# Patient Record
Sex: FEMALE | Race: WHITE | NOT HISPANIC OR LATINO | Employment: OTHER | URBAN - METROPOLITAN AREA
[De-identification: names, ages, dates, MRNs, and addresses within clinical notes are randomized per-mention and may not be internally consistent; named-entity substitution may affect disease eponyms.]

---

## 2019-09-30 ENCOUNTER — OFFICE VISIT (OUTPATIENT)
Dept: GASTROENTEROLOGY | Facility: CLINIC | Age: 59
End: 2019-09-30
Payer: COMMERCIAL

## 2019-09-30 VITALS
HEART RATE: 100 BPM | BODY MASS INDEX: 32.04 KG/M2 | HEIGHT: 67 IN | WEIGHT: 204.13 LBS | DIASTOLIC BLOOD PRESSURE: 86 MMHG | SYSTOLIC BLOOD PRESSURE: 128 MMHG

## 2019-09-30 DIAGNOSIS — K80.20 ASYMPTOMATIC CHOLELITHIASIS: ICD-10-CM

## 2019-09-30 DIAGNOSIS — R10.12 ABDOMINAL PAIN, LEFT UPPER QUADRANT: Primary | ICD-10-CM

## 2019-09-30 DIAGNOSIS — E73.9 LACTOSE INTOLERANCE: ICD-10-CM

## 2019-09-30 DIAGNOSIS — R11.0 NAUSEA: ICD-10-CM

## 2019-09-30 PROCEDURE — 99999 PR PBB SHADOW E&M-EST. PATIENT-LVL III: ICD-10-PCS | Mod: PBBFAC,,, | Performed by: INTERNAL MEDICINE

## 2019-09-30 PROCEDURE — 99204 PR OFFICE/OUTPT VISIT, NEW, LEVL IV, 45-59 MIN: ICD-10-PCS | Mod: ,,, | Performed by: INTERNAL MEDICINE

## 2019-09-30 PROCEDURE — 99204 OFFICE O/P NEW MOD 45 MIN: CPT | Mod: ,,, | Performed by: INTERNAL MEDICINE

## 2019-09-30 PROCEDURE — 99999 PR PBB SHADOW E&M-EST. PATIENT-LVL III: CPT | Mod: PBBFAC,,, | Performed by: INTERNAL MEDICINE

## 2019-09-30 RX ORDER — ATORVASTATIN CALCIUM 10 MG/1
TABLET, FILM COATED ORAL
COMMUNITY
Start: 2019-03-25

## 2019-09-30 NOTE — PROGRESS NOTES
Subjective:       Patient ID: Suellen Campos is a 59 y.o. female.    Chief Complaint: Nausea (vomiting/off and on) and Abdominal Cramping (off and on)    Patient with history of gallstones, has been told she had this issue 5 years ago. Had not been having no problems until about 3 months ago when she had a flare of pain. She had episode of epigastric pain on and off for ~ 3 weeks. The pain was LUQ in location and was dull and aching in character. It would last few seconds and then go away. There was significant variability between episodes and may not occur for weeks. She has episodes of nausea which may last a day and recurs some evrey 3 days are so; usually associated with meals. Her NSAID use is rare. She denies BRBPR or melena. Her appetite is good, but no weight loss. There is no aversion to the sight or smell of food. There has been no change in bowel habits. There is no constipation dispite being on nacotic analgesics.     The patient's last colonoscopy was in 2013. She had a negative Cologuard earlier this year which was negative. She carries a diagnosis of lactose intolernace, but cannot recall if episodes of her complaints were associated with this.     Review of Systems   Constitutional: Negative for activity change, appetite change, chills, diaphoresis, fatigue, fever and unexpected weight change.   HENT: Negative for congestion, ear discharge, ear pain, hearing loss, nosebleeds, postnasal drip and tinnitus.    Eyes: Negative for photophobia and visual disturbance.   Respiratory: Negative for apnea, cough, choking, chest tightness, shortness of breath and wheezing.    Cardiovascular: Negative for chest pain, palpitations and leg swelling.   Gastrointestinal: Positive for abdominal distention, diarrhea and nausea. Negative for abdominal pain, anal bleeding, blood in stool, constipation, rectal pain and vomiting.   Genitourinary: Negative for difficulty urinating, dyspareunia, dysuria, flank pain, frequency,  hematuria, menstrual problem, pelvic pain, urgency, vaginal bleeding and vaginal discharge.   Musculoskeletal: Positive for back pain and joint swelling. Negative for arthralgias, gait problem, myalgias and neck stiffness.        Joint stiffness   Skin: Negative for pallor and rash.   Neurological: Negative for dizziness, tremors, seizures, syncope, speech difficulty, weakness, numbness and headaches.   Hematological: Negative for adenopathy.   Psychiatric/Behavioral: Negative for agitation, confusion, hallucinations, sleep disturbance and suicidal ideas.       Objective:      Physical Exam   Constitutional: She is oriented to person, place, and time. She appears well-developed and well-nourished.   HENT:   Head: Normocephalic and atraumatic.   Eyes: Pupils are equal, round, and reactive to light. Conjunctivae and EOM are normal. Right eye exhibits no discharge. Left eye exhibits no discharge. No scleral icterus.   Neck: Normal range of motion. Neck supple. No JVD present. No thyromegaly present.   Cardiovascular: Normal rate, regular rhythm, normal heart sounds and intact distal pulses. Exam reveals no gallop and no friction rub.   No murmur heard.  Pulmonary/Chest: Effort normal and breath sounds normal. No respiratory distress. She has no wheezes. She has no rales. She exhibits no tenderness.   Abdominal: Soft. Bowel sounds are normal. She exhibits no distension and no mass. There is no tenderness. There is no rebound and no guarding.   Musculoskeletal: Normal range of motion. She exhibits no edema.   Lymphadenopathy:     She has no cervical adenopathy.   Neurological: She is alert and oriented to person, place, and time. She has normal reflexes. She exhibits normal muscle tone. Coordination normal.   Skin: Skin is warm and dry. No rash noted. No erythema. No pallor.   Psychiatric: She has a normal mood and affect. Her behavior is normal. Judgment and thought content normal.   Vitals reviewed.      Assessment:    LUQ abdominal pain  Nausea  Cholelithiasis        Asymptomatic  ? Lactose intolerance  No diagnosis found.    Plan:   Presently without difficulty. No clear indication of source of problem. Have patient see us at the time of flareup to make decisions of what maybe happening.  Lactose free diet

## 2021-05-09 ENCOUNTER — ANESTHESIA EVENT (OUTPATIENT)
Dept: SURGERY | Facility: HOSPITAL | Age: 61
DRG: 255 | End: 2021-05-09
Payer: COMMERCIAL

## 2021-05-09 ENCOUNTER — ANESTHESIA (OUTPATIENT)
Dept: SURGERY | Facility: HOSPITAL | Age: 61
DRG: 255 | End: 2021-05-09
Payer: COMMERCIAL

## 2021-05-09 ENCOUNTER — HOSPITAL ENCOUNTER (INPATIENT)
Facility: HOSPITAL | Age: 61
LOS: 2 days | Discharge: HOME HEALTH | DRG: 255 | End: 2021-05-12
Attending: EMERGENCY MEDICINE | Admitting: FAMILY MEDICINE
Payer: COMMERCIAL

## 2021-05-09 DIAGNOSIS — L03.032 CELLULITIS OF GREAT TOE, LEFT: ICD-10-CM

## 2021-05-09 DIAGNOSIS — L08.9 TOE INFECTION: ICD-10-CM

## 2021-05-09 DIAGNOSIS — L03.116 CELLULITIS OF LEFT FOOT: Primary | ICD-10-CM

## 2021-05-09 DIAGNOSIS — Z01.810 PREOP CARDIOVASCULAR EXAM: ICD-10-CM

## 2021-05-09 DIAGNOSIS — A48.0 GAS GANGRENE OF FOOT: ICD-10-CM

## 2021-05-09 PROBLEM — I10 ESSENTIAL HYPERTENSION: Status: ACTIVE | Noted: 2021-05-09

## 2021-05-09 PROBLEM — F32.A DEPRESSION: Status: ACTIVE | Noted: 2021-05-09

## 2021-05-09 PROBLEM — E87.6 HYPOKALEMIA: Status: ACTIVE | Noted: 2021-05-09

## 2021-05-09 PROBLEM — Z89.412 HISTORY OF AMPUTATION OF LEFT GREAT TOE: Status: ACTIVE | Noted: 2021-05-09

## 2021-05-09 PROBLEM — G89.4 CHRONIC PAIN SYNDROME: Status: ACTIVE | Noted: 2021-05-09

## 2021-05-09 PROBLEM — E11.49 DIABETES MELLITUS TYPE 2 WITH NEUROLOGICAL MANIFESTATIONS: Status: ACTIVE | Noted: 2021-05-09

## 2021-05-09 PROBLEM — E78.5 HLD (HYPERLIPIDEMIA): Status: ACTIVE | Noted: 2021-05-09

## 2021-05-09 PROBLEM — E87.1 HYPONATREMIA: Status: ACTIVE | Noted: 2021-05-09

## 2021-05-09 LAB
ALBUMIN SERPL BCP-MCNC: 3 G/DL (ref 3.5–5.2)
ALP SERPL-CCNC: 77 U/L (ref 55–135)
ALT SERPL W/O P-5'-P-CCNC: 15 U/L (ref 10–44)
ANION GAP SERPL CALC-SCNC: 13 MMOL/L (ref 8–16)
AST SERPL-CCNC: 17 U/L (ref 10–40)
BASOPHILS # BLD AUTO: 0.03 K/UL (ref 0–0.2)
BASOPHILS NFR BLD: 0.3 % (ref 0–1.9)
BILIRUB SERPL-MCNC: 0.8 MG/DL (ref 0.1–1)
BUN SERPL-MCNC: 24 MG/DL (ref 8–23)
CALCIUM SERPL-MCNC: 8.9 MG/DL (ref 8.7–10.5)
CHLORIDE SERPL-SCNC: 93 MMOL/L (ref 95–110)
CO2 SERPL-SCNC: 27 MMOL/L (ref 23–29)
CREAT SERPL-MCNC: 0.9 MG/DL (ref 0.5–1.4)
CRP SERPL-MCNC: 271.5 MG/L (ref 0–8.2)
CTP QC/QA: YES
DIFFERENTIAL METHOD: ABNORMAL
EOSINOPHIL # BLD AUTO: 0 K/UL (ref 0–0.5)
EOSINOPHIL NFR BLD: 0.4 % (ref 0–8)
ERYTHROCYTE [DISTWIDTH] IN BLOOD BY AUTOMATED COUNT: 14.6 % (ref 11.5–14.5)
ERYTHROCYTE [SEDIMENTATION RATE] IN BLOOD BY WESTERGREN METHOD: 100 MM/HR (ref 0–20)
EST. GFR  (AFRICAN AMERICAN): >60 ML/MIN/1.73 M^2
EST. GFR  (NON AFRICAN AMERICAN): >60 ML/MIN/1.73 M^2
GLUCOSE SERPL-MCNC: 148 MG/DL (ref 70–110)
GRAM STN SPEC: NORMAL
HCT VFR BLD AUTO: 31.6 % (ref 37–48.5)
HCV AB SERPL QL IA: NEGATIVE
HGB BLD-MCNC: 10 G/DL (ref 12–16)
HIV 1+2 AB+HIV1 P24 AG SERPL QL IA: NEGATIVE
IMM GRANULOCYTES # BLD AUTO: 0.04 K/UL (ref 0–0.04)
IMM GRANULOCYTES NFR BLD AUTO: 0.4 % (ref 0–0.5)
LACTATE SERPL-SCNC: 0.9 MMOL/L (ref 0.5–2.2)
LIPASE SERPL-CCNC: 6 U/L (ref 4–60)
LYMPHOCYTES # BLD AUTO: 0.8 K/UL (ref 1–4.8)
LYMPHOCYTES NFR BLD: 7.6 % (ref 18–48)
MCH RBC QN AUTO: 26.7 PG (ref 27–31)
MCHC RBC AUTO-ENTMCNC: 31.6 G/DL (ref 32–36)
MCV RBC AUTO: 85 FL (ref 82–98)
MONOCYTES # BLD AUTO: 0.8 K/UL (ref 0.3–1)
MONOCYTES NFR BLD: 8 % (ref 4–15)
NEUTROPHILS # BLD AUTO: 8.5 K/UL (ref 1.8–7.7)
NEUTROPHILS NFR BLD: 83.3 % (ref 38–73)
NRBC BLD-RTO: 0 /100 WBC
PLATELET # BLD AUTO: 214 K/UL (ref 150–450)
PMV BLD AUTO: 9.7 FL (ref 9.2–12.9)
POCT GLUCOSE: 142 MG/DL (ref 70–110)
POTASSIUM SERPL-SCNC: 3.3 MMOL/L (ref 3.5–5.1)
PROT SERPL-MCNC: 7.5 G/DL (ref 6–8.4)
RBC # BLD AUTO: 3.74 M/UL (ref 4–5.4)
SARS-COV-2 RDRP RESP QL NAA+PROBE: NEGATIVE
SODIUM SERPL-SCNC: 133 MMOL/L (ref 136–145)
WBC # BLD AUTO: 10.25 K/UL (ref 3.9–12.7)

## 2021-05-09 PROCEDURE — 99223 PR INITIAL HOSPITAL CARE,LEVL III: ICD-10-PCS | Mod: ,,, | Performed by: PODIATRIST

## 2021-05-09 PROCEDURE — 93010 EKG 12-LEAD: ICD-10-PCS | Mod: ,,, | Performed by: INTERNAL MEDICINE

## 2021-05-09 PROCEDURE — 71000033 HC RECOVERY, INTIAL HOUR: Performed by: PODIATRIST

## 2021-05-09 PROCEDURE — 85651 RBC SED RATE NONAUTOMATED: CPT | Performed by: EMERGENCY MEDICINE

## 2021-05-09 PROCEDURE — 25000003 PHARM REV CODE 250: Performed by: EMERGENCY MEDICINE

## 2021-05-09 PROCEDURE — 25000003 PHARM REV CODE 250: Performed by: NURSE PRACTITIONER

## 2021-05-09 PROCEDURE — 87186 SC STD MICRODIL/AGAR DIL: CPT | Mod: 59 | Performed by: PODIATRIST

## 2021-05-09 PROCEDURE — 93005 ELECTROCARDIOGRAM TRACING: CPT

## 2021-05-09 PROCEDURE — 87205 SMEAR GRAM STAIN: CPT | Mod: 59 | Performed by: PODIATRIST

## 2021-05-09 PROCEDURE — 86140 C-REACTIVE PROTEIN: CPT | Performed by: EMERGENCY MEDICINE

## 2021-05-09 PROCEDURE — 87070 CULTURE OTHR SPECIMN AEROBIC: CPT | Mod: 59 | Performed by: PODIATRIST

## 2021-05-09 PROCEDURE — 83690 ASSAY OF LIPASE: CPT | Performed by: EMERGENCY MEDICINE

## 2021-05-09 PROCEDURE — 63600175 PHARM REV CODE 636 W HCPCS: Performed by: PODIATRIST

## 2021-05-09 PROCEDURE — G0378 HOSPITAL OBSERVATION PER HR: HCPCS

## 2021-05-09 PROCEDURE — 37000009 HC ANESTHESIA EA ADD 15 MINS: Performed by: PODIATRIST

## 2021-05-09 PROCEDURE — 96365 THER/PROPH/DIAG IV INF INIT: CPT

## 2021-05-09 PROCEDURE — 86703 HIV-1/HIV-2 1 RESULT ANTBDY: CPT | Performed by: EMERGENCY MEDICINE

## 2021-05-09 PROCEDURE — 37000008 HC ANESTHESIA 1ST 15 MINUTES: Performed by: PODIATRIST

## 2021-05-09 PROCEDURE — 25000003 PHARM REV CODE 250: Performed by: PODIATRIST

## 2021-05-09 PROCEDURE — 36415 COLL VENOUS BLD VENIPUNCTURE: CPT | Performed by: EMERGENCY MEDICINE

## 2021-05-09 PROCEDURE — 63600175 PHARM REV CODE 636 W HCPCS: Performed by: EMERGENCY MEDICINE

## 2021-05-09 PROCEDURE — 86803 HEPATITIS C AB TEST: CPT | Performed by: EMERGENCY MEDICINE

## 2021-05-09 PROCEDURE — 28820 PR AMPUTATION TOE,MT-P JT: ICD-10-PCS | Mod: TA,,, | Performed by: PODIATRIST

## 2021-05-09 PROCEDURE — 96376 TX/PRO/DX INJ SAME DRUG ADON: CPT

## 2021-05-09 PROCEDURE — 93010 ELECTROCARDIOGRAM REPORT: CPT | Mod: ,,, | Performed by: INTERNAL MEDICINE

## 2021-05-09 PROCEDURE — 87102 FUNGUS ISOLATION CULTURE: CPT | Performed by: PODIATRIST

## 2021-05-09 PROCEDURE — 36000707: Performed by: PODIATRIST

## 2021-05-09 PROCEDURE — 83605 ASSAY OF LACTIC ACID: CPT | Performed by: EMERGENCY MEDICINE

## 2021-05-09 PROCEDURE — 87116 MYCOBACTERIA CULTURE: CPT | Performed by: PODIATRIST

## 2021-05-09 PROCEDURE — 36415 COLL VENOUS BLD VENIPUNCTURE: CPT | Performed by: NURSE PRACTITIONER

## 2021-05-09 PROCEDURE — 96361 HYDRATE IV INFUSION ADD-ON: CPT

## 2021-05-09 PROCEDURE — 87075 CULTR BACTERIA EXCEPT BLOOD: CPT | Mod: 59 | Performed by: PODIATRIST

## 2021-05-09 PROCEDURE — 87040 BLOOD CULTURE FOR BACTERIA: CPT | Mod: 59 | Performed by: EMERGENCY MEDICINE

## 2021-05-09 PROCEDURE — 87206 SMEAR FLUORESCENT/ACID STAI: CPT | Mod: 91 | Performed by: PODIATRIST

## 2021-05-09 PROCEDURE — 99223 1ST HOSP IP/OBS HIGH 75: CPT | Mod: ,,, | Performed by: PODIATRIST

## 2021-05-09 PROCEDURE — 27201423 OPTIME MED/SURG SUP & DEVICES STERILE SUPPLY: Performed by: PODIATRIST

## 2021-05-09 PROCEDURE — 83036 HEMOGLOBIN GLYCOSYLATED A1C: CPT | Performed by: NURSE PRACTITIONER

## 2021-05-09 PROCEDURE — 80053 COMPREHEN METABOLIC PANEL: CPT | Performed by: EMERGENCY MEDICINE

## 2021-05-09 PROCEDURE — 28820 AMPUTATION OF TOE: CPT | Mod: TA,,, | Performed by: PODIATRIST

## 2021-05-09 PROCEDURE — 63600175 PHARM REV CODE 636 W HCPCS: Performed by: NURSE ANESTHETIST, CERTIFIED REGISTERED

## 2021-05-09 PROCEDURE — 85025 COMPLETE CBC W/AUTO DIFF WBC: CPT | Performed by: EMERGENCY MEDICINE

## 2021-05-09 PROCEDURE — 36000706: Performed by: PODIATRIST

## 2021-05-09 PROCEDURE — 99285 EMERGENCY DEPT VISIT HI MDM: CPT | Mod: 25

## 2021-05-09 PROCEDURE — 87077 CULTURE AEROBIC IDENTIFY: CPT | Mod: 59 | Performed by: PODIATRIST

## 2021-05-09 PROCEDURE — U0002 COVID-19 LAB TEST NON-CDC: HCPCS | Performed by: EMERGENCY MEDICINE

## 2021-05-09 RX ORDER — ONDANSETRON 8 MG/1
8 TABLET, ORALLY DISINTEGRATING ORAL EVERY 8 HOURS PRN
Status: DISCONTINUED | OUTPATIENT
Start: 2021-05-09 | End: 2021-05-12 | Stop reason: HOSPADM

## 2021-05-09 RX ORDER — BUPIVACAINE HYDROCHLORIDE 5 MG/ML
INJECTION, SOLUTION EPIDURAL; INTRACAUDAL
Status: DISCONTINUED | OUTPATIENT
Start: 2021-05-09 | End: 2021-05-09 | Stop reason: HOSPADM

## 2021-05-09 RX ORDER — MORPHINE SULFATE 15 MG/1
30 TABLET ORAL EVERY 12 HOURS
Status: DISCONTINUED | OUTPATIENT
Start: 2021-05-09 | End: 2021-05-12 | Stop reason: HOSPADM

## 2021-05-09 RX ORDER — VENLAFAXINE HYDROCHLORIDE 150 MG/1
150 CAPSULE, EXTENDED RELEASE ORAL NIGHTLY
Status: DISCONTINUED | OUTPATIENT
Start: 2021-05-09 | End: 2021-05-12 | Stop reason: HOSPADM

## 2021-05-09 RX ORDER — POTASSIUM CHLORIDE 20 MEQ/1
40 TABLET, EXTENDED RELEASE ORAL ONCE
Status: COMPLETED | OUTPATIENT
Start: 2021-05-09 | End: 2021-05-09

## 2021-05-09 RX ORDER — DIPHENHYDRAMINE HYDROCHLORIDE 50 MG/ML
25 INJECTION INTRAMUSCULAR; INTRAVENOUS EVERY 6 HOURS PRN
Status: DISCONTINUED | OUTPATIENT
Start: 2021-05-09 | End: 2021-05-09 | Stop reason: HOSPADM

## 2021-05-09 RX ORDER — FENTANYL CITRATE 50 UG/ML
25 INJECTION, SOLUTION INTRAMUSCULAR; INTRAVENOUS EVERY 5 MIN PRN
Status: DISCONTINUED | OUTPATIENT
Start: 2021-05-09 | End: 2021-05-09 | Stop reason: HOSPADM

## 2021-05-09 RX ORDER — INSULIN ASPART 100 [IU]/ML
0-5 INJECTION, SOLUTION INTRAVENOUS; SUBCUTANEOUS EVERY 6 HOURS PRN
Status: DISCONTINUED | OUTPATIENT
Start: 2021-05-09 | End: 2021-05-12 | Stop reason: HOSPADM

## 2021-05-09 RX ORDER — METRONIDAZOLE 500 MG/1
500 TABLET ORAL
Status: DISCONTINUED | OUTPATIENT
Start: 2021-05-09 | End: 2021-05-12 | Stop reason: ALTCHOICE

## 2021-05-09 RX ORDER — KETOROLAC TROMETHAMINE 30 MG/ML
15 INJECTION, SOLUTION INTRAMUSCULAR; INTRAVENOUS EVERY 8 HOURS PRN
Status: DISCONTINUED | OUTPATIENT
Start: 2021-05-09 | End: 2021-05-09 | Stop reason: HOSPADM

## 2021-05-09 RX ORDER — OXYCODONE HYDROCHLORIDE 5 MG/1
15 TABLET ORAL EVERY 8 HOURS PRN
Status: DISCONTINUED | OUTPATIENT
Start: 2021-05-09 | End: 2021-05-12 | Stop reason: HOSPADM

## 2021-05-09 RX ORDER — ATORVASTATIN CALCIUM 10 MG/1
10 TABLET, FILM COATED ORAL NIGHTLY
Status: DISCONTINUED | OUTPATIENT
Start: 2021-05-09 | End: 2021-05-12 | Stop reason: HOSPADM

## 2021-05-09 RX ORDER — CEFEPIME HYDROCHLORIDE 1 G/50ML
2 INJECTION, SOLUTION INTRAVENOUS
Status: DISCONTINUED | OUTPATIENT
Start: 2021-05-09 | End: 2021-05-12 | Stop reason: ALTCHOICE

## 2021-05-09 RX ORDER — SODIUM CHLORIDE, SODIUM LACTATE, POTASSIUM CHLORIDE, CALCIUM CHLORIDE 600; 310; 30; 20 MG/100ML; MG/100ML; MG/100ML; MG/100ML
INJECTION, SOLUTION INTRAVENOUS CONTINUOUS PRN
Status: DISCONTINUED | OUTPATIENT
Start: 2021-05-09 | End: 2021-05-09

## 2021-05-09 RX ORDER — HYDROCODONE BITARTRATE AND ACETAMINOPHEN 5; 325 MG/1; MG/1
1 TABLET ORAL
Status: DISCONTINUED | OUTPATIENT
Start: 2021-05-09 | End: 2021-05-09 | Stop reason: HOSPADM

## 2021-05-09 RX ORDER — MIDAZOLAM HYDROCHLORIDE 1 MG/ML
INJECTION INTRAMUSCULAR; INTRAVENOUS
Status: DISCONTINUED | OUTPATIENT
Start: 2021-05-09 | End: 2021-05-09

## 2021-05-09 RX ORDER — CEFEPIME HYDROCHLORIDE 1 G/50ML
1 INJECTION, SOLUTION INTRAVENOUS
Status: DISCONTINUED | OUTPATIENT
Start: 2021-05-09 | End: 2021-05-09 | Stop reason: DRUGHIGH

## 2021-05-09 RX ORDER — FENTANYL CITRATE 50 UG/ML
INJECTION, SOLUTION INTRAMUSCULAR; INTRAVENOUS
Status: DISCONTINUED | OUTPATIENT
Start: 2021-05-09 | End: 2021-05-09

## 2021-05-09 RX ORDER — CLINDAMYCIN PHOSPHATE 900 MG/50ML
900 INJECTION, SOLUTION INTRAVENOUS
Status: COMPLETED | OUTPATIENT
Start: 2021-05-09 | End: 2021-05-09

## 2021-05-09 RX ORDER — ONDANSETRON 2 MG/ML
4 INJECTION INTRAMUSCULAR; INTRAVENOUS ONCE AS NEEDED
Status: DISCONTINUED | OUTPATIENT
Start: 2021-05-09 | End: 2021-05-09 | Stop reason: HOSPADM

## 2021-05-09 RX ORDER — SODIUM CHLORIDE 9 MG/ML
INJECTION, SOLUTION INTRAVENOUS CONTINUOUS
Status: DISCONTINUED | OUTPATIENT
Start: 2021-05-09 | End: 2021-05-12 | Stop reason: HOSPADM

## 2021-05-09 RX ORDER — MEPERIDINE HYDROCHLORIDE 25 MG/ML
12.5 INJECTION INTRAMUSCULAR; INTRAVENOUS; SUBCUTANEOUS ONCE
Status: DISCONTINUED | OUTPATIENT
Start: 2021-05-09 | End: 2021-05-09 | Stop reason: HOSPADM

## 2021-05-09 RX ORDER — VANCOMYCIN HCL IN 5 % DEXTROSE 1G/250ML
1000 PLASTIC BAG, INJECTION (ML) INTRAVENOUS
Status: DISCONTINUED | OUTPATIENT
Start: 2021-05-10 | End: 2021-05-11

## 2021-05-09 RX ORDER — ALBUTEROL SULFATE 0.83 MG/ML
2.5 SOLUTION RESPIRATORY (INHALATION) EVERY 4 HOURS PRN
Status: DISCONTINUED | OUTPATIENT
Start: 2021-05-09 | End: 2021-05-12 | Stop reason: HOSPADM

## 2021-05-09 RX ORDER — GLUCAGON 1 MG
1 KIT INJECTION
Status: DISCONTINUED | OUTPATIENT
Start: 2021-05-09 | End: 2021-05-12 | Stop reason: HOSPADM

## 2021-05-09 RX ORDER — SODIUM CHLORIDE 0.9 % (FLUSH) 0.9 %
10 SYRINGE (ML) INJECTION
Status: DISCONTINUED | OUTPATIENT
Start: 2021-05-09 | End: 2021-05-12 | Stop reason: HOSPADM

## 2021-05-09 RX ADMIN — CLINDAMYCIN IN 5 PERCENT DEXTROSE 900 MG: 18 INJECTION, SOLUTION INTRAVENOUS at 12:05

## 2021-05-09 RX ADMIN — MORPHINE SULFATE 30 MG: 15 TABLET ORAL at 09:05

## 2021-05-09 RX ADMIN — CEFEPIME 2 G: 2 INJECTION, POWDER, FOR SOLUTION INTRAVENOUS at 09:05

## 2021-05-09 RX ADMIN — CEFEPIME 2 G: 2 INJECTION, POWDER, FOR SOLUTION INTRAVENOUS at 12:05

## 2021-05-09 RX ADMIN — SODIUM CHLORIDE: 0.9 INJECTION, SOLUTION INTRAVENOUS at 04:05

## 2021-05-09 RX ADMIN — MIDAZOLAM HYDROCHLORIDE 2 MG: 1 INJECTION, SOLUTION INTRAMUSCULAR; INTRAVENOUS at 02:05

## 2021-05-09 RX ADMIN — POTASSIUM CHLORIDE 40 MEQ: 1500 TABLET, EXTENDED RELEASE ORAL at 12:05

## 2021-05-09 RX ADMIN — METRONIDAZOLE 500 MG: 500 TABLET ORAL at 04:05

## 2021-05-09 RX ADMIN — ATORVASTATIN CALCIUM 10 MG: 10 TABLET, FILM COATED ORAL at 09:05

## 2021-05-09 RX ADMIN — VANCOMYCIN HYDROCHLORIDE 2250 MG: 100 INJECTION, POWDER, LYOPHILIZED, FOR SOLUTION INTRAVENOUS at 02:05

## 2021-05-09 RX ADMIN — SODIUM CHLORIDE 500 ML: 0.9 INJECTION, SOLUTION INTRAVENOUS at 11:05

## 2021-05-09 RX ADMIN — METRONIDAZOLE 500 MG: 500 TABLET ORAL at 09:05

## 2021-05-09 RX ADMIN — FENTANYL CITRATE 100 MCG: 50 INJECTION, SOLUTION INTRAMUSCULAR; INTRAVENOUS at 02:05

## 2021-05-09 RX ADMIN — VENLAFAXINE HYDROCHLORIDE 150 MG: 150 CAPSULE, EXTENDED RELEASE ORAL at 09:05

## 2021-05-09 RX ADMIN — SODIUM CHLORIDE, SODIUM LACTATE, POTASSIUM CHLORIDE, AND CALCIUM CHLORIDE: 600; 310; 30; 20 INJECTION, SOLUTION INTRAVENOUS at 02:05

## 2021-05-10 ENCOUNTER — PATIENT MESSAGE (OUTPATIENT)
Dept: RESEARCH | Facility: HOSPITAL | Age: 61
End: 2021-05-10

## 2021-05-10 PROBLEM — E11.621 DIABETIC ULCER OF RIGHT GREAT TOE: Status: ACTIVE | Noted: 2021-05-10

## 2021-05-10 PROBLEM — L97.519 DIABETIC ULCER OF RIGHT GREAT TOE: Status: ACTIVE | Noted: 2021-05-10

## 2021-05-10 PROBLEM — G47.00 INSOMNIA: Status: ACTIVE | Noted: 2021-05-10

## 2021-05-10 PROBLEM — A48.0: Status: RESOLVED | Noted: 2021-05-09 | Resolved: 2021-05-10

## 2021-05-10 LAB
ANION GAP SERPL CALC-SCNC: 8 MMOL/L (ref 8–16)
BUN SERPL-MCNC: 16 MG/DL (ref 8–23)
CALCIUM SERPL-MCNC: 8.5 MG/DL (ref 8.7–10.5)
CHLORIDE SERPL-SCNC: 105 MMOL/L (ref 95–110)
CO2 SERPL-SCNC: 27 MMOL/L (ref 23–29)
CREAT SERPL-MCNC: 0.7 MG/DL (ref 0.5–1.4)
EST. GFR  (AFRICAN AMERICAN): >60 ML/MIN/1.73 M^2
EST. GFR  (NON AFRICAN AMERICAN): >60 ML/MIN/1.73 M^2
ESTIMATED AVG GLUCOSE: 148 MG/DL (ref 68–131)
GLUCOSE SERPL-MCNC: 107 MG/DL (ref 70–110)
HBA1C MFR BLD: 6.8 % (ref 4–5.6)
MAGNESIUM SERPL-MCNC: 1.5 MG/DL (ref 1.6–2.6)
POCT GLUCOSE: 102 MG/DL (ref 70–110)
POCT GLUCOSE: 107 MG/DL (ref 70–110)
POCT GLUCOSE: 114 MG/DL (ref 70–110)
POCT GLUCOSE: 147 MG/DL (ref 70–110)
POTASSIUM SERPL-SCNC: 4.1 MMOL/L (ref 3.5–5.1)
SODIUM SERPL-SCNC: 140 MMOL/L (ref 136–145)

## 2021-05-10 PROCEDURE — 25000003 PHARM REV CODE 250: Performed by: PODIATRIST

## 2021-05-10 PROCEDURE — 25000003 PHARM REV CODE 250: Performed by: NURSE PRACTITIONER

## 2021-05-10 PROCEDURE — 63600175 PHARM REV CODE 636 W HCPCS: Performed by: NURSE PRACTITIONER

## 2021-05-10 PROCEDURE — 96376 TX/PRO/DX INJ SAME DRUG ADON: CPT

## 2021-05-10 PROCEDURE — 63600175 PHARM REV CODE 636 W HCPCS: Performed by: PODIATRIST

## 2021-05-10 PROCEDURE — 96372 THER/PROPH/DIAG INJ SC/IM: CPT

## 2021-05-10 PROCEDURE — 83735 ASSAY OF MAGNESIUM: CPT | Performed by: NURSE PRACTITIONER

## 2021-05-10 PROCEDURE — 11000001 HC ACUTE MED/SURG PRIVATE ROOM

## 2021-05-10 PROCEDURE — 80048 BASIC METABOLIC PNL TOTAL CA: CPT | Performed by: NURSE PRACTITIONER

## 2021-05-10 PROCEDURE — 96375 TX/PRO/DX INJ NEW DRUG ADDON: CPT

## 2021-05-10 PROCEDURE — 36415 COLL VENOUS BLD VENIPUNCTURE: CPT | Performed by: NURSE PRACTITIONER

## 2021-05-10 RX ORDER — ENOXAPARIN SODIUM 100 MG/ML
40 INJECTION SUBCUTANEOUS EVERY 24 HOURS
Status: DISCONTINUED | OUTPATIENT
Start: 2021-05-10 | End: 2021-05-12 | Stop reason: HOSPADM

## 2021-05-10 RX ORDER — POTASSIUM CHLORIDE 20 MEQ/1
40 TABLET, EXTENDED RELEASE ORAL ONCE
Status: COMPLETED | OUTPATIENT
Start: 2021-05-10 | End: 2021-05-10

## 2021-05-10 RX ADMIN — CEFEPIME 2 G: 2 INJECTION, POWDER, FOR SOLUTION INTRAVENOUS at 12:05

## 2021-05-10 RX ADMIN — CEFEPIME 2 G: 2 INJECTION, POWDER, FOR SOLUTION INTRAVENOUS at 09:05

## 2021-05-10 RX ADMIN — METRONIDAZOLE 500 MG: 500 TABLET ORAL at 09:05

## 2021-05-10 RX ADMIN — OXYCODONE HYDROCHLORIDE 15 MG: 5 TABLET ORAL at 02:05

## 2021-05-10 RX ADMIN — CEFEPIME 2 G: 2 INJECTION, POWDER, FOR SOLUTION INTRAVENOUS at 05:05

## 2021-05-10 RX ADMIN — ENOXAPARIN SODIUM 40 MG: 40 INJECTION SUBCUTANEOUS at 05:05

## 2021-05-10 RX ADMIN — ATORVASTATIN CALCIUM 10 MG: 10 TABLET, FILM COATED ORAL at 09:05

## 2021-05-10 RX ADMIN — MORPHINE SULFATE 30 MG: 15 TABLET ORAL at 08:05

## 2021-05-10 RX ADMIN — VANCOMYCIN HYDROCHLORIDE 1000 MG: 1 INJECTION, POWDER, LYOPHILIZED, FOR SOLUTION INTRAVENOUS at 01:05

## 2021-05-10 RX ADMIN — OXYCODONE HYDROCHLORIDE 15 MG: 5 TABLET ORAL at 03:05

## 2021-05-10 RX ADMIN — POTASSIUM CHLORIDE 40 MEQ: 1500 TABLET, EXTENDED RELEASE ORAL at 12:05

## 2021-05-10 RX ADMIN — VENLAFAXINE HYDROCHLORIDE 150 MG: 150 CAPSULE, EXTENDED RELEASE ORAL at 09:05

## 2021-05-10 RX ADMIN — METRONIDAZOLE 500 MG: 500 TABLET ORAL at 02:05

## 2021-05-10 RX ADMIN — VANCOMYCIN HYDROCHLORIDE 1000 MG: 1 INJECTION, POWDER, LYOPHILIZED, FOR SOLUTION INTRAVENOUS at 02:05

## 2021-05-10 RX ADMIN — METRONIDAZOLE 500 MG: 500 TABLET ORAL at 05:05

## 2021-05-10 RX ADMIN — MORPHINE SULFATE 30 MG: 15 TABLET ORAL at 09:05

## 2021-05-11 LAB
ANION GAP SERPL CALC-SCNC: 12 MMOL/L (ref 8–16)
BASOPHILS # BLD AUTO: 0.04 K/UL (ref 0–0.2)
BASOPHILS NFR BLD: 0.8 % (ref 0–1.9)
BUN SERPL-MCNC: 10 MG/DL (ref 8–23)
CALCIUM SERPL-MCNC: 8.6 MG/DL (ref 8.7–10.5)
CHLORIDE SERPL-SCNC: 107 MMOL/L (ref 95–110)
CO2 SERPL-SCNC: 22 MMOL/L (ref 23–29)
CREAT SERPL-MCNC: 0.7 MG/DL (ref 0.5–1.4)
DIFFERENTIAL METHOD: ABNORMAL
EOSINOPHIL # BLD AUTO: 0.1 K/UL (ref 0–0.5)
EOSINOPHIL NFR BLD: 1.9 % (ref 0–8)
ERYTHROCYTE [DISTWIDTH] IN BLOOD BY AUTOMATED COUNT: 14.6 % (ref 11.5–14.5)
EST. GFR  (AFRICAN AMERICAN): >60 ML/MIN/1.73 M^2
EST. GFR  (NON AFRICAN AMERICAN): >60 ML/MIN/1.73 M^2
GLUCOSE SERPL-MCNC: 128 MG/DL (ref 70–110)
HCT VFR BLD AUTO: 32.4 % (ref 37–48.5)
HGB BLD-MCNC: 9.8 G/DL (ref 12–16)
IMM GRANULOCYTES # BLD AUTO: 0.04 K/UL (ref 0–0.04)
IMM GRANULOCYTES NFR BLD AUTO: 0.8 % (ref 0–0.5)
LYMPHOCYTES # BLD AUTO: 1.1 K/UL (ref 1–4.8)
LYMPHOCYTES NFR BLD: 22.6 % (ref 18–48)
MCH RBC QN AUTO: 26.1 PG (ref 27–31)
MCHC RBC AUTO-ENTMCNC: 30.2 G/DL (ref 32–36)
MCV RBC AUTO: 86 FL (ref 82–98)
MONOCYTES # BLD AUTO: 0.4 K/UL (ref 0.3–1)
MONOCYTES NFR BLD: 9.2 % (ref 4–15)
NEUTROPHILS # BLD AUTO: 3.1 K/UL (ref 1.8–7.7)
NEUTROPHILS NFR BLD: 64.7 % (ref 38–73)
NRBC BLD-RTO: 0 /100 WBC
PLATELET # BLD AUTO: 277 K/UL (ref 150–450)
PMV BLD AUTO: 9.7 FL (ref 9.2–12.9)
POCT GLUCOSE: 119 MG/DL (ref 70–110)
POCT GLUCOSE: 133 MG/DL (ref 70–110)
POCT GLUCOSE: 91 MG/DL (ref 70–110)
POCT GLUCOSE: 92 MG/DL (ref 70–110)
POTASSIUM SERPL-SCNC: 4 MMOL/L (ref 3.5–5.1)
RBC # BLD AUTO: 3.75 M/UL (ref 4–5.4)
SODIUM SERPL-SCNC: 141 MMOL/L (ref 136–145)
VANCOMYCIN TROUGH SERPL-MCNC: 15.6 UG/ML (ref 10–22)
WBC # BLD AUTO: 4.77 K/UL (ref 3.9–12.7)

## 2021-05-11 PROCEDURE — 25000003 PHARM REV CODE 250: Performed by: PODIATRIST

## 2021-05-11 PROCEDURE — 25000003 PHARM REV CODE 250: Performed by: FAMILY MEDICINE

## 2021-05-11 PROCEDURE — 97161 PT EVAL LOW COMPLEX 20 MIN: CPT

## 2021-05-11 PROCEDURE — 63600175 PHARM REV CODE 636 W HCPCS: Performed by: NURSE PRACTITIONER

## 2021-05-11 PROCEDURE — 97530 THERAPEUTIC ACTIVITIES: CPT

## 2021-05-11 PROCEDURE — 85025 COMPLETE CBC W/AUTO DIFF WBC: CPT | Performed by: FAMILY MEDICINE

## 2021-05-11 PROCEDURE — 63600175 PHARM REV CODE 636 W HCPCS: Performed by: PODIATRIST

## 2021-05-11 PROCEDURE — 63600175 PHARM REV CODE 636 W HCPCS: Performed by: FAMILY MEDICINE

## 2021-05-11 PROCEDURE — 97165 OT EVAL LOW COMPLEX 30 MIN: CPT

## 2021-05-11 PROCEDURE — 25000003 PHARM REV CODE 250: Performed by: NURSE PRACTITIONER

## 2021-05-11 PROCEDURE — 36415 COLL VENOUS BLD VENIPUNCTURE: CPT | Performed by: FAMILY MEDICINE

## 2021-05-11 PROCEDURE — 36415 COLL VENOUS BLD VENIPUNCTURE: CPT | Performed by: PODIATRIST

## 2021-05-11 PROCEDURE — 80202 ASSAY OF VANCOMYCIN: CPT | Performed by: PODIATRIST

## 2021-05-11 PROCEDURE — 80048 BASIC METABOLIC PNL TOTAL CA: CPT | Performed by: FAMILY MEDICINE

## 2021-05-11 PROCEDURE — 11000001 HC ACUTE MED/SURG PRIVATE ROOM

## 2021-05-11 RX ORDER — MAGNESIUM SULFATE 1 G/100ML
1 INJECTION INTRAVENOUS ONCE
Status: COMPLETED | OUTPATIENT
Start: 2021-05-11 | End: 2021-05-11

## 2021-05-11 RX ADMIN — OXYCODONE HYDROCHLORIDE 15 MG: 5 TABLET ORAL at 09:05

## 2021-05-11 RX ADMIN — SODIUM CHLORIDE: 0.9 INJECTION, SOLUTION INTRAVENOUS at 12:05

## 2021-05-11 RX ADMIN — OXYCODONE HYDROCHLORIDE 15 MG: 5 TABLET ORAL at 01:05

## 2021-05-11 RX ADMIN — METRONIDAZOLE 500 MG: 500 TABLET ORAL at 09:05

## 2021-05-11 RX ADMIN — METRONIDAZOLE 500 MG: 500 TABLET ORAL at 05:05

## 2021-05-11 RX ADMIN — MORPHINE SULFATE 30 MG: 15 TABLET ORAL at 08:05

## 2021-05-11 RX ADMIN — VANCOMYCIN HYDROCHLORIDE 1000 MG: 1 INJECTION, POWDER, LYOPHILIZED, FOR SOLUTION INTRAVENOUS at 02:05

## 2021-05-11 RX ADMIN — MORPHINE SULFATE 30 MG: 15 TABLET ORAL at 09:05

## 2021-05-11 RX ADMIN — CEFEPIME 2 G: 2 INJECTION, POWDER, FOR SOLUTION INTRAVENOUS at 09:05

## 2021-05-11 RX ADMIN — VANCOMYCIN HYDROCHLORIDE 750 MG: 750 INJECTION, POWDER, LYOPHILIZED, FOR SOLUTION INTRAVENOUS at 01:05

## 2021-05-11 RX ADMIN — CEFEPIME 2 G: 2 INJECTION, POWDER, FOR SOLUTION INTRAVENOUS at 01:05

## 2021-05-11 RX ADMIN — SODIUM CHLORIDE: 0.9 INJECTION, SOLUTION INTRAVENOUS at 06:05

## 2021-05-11 RX ADMIN — VENLAFAXINE HYDROCHLORIDE 150 MG: 150 CAPSULE, EXTENDED RELEASE ORAL at 09:05

## 2021-05-11 RX ADMIN — OXYCODONE HYDROCHLORIDE 15 MG: 5 TABLET ORAL at 05:05

## 2021-05-11 RX ADMIN — MAGNESIUM SULFATE 1 G: 1 INJECTION INTRAVENOUS at 11:05

## 2021-05-11 RX ADMIN — ATORVASTATIN CALCIUM 10 MG: 10 TABLET, FILM COATED ORAL at 09:05

## 2021-05-11 RX ADMIN — CEFEPIME 2 G: 2 INJECTION, POWDER, FOR SOLUTION INTRAVENOUS at 05:05

## 2021-05-11 RX ADMIN — ENOXAPARIN SODIUM 40 MG: 40 INJECTION SUBCUTANEOUS at 04:05

## 2021-05-11 RX ADMIN — METRONIDAZOLE 500 MG: 500 TABLET ORAL at 01:05

## 2021-05-12 ENCOUNTER — TELEPHONE (OUTPATIENT)
Dept: PODIATRY | Facility: CLINIC | Age: 61
End: 2021-05-12

## 2021-05-12 VITALS
OXYGEN SATURATION: 100 % | HEIGHT: 67 IN | WEIGHT: 192 LBS | SYSTOLIC BLOOD PRESSURE: 153 MMHG | BODY MASS INDEX: 30.13 KG/M2 | DIASTOLIC BLOOD PRESSURE: 70 MMHG | TEMPERATURE: 98 F | HEART RATE: 78 BPM | RESPIRATION RATE: 18 BRPM

## 2021-05-12 PROBLEM — E87.1 HYPONATREMIA: Status: RESOLVED | Noted: 2021-05-09 | Resolved: 2021-05-12

## 2021-05-12 PROBLEM — E87.6 HYPOKALEMIA: Status: RESOLVED | Noted: 2021-05-09 | Resolved: 2021-05-12

## 2021-05-12 LAB
ANION GAP SERPL CALC-SCNC: 13 MMOL/L (ref 8–16)
BACTERIA SPEC AEROBE CULT: ABNORMAL
BACTERIA TISS AEROBE CULT: ABNORMAL
BACTERIA TISS AEROBE CULT: ABNORMAL
BASOPHILS # BLD AUTO: 0.05 K/UL (ref 0–0.2)
BASOPHILS NFR BLD: 1.1 % (ref 0–1.9)
BUN SERPL-MCNC: 7 MG/DL (ref 8–23)
CALCIUM SERPL-MCNC: 8.8 MG/DL (ref 8.7–10.5)
CHLORIDE SERPL-SCNC: 109 MMOL/L (ref 95–110)
CO2 SERPL-SCNC: 18 MMOL/L (ref 23–29)
CREAT SERPL-MCNC: 0.7 MG/DL (ref 0.5–1.4)
DIFFERENTIAL METHOD: ABNORMAL
EOSINOPHIL # BLD AUTO: 0.1 K/UL (ref 0–0.5)
EOSINOPHIL NFR BLD: 2.2 % (ref 0–8)
ERYTHROCYTE [DISTWIDTH] IN BLOOD BY AUTOMATED COUNT: 14.6 % (ref 11.5–14.5)
EST. GFR  (AFRICAN AMERICAN): >60 ML/MIN/1.73 M^2
EST. GFR  (NON AFRICAN AMERICAN): >60 ML/MIN/1.73 M^2
GLUCOSE SERPL-MCNC: 110 MG/DL (ref 70–110)
GRAM STN SPEC: ABNORMAL
HCT VFR BLD AUTO: 33.2 % (ref 37–48.5)
HGB BLD-MCNC: 9.6 G/DL (ref 12–16)
IMM GRANULOCYTES # BLD AUTO: 0.04 K/UL (ref 0–0.04)
IMM GRANULOCYTES NFR BLD AUTO: 0.9 % (ref 0–0.5)
LYMPHOCYTES # BLD AUTO: 0.9 K/UL (ref 1–4.8)
LYMPHOCYTES NFR BLD: 20.7 % (ref 18–48)
MCH RBC QN AUTO: 26.4 PG (ref 27–31)
MCHC RBC AUTO-ENTMCNC: 28.9 G/DL (ref 32–36)
MCV RBC AUTO: 91 FL (ref 82–98)
MONOCYTES # BLD AUTO: 0.4 K/UL (ref 0.3–1)
MONOCYTES NFR BLD: 8.2 % (ref 4–15)
NEUTROPHILS # BLD AUTO: 3 K/UL (ref 1.8–7.7)
NEUTROPHILS NFR BLD: 66.9 % (ref 38–73)
NRBC BLD-RTO: 0 /100 WBC
PLATELET # BLD AUTO: 240 K/UL (ref 150–450)
PMV BLD AUTO: 10 FL (ref 9.2–12.9)
POCT GLUCOSE: 104 MG/DL (ref 70–110)
POCT GLUCOSE: 115 MG/DL (ref 70–110)
POCT GLUCOSE: 150 MG/DL (ref 70–110)
POTASSIUM SERPL-SCNC: 3.7 MMOL/L (ref 3.5–5.1)
RBC # BLD AUTO: 3.64 M/UL (ref 4–5.4)
SODIUM SERPL-SCNC: 140 MMOL/L (ref 136–145)
WBC # BLD AUTO: 4.5 K/UL (ref 3.9–12.7)

## 2021-05-12 PROCEDURE — 25000003 PHARM REV CODE 250: Performed by: NURSE PRACTITIONER

## 2021-05-12 PROCEDURE — 36415 COLL VENOUS BLD VENIPUNCTURE: CPT | Performed by: FAMILY MEDICINE

## 2021-05-12 PROCEDURE — 25000003 PHARM REV CODE 250: Performed by: PODIATRIST

## 2021-05-12 PROCEDURE — 80048 BASIC METABOLIC PNL TOTAL CA: CPT | Performed by: FAMILY MEDICINE

## 2021-05-12 PROCEDURE — 25000003 PHARM REV CODE 250: Performed by: FAMILY MEDICINE

## 2021-05-12 PROCEDURE — 63600175 PHARM REV CODE 636 W HCPCS: Performed by: PODIATRIST

## 2021-05-12 PROCEDURE — 85025 COMPLETE CBC W/AUTO DIFF WBC: CPT | Performed by: NURSE PRACTITIONER

## 2021-05-12 PROCEDURE — 63600175 PHARM REV CODE 636 W HCPCS: Performed by: FAMILY MEDICINE

## 2021-05-12 RX ORDER — SULFAMETHOXAZOLE AND TRIMETHOPRIM 800; 160 MG/1; MG/1
1 TABLET ORAL 2 TIMES DAILY
Status: DISCONTINUED | OUTPATIENT
Start: 2021-05-12 | End: 2021-05-12 | Stop reason: HOSPADM

## 2021-05-12 RX ORDER — TALC
6 POWDER (GRAM) TOPICAL NIGHTLY PRN
Status: DISCONTINUED | OUTPATIENT
Start: 2021-05-12 | End: 2021-05-12 | Stop reason: HOSPADM

## 2021-05-12 RX ORDER — ALPRAZOLAM 0.25 MG/1
0.25 TABLET ORAL ONCE
Status: COMPLETED | OUTPATIENT
Start: 2021-05-12 | End: 2021-05-12

## 2021-05-12 RX ORDER — SULFAMETHOXAZOLE AND TRIMETHOPRIM 800; 160 MG/1; MG/1
1 TABLET ORAL 2 TIMES DAILY
Qty: 20 TABLET | Refills: 0 | Status: SHIPPED | OUTPATIENT
Start: 2021-05-12 | End: 2021-05-22

## 2021-05-12 RX ADMIN — ALPRAZOLAM 0.25 MG: 0.25 TABLET ORAL at 04:05

## 2021-05-12 RX ADMIN — CEFEPIME 2 G: 2 INJECTION, POWDER, FOR SOLUTION INTRAVENOUS at 05:05

## 2021-05-12 RX ADMIN — SULFAMETHOXAZOLE AND TRIMETHOPRIM 1 TABLET: 800; 160 TABLET ORAL at 10:05

## 2021-05-12 RX ADMIN — VANCOMYCIN HYDROCHLORIDE 750 MG: 750 INJECTION, POWDER, LYOPHILIZED, FOR SOLUTION INTRAVENOUS at 02:05

## 2021-05-12 RX ADMIN — SODIUM CHLORIDE: 0.9 INJECTION, SOLUTION INTRAVENOUS at 08:05

## 2021-05-12 RX ADMIN — OXYCODONE HYDROCHLORIDE 15 MG: 5 TABLET ORAL at 05:05

## 2021-05-12 RX ADMIN — MORPHINE SULFATE 30 MG: 15 TABLET ORAL at 08:05

## 2021-05-12 RX ADMIN — METRONIDAZOLE 500 MG: 500 TABLET ORAL at 05:05

## 2021-05-12 RX ADMIN — TRAZODONE HYDROCHLORIDE 25 MG: 50 TABLET ORAL at 03:05

## 2021-05-13 ENCOUNTER — PATIENT MESSAGE (OUTPATIENT)
Dept: ADMINISTRATIVE | Facility: CLINIC | Age: 61
End: 2021-05-13

## 2021-05-13 ENCOUNTER — PATIENT OUTREACH (OUTPATIENT)
Dept: ADMINISTRATIVE | Facility: CLINIC | Age: 61
End: 2021-05-13

## 2021-05-13 LAB
BACTERIA SPEC ANAEROBE CULT: NORMAL

## 2021-05-14 ENCOUNTER — PATIENT MESSAGE (OUTPATIENT)
Dept: ADMINISTRATIVE | Facility: CLINIC | Age: 61
End: 2021-05-14

## 2021-05-14 LAB
BACTERIA BLD CULT: NORMAL
BACTERIA BLD CULT: NORMAL

## 2021-05-17 ENCOUNTER — PATIENT MESSAGE (OUTPATIENT)
Dept: ADMINISTRATIVE | Facility: CLINIC | Age: 61
End: 2021-05-17

## 2021-05-20 ENCOUNTER — EXTERNAL HOME HEALTH (OUTPATIENT)
Dept: HOME HEALTH SERVICES | Facility: HOSPITAL | Age: 61
End: 2021-05-20
Payer: COMMERCIAL

## 2021-05-24 ENCOUNTER — OFFICE VISIT (OUTPATIENT)
Dept: PODIATRY | Facility: CLINIC | Age: 61
End: 2021-05-24
Payer: COMMERCIAL

## 2021-05-24 VITALS — WEIGHT: 192 LBS | HEIGHT: 67 IN | BODY MASS INDEX: 30.13 KG/M2

## 2021-05-24 DIAGNOSIS — Z89.412 HISTORY OF AMPUTATION OF LEFT GREAT TOE: ICD-10-CM

## 2021-05-24 DIAGNOSIS — E11.49 DIABETES MELLITUS TYPE 2 WITH NEUROLOGICAL MANIFESTATIONS: ICD-10-CM

## 2021-05-24 DIAGNOSIS — Z09 FOLLOW-UP EXAMINATION FOLLOWING SURGERY: Primary | ICD-10-CM

## 2021-05-24 PROCEDURE — 3008F PR BODY MASS INDEX (BMI) DOCUMENTED: ICD-10-PCS | Mod: CPTII,S$GLB,, | Performed by: PODIATRIST

## 2021-05-24 PROCEDURE — 99999 PR PBB SHADOW E&M-EST. PATIENT-LVL III: ICD-10-PCS | Mod: PBBFAC,,, | Performed by: PODIATRIST

## 2021-05-24 PROCEDURE — 99024 POSTOP FOLLOW-UP VISIT: CPT | Mod: S$GLB,,, | Performed by: PODIATRIST

## 2021-05-24 PROCEDURE — 3008F BODY MASS INDEX DOCD: CPT | Mod: CPTII,S$GLB,, | Performed by: PODIATRIST

## 2021-05-24 PROCEDURE — 3044F PR MOST RECENT HEMOGLOBIN A1C LEVEL <7.0%: ICD-10-PCS | Mod: CPTII,S$GLB,, | Performed by: PODIATRIST

## 2021-05-24 PROCEDURE — 99024 PR POST-OP FOLLOW-UP VISIT: ICD-10-PCS | Mod: S$GLB,,, | Performed by: PODIATRIST

## 2021-05-24 PROCEDURE — 99999 PR PBB SHADOW E&M-EST. PATIENT-LVL III: CPT | Mod: PBBFAC,,, | Performed by: PODIATRIST

## 2021-05-24 PROCEDURE — 3044F HG A1C LEVEL LT 7.0%: CPT | Mod: CPTII,S$GLB,, | Performed by: PODIATRIST

## 2021-05-31 ENCOUNTER — OFFICE VISIT (OUTPATIENT)
Dept: PODIATRY | Facility: CLINIC | Age: 61
End: 2021-05-31
Payer: COMMERCIAL

## 2021-05-31 VITALS — WEIGHT: 185.63 LBS | BODY MASS INDEX: 29.07 KG/M2

## 2021-05-31 DIAGNOSIS — Z89.412 HISTORY OF AMPUTATION OF LEFT GREAT TOE: ICD-10-CM

## 2021-05-31 DIAGNOSIS — E11.621 DIABETIC ULCER OF TOE OF RIGHT FOOT ASSOCIATED WITH TYPE 2 DIABETES MELLITUS, WITH FAT LAYER EXPOSED: ICD-10-CM

## 2021-05-31 DIAGNOSIS — Z09 FOLLOW-UP EXAMINATION FOLLOWING SURGERY: Primary | ICD-10-CM

## 2021-05-31 DIAGNOSIS — L97.512 DIABETIC ULCER OF TOE OF RIGHT FOOT ASSOCIATED WITH TYPE 2 DIABETES MELLITUS, WITH FAT LAYER EXPOSED: ICD-10-CM

## 2021-05-31 DIAGNOSIS — E11.49 DIABETES MELLITUS TYPE 2 WITH NEUROLOGICAL MANIFESTATIONS: ICD-10-CM

## 2021-05-31 PROCEDURE — 3044F PR MOST RECENT HEMOGLOBIN A1C LEVEL <7.0%: ICD-10-PCS | Mod: CPTII,S$GLB,, | Performed by: PODIATRIST

## 2021-05-31 PROCEDURE — 1111F PR DISCHARGE MEDS RECONCILED W/ CURRENT OUTPATIENT MED LIST: ICD-10-PCS | Mod: CPTII,S$GLB,, | Performed by: PODIATRIST

## 2021-05-31 PROCEDURE — 11042 PR DEBRIDEMENT, SKIN, SUB-Q TISSUE,=<20 SQ CM: ICD-10-PCS | Mod: S$GLB,,, | Performed by: PODIATRIST

## 2021-05-31 PROCEDURE — 99999 PR PBB SHADOW E&M-EST. PATIENT-LVL III: CPT | Mod: PBBFAC,,, | Performed by: PODIATRIST

## 2021-05-31 PROCEDURE — 99999 PR PBB SHADOW E&M-EST. PATIENT-LVL III: ICD-10-PCS | Mod: PBBFAC,,, | Performed by: PODIATRIST

## 2021-05-31 PROCEDURE — 3008F PR BODY MASS INDEX (BMI) DOCUMENTED: ICD-10-PCS | Mod: CPTII,S$GLB,, | Performed by: PODIATRIST

## 2021-05-31 PROCEDURE — 3044F HG A1C LEVEL LT 7.0%: CPT | Mod: CPTII,S$GLB,, | Performed by: PODIATRIST

## 2021-05-31 PROCEDURE — 99213 OFFICE O/P EST LOW 20 MIN: CPT | Mod: 25,S$GLB,, | Performed by: PODIATRIST

## 2021-05-31 PROCEDURE — 3008F BODY MASS INDEX DOCD: CPT | Mod: CPTII,S$GLB,, | Performed by: PODIATRIST

## 2021-05-31 PROCEDURE — 11042 DBRDMT SUBQ TIS 1ST 20SQCM/<: CPT | Mod: S$GLB,,, | Performed by: PODIATRIST

## 2021-05-31 PROCEDURE — 1125F AMNT PAIN NOTED PAIN PRSNT: CPT | Mod: S$GLB,,, | Performed by: PODIATRIST

## 2021-05-31 PROCEDURE — 1111F DSCHRG MED/CURRENT MED MERGE: CPT | Mod: CPTII,S$GLB,, | Performed by: PODIATRIST

## 2021-05-31 PROCEDURE — 99213 PR OFFICE/OUTPT VISIT, EST, LEVL III, 20-29 MIN: ICD-10-PCS | Mod: 25,S$GLB,, | Performed by: PODIATRIST

## 2021-05-31 PROCEDURE — 1125F PR PAIN SEVERITY QUANTIFIED, PAIN PRESENT: ICD-10-PCS | Mod: S$GLB,,, | Performed by: PODIATRIST

## 2021-06-04 ENCOUNTER — DOCUMENT SCAN (OUTPATIENT)
Dept: HOME HEALTH SERVICES | Facility: HOSPITAL | Age: 61
End: 2021-06-04
Payer: COMMERCIAL

## 2021-06-15 ENCOUNTER — OFFICE VISIT (OUTPATIENT)
Dept: PODIATRY | Facility: CLINIC | Age: 61
End: 2021-06-15
Payer: COMMERCIAL

## 2021-06-15 VITALS — HEIGHT: 67 IN | WEIGHT: 185.63 LBS | BODY MASS INDEX: 29.13 KG/M2

## 2021-06-15 DIAGNOSIS — E11.621 DIABETIC ULCER OF TOE OF RIGHT FOOT ASSOCIATED WITH TYPE 2 DIABETES MELLITUS, WITH FAT LAYER EXPOSED: ICD-10-CM

## 2021-06-15 DIAGNOSIS — E11.49 DIABETES MELLITUS TYPE 2 WITH NEUROLOGICAL MANIFESTATIONS: Primary | ICD-10-CM

## 2021-06-15 DIAGNOSIS — T81.30XA WOUND DEHISCENCE: ICD-10-CM

## 2021-06-15 DIAGNOSIS — Z89.412 HISTORY OF AMPUTATION OF LEFT GREAT TOE: ICD-10-CM

## 2021-06-15 DIAGNOSIS — L97.512 DIABETIC ULCER OF TOE OF RIGHT FOOT ASSOCIATED WITH TYPE 2 DIABETES MELLITUS, WITH FAT LAYER EXPOSED: ICD-10-CM

## 2021-06-15 PROCEDURE — 3044F PR MOST RECENT HEMOGLOBIN A1C LEVEL <7.0%: ICD-10-PCS | Mod: CPTII,S$GLB,, | Performed by: PODIATRIST

## 2021-06-15 PROCEDURE — 3044F HG A1C LEVEL LT 7.0%: CPT | Mod: CPTII,S$GLB,, | Performed by: PODIATRIST

## 2021-06-15 PROCEDURE — 1126F PR PAIN SEVERITY QUANTIFIED, NO PAIN PRESENT: ICD-10-PCS | Mod: S$GLB,,, | Performed by: PODIATRIST

## 2021-06-15 PROCEDURE — 1126F AMNT PAIN NOTED NONE PRSNT: CPT | Mod: S$GLB,,, | Performed by: PODIATRIST

## 2021-06-15 PROCEDURE — 99024 PR POST-OP FOLLOW-UP VISIT: ICD-10-PCS | Mod: S$GLB,,, | Performed by: PODIATRIST

## 2021-06-15 PROCEDURE — 3008F BODY MASS INDEX DOCD: CPT | Mod: CPTII,S$GLB,, | Performed by: PODIATRIST

## 2021-06-15 PROCEDURE — 99999 PR PBB SHADOW E&M-EST. PATIENT-LVL III: CPT | Mod: PBBFAC,,, | Performed by: PODIATRIST

## 2021-06-15 PROCEDURE — 99999 PR PBB SHADOW E&M-EST. PATIENT-LVL III: ICD-10-PCS | Mod: PBBFAC,,, | Performed by: PODIATRIST

## 2021-06-15 PROCEDURE — 3008F PR BODY MASS INDEX (BMI) DOCUMENTED: ICD-10-PCS | Mod: CPTII,S$GLB,, | Performed by: PODIATRIST

## 2021-06-15 PROCEDURE — 99024 POSTOP FOLLOW-UP VISIT: CPT | Mod: S$GLB,,, | Performed by: PODIATRIST

## 2021-06-15 RX ORDER — SULFAMETHOXAZOLE AND TRIMETHOPRIM 800; 160 MG/1; MG/1
1 TABLET ORAL 2 TIMES DAILY
Qty: 14 TABLET | Refills: 0 | Status: SHIPPED | OUTPATIENT
Start: 2021-06-15 | End: 2021-06-22

## 2021-06-16 LAB
FUNGUS SPEC CULT: NORMAL

## 2021-06-22 ENCOUNTER — DOCUMENT SCAN (OUTPATIENT)
Dept: HOME HEALTH SERVICES | Facility: HOSPITAL | Age: 61
End: 2021-06-22
Payer: COMMERCIAL

## 2021-06-25 ENCOUNTER — TELEPHONE (OUTPATIENT)
Dept: PODIATRY | Facility: CLINIC | Age: 61
End: 2021-06-25

## 2021-06-29 ENCOUNTER — OFFICE VISIT (OUTPATIENT)
Dept: PODIATRY | Facility: CLINIC | Age: 61
End: 2021-06-29
Payer: COMMERCIAL

## 2021-06-29 DIAGNOSIS — T81.30XA WOUND DEHISCENCE: ICD-10-CM

## 2021-06-29 DIAGNOSIS — L97.512 DIABETIC ULCER OF TOE OF RIGHT FOOT ASSOCIATED WITH TYPE 2 DIABETES MELLITUS, WITH FAT LAYER EXPOSED: ICD-10-CM

## 2021-06-29 DIAGNOSIS — E11.621 DIABETIC ULCER OF TOE OF RIGHT FOOT ASSOCIATED WITH TYPE 2 DIABETES MELLITUS, WITH FAT LAYER EXPOSED: ICD-10-CM

## 2021-06-29 DIAGNOSIS — Z09 FOLLOW-UP EXAMINATION FOLLOWING SURGERY: Primary | ICD-10-CM

## 2021-06-29 DIAGNOSIS — E11.49 DIABETES MELLITUS TYPE 2 WITH NEUROLOGICAL MANIFESTATIONS: ICD-10-CM

## 2021-06-29 DIAGNOSIS — Z89.412 HISTORY OF AMPUTATION OF LEFT GREAT TOE: ICD-10-CM

## 2021-06-29 PROCEDURE — 11042 PR DEBRIDEMENT, SKIN, SUB-Q TISSUE,=<20 SQ CM: ICD-10-PCS | Mod: S$GLB,,, | Performed by: PODIATRIST

## 2021-06-29 PROCEDURE — 99999 PR PBB SHADOW E&M-EST. PATIENT-LVL II: CPT | Mod: PBBFAC,,, | Performed by: PODIATRIST

## 2021-06-29 PROCEDURE — 99024 PR POST-OP FOLLOW-UP VISIT: ICD-10-PCS | Mod: S$GLB,,, | Performed by: PODIATRIST

## 2021-06-29 PROCEDURE — 99024 POSTOP FOLLOW-UP VISIT: CPT | Mod: S$GLB,,, | Performed by: PODIATRIST

## 2021-06-29 PROCEDURE — 99999 PR PBB SHADOW E&M-EST. PATIENT-LVL II: ICD-10-PCS | Mod: PBBFAC,,, | Performed by: PODIATRIST

## 2021-06-29 PROCEDURE — 11042 DBRDMT SUBQ TIS 1ST 20SQCM/<: CPT | Mod: S$GLB,,, | Performed by: PODIATRIST

## 2021-07-01 ENCOUNTER — DOCUMENT SCAN (OUTPATIENT)
Dept: HOME HEALTH SERVICES | Facility: HOSPITAL | Age: 61
End: 2021-07-01
Payer: COMMERCIAL

## 2021-07-07 ENCOUNTER — PATIENT MESSAGE (OUTPATIENT)
Dept: PODIATRY | Facility: CLINIC | Age: 61
End: 2021-07-07

## 2021-07-07 RX ORDER — SULFAMETHOXAZOLE AND TRIMETHOPRIM 800; 160 MG/1; MG/1
1 TABLET ORAL 2 TIMES DAILY
Qty: 10 TABLET | Refills: 0 | Status: SHIPPED | OUTPATIENT
Start: 2021-07-07 | End: 2021-07-12

## 2021-07-11 LAB
ACID FAST MOD KINY STN SPEC: NORMAL
MYCOBACTERIUM SPEC QL CULT: NORMAL

## 2021-07-14 ENCOUNTER — HOSPITAL ENCOUNTER (OUTPATIENT)
Dept: RADIOLOGY | Facility: HOSPITAL | Age: 61
Discharge: HOME | End: 2021-07-14
Attending: PODIATRIST
Payer: COMMERCIAL

## 2021-07-14 ENCOUNTER — OFFICE VISIT (OUTPATIENT)
Dept: PODIATRY | Facility: CLINIC | Age: 61
End: 2021-07-14
Payer: COMMERCIAL

## 2021-07-14 DIAGNOSIS — E11.49 DIABETES MELLITUS TYPE 2 WITH NEUROLOGICAL MANIFESTATIONS: ICD-10-CM

## 2021-07-14 DIAGNOSIS — L97.522 SKIN ULCER OF THIRD TOE, LEFT, WITH FAT LAYER EXPOSED: ICD-10-CM

## 2021-07-14 DIAGNOSIS — L03.116 CELLULITIS OF LEFT FOOT: Primary | ICD-10-CM

## 2021-07-14 DIAGNOSIS — Z89.412 HISTORY OF AMPUTATION OF LEFT GREAT TOE: ICD-10-CM

## 2021-07-14 DIAGNOSIS — L03.116 CELLULITIS OF LEFT FOOT: ICD-10-CM

## 2021-07-14 PROCEDURE — 73630 XR FOOT COMPLETE 3 VIEW LEFT: ICD-10-PCS | Mod: 26,LT,, | Performed by: RADIOLOGY

## 2021-07-14 PROCEDURE — 87070 CULTURE OTHR SPECIMN AEROBIC: CPT | Performed by: PODIATRIST

## 2021-07-14 PROCEDURE — 87077 CULTURE AEROBIC IDENTIFY: CPT | Performed by: PODIATRIST

## 2021-07-14 PROCEDURE — 87186 SC STD MICRODIL/AGAR DIL: CPT | Performed by: PODIATRIST

## 2021-07-14 PROCEDURE — 3044F PR MOST RECENT HEMOGLOBIN A1C LEVEL <7.0%: ICD-10-PCS | Mod: CPTII,S$GLB,, | Performed by: PODIATRIST

## 2021-07-14 PROCEDURE — 73630 X-RAY EXAM OF FOOT: CPT | Mod: 26,LT,, | Performed by: RADIOLOGY

## 2021-07-14 PROCEDURE — 73630 X-RAY EXAM OF FOOT: CPT | Mod: TC,LT

## 2021-07-14 PROCEDURE — 99214 PR OFFICE/OUTPT VISIT, EST, LEVL IV, 30-39 MIN: ICD-10-PCS | Mod: 25,24,S$GLB, | Performed by: PODIATRIST

## 2021-07-14 PROCEDURE — 99999 PR PBB SHADOW E&M-EST. PATIENT-LVL III: ICD-10-PCS | Mod: PBBFAC,,, | Performed by: PODIATRIST

## 2021-07-14 PROCEDURE — 99999 PR PBB SHADOW E&M-EST. PATIENT-LVL III: CPT | Mod: PBBFAC,,, | Performed by: PODIATRIST

## 2021-07-14 PROCEDURE — 99214 OFFICE O/P EST MOD 30 MIN: CPT | Mod: 25,24,S$GLB, | Performed by: PODIATRIST

## 2021-07-14 PROCEDURE — 3044F HG A1C LEVEL LT 7.0%: CPT | Mod: CPTII,S$GLB,, | Performed by: PODIATRIST

## 2021-07-14 RX ORDER — DOXYCYCLINE HYCLATE 100 MG
100 TABLET ORAL 2 TIMES DAILY
Qty: 20 TABLET | Refills: 0 | Status: SHIPPED | OUTPATIENT
Start: 2021-07-14 | End: 2021-07-24

## 2021-07-14 RX ORDER — DOXYCYCLINE HYCLATE 100 MG
100 TABLET ORAL 2 TIMES DAILY
Qty: 20 TABLET | Refills: 0 | Status: SHIPPED | OUTPATIENT
Start: 2021-07-14 | End: 2021-07-14

## 2021-07-19 LAB — BACTERIA SPEC AEROBE CULT: ABNORMAL

## 2021-08-04 ENCOUNTER — OFFICE VISIT (OUTPATIENT)
Dept: PODIATRY | Facility: CLINIC | Age: 61
End: 2021-08-04
Payer: COMMERCIAL

## 2021-08-04 DIAGNOSIS — Z89.412 HISTORY OF AMPUTATION OF LEFT GREAT TOE: ICD-10-CM

## 2021-08-04 DIAGNOSIS — E11.49 DIABETES MELLITUS TYPE 2 WITH NEUROLOGICAL MANIFESTATIONS: ICD-10-CM

## 2021-08-04 DIAGNOSIS — E11.621 DIABETIC ULCER OF TOE OF RIGHT FOOT ASSOCIATED WITH TYPE 2 DIABETES MELLITUS, WITH FAT LAYER EXPOSED: Primary | ICD-10-CM

## 2021-08-04 DIAGNOSIS — L97.512 DIABETIC ULCER OF TOE OF RIGHT FOOT ASSOCIATED WITH TYPE 2 DIABETES MELLITUS, WITH FAT LAYER EXPOSED: Primary | ICD-10-CM

## 2021-08-04 PROCEDURE — 99499 NO LOS: ICD-10-PCS | Mod: S$GLB,,, | Performed by: PODIATRIST

## 2021-08-04 PROCEDURE — 1159F MED LIST DOCD IN RCRD: CPT | Mod: CPTII,S$GLB,, | Performed by: PODIATRIST

## 2021-08-04 PROCEDURE — 1160F RVW MEDS BY RX/DR IN RCRD: CPT | Mod: CPTII,S$GLB,, | Performed by: PODIATRIST

## 2021-08-04 PROCEDURE — 11042 DBRDMT SUBQ TIS 1ST 20SQCM/<: CPT | Mod: S$GLB,,, | Performed by: PODIATRIST

## 2021-08-04 PROCEDURE — 99499 UNLISTED E&M SERVICE: CPT | Mod: S$GLB,,, | Performed by: PODIATRIST

## 2021-08-04 PROCEDURE — 3044F PR MOST RECENT HEMOGLOBIN A1C LEVEL <7.0%: ICD-10-PCS | Mod: CPTII,S$GLB,, | Performed by: PODIATRIST

## 2021-08-04 PROCEDURE — 3044F HG A1C LEVEL LT 7.0%: CPT | Mod: CPTII,S$GLB,, | Performed by: PODIATRIST

## 2021-08-04 PROCEDURE — 99999 PR PBB SHADOW E&M-EST. PATIENT-LVL II: CPT | Mod: PBBFAC,,, | Performed by: PODIATRIST

## 2021-08-04 PROCEDURE — 99999 PR PBB SHADOW E&M-EST. PATIENT-LVL II: ICD-10-PCS | Mod: PBBFAC,,, | Performed by: PODIATRIST

## 2021-08-04 PROCEDURE — 11042 PR DEBRIDEMENT, SKIN, SUB-Q TISSUE,=<20 SQ CM: ICD-10-PCS | Mod: S$GLB,,, | Performed by: PODIATRIST

## 2021-08-04 PROCEDURE — 1159F PR MEDICATION LIST DOCUMENTED IN MEDICAL RECORD: ICD-10-PCS | Mod: CPTII,S$GLB,, | Performed by: PODIATRIST

## 2021-08-04 PROCEDURE — 1160F PR REVIEW ALL MEDS BY PRESCRIBER/CLIN PHARMACIST DOCUMENTED: ICD-10-PCS | Mod: CPTII,S$GLB,, | Performed by: PODIATRIST

## 2021-08-25 ENCOUNTER — OFFICE VISIT (OUTPATIENT)
Dept: PODIATRY | Facility: CLINIC | Age: 61
End: 2021-08-25
Payer: COMMERCIAL

## 2021-08-25 DIAGNOSIS — E11.49 DIABETES MELLITUS TYPE 2 WITH NEUROLOGICAL MANIFESTATIONS: ICD-10-CM

## 2021-08-25 DIAGNOSIS — L97.512 DIABETIC ULCER OF TOE OF RIGHT FOOT ASSOCIATED WITH TYPE 2 DIABETES MELLITUS, WITH FAT LAYER EXPOSED: Primary | ICD-10-CM

## 2021-08-25 DIAGNOSIS — Z89.412 HISTORY OF AMPUTATION OF LEFT GREAT TOE: ICD-10-CM

## 2021-08-25 DIAGNOSIS — E11.621 DIABETIC ULCER OF TOE OF RIGHT FOOT ASSOCIATED WITH TYPE 2 DIABETES MELLITUS, WITH FAT LAYER EXPOSED: Primary | ICD-10-CM

## 2021-08-25 PROCEDURE — 99499 NO LOS: ICD-10-PCS | Mod: S$GLB,,, | Performed by: PODIATRIST

## 2021-08-25 PROCEDURE — 1159F PR MEDICATION LIST DOCUMENTED IN MEDICAL RECORD: ICD-10-PCS | Mod: CPTII,S$GLB,, | Performed by: PODIATRIST

## 2021-08-25 PROCEDURE — 1160F PR REVIEW ALL MEDS BY PRESCRIBER/CLIN PHARMACIST DOCUMENTED: ICD-10-PCS | Mod: CPTII,S$GLB,, | Performed by: PODIATRIST

## 2021-08-25 PROCEDURE — 11042 DBRDMT SUBQ TIS 1ST 20SQCM/<: CPT | Mod: S$GLB,,, | Performed by: PODIATRIST

## 2021-08-25 PROCEDURE — 99499 UNLISTED E&M SERVICE: CPT | Mod: S$GLB,,, | Performed by: PODIATRIST

## 2021-08-25 PROCEDURE — 3044F HG A1C LEVEL LT 7.0%: CPT | Mod: CPTII,S$GLB,, | Performed by: PODIATRIST

## 2021-08-25 PROCEDURE — 1159F MED LIST DOCD IN RCRD: CPT | Mod: CPTII,S$GLB,, | Performed by: PODIATRIST

## 2021-08-25 PROCEDURE — 99999 PR PBB SHADOW E&M-EST. PATIENT-LVL II: ICD-10-PCS | Mod: PBBFAC,,, | Performed by: PODIATRIST

## 2021-08-25 PROCEDURE — 1160F RVW MEDS BY RX/DR IN RCRD: CPT | Mod: CPTII,S$GLB,, | Performed by: PODIATRIST

## 2021-08-25 PROCEDURE — 3044F PR MOST RECENT HEMOGLOBIN A1C LEVEL <7.0%: ICD-10-PCS | Mod: CPTII,S$GLB,, | Performed by: PODIATRIST

## 2021-08-25 PROCEDURE — 11042 PR DEBRIDEMENT, SKIN, SUB-Q TISSUE,=<20 SQ CM: ICD-10-PCS | Mod: S$GLB,,, | Performed by: PODIATRIST

## 2021-08-25 PROCEDURE — 99999 PR PBB SHADOW E&M-EST. PATIENT-LVL II: CPT | Mod: PBBFAC,,, | Performed by: PODIATRIST

## 2021-09-13 ENCOUNTER — OFFICE VISIT (OUTPATIENT)
Dept: PODIATRY | Facility: CLINIC | Age: 61
End: 2021-09-13
Payer: COMMERCIAL

## 2021-09-13 VITALS — BODY MASS INDEX: 29.13 KG/M2 | HEIGHT: 67 IN | WEIGHT: 185.63 LBS

## 2021-09-13 DIAGNOSIS — Z89.412 HISTORY OF AMPUTATION OF LEFT GREAT TOE: ICD-10-CM

## 2021-09-13 DIAGNOSIS — E11.621 DIABETIC ULCER OF TOE OF RIGHT FOOT ASSOCIATED WITH TYPE 2 DIABETES MELLITUS, WITH FAT LAYER EXPOSED: Primary | ICD-10-CM

## 2021-09-13 DIAGNOSIS — E11.49 DIABETES MELLITUS TYPE 2 WITH NEUROLOGICAL MANIFESTATIONS: ICD-10-CM

## 2021-09-13 DIAGNOSIS — L97.512 DIABETIC ULCER OF TOE OF RIGHT FOOT ASSOCIATED WITH TYPE 2 DIABETES MELLITUS, WITH FAT LAYER EXPOSED: Primary | ICD-10-CM

## 2021-09-13 PROCEDURE — 99214 OFFICE O/P EST MOD 30 MIN: CPT | Mod: 25,S$GLB,, | Performed by: PODIATRIST

## 2021-09-13 PROCEDURE — 99999 PR PBB SHADOW E&M-EST. PATIENT-LVL III: CPT | Mod: PBBFAC,,, | Performed by: PODIATRIST

## 2021-09-13 PROCEDURE — 1159F MED LIST DOCD IN RCRD: CPT | Mod: CPTII,S$GLB,, | Performed by: PODIATRIST

## 2021-09-13 PROCEDURE — 1160F RVW MEDS BY RX/DR IN RCRD: CPT | Mod: CPTII,S$GLB,, | Performed by: PODIATRIST

## 2021-09-13 PROCEDURE — 1159F PR MEDICATION LIST DOCUMENTED IN MEDICAL RECORD: ICD-10-PCS | Mod: CPTII,S$GLB,, | Performed by: PODIATRIST

## 2021-09-13 PROCEDURE — 3008F BODY MASS INDEX DOCD: CPT | Mod: CPTII,S$GLB,, | Performed by: PODIATRIST

## 2021-09-13 PROCEDURE — 3044F HG A1C LEVEL LT 7.0%: CPT | Mod: CPTII,S$GLB,, | Performed by: PODIATRIST

## 2021-09-13 PROCEDURE — 3008F PR BODY MASS INDEX (BMI) DOCUMENTED: ICD-10-PCS | Mod: CPTII,S$GLB,, | Performed by: PODIATRIST

## 2021-09-13 PROCEDURE — 3044F PR MOST RECENT HEMOGLOBIN A1C LEVEL <7.0%: ICD-10-PCS | Mod: CPTII,S$GLB,, | Performed by: PODIATRIST

## 2021-09-13 PROCEDURE — 1160F PR REVIEW ALL MEDS BY PRESCRIBER/CLIN PHARMACIST DOCUMENTED: ICD-10-PCS | Mod: CPTII,S$GLB,, | Performed by: PODIATRIST

## 2021-09-13 PROCEDURE — 99999 PR PBB SHADOW E&M-EST. PATIENT-LVL III: ICD-10-PCS | Mod: PBBFAC,,, | Performed by: PODIATRIST

## 2021-09-13 PROCEDURE — 11042 PR DEBRIDEMENT, SKIN, SUB-Q TISSUE,=<20 SQ CM: ICD-10-PCS | Mod: S$GLB,,, | Performed by: PODIATRIST

## 2021-09-13 PROCEDURE — 99214 PR OFFICE/OUTPT VISIT, EST, LEVL IV, 30-39 MIN: ICD-10-PCS | Mod: 25,S$GLB,, | Performed by: PODIATRIST

## 2021-09-13 PROCEDURE — 11042 DBRDMT SUBQ TIS 1ST 20SQCM/<: CPT | Mod: S$GLB,,, | Performed by: PODIATRIST

## 2021-09-22 DIAGNOSIS — L97.512 DIABETIC ULCER OF TOE OF RIGHT FOOT ASSOCIATED WITH TYPE 2 DIABETES MELLITUS, WITH FAT LAYER EXPOSED: Primary | ICD-10-CM

## 2021-09-22 DIAGNOSIS — E11.621 DIABETIC ULCER OF TOE OF RIGHT FOOT ASSOCIATED WITH TYPE 2 DIABETES MELLITUS, WITH FAT LAYER EXPOSED: Primary | ICD-10-CM

## 2021-10-13 ENCOUNTER — OFFICE VISIT (OUTPATIENT)
Dept: PODIATRY | Facility: CLINIC | Age: 61
End: 2021-10-13
Payer: COMMERCIAL

## 2021-10-13 DIAGNOSIS — E11.621 DIABETIC ULCER OF TOE OF RIGHT FOOT ASSOCIATED WITH TYPE 2 DIABETES MELLITUS, WITH FAT LAYER EXPOSED: Primary | ICD-10-CM

## 2021-10-13 DIAGNOSIS — L97.512 DIABETIC ULCER OF TOE OF RIGHT FOOT ASSOCIATED WITH TYPE 2 DIABETES MELLITUS, WITH FAT LAYER EXPOSED: Primary | ICD-10-CM

## 2021-10-13 DIAGNOSIS — Z89.412 HISTORY OF AMPUTATION OF LEFT GREAT TOE: ICD-10-CM

## 2021-10-13 PROCEDURE — 3044F PR MOST RECENT HEMOGLOBIN A1C LEVEL <7.0%: ICD-10-PCS | Mod: CPTII,S$GLB,, | Performed by: PODIATRIST

## 2021-10-13 PROCEDURE — 99499 UNLISTED E&M SERVICE: CPT | Mod: S$GLB,,, | Performed by: PODIATRIST

## 2021-10-13 PROCEDURE — 1159F PR MEDICATION LIST DOCUMENTED IN MEDICAL RECORD: ICD-10-PCS | Mod: CPTII,S$GLB,, | Performed by: PODIATRIST

## 2021-10-13 PROCEDURE — 11042 DBRDMT SUBQ TIS 1ST 20SQCM/<: CPT | Mod: S$GLB,,, | Performed by: PODIATRIST

## 2021-10-13 PROCEDURE — 1160F RVW MEDS BY RX/DR IN RCRD: CPT | Mod: CPTII,S$GLB,, | Performed by: PODIATRIST

## 2021-10-13 PROCEDURE — 11042 PR DEBRIDEMENT, SKIN, SUB-Q TISSUE,=<20 SQ CM: ICD-10-PCS | Mod: S$GLB,,, | Performed by: PODIATRIST

## 2021-10-13 PROCEDURE — 3044F HG A1C LEVEL LT 7.0%: CPT | Mod: CPTII,S$GLB,, | Performed by: PODIATRIST

## 2021-10-13 PROCEDURE — 1159F MED LIST DOCD IN RCRD: CPT | Mod: CPTII,S$GLB,, | Performed by: PODIATRIST

## 2021-10-13 PROCEDURE — 99999 PR PBB SHADOW E&M-EST. PATIENT-LVL II: CPT | Mod: PBBFAC,,, | Performed by: PODIATRIST

## 2021-10-13 PROCEDURE — 99999 PR PBB SHADOW E&M-EST. PATIENT-LVL II: ICD-10-PCS | Mod: PBBFAC,,, | Performed by: PODIATRIST

## 2021-10-13 PROCEDURE — 1160F PR REVIEW ALL MEDS BY PRESCRIBER/CLIN PHARMACIST DOCUMENTED: ICD-10-PCS | Mod: CPTII,S$GLB,, | Performed by: PODIATRIST

## 2021-10-13 PROCEDURE — 99499 NO LOS: ICD-10-PCS | Mod: S$GLB,,, | Performed by: PODIATRIST

## 2021-10-21 ENCOUNTER — OFFICE VISIT (OUTPATIENT)
Dept: PODIATRY | Facility: CLINIC | Age: 61
End: 2021-10-21
Payer: COMMERCIAL

## 2021-10-21 DIAGNOSIS — L03.116 CELLULITIS OF LEFT FOOT: ICD-10-CM

## 2021-10-21 DIAGNOSIS — L97.522 SKIN ULCER OF THIRD TOE, LEFT, WITH FAT LAYER EXPOSED: ICD-10-CM

## 2021-10-21 DIAGNOSIS — E11.49 DIABETES MELLITUS TYPE 2 WITH NEUROLOGICAL MANIFESTATIONS: ICD-10-CM

## 2021-10-21 DIAGNOSIS — E11.621 DIABETIC ULCER OF TOE OF RIGHT FOOT ASSOCIATED WITH TYPE 2 DIABETES MELLITUS, WITH FAT LAYER EXPOSED: Primary | ICD-10-CM

## 2021-10-21 DIAGNOSIS — Z89.412 HISTORY OF AMPUTATION OF LEFT GREAT TOE: ICD-10-CM

## 2021-10-21 DIAGNOSIS — L97.512 DIABETIC ULCER OF TOE OF RIGHT FOOT ASSOCIATED WITH TYPE 2 DIABETES MELLITUS, WITH FAT LAYER EXPOSED: Primary | ICD-10-CM

## 2021-10-21 PROCEDURE — 1160F PR REVIEW ALL MEDS BY PRESCRIBER/CLIN PHARMACIST DOCUMENTED: ICD-10-PCS | Mod: CPTII,S$GLB,, | Performed by: PODIATRIST

## 2021-10-21 PROCEDURE — 3044F HG A1C LEVEL LT 7.0%: CPT | Mod: CPTII,S$GLB,, | Performed by: PODIATRIST

## 2021-10-21 PROCEDURE — 99214 PR OFFICE/OUTPT VISIT, EST, LEVL IV, 30-39 MIN: ICD-10-PCS | Mod: 25,S$GLB,, | Performed by: PODIATRIST

## 2021-10-21 PROCEDURE — 1159F PR MEDICATION LIST DOCUMENTED IN MEDICAL RECORD: ICD-10-PCS | Mod: CPTII,S$GLB,, | Performed by: PODIATRIST

## 2021-10-21 PROCEDURE — 1160F RVW MEDS BY RX/DR IN RCRD: CPT | Mod: CPTII,S$GLB,, | Performed by: PODIATRIST

## 2021-10-21 PROCEDURE — 1159F MED LIST DOCD IN RCRD: CPT | Mod: CPTII,S$GLB,, | Performed by: PODIATRIST

## 2021-10-21 PROCEDURE — 3044F PR MOST RECENT HEMOGLOBIN A1C LEVEL <7.0%: ICD-10-PCS | Mod: CPTII,S$GLB,, | Performed by: PODIATRIST

## 2021-10-21 PROCEDURE — 99999 PR PBB SHADOW E&M-EST. PATIENT-LVL III: CPT | Mod: PBBFAC,,, | Performed by: PODIATRIST

## 2021-10-21 PROCEDURE — 15275 SKIN SUB GRAFT FACE/NK/HF/G: CPT | Mod: S$GLB,,, | Performed by: PODIATRIST

## 2021-10-21 PROCEDURE — 99214 OFFICE O/P EST MOD 30 MIN: CPT | Mod: 25,S$GLB,, | Performed by: PODIATRIST

## 2021-10-21 PROCEDURE — 15275 PR SKIN SUB GRAFT FACE/NK/HF/G UP TO 100 SQCM: ICD-10-PCS | Mod: S$GLB,,, | Performed by: PODIATRIST

## 2021-10-21 PROCEDURE — 99999 PR PBB SHADOW E&M-EST. PATIENT-LVL III: ICD-10-PCS | Mod: PBBFAC,,, | Performed by: PODIATRIST

## 2021-10-21 RX ORDER — SULFAMETHOXAZOLE AND TRIMETHOPRIM 800; 160 MG/1; MG/1
1 TABLET ORAL 2 TIMES DAILY
Qty: 14 TABLET | Refills: 0 | Status: ON HOLD | OUTPATIENT
Start: 2021-10-21 | End: 2021-10-30 | Stop reason: HOSPADM

## 2021-10-28 ENCOUNTER — HOSPITAL ENCOUNTER (INPATIENT)
Facility: HOSPITAL | Age: 61
LOS: 1 days | Discharge: HOME | DRG: 194 | End: 2021-10-30
Attending: FAMILY MEDICINE | Admitting: INTERNAL MEDICINE
Payer: COMMERCIAL

## 2021-10-28 DIAGNOSIS — J18.9 PNEUMONIA OF RIGHT LUNG DUE TO INFECTIOUS ORGANISM, UNSPECIFIED PART OF LUNG: Primary | ICD-10-CM

## 2021-10-28 DIAGNOSIS — Z20.822 SUSPECTED COVID-19 VIRUS INFECTION: ICD-10-CM

## 2021-10-28 DIAGNOSIS — J18.9 PNEUMONIA: ICD-10-CM

## 2021-10-28 LAB
ALBUMIN SERPL BCP-MCNC: 2.9 G/DL (ref 3.5–5.2)
ALP SERPL-CCNC: 92 U/L (ref 55–135)
ALT SERPL W/O P-5'-P-CCNC: 21 U/L (ref 10–44)
ANION GAP SERPL CALC-SCNC: 12 MMOL/L (ref 8–16)
AST SERPL-CCNC: 19 U/L (ref 10–40)
BASOPHILS # BLD AUTO: 0.09 K/UL (ref 0–0.2)
BASOPHILS NFR BLD: 1 % (ref 0–1.9)
BILIRUB SERPL-MCNC: 0.1 MG/DL (ref 0.1–1)
BNP SERPL-MCNC: <10 PG/ML (ref 0–99)
BUN SERPL-MCNC: 21 MG/DL (ref 8–23)
CALCIUM SERPL-MCNC: 10.3 MG/DL (ref 8.7–10.5)
CHLORIDE SERPL-SCNC: 100 MMOL/L (ref 95–110)
CK SERPL-CCNC: 38 U/L (ref 20–180)
CO2 SERPL-SCNC: 25 MMOL/L (ref 23–29)
CREAT SERPL-MCNC: 1.2 MG/DL (ref 0.5–1.4)
CRP SERPL-MCNC: 31.6 MG/L (ref 0–8.2)
D DIMER PPP IA.FEU-MCNC: 3.57 MG/L FEU
DIFFERENTIAL METHOD: ABNORMAL
EOSINOPHIL # BLD AUTO: 0.3 K/UL (ref 0–0.5)
EOSINOPHIL NFR BLD: 3 % (ref 0–8)
ERYTHROCYTE [DISTWIDTH] IN BLOOD BY AUTOMATED COUNT: 14.7 % (ref 11.5–14.5)
EST. GFR  (AFRICAN AMERICAN): 56 ML/MIN/1.73 M^2
EST. GFR  (NON AFRICAN AMERICAN): 49 ML/MIN/1.73 M^2
FERRITIN SERPL-MCNC: 116 NG/ML (ref 20–300)
GLUCOSE SERPL-MCNC: 179 MG/DL (ref 70–110)
HCT VFR BLD AUTO: 36.2 % (ref 37–48.5)
HGB BLD-MCNC: 11.2 G/DL (ref 12–16)
IMM GRANULOCYTES # BLD AUTO: 0.42 K/UL (ref 0–0.04)
IMM GRANULOCYTES NFR BLD AUTO: 4.5 % (ref 0–0.5)
LACTATE SERPL-SCNC: 2.5 MMOL/L (ref 0.5–2.2)
LDH SERPL L TO P-CCNC: 148 U/L (ref 110–260)
LYMPHOCYTES # BLD AUTO: 1.5 K/UL (ref 1–4.8)
LYMPHOCYTES NFR BLD: 16.3 % (ref 18–48)
MCH RBC QN AUTO: 26 PG (ref 27–31)
MCHC RBC AUTO-ENTMCNC: 30.9 G/DL (ref 32–36)
MCV RBC AUTO: 84 FL (ref 82–98)
MONOCYTES # BLD AUTO: 0.7 K/UL (ref 0.3–1)
MONOCYTES NFR BLD: 7.4 % (ref 4–15)
NEUTROPHILS # BLD AUTO: 6.3 K/UL (ref 1.8–7.7)
NEUTROPHILS NFR BLD: 67.8 % (ref 38–73)
NRBC BLD-RTO: 0 /100 WBC
PLATELET # BLD AUTO: 352 K/UL (ref 150–450)
PMV BLD AUTO: 9 FL (ref 9.2–12.9)
POTASSIUM SERPL-SCNC: 4.2 MMOL/L (ref 3.5–5.1)
PROCALCITONIN SERPL IA-MCNC: 0.25 NG/ML
PROT SERPL-MCNC: 8.2 G/DL (ref 6–8.4)
RBC # BLD AUTO: 4.3 M/UL (ref 4–5.4)
SODIUM SERPL-SCNC: 137 MMOL/L (ref 136–145)
TROPONIN I SERPL DL<=0.01 NG/ML-MCNC: <0.006 NG/ML (ref 0–0.03)
WBC # BLD AUTO: 9.25 K/UL (ref 3.9–12.7)

## 2021-10-28 PROCEDURE — 85025 COMPLETE CBC W/AUTO DIFF WBC: CPT | Performed by: FAMILY MEDICINE

## 2021-10-28 PROCEDURE — G0378 HOSPITAL OBSERVATION PER HR: HCPCS

## 2021-10-28 PROCEDURE — 85379 FIBRIN DEGRADATION QUANT: CPT | Performed by: FAMILY MEDICINE

## 2021-10-28 PROCEDURE — 93005 ELECTROCARDIOGRAM TRACING: CPT

## 2021-10-28 PROCEDURE — 93010 EKG 12-LEAD: ICD-10-PCS | Mod: ,,, | Performed by: INTERNAL MEDICINE

## 2021-10-28 PROCEDURE — 83615 LACTATE (LD) (LDH) ENZYME: CPT | Performed by: FAMILY MEDICINE

## 2021-10-28 PROCEDURE — 93010 ELECTROCARDIOGRAM REPORT: CPT | Mod: ,,, | Performed by: INTERNAL MEDICINE

## 2021-10-28 PROCEDURE — 83605 ASSAY OF LACTIC ACID: CPT | Performed by: FAMILY MEDICINE

## 2021-10-28 PROCEDURE — 86140 C-REACTIVE PROTEIN: CPT | Performed by: FAMILY MEDICINE

## 2021-10-28 PROCEDURE — 99285 EMERGENCY DEPT VISIT HI MDM: CPT | Mod: 25

## 2021-10-28 PROCEDURE — 83880 ASSAY OF NATRIURETIC PEPTIDE: CPT | Performed by: FAMILY MEDICINE

## 2021-10-28 PROCEDURE — 80053 COMPREHEN METABOLIC PANEL: CPT | Performed by: FAMILY MEDICINE

## 2021-10-28 PROCEDURE — 84145 PROCALCITONIN (PCT): CPT | Performed by: FAMILY MEDICINE

## 2021-10-28 PROCEDURE — 84484 ASSAY OF TROPONIN QUANT: CPT | Performed by: FAMILY MEDICINE

## 2021-10-28 PROCEDURE — 82550 ASSAY OF CK (CPK): CPT | Performed by: FAMILY MEDICINE

## 2021-10-28 PROCEDURE — 82728 ASSAY OF FERRITIN: CPT | Performed by: FAMILY MEDICINE

## 2021-10-28 RX ORDER — TRAMADOL HYDROCHLORIDE 50 MG/1
50 TABLET ORAL 3 TIMES DAILY PRN
Status: ON HOLD | COMMUNITY
Start: 2021-09-23 | End: 2023-06-08 | Stop reason: HOSPADM

## 2021-10-28 RX ORDER — DOXYCYCLINE HYCLATE 100 MG
TABLET ORAL
Status: ON HOLD | COMMUNITY
Start: 2021-10-24 | End: 2021-10-30 | Stop reason: HOSPADM

## 2021-10-28 RX ORDER — IBUPROFEN 200 MG
200 TABLET ORAL
COMMUNITY

## 2021-10-28 RX ORDER — EMPAGLIFLOZIN 10 MG/1
10 TABLET, FILM COATED ORAL DAILY
COMMUNITY
Start: 2021-10-07

## 2021-10-28 RX ORDER — ALBUTEROL SULFATE 90 UG/1
AEROSOL, METERED RESPIRATORY (INHALATION)
COMMUNITY
Start: 2021-10-24 | End: 2023-06-05

## 2021-10-28 RX ORDER — CEFEPIME HYDROCHLORIDE 1 G/50ML
2 INJECTION, SOLUTION INTRAVENOUS ONCE
Status: COMPLETED | OUTPATIENT
Start: 2021-10-29 | End: 2021-10-29

## 2021-10-28 RX ORDER — PROMETHAZINE HYDROCHLORIDE AND DEXTROMETHORPHAN HYDROBROMIDE 6.25; 15 MG/5ML; MG/5ML
SYRUP ORAL
COMMUNITY
Start: 2021-10-24

## 2021-10-28 RX ORDER — ALBUTEROL SULFATE 0.83 MG/ML
SOLUTION RESPIRATORY (INHALATION)
COMMUNITY
Start: 2021-10-24 | End: 2023-06-05

## 2021-10-28 RX ORDER — METAXALONE 800 MG/1
800 TABLET ORAL 3 TIMES DAILY PRN
COMMUNITY
Start: 2021-07-15

## 2021-10-28 RX ADMIN — IOHEXOL 100 ML: 350 INJECTION, SOLUTION INTRAVENOUS at 11:10

## 2021-10-29 PROBLEM — J18.9 PNEUMONIA: Status: ACTIVE | Noted: 2021-10-29

## 2021-10-29 PROBLEM — N17.9 ACUTE RENAL FAILURE: Status: ACTIVE | Noted: 2021-10-29

## 2021-10-29 LAB
ALBUMIN SERPL BCP-MCNC: 2.9 G/DL (ref 3.5–5.2)
ALP SERPL-CCNC: 91 U/L (ref 55–135)
ALT SERPL W/O P-5'-P-CCNC: 19 U/L (ref 10–44)
ANION GAP SERPL CALC-SCNC: 13 MMOL/L (ref 8–16)
AST SERPL-CCNC: 16 U/L (ref 10–40)
BASOPHILS # BLD AUTO: 0.1 K/UL (ref 0–0.2)
BASOPHILS NFR BLD: 1.4 % (ref 0–1.9)
BILIRUB SERPL-MCNC: 0.1 MG/DL (ref 0.1–1)
BUN SERPL-MCNC: 19 MG/DL (ref 8–23)
CALCIUM SERPL-MCNC: 10.4 MG/DL (ref 8.7–10.5)
CHLORIDE SERPL-SCNC: 101 MMOL/L (ref 95–110)
CO2 SERPL-SCNC: 24 MMOL/L (ref 23–29)
CREAT SERPL-MCNC: 1 MG/DL (ref 0.5–1.4)
CTP QC/QA: YES
DIFFERENTIAL METHOD: ABNORMAL
EOSINOPHIL # BLD AUTO: 0.4 K/UL (ref 0–0.5)
EOSINOPHIL NFR BLD: 5.2 % (ref 0–8)
ERYTHROCYTE [DISTWIDTH] IN BLOOD BY AUTOMATED COUNT: 14.8 % (ref 11.5–14.5)
EST. GFR  (AFRICAN AMERICAN): >60 ML/MIN/1.73 M^2
EST. GFR  (NON AFRICAN AMERICAN): >60 ML/MIN/1.73 M^2
ESTIMATED AVG GLUCOSE: 134 MG/DL (ref 68–131)
GLUCOSE SERPL-MCNC: 143 MG/DL (ref 70–110)
HBA1C MFR BLD: 6.3 % (ref 4–5.6)
HCT VFR BLD AUTO: 38.5 % (ref 37–48.5)
HGB BLD-MCNC: 11.5 G/DL (ref 12–16)
IMM GRANULOCYTES # BLD AUTO: 0.36 K/UL (ref 0–0.04)
IMM GRANULOCYTES NFR BLD AUTO: 4.9 % (ref 0–0.5)
LACTATE SERPL-SCNC: 0.9 MMOL/L (ref 0.5–2.2)
LYMPHOCYTES # BLD AUTO: 1.8 K/UL (ref 1–4.8)
LYMPHOCYTES NFR BLD: 24.4 % (ref 18–48)
MAGNESIUM SERPL-MCNC: 1.5 MG/DL (ref 1.6–2.6)
MCH RBC QN AUTO: 26 PG (ref 27–31)
MCHC RBC AUTO-ENTMCNC: 29.9 G/DL (ref 32–36)
MCV RBC AUTO: 87 FL (ref 82–98)
MONOCYTES # BLD AUTO: 0.5 K/UL (ref 0.3–1)
MONOCYTES NFR BLD: 6.7 % (ref 4–15)
NEUTROPHILS # BLD AUTO: 4.2 K/UL (ref 1.8–7.7)
NEUTROPHILS NFR BLD: 57.4 % (ref 38–73)
NRBC BLD-RTO: 0 /100 WBC
PLATELET # BLD AUTO: 318 K/UL (ref 150–450)
PMV BLD AUTO: 9.3 FL (ref 9.2–12.9)
POCT GLUCOSE: 110 MG/DL (ref 70–110)
POCT GLUCOSE: 151 MG/DL (ref 70–110)
POCT GLUCOSE: 230 MG/DL (ref 70–110)
POCT GLUCOSE: 269 MG/DL (ref 70–110)
POTASSIUM SERPL-SCNC: 4.4 MMOL/L (ref 3.5–5.1)
PROCALCITONIN SERPL IA-MCNC: 0.06 NG/ML
PROT SERPL-MCNC: 8.1 G/DL (ref 6–8.4)
RBC # BLD AUTO: 4.43 M/UL (ref 4–5.4)
SARS-COV-2 RDRP RESP QL NAA+PROBE: NEGATIVE
SODIUM SERPL-SCNC: 138 MMOL/L (ref 136–145)
WBC # BLD AUTO: 7.34 K/UL (ref 3.9–12.7)

## 2021-10-29 PROCEDURE — 96375 TX/PRO/DX INJ NEW DRUG ADDON: CPT

## 2021-10-29 PROCEDURE — 83036 HEMOGLOBIN GLYCOSYLATED A1C: CPT | Performed by: NURSE PRACTITIONER

## 2021-10-29 PROCEDURE — 80053 COMPREHEN METABOLIC PANEL: CPT | Performed by: NURSE PRACTITIONER

## 2021-10-29 PROCEDURE — 25000003 PHARM REV CODE 250: Performed by: INTERNAL MEDICINE

## 2021-10-29 PROCEDURE — 25500020 PHARM REV CODE 255: Performed by: FAMILY MEDICINE

## 2021-10-29 PROCEDURE — 83605 ASSAY OF LACTIC ACID: CPT | Performed by: FAMILY MEDICINE

## 2021-10-29 PROCEDURE — 63600175 PHARM REV CODE 636 W HCPCS: Performed by: NURSE PRACTITIONER

## 2021-10-29 PROCEDURE — 63600175 PHARM REV CODE 636 W HCPCS: Performed by: FAMILY MEDICINE

## 2021-10-29 PROCEDURE — 83735 ASSAY OF MAGNESIUM: CPT | Performed by: NURSE PRACTITIONER

## 2021-10-29 PROCEDURE — 96365 THER/PROPH/DIAG IV INF INIT: CPT

## 2021-10-29 PROCEDURE — 36415 COLL VENOUS BLD VENIPUNCTURE: CPT | Performed by: NURSE PRACTITIONER

## 2021-10-29 PROCEDURE — 85025 COMPLETE CBC W/AUTO DIFF WBC: CPT | Performed by: NURSE PRACTITIONER

## 2021-10-29 PROCEDURE — 84145 PROCALCITONIN (PCT): CPT | Performed by: INTERNAL MEDICINE

## 2021-10-29 PROCEDURE — U0002 COVID-19 LAB TEST NON-CDC: HCPCS | Performed by: FAMILY MEDICINE

## 2021-10-29 PROCEDURE — 96372 THER/PROPH/DIAG INJ SC/IM: CPT

## 2021-10-29 PROCEDURE — 96361 HYDRATE IV INFUSION ADD-ON: CPT

## 2021-10-29 PROCEDURE — 36415 COLL VENOUS BLD VENIPUNCTURE: CPT | Performed by: INTERNAL MEDICINE

## 2021-10-29 PROCEDURE — 87040 BLOOD CULTURE FOR BACTERIA: CPT | Mod: 59 | Performed by: NURSE PRACTITIONER

## 2021-10-29 PROCEDURE — 11000001 HC ACUTE MED/SURG PRIVATE ROOM

## 2021-10-29 PROCEDURE — 96372 THER/PROPH/DIAG INJ SC/IM: CPT | Mod: 59

## 2021-10-29 PROCEDURE — 96374 THER/PROPH/DIAG INJ IV PUSH: CPT | Mod: 59

## 2021-10-29 PROCEDURE — 25000003 PHARM REV CODE 250: Performed by: NURSE PRACTITIONER

## 2021-10-29 PROCEDURE — 94761 N-INVAS EAR/PLS OXIMETRY MLT: CPT

## 2021-10-29 RX ORDER — OMEGA-3-ACID ETHYL ESTERS 1 G/1
1 CAPSULE, LIQUID FILLED ORAL 2 TIMES DAILY
Status: DISCONTINUED | OUTPATIENT
Start: 2021-10-29 | End: 2021-10-30 | Stop reason: HOSPADM

## 2021-10-29 RX ORDER — IBUPROFEN 200 MG
16 TABLET ORAL
Status: DISCONTINUED | OUTPATIENT
Start: 2021-10-29 | End: 2021-10-30 | Stop reason: HOSPADM

## 2021-10-29 RX ORDER — AZITHROMYCIN 250 MG/1
500 TABLET, FILM COATED ORAL DAILY
Status: DISCONTINUED | OUTPATIENT
Start: 2021-10-30 | End: 2021-10-30 | Stop reason: HOSPADM

## 2021-10-29 RX ORDER — VALSARTAN 160 MG/1
320 TABLET ORAL NIGHTLY
Status: DISCONTINUED | OUTPATIENT
Start: 2021-10-29 | End: 2021-10-30 | Stop reason: HOSPADM

## 2021-10-29 RX ORDER — ONDANSETRON 2 MG/ML
4 INJECTION INTRAMUSCULAR; INTRAVENOUS EVERY 6 HOURS PRN
Status: DISCONTINUED | OUTPATIENT
Start: 2021-10-29 | End: 2021-10-30 | Stop reason: HOSPADM

## 2021-10-29 RX ORDER — HYDROMORPHONE HYDROCHLORIDE 2 MG/ML
1 INJECTION, SOLUTION INTRAMUSCULAR; INTRAVENOUS; SUBCUTANEOUS
Status: DISCONTINUED | OUTPATIENT
Start: 2021-10-29 | End: 2021-10-30 | Stop reason: HOSPADM

## 2021-10-29 RX ORDER — METHOCARBAMOL 750 MG/1
750 TABLET, FILM COATED ORAL 3 TIMES DAILY PRN
Status: DISCONTINUED | OUTPATIENT
Start: 2021-10-29 | End: 2021-10-30 | Stop reason: HOSPADM

## 2021-10-29 RX ORDER — IPRATROPIUM BROMIDE AND ALBUTEROL SULFATE 2.5; .5 MG/3ML; MG/3ML
3 SOLUTION RESPIRATORY (INHALATION)
Status: DISCONTINUED | OUTPATIENT
Start: 2021-10-29 | End: 2021-10-30 | Stop reason: HOSPADM

## 2021-10-29 RX ORDER — LANOLIN ALCOHOL/MO/W.PET/CERES
400 CREAM (GRAM) TOPICAL 2 TIMES DAILY
Status: DISCONTINUED | OUTPATIENT
Start: 2021-10-29 | End: 2021-10-30

## 2021-10-29 RX ORDER — VENLAFAXINE HYDROCHLORIDE 150 MG/1
150 CAPSULE, EXTENDED RELEASE ORAL NIGHTLY
Status: DISCONTINUED | OUTPATIENT
Start: 2021-10-29 | End: 2021-10-30 | Stop reason: HOSPADM

## 2021-10-29 RX ORDER — PROMETHAZINE HYDROCHLORIDE AND CODEINE PHOSPHATE 6.25; 1 MG/5ML; MG/5ML
10 SOLUTION ORAL EVERY 4 HOURS PRN
Status: DISCONTINUED | OUTPATIENT
Start: 2021-10-29 | End: 2021-10-30 | Stop reason: HOSPADM

## 2021-10-29 RX ORDER — GLUCAGON 1 MG
1 KIT INJECTION
Status: DISCONTINUED | OUTPATIENT
Start: 2021-10-29 | End: 2021-10-30 | Stop reason: HOSPADM

## 2021-10-29 RX ORDER — VALSARTAN AND HYDROCHLOROTHIAZIDE 320; 25 MG/1; MG/1
1 TABLET, FILM COATED ORAL NIGHTLY
Status: DISCONTINUED | OUTPATIENT
Start: 2021-10-29 | End: 2021-10-29 | Stop reason: CLARIF

## 2021-10-29 RX ORDER — ACETAMINOPHEN 325 MG/1
650 TABLET ORAL EVERY 6 HOURS PRN
Status: DISCONTINUED | OUTPATIENT
Start: 2021-10-29 | End: 2021-10-30 | Stop reason: HOSPADM

## 2021-10-29 RX ORDER — IBUPROFEN 200 MG
24 TABLET ORAL
Status: DISCONTINUED | OUTPATIENT
Start: 2021-10-29 | End: 2021-10-30 | Stop reason: HOSPADM

## 2021-10-29 RX ORDER — MORPHINE SULFATE 4 MG/ML
2 INJECTION, SOLUTION INTRAMUSCULAR; INTRAVENOUS
Status: DISCONTINUED | OUTPATIENT
Start: 2021-10-29 | End: 2021-10-29

## 2021-10-29 RX ORDER — ONDANSETRON HYDROCHLORIDE 4 MG/5ML
4 SOLUTION ORAL ONCE
Status: COMPLETED | OUTPATIENT
Start: 2021-10-29 | End: 2021-10-29

## 2021-10-29 RX ORDER — HYDRALAZINE HYDROCHLORIDE 20 MG/ML
10 INJECTION INTRAMUSCULAR; INTRAVENOUS EVERY 6 HOURS PRN
Status: DISCONTINUED | OUTPATIENT
Start: 2021-10-29 | End: 2021-10-30 | Stop reason: HOSPADM

## 2021-10-29 RX ORDER — INSULIN ASPART 100 [IU]/ML
1-10 INJECTION, SOLUTION INTRAVENOUS; SUBCUTANEOUS
Status: DISCONTINUED | OUTPATIENT
Start: 2021-10-29 | End: 2021-10-30 | Stop reason: HOSPADM

## 2021-10-29 RX ORDER — HYDROMORPHONE HYDROCHLORIDE 2 MG/ML
0.5 INJECTION, SOLUTION INTRAMUSCULAR; INTRAVENOUS; SUBCUTANEOUS
Status: DISCONTINUED | OUTPATIENT
Start: 2021-10-29 | End: 2021-10-30 | Stop reason: HOSPADM

## 2021-10-29 RX ORDER — METAXALONE 800 MG/1
800 TABLET ORAL 3 TIMES DAILY PRN
Status: DISCONTINUED | OUTPATIENT
Start: 2021-10-29 | End: 2021-10-29

## 2021-10-29 RX ORDER — ATORVASTATIN CALCIUM 10 MG/1
10 TABLET, FILM COATED ORAL NIGHTLY
Status: DISCONTINUED | OUTPATIENT
Start: 2021-10-29 | End: 2021-10-30 | Stop reason: HOSPADM

## 2021-10-29 RX ORDER — MORPHINE SULFATE 30 MG/1
60 TABLET, FILM COATED, EXTENDED RELEASE ORAL EVERY 12 HOURS
Status: DISCONTINUED | OUTPATIENT
Start: 2021-10-29 | End: 2021-10-30 | Stop reason: HOSPADM

## 2021-10-29 RX ORDER — HYDROCHLOROTHIAZIDE 25 MG/1
25 TABLET ORAL NIGHTLY
Status: DISCONTINUED | OUTPATIENT
Start: 2021-10-29 | End: 2021-10-30 | Stop reason: HOSPADM

## 2021-10-29 RX ORDER — SODIUM CHLORIDE 9 MG/ML
INJECTION, SOLUTION INTRAVENOUS CONTINUOUS
Status: DISCONTINUED | OUTPATIENT
Start: 2021-10-29 | End: 2021-10-30 | Stop reason: HOSPADM

## 2021-10-29 RX ADMIN — VALSARTAN 320 MG: 160 TABLET, FILM COATED ORAL at 03:10

## 2021-10-29 RX ADMIN — HYDROMORPHONE HYDROCHLORIDE 1 MG: 2 INJECTION INTRAMUSCULAR; INTRAVENOUS; SUBCUTANEOUS at 03:10

## 2021-10-29 RX ADMIN — INSULIN ASPART 2 UNITS: 100 INJECTION, SOLUTION INTRAVENOUS; SUBCUTANEOUS at 06:10

## 2021-10-29 RX ADMIN — ATORVASTATIN CALCIUM 10 MG: 10 TABLET, FILM COATED ORAL at 08:10

## 2021-10-29 RX ADMIN — HYDROCHLOROTHIAZIDE 25 MG: 25 TABLET ORAL at 08:10

## 2021-10-29 RX ADMIN — CEFTRIAXONE 1 G: 1 INJECTION, SOLUTION INTRAVENOUS at 03:10

## 2021-10-29 RX ADMIN — METHOCARBAMOL 750 MG: 750 TABLET ORAL at 09:10

## 2021-10-29 RX ADMIN — INSULIN ASPART 3 UNITS: 100 INJECTION, SOLUTION INTRAVENOUS; SUBCUTANEOUS at 08:10

## 2021-10-29 RX ADMIN — AZITHROMYCIN MONOHYDRATE 500 MG: 500 INJECTION, POWDER, LYOPHILIZED, FOR SOLUTION INTRAVENOUS at 06:10

## 2021-10-29 RX ADMIN — VENLAFAXINE HYDROCHLORIDE 150 MG: 150 CAPSULE, EXTENDED RELEASE ORAL at 08:10

## 2021-10-29 RX ADMIN — Medication 400 MG: at 08:10

## 2021-10-29 RX ADMIN — ONDANSETRON 4 MG: 4 SOLUTION ORAL at 06:10

## 2021-10-29 RX ADMIN — HYDROMORPHONE HYDROCHLORIDE 1 MG: 2 INJECTION INTRAMUSCULAR; INTRAVENOUS; SUBCUTANEOUS at 09:10

## 2021-10-29 RX ADMIN — INSULIN ASPART 4 UNITS: 100 INJECTION, SOLUTION INTRAVENOUS; SUBCUTANEOUS at 05:10

## 2021-10-29 RX ADMIN — Medication 400 MG: at 12:10

## 2021-10-29 RX ADMIN — MORPHINE SULFATE 2 MG: 4 INJECTION INTRAVENOUS at 01:10

## 2021-10-29 RX ADMIN — ATORVASTATIN CALCIUM 10 MG: 10 TABLET, FILM COATED ORAL at 03:10

## 2021-10-29 RX ADMIN — MORPHINE SULFATE 60 MG: 30 TABLET, FILM COATED, EXTENDED RELEASE ORAL at 08:10

## 2021-10-29 RX ADMIN — METHOCARBAMOL 750 MG: 750 TABLET ORAL at 03:10

## 2021-10-29 RX ADMIN — SODIUM CHLORIDE: 0.9 INJECTION, SOLUTION INTRAVENOUS at 04:10

## 2021-10-29 RX ADMIN — HYDROMORPHONE HYDROCHLORIDE 0.5 MG: 2 INJECTION INTRAMUSCULAR; INTRAVENOUS; SUBCUTANEOUS at 06:10

## 2021-10-29 RX ADMIN — SODIUM CHLORIDE: 0.9 INJECTION, SOLUTION INTRAVENOUS at 03:10

## 2021-10-29 RX ADMIN — VALSARTAN 320 MG: 160 TABLET, FILM COATED ORAL at 08:10

## 2021-10-29 RX ADMIN — HYDROCHLOROTHIAZIDE 25 MG: 25 TABLET ORAL at 03:10

## 2021-10-29 RX ADMIN — CEFEPIME HYDROCHLORIDE 2 G: 2 INJECTION, SOLUTION INTRAVENOUS at 01:10

## 2021-10-29 RX ADMIN — OMEGA-3-ACID ETHYL ESTERS 1 G: 1 CAPSULE, LIQUID FILLED ORAL at 08:10

## 2021-10-29 RX ADMIN — MULTIPLE VITAMINS W/ MINERALS TAB 1 TABLET: TAB at 08:10

## 2021-10-29 RX ADMIN — PROMETHAZINE HYDROCHLORIDE AND CODEINE PHOSPHATE 10 ML: 10; 6.25 SOLUTION ORAL at 09:10

## 2021-10-29 RX ADMIN — PROMETHAZINE HYDROCHLORIDE AND CODEINE PHOSPHATE 10 ML: 10; 6.25 SOLUTION ORAL at 03:10

## 2021-10-29 RX ADMIN — VENLAFAXINE HYDROCHLORIDE 150 MG: 150 CAPSULE, EXTENDED RELEASE ORAL at 03:10

## 2021-10-29 RX ADMIN — PROMETHAZINE HYDROCHLORIDE AND CODEINE PHOSPHATE 10 ML: 10; 6.25 SOLUTION ORAL at 11:10

## 2021-10-30 VITALS
DIASTOLIC BLOOD PRESSURE: 67 MMHG | RESPIRATION RATE: 16 BRPM | TEMPERATURE: 98 F | HEIGHT: 67 IN | OXYGEN SATURATION: 97 % | SYSTOLIC BLOOD PRESSURE: 113 MMHG | HEART RATE: 82 BPM | WEIGHT: 166.69 LBS | BODY MASS INDEX: 26.16 KG/M2

## 2021-10-30 LAB
ALBUMIN SERPL BCP-MCNC: 2.6 G/DL (ref 3.5–5.2)
ALP SERPL-CCNC: 82 U/L (ref 55–135)
ALT SERPL W/O P-5'-P-CCNC: 16 U/L (ref 10–44)
ANION GAP SERPL CALC-SCNC: 12 MMOL/L (ref 8–16)
AST SERPL-CCNC: 14 U/L (ref 10–40)
BASOPHILS # BLD AUTO: 0.12 K/UL (ref 0–0.2)
BASOPHILS NFR BLD: 1.5 % (ref 0–1.9)
BILIRUB SERPL-MCNC: 0.1 MG/DL (ref 0.1–1)
BUN SERPL-MCNC: 13 MG/DL (ref 8–23)
CALCIUM SERPL-MCNC: 9.6 MG/DL (ref 8.7–10.5)
CHLORIDE SERPL-SCNC: 103 MMOL/L (ref 95–110)
CO2 SERPL-SCNC: 25 MMOL/L (ref 23–29)
CREAT SERPL-MCNC: 0.8 MG/DL (ref 0.5–1.4)
DIFFERENTIAL METHOD: ABNORMAL
EOSINOPHIL # BLD AUTO: 0.5 K/UL (ref 0–0.5)
EOSINOPHIL NFR BLD: 5.6 % (ref 0–8)
ERYTHROCYTE [DISTWIDTH] IN BLOOD BY AUTOMATED COUNT: 15 % (ref 11.5–14.5)
EST. GFR  (AFRICAN AMERICAN): >60 ML/MIN/1.73 M^2
EST. GFR  (NON AFRICAN AMERICAN): >60 ML/MIN/1.73 M^2
GLUCOSE SERPL-MCNC: 122 MG/DL (ref 70–110)
HCT VFR BLD AUTO: 38 % (ref 37–48.5)
HGB BLD-MCNC: 11.4 G/DL (ref 12–16)
IMM GRANULOCYTES # BLD AUTO: 0.36 K/UL (ref 0–0.04)
IMM GRANULOCYTES NFR BLD AUTO: 4.5 % (ref 0–0.5)
LYMPHOCYTES # BLD AUTO: 1.9 K/UL (ref 1–4.8)
LYMPHOCYTES NFR BLD: 24 % (ref 18–48)
MAGNESIUM SERPL-MCNC: 1.5 MG/DL (ref 1.6–2.6)
MCH RBC QN AUTO: 26.6 PG (ref 27–31)
MCHC RBC AUTO-ENTMCNC: 30 G/DL (ref 32–36)
MCV RBC AUTO: 89 FL (ref 82–98)
MONOCYTES # BLD AUTO: 0.5 K/UL (ref 0.3–1)
MONOCYTES NFR BLD: 6.2 % (ref 4–15)
NEUTROPHILS # BLD AUTO: 4.7 K/UL (ref 1.8–7.7)
NEUTROPHILS NFR BLD: 58.2 % (ref 38–73)
NRBC BLD-RTO: 0 /100 WBC
PLATELET # BLD AUTO: 322 K/UL (ref 150–450)
PMV BLD AUTO: 9.4 FL (ref 9.2–12.9)
POCT GLUCOSE: 132 MG/DL (ref 70–110)
POTASSIUM SERPL-SCNC: 4.6 MMOL/L (ref 3.5–5.1)
PROCALCITONIN SERPL IA-MCNC: 0.06 NG/ML
PROT SERPL-MCNC: 7.4 G/DL (ref 6–8.4)
RBC # BLD AUTO: 4.28 M/UL (ref 4–5.4)
SODIUM SERPL-SCNC: 140 MMOL/L (ref 136–145)
WBC # BLD AUTO: 8.01 K/UL (ref 3.9–12.7)

## 2021-10-30 PROCEDURE — 63600175 PHARM REV CODE 636 W HCPCS: Performed by: NURSE PRACTITIONER

## 2021-10-30 PROCEDURE — 84145 PROCALCITONIN (PCT): CPT | Performed by: INTERNAL MEDICINE

## 2021-10-30 PROCEDURE — 63600175 PHARM REV CODE 636 W HCPCS: Performed by: INTERNAL MEDICINE

## 2021-10-30 PROCEDURE — 80053 COMPREHEN METABOLIC PANEL: CPT | Performed by: NURSE PRACTITIONER

## 2021-10-30 PROCEDURE — 85025 COMPLETE CBC W/AUTO DIFF WBC: CPT | Performed by: NURSE PRACTITIONER

## 2021-10-30 PROCEDURE — 83735 ASSAY OF MAGNESIUM: CPT | Performed by: NURSE PRACTITIONER

## 2021-10-30 PROCEDURE — 36415 COLL VENOUS BLD VENIPUNCTURE: CPT | Performed by: INTERNAL MEDICINE

## 2021-10-30 PROCEDURE — 25000003 PHARM REV CODE 250: Performed by: NURSE PRACTITIONER

## 2021-10-30 PROCEDURE — 63700000 PHARM REV CODE 250 ALT 637 W/O HCPCS: Performed by: INTERNAL MEDICINE

## 2021-10-30 RX ORDER — LEVOFLOXACIN 750 MG/1
750 TABLET ORAL DAILY
Qty: 7 TABLET | Refills: 0 | Status: SHIPPED | OUTPATIENT
Start: 2021-10-30 | End: 2021-11-06

## 2021-10-30 RX ORDER — MAGNESIUM SULFATE HEPTAHYDRATE 40 MG/ML
2 INJECTION, SOLUTION INTRAVENOUS ONCE
Status: COMPLETED | OUTPATIENT
Start: 2021-10-30 | End: 2021-10-30

## 2021-10-30 RX ADMIN — OMEGA-3-ACID ETHYL ESTERS 1 G: 1 CAPSULE, LIQUID FILLED ORAL at 09:10

## 2021-10-30 RX ADMIN — HYDROMORPHONE HYDROCHLORIDE 1 MG: 2 INJECTION INTRAMUSCULAR; INTRAVENOUS; SUBCUTANEOUS at 05:10

## 2021-10-30 RX ADMIN — CEFTRIAXONE 1 G: 1 INJECTION, SOLUTION INTRAVENOUS at 05:10

## 2021-10-30 RX ADMIN — MAGNESIUM SULFATE HEPTAHYDRATE 2 G: 40 INJECTION, SOLUTION INTRAVENOUS at 09:10

## 2021-10-30 RX ADMIN — MULTIPLE VITAMINS W/ MINERALS TAB 1 TABLET: TAB at 09:10

## 2021-10-30 RX ADMIN — AZITHROMYCIN MONOHYDRATE 500 MG: 250 TABLET ORAL at 09:10

## 2021-10-30 RX ADMIN — MORPHINE SULFATE 60 MG: 30 TABLET, FILM COATED, EXTENDED RELEASE ORAL at 09:10

## 2021-11-03 LAB
BACTERIA BLD CULT: NORMAL
BACTERIA BLD CULT: NORMAL

## 2021-11-04 ENCOUNTER — OFFICE VISIT (OUTPATIENT)
Dept: PODIATRY | Facility: CLINIC | Age: 61
End: 2021-11-04
Payer: COMMERCIAL

## 2021-11-04 DIAGNOSIS — L97.512 DIABETIC ULCER OF TOE OF RIGHT FOOT ASSOCIATED WITH TYPE 2 DIABETES MELLITUS, WITH FAT LAYER EXPOSED: Primary | ICD-10-CM

## 2021-11-04 DIAGNOSIS — E11.621 DIABETIC ULCER OF TOE OF RIGHT FOOT ASSOCIATED WITH TYPE 2 DIABETES MELLITUS, WITH FAT LAYER EXPOSED: Primary | ICD-10-CM

## 2021-11-04 DIAGNOSIS — Z89.412 HISTORY OF AMPUTATION OF LEFT GREAT TOE: ICD-10-CM

## 2021-11-04 PROCEDURE — 1111F PR DISCHARGE MEDS RECONCILED W/ CURRENT OUTPATIENT MED LIST: ICD-10-PCS | Mod: CPTII,S$GLB,, | Performed by: PODIATRIST

## 2021-11-04 PROCEDURE — 1159F PR MEDICATION LIST DOCUMENTED IN MEDICAL RECORD: ICD-10-PCS | Mod: CPTII,S$GLB,, | Performed by: PODIATRIST

## 2021-11-04 PROCEDURE — 1160F RVW MEDS BY RX/DR IN RCRD: CPT | Mod: CPTII,S$GLB,, | Performed by: PODIATRIST

## 2021-11-04 PROCEDURE — 1159F MED LIST DOCD IN RCRD: CPT | Mod: CPTII,S$GLB,, | Performed by: PODIATRIST

## 2021-11-04 PROCEDURE — 3044F HG A1C LEVEL LT 7.0%: CPT | Mod: CPTII,S$GLB,, | Performed by: PODIATRIST

## 2021-11-04 PROCEDURE — 99999 PR PBB SHADOW E&M-EST. PATIENT-LVL I: CPT | Mod: PBBFAC,,, | Performed by: PODIATRIST

## 2021-11-04 PROCEDURE — 1160F PR REVIEW ALL MEDS BY PRESCRIBER/CLIN PHARMACIST DOCUMENTED: ICD-10-PCS | Mod: CPTII,S$GLB,, | Performed by: PODIATRIST

## 2021-11-04 PROCEDURE — 99212 PR OFFICE/OUTPT VISIT, EST, LEVL II, 10-19 MIN: ICD-10-PCS | Mod: S$GLB,,, | Performed by: PODIATRIST

## 2021-11-04 PROCEDURE — 1111F DSCHRG MED/CURRENT MED MERGE: CPT | Mod: CPTII,S$GLB,, | Performed by: PODIATRIST

## 2021-11-04 PROCEDURE — 3044F PR MOST RECENT HEMOGLOBIN A1C LEVEL <7.0%: ICD-10-PCS | Mod: CPTII,S$GLB,, | Performed by: PODIATRIST

## 2021-11-04 PROCEDURE — 99999 PR PBB SHADOW E&M-EST. PATIENT-LVL I: ICD-10-PCS | Mod: PBBFAC,,, | Performed by: PODIATRIST

## 2021-11-04 PROCEDURE — 99212 OFFICE O/P EST SF 10 MIN: CPT | Mod: S$GLB,,, | Performed by: PODIATRIST

## 2021-11-16 ENCOUNTER — OFFICE VISIT (OUTPATIENT)
Dept: PODIATRY | Facility: CLINIC | Age: 61
End: 2021-11-16
Payer: COMMERCIAL

## 2021-11-16 DIAGNOSIS — E11.49 DIABETES MELLITUS TYPE 2 WITH NEUROLOGICAL MANIFESTATIONS: ICD-10-CM

## 2021-11-16 DIAGNOSIS — L97.512 DIABETIC ULCER OF TOE OF RIGHT FOOT ASSOCIATED WITH TYPE 2 DIABETES MELLITUS, WITH FAT LAYER EXPOSED: Primary | ICD-10-CM

## 2021-11-16 DIAGNOSIS — Z89.412 HISTORY OF AMPUTATION OF LEFT GREAT TOE: ICD-10-CM

## 2021-11-16 DIAGNOSIS — E11.621 DIABETIC ULCER OF TOE OF RIGHT FOOT ASSOCIATED WITH TYPE 2 DIABETES MELLITUS, WITH FAT LAYER EXPOSED: Primary | ICD-10-CM

## 2021-11-16 PROCEDURE — 3044F PR MOST RECENT HEMOGLOBIN A1C LEVEL <7.0%: ICD-10-PCS | Mod: CPTII,S$GLB,, | Performed by: PODIATRIST

## 2021-11-16 PROCEDURE — 99999 PR PBB SHADOW E&M-EST. PATIENT-LVL III: ICD-10-PCS | Mod: PBBFAC,,, | Performed by: PODIATRIST

## 2021-11-16 PROCEDURE — 1160F RVW MEDS BY RX/DR IN RCRD: CPT | Mod: CPTII,S$GLB,, | Performed by: PODIATRIST

## 2021-11-16 PROCEDURE — 3044F HG A1C LEVEL LT 7.0%: CPT | Mod: CPTII,S$GLB,, | Performed by: PODIATRIST

## 2021-11-16 PROCEDURE — 99999 PR PBB SHADOW E&M-EST. PATIENT-LVL III: CPT | Mod: PBBFAC,,, | Performed by: PODIATRIST

## 2021-11-16 PROCEDURE — 99499 NO LOS: ICD-10-PCS | Mod: 25,S$GLB,, | Performed by: PODIATRIST

## 2021-11-16 PROCEDURE — 15275 PR SKIN SUB GRAFT FACE/NK/HF/G UP TO 100 SQCM: ICD-10-PCS | Mod: S$GLB,,, | Performed by: PODIATRIST

## 2021-11-16 PROCEDURE — 1160F PR REVIEW ALL MEDS BY PRESCRIBER/CLIN PHARMACIST DOCUMENTED: ICD-10-PCS | Mod: CPTII,S$GLB,, | Performed by: PODIATRIST

## 2021-11-16 PROCEDURE — 15275 SKIN SUB GRAFT FACE/NK/HF/G: CPT | Mod: S$GLB,,, | Performed by: PODIATRIST

## 2021-11-16 PROCEDURE — 99499 UNLISTED E&M SERVICE: CPT | Mod: 25,S$GLB,, | Performed by: PODIATRIST

## 2021-11-16 PROCEDURE — 1159F PR MEDICATION LIST DOCUMENTED IN MEDICAL RECORD: ICD-10-PCS | Mod: CPTII,S$GLB,, | Performed by: PODIATRIST

## 2021-11-16 PROCEDURE — 1159F MED LIST DOCD IN RCRD: CPT | Mod: CPTII,S$GLB,, | Performed by: PODIATRIST

## 2021-12-03 ENCOUNTER — PATIENT MESSAGE (OUTPATIENT)
Dept: PODIATRY | Facility: CLINIC | Age: 61
End: 2021-12-03
Payer: COMMERCIAL

## 2021-12-20 ENCOUNTER — OFFICE VISIT (OUTPATIENT)
Dept: PODIATRY | Facility: CLINIC | Age: 61
End: 2021-12-20
Payer: COMMERCIAL

## 2021-12-20 DIAGNOSIS — E11.621 DIABETIC ULCER OF TOE OF RIGHT FOOT ASSOCIATED WITH TYPE 2 DIABETES MELLITUS, WITH FAT LAYER EXPOSED: Primary | ICD-10-CM

## 2021-12-20 DIAGNOSIS — E11.49 DIABETES MELLITUS TYPE 2 WITH NEUROLOGICAL MANIFESTATIONS: ICD-10-CM

## 2021-12-20 DIAGNOSIS — L97.512 DIABETIC ULCER OF TOE OF RIGHT FOOT ASSOCIATED WITH TYPE 2 DIABETES MELLITUS, WITH FAT LAYER EXPOSED: Primary | ICD-10-CM

## 2021-12-20 DIAGNOSIS — Z89.412 HISTORY OF AMPUTATION OF LEFT GREAT TOE: ICD-10-CM

## 2021-12-20 PROCEDURE — 11042 DBRDMT SUBQ TIS 1ST 20SQCM/<: CPT | Mod: S$GLB,,, | Performed by: PODIATRIST

## 2021-12-20 PROCEDURE — 99999 PR PBB SHADOW E&M-EST. PATIENT-LVL III: CPT | Mod: PBBFAC,,, | Performed by: PODIATRIST

## 2021-12-20 PROCEDURE — 99999 PR PBB SHADOW E&M-EST. PATIENT-LVL III: ICD-10-PCS | Mod: PBBFAC,,, | Performed by: PODIATRIST

## 2021-12-20 PROCEDURE — 99214 PR OFFICE/OUTPT VISIT, EST, LEVL IV, 30-39 MIN: ICD-10-PCS | Mod: 25,S$GLB,, | Performed by: PODIATRIST

## 2021-12-20 PROCEDURE — 99214 OFFICE O/P EST MOD 30 MIN: CPT | Mod: 25,S$GLB,, | Performed by: PODIATRIST

## 2021-12-20 PROCEDURE — 11042 PR DEBRIDEMENT, SKIN, SUB-Q TISSUE,=<20 SQ CM: ICD-10-PCS | Mod: S$GLB,,, | Performed by: PODIATRIST

## 2022-01-12 ENCOUNTER — OFFICE VISIT (OUTPATIENT)
Dept: PODIATRY | Facility: CLINIC | Age: 62
End: 2022-01-12
Payer: COMMERCIAL

## 2022-01-12 VITALS — HEIGHT: 67 IN | BODY MASS INDEX: 26.16 KG/M2 | WEIGHT: 166.69 LBS

## 2022-01-12 DIAGNOSIS — Z89.412 HISTORY OF AMPUTATION OF LEFT GREAT TOE: ICD-10-CM

## 2022-01-12 DIAGNOSIS — L97.512 DIABETIC ULCER OF TOE OF RIGHT FOOT ASSOCIATED WITH TYPE 2 DIABETES MELLITUS, WITH FAT LAYER EXPOSED: Primary | ICD-10-CM

## 2022-01-12 DIAGNOSIS — E11.621 DIABETIC ULCER OF TOE OF RIGHT FOOT ASSOCIATED WITH TYPE 2 DIABETES MELLITUS, WITH FAT LAYER EXPOSED: Primary | ICD-10-CM

## 2022-01-12 DIAGNOSIS — E11.49 DIABETES MELLITUS TYPE 2 WITH NEUROLOGICAL MANIFESTATIONS: ICD-10-CM

## 2022-01-12 PROCEDURE — 3008F PR BODY MASS INDEX (BMI) DOCUMENTED: ICD-10-PCS | Mod: CPTII,S$GLB,, | Performed by: PODIATRIST

## 2022-01-12 PROCEDURE — 15275 SKIN SUB GRAFT FACE/NK/HF/G: CPT | Mod: S$GLB,,, | Performed by: PODIATRIST

## 2022-01-12 PROCEDURE — 99499 NO LOS: ICD-10-PCS | Mod: S$GLB,,, | Performed by: PODIATRIST

## 2022-01-12 PROCEDURE — 99999 PR PBB SHADOW E&M-EST. PATIENT-LVL III: CPT | Mod: PBBFAC,,, | Performed by: PODIATRIST

## 2022-01-12 PROCEDURE — 1159F MED LIST DOCD IN RCRD: CPT | Mod: CPTII,S$GLB,, | Performed by: PODIATRIST

## 2022-01-12 PROCEDURE — 15275 PR SKIN SUB GRAFT FACE/NK/HF/G UP TO 100 SQCM: ICD-10-PCS | Mod: S$GLB,,, | Performed by: PODIATRIST

## 2022-01-12 PROCEDURE — 1160F PR REVIEW ALL MEDS BY PRESCRIBER/CLIN PHARMACIST DOCUMENTED: ICD-10-PCS | Mod: CPTII,S$GLB,, | Performed by: PODIATRIST

## 2022-01-12 PROCEDURE — 3008F BODY MASS INDEX DOCD: CPT | Mod: CPTII,S$GLB,, | Performed by: PODIATRIST

## 2022-01-12 PROCEDURE — 1159F PR MEDICATION LIST DOCUMENTED IN MEDICAL RECORD: ICD-10-PCS | Mod: CPTII,S$GLB,, | Performed by: PODIATRIST

## 2022-01-12 PROCEDURE — 1160F RVW MEDS BY RX/DR IN RCRD: CPT | Mod: CPTII,S$GLB,, | Performed by: PODIATRIST

## 2022-01-12 PROCEDURE — 99499 UNLISTED E&M SERVICE: CPT | Mod: S$GLB,,, | Performed by: PODIATRIST

## 2022-01-12 PROCEDURE — 99999 PR PBB SHADOW E&M-EST. PATIENT-LVL III: ICD-10-PCS | Mod: PBBFAC,,, | Performed by: PODIATRIST

## 2022-01-12 RX ORDER — ZINC GLUCONATE 50 MG
50 TABLET ORAL
COMMUNITY

## 2022-01-12 NOTE — PROGRESS NOTES
Subjective:       Patient ID: Suellen Campos is a 61 y.o. female.    Chief Complaint: Procedure (Graft application. Pt denies pain at present. Diabetic pt, PCP: Dr. Wheatley last seen 11/08/21. )      HPI: Patient presents to the clinic today, for application of Integra Omnigraft allograft to an ulceration located at the plantar aspect of the right hallux. Patient's Primary Care Provider is Amelia Wheatley MD. The PMHx. does include diabetes mellitus type 2 with history polyneuropathy and left hallux amputation. To this juncture, patient has had 2 allograft applications.    Hemoglobin A1C   Date Value Ref Range Status   10/29/2021 6.3 (H) 4.0 - 5.6 % Final     Comment:     ADA Screening Guidelines:  5.7-6.4%  Consistent with prediabetes  >or=6.5%  Consistent with diabetes    High levels of fetal hemoglobin interfere with the HbA1C  assay. Heterozygous hemoglobin variants (HbS, HgC, etc)do  not significantly interfere with this assay.   However, presence of multiple variants may affect accuracy.     05/09/2021 6.8 (H) 4.0 - 5.6 % Final     Comment:     ADA Screening Guidelines:  5.7-6.4%  Consistent with prediabetes  >or=6.5%  Consistent with diabetes    High levels of fetal hemoglobin interfere with the HbA1C  assay. Heterozygous hemoglobin variants (HbS, HgC, etc)do  not significantly interfere with this assay.   However, presence of multiple variants may affect accuracy.     10/29/2010 6.0 4.0 - 6.2 % Final       Review of patient's allergies indicates:  No Known Allergies    Past Medical History:   Diagnosis Date    Chronic pain     COVID-19     Depression     Diabetes mellitus     Hyperlipidemia     Hypertension     Neuropathy     Osteoarthritis     Spinal stenosis        Family History   Problem Relation Age of Onset    Diabetes Mellitus Mother     Hypertension Mother     Pulmonary fibrosis Father     Hypertension Sister     Diabetes Sister     Pulmonary fibrosis Brother        Social History  "    Socioeconomic History    Marital status:    Tobacco Use    Smoking status: Never Smoker    Smokeless tobacco: Never Used   Substance and Sexual Activity    Alcohol use: Never    Drug use: Never    Sexual activity: Yes     Partners: Male     Social Determinants of Health     Financial Resource Strain: Low Risk     Difficulty of Paying Living Expenses: Not very hard   Food Insecurity: No Food Insecurity    Worried About Running Out of Food in the Last Year: Never true    Ran Out of Food in the Last Year: Never true   Transportation Needs: No Transportation Needs    Lack of Transportation (Medical): No    Lack of Transportation (Non-Medical): No   Physical Activity: Inactive    Days of Exercise per Week: 0 days    Minutes of Exercise per Session: 0 min   Stress: No Stress Concern Present    Feeling of Stress : Not at all   Social Connections: Unknown    Frequency of Communication with Friends and Family: More than three times a week    Frequency of Social Gatherings with Friends and Family: Three times a week    Active Member of Clubs or Organizations: No    Attends Club or Organization Meetings: Never    Marital Status:    Housing Stability: Unknown    Unable to Pay for Housing in the Last Year: No    Unstable Housing in the Last Year: No       Past Surgical History:   Procedure Laterality Date    HYSTERECTOMY      INCISION AND DRAINAGE FOOT Left 5/9/2021    Procedure: INCISION AND DRAINAGE, FOOT;  Surgeon: Shelby Stephen DPM;  Location: [facility] OR;  Service: Podiatry;  Laterality: Left;  GREAT TOE OF LEFT FOOT    SPINAL CORD STIMULATOR IMPLANT      TOE AMPUTATION Left 5/9/2021    Procedure: AMPUTATION, TOE;  Surgeon: Shelby Stephen DPM;  Location: [facility] OR;  Service: Podiatry;  Laterality: Left;  LEFT GREAT     TONSILLECTOMY      TOTAL ABDOMINAL HYSTERECTOMY W/ BILATERAL SALPINGOOPHORECTOMY         Review of Systems       Objective:   Ht 5' 7" (1.702 m)   Wt 75.6 kg " (166 lb 10.7 oz)   BMI 26.10 kg/m²     Physical Exam  LOWER EXTREMITY PHYSICAL EXAMINATION    DERMATOLOGY:  Ulceration present to the plantar aspect of the right hallux.  Pre debridement, the wound is 0.1 x 0.3cm.  Post debridement, the wound measures 0.5 cm x 0.2 cm.  The wound depth of 0.1cm .  There is no edema or erythema.  Flaking hypertrophic skin noted surrounding the wound with new epithelialization where previous larger diameter ulcer was noted.    Assessment:     1. Diabetic ulcer of toe of right foot associated with type 2 diabetes mellitus, with fat layer exposed    2. History of amputation of left great toe    3. Diabetes mellitus type 2 with neurological manifestations        Plan:     Diabetic ulcer of toe of right foot associated with type 2 diabetes mellitus, with fat layer exposed    History of amputation of left great toe    Diabetes mellitus type 2 with neurological manifestations        The wound was surgically debrided after adequate prep with alcohol and/or betadine paint. Excisional wound debridement was performed using sharp #10/#15 blade/rounded scalpel and tissue nipper, with removal of all non-viable skin and soft tissues; necrotic skin/tissue formation. The woundbase/wound bed was also debrided to encourage bleeding as to promote/stimulate healing. Debridement was excisional and included epidermal, dermal and subcutaneous tissues. Post debridement measurements are as above. Hemostasis was achieved. Patient tolerated procedure well and without complications. Local woundcare with Integra Omnigraft allograft with nonadherent dressings and bandage thereafter. 50% of the graft was used for total of 3.1cm2. The graft was packed deep within the wound as well as to the wound periphery.  The graft is then secured in standard fashion. Today's graft application is # 3. Lot #:  [id number] Expiration Date:  02/29/2024. NDC #: [address]-18.    She will keep dressings clean, dry, intact for 2 weeks.        Future Appointments   Date Time Provider Department Center   1/24/2022  3:00 PM Shelby Stephen DPM [facility]

## 2022-01-31 ENCOUNTER — OFFICE VISIT (OUTPATIENT)
Dept: PODIATRY | Facility: CLINIC | Age: 62
End: 2022-01-31
Payer: COMMERCIAL

## 2022-01-31 VITALS — BODY MASS INDEX: 26.06 KG/M2 | HEIGHT: 67 IN | WEIGHT: 166 LBS

## 2022-01-31 DIAGNOSIS — E11.621 DIABETIC ULCER OF TOE OF RIGHT FOOT ASSOCIATED WITH TYPE 2 DIABETES MELLITUS, WITH FAT LAYER EXPOSED: Primary | ICD-10-CM

## 2022-01-31 DIAGNOSIS — Z89.412 HISTORY OF AMPUTATION OF LEFT GREAT TOE: ICD-10-CM

## 2022-01-31 DIAGNOSIS — E11.49 DIABETES MELLITUS TYPE 2 WITH NEUROLOGICAL MANIFESTATIONS: ICD-10-CM

## 2022-01-31 DIAGNOSIS — L97.512 DIABETIC ULCER OF TOE OF RIGHT FOOT ASSOCIATED WITH TYPE 2 DIABETES MELLITUS, WITH FAT LAYER EXPOSED: Primary | ICD-10-CM

## 2022-01-31 PROCEDURE — 1159F PR MEDICATION LIST DOCUMENTED IN MEDICAL RECORD: ICD-10-PCS | Mod: CPTII,S$GLB,, | Performed by: PODIATRIST

## 2022-01-31 PROCEDURE — 11042 PR DEBRIDEMENT, SKIN, SUB-Q TISSUE,=<20 SQ CM: ICD-10-PCS | Mod: S$GLB,,, | Performed by: PODIATRIST

## 2022-01-31 PROCEDURE — 1159F MED LIST DOCD IN RCRD: CPT | Mod: CPTII,S$GLB,, | Performed by: PODIATRIST

## 2022-01-31 PROCEDURE — 3008F BODY MASS INDEX DOCD: CPT | Mod: CPTII,S$GLB,, | Performed by: PODIATRIST

## 2022-01-31 PROCEDURE — 99999 PR PBB SHADOW E&M-EST. PATIENT-LVL III: CPT | Mod: PBBFAC,,, | Performed by: PODIATRIST

## 2022-01-31 PROCEDURE — 1160F RVW MEDS BY RX/DR IN RCRD: CPT | Mod: CPTII,S$GLB,, | Performed by: PODIATRIST

## 2022-01-31 PROCEDURE — 99999 PR PBB SHADOW E&M-EST. PATIENT-LVL III: ICD-10-PCS | Mod: PBBFAC,,, | Performed by: PODIATRIST

## 2022-01-31 PROCEDURE — 99499 UNLISTED E&M SERVICE: CPT | Mod: S$GLB,,, | Performed by: PODIATRIST

## 2022-01-31 PROCEDURE — 3008F PR BODY MASS INDEX (BMI) DOCUMENTED: ICD-10-PCS | Mod: CPTII,S$GLB,, | Performed by: PODIATRIST

## 2022-01-31 PROCEDURE — 99499 NO LOS: ICD-10-PCS | Mod: S$GLB,,, | Performed by: PODIATRIST

## 2022-01-31 PROCEDURE — 11042 DBRDMT SUBQ TIS 1ST 20SQCM/<: CPT | Mod: S$GLB,,, | Performed by: PODIATRIST

## 2022-01-31 PROCEDURE — 1160F PR REVIEW ALL MEDS BY PRESCRIBER/CLIN PHARMACIST DOCUMENTED: ICD-10-PCS | Mod: CPTII,S$GLB,, | Performed by: PODIATRIST

## 2022-01-31 NOTE — PROGRESS NOTES
Subjective:       Patient ID: Suellen Campos is a 61 y.o. female.    Chief Complaint: Follow-up (F/u for graft removal on the right great toe, 0 pain, diabetic, wears tennis and socks, last seen PCP 01/24/2022)      HPI: Patient presents to the clinic today, for assessment of an ulceration located at the plantar aspect of the right hallux. Patient's Primary Care Provider is Amelia Wheatley MD. The PMHx. does include diabetes mellitus type 2 with history polyneuropathy and left hallux amputation. To this juncture, patient has had 3 allograft applications of Integra Omnigraft thus far.    Hemoglobin A1C   Date Value Ref Range Status   10/29/2021 6.3 (H) 4.0 - 5.6 % Final     Comment:     ADA Screening Guidelines:  5.7-6.4%  Consistent with prediabetes  >or=6.5%  Consistent with diabetes    High levels of fetal hemoglobin interfere with the HbA1C  assay. Heterozygous hemoglobin variants (HbS, HgC, etc)do  not significantly interfere with this assay.   However, presence of multiple variants may affect accuracy.     05/09/2021 6.8 (H) 4.0 - 5.6 % Final     Comment:     ADA Screening Guidelines:  5.7-6.4%  Consistent with prediabetes  >or=6.5%  Consistent with diabetes    High levels of fetal hemoglobin interfere with the HbA1C  assay. Heterozygous hemoglobin variants (HbS, HgC, etc)do  not significantly interfere with this assay.   However, presence of multiple variants may affect accuracy.     10/29/2010 6.0 4.0 - 6.2 % Final       Review of patient's allergies indicates:  No Known Allergies    Past Medical History:   Diagnosis Date    Chronic pain     COVID-19     Depression     Diabetes mellitus     Hyperlipidemia     Hypertension     Neuropathy     Osteoarthritis     Spinal stenosis        Family History   Problem Relation Age of Onset    Diabetes Mellitus Mother     Hypertension Mother     Pulmonary fibrosis Father     Hypertension Sister     Diabetes Sister     Pulmonary fibrosis Brother   "      Social History     Socioeconomic History    Marital status:    Tobacco Use    Smoking status: Never Smoker    Smokeless tobacco: Never Used   Substance and Sexual Activity    Alcohol use: Never    Drug use: Never    Sexual activity: Yes     Partners: Male     Social Determinants of Health     Financial Resource Strain: Low Risk     Difficulty of Paying Living Expenses: Not very hard   Food Insecurity: No Food Insecurity    Worried About Running Out of Food in the Last Year: Never true    Ran Out of Food in the Last Year: Never true   Transportation Needs: No Transportation Needs    Lack of Transportation (Medical): No    Lack of Transportation (Non-Medical): No   Physical Activity: Inactive    Days of Exercise per Week: 0 days    Minutes of Exercise per Session: 0 min   Stress: No Stress Concern Present    Feeling of Stress : Not at all   Social Connections: Unknown    Frequency of Communication with Friends and Family: More than three times a week    Frequency of Social Gatherings with Friends and Family: Three times a week    Active Member of Clubs or Organizations: No    Attends Club or Organization Meetings: Never    Marital Status:    Housing Stability: Unknown    Unable to Pay for Housing in the Last Year: No    Unstable Housing in the Last Year: No       Past Surgical History:   Procedure Laterality Date    HYSTERECTOMY      INCISION AND DRAINAGE FOOT Left 5/9/2021    Procedure: INCISION AND DRAINAGE, FOOT;  Surgeon: Shelby Stephen DPM;  Location: [facility] OR;  Service: Podiatry;  Laterality: Left;  GREAT TOE OF LEFT FOOT    SPINAL CORD STIMULATOR IMPLANT      TOE AMPUTATION Left 5/9/2021    Procedure: AMPUTATION, TOE;  Surgeon: Shelby Stephen DPM;  Location: [facility] OR;  Service: Podiatry;  Laterality: Left;  LEFT GREAT     TONSILLECTOMY      TOTAL ABDOMINAL HYSTERECTOMY W/ BILATERAL SALPINGOOPHORECTOMY         Review of Systems       Objective:   Ht 5' 7" (7.619 " m)   Wt 75.3 kg (166 lb)   BMI 26.00 kg/m²     Physical Exam  LOWER EXTREMITY PHYSICAL EXAMINATION    DERMATOLOGY:  Ulceration present to the plantar aspect of the right hallux.  Pre debridement, the wound is superficial callusing.  Post debridement, the wound measures 0.2 cm x 0.1 cm.  The wound depth of 0.05cm .  There is no edema or erythema.  Flaking hypertrophic skin noted surrounding the wound with new epithelialization where previous larger diameter ulcer was noted.    Assessment:     1. Diabetic ulcer of toe of right foot associated with type 2 diabetes mellitus, with fat layer exposed    2. History of amputation of left great toe    3. Diabetes mellitus type 2 with neurological manifestations        Plan:     Diabetic ulcer of toe of right foot associated with type 2 diabetes mellitus, with fat layer exposed    History of amputation of left great toe    Diabetes mellitus type 2 with neurological manifestations        The wound was surgically debrided after adequate prep with alcohol and/or betadine paint. Excisional wound debridement was performed using sharp #10/#15 blade/rounded scalpel and tissue nipper, with removal of all non-viable skin and soft tissues; necrotic skin/tissue formation. The woundbase/wound bed was also debrided to encourage bleeding as to promote/stimulate healing. Debridement was excisional and included epidermal, dermal and subcutaneous tissues. Post debridement measurements are as above. Hemostasis was achieved. Patient tolerated procedure well and without complications. Local woundcare with Medih[employer] dressings and bandage thereafter.     Future Appointments   Date Time Provider Department Center   3/7/2022  2:00 PM Shelby Stephen DPM [facility]

## 2022-03-07 ENCOUNTER — OFFICE VISIT (OUTPATIENT)
Dept: PODIATRY | Facility: CLINIC | Age: 62
End: 2022-03-07
Payer: COMMERCIAL

## 2022-03-07 VITALS — HEIGHT: 67 IN | WEIGHT: 166 LBS | BODY MASS INDEX: 26.06 KG/M2

## 2022-03-07 DIAGNOSIS — E11.49 DIABETES MELLITUS TYPE 2 WITH NEUROLOGICAL MANIFESTATIONS: ICD-10-CM

## 2022-03-07 DIAGNOSIS — Z89.412 HISTORY OF AMPUTATION OF LEFT GREAT TOE: ICD-10-CM

## 2022-03-07 DIAGNOSIS — L97.512 DIABETIC ULCER OF TOE OF RIGHT FOOT ASSOCIATED WITH TYPE 2 DIABETES MELLITUS, WITH FAT LAYER EXPOSED: Primary | ICD-10-CM

## 2022-03-07 DIAGNOSIS — E11.621 DIABETIC ULCER OF TOE OF RIGHT FOOT ASSOCIATED WITH TYPE 2 DIABETES MELLITUS, WITH FAT LAYER EXPOSED: Primary | ICD-10-CM

## 2022-03-07 PROCEDURE — 1159F MED LIST DOCD IN RCRD: CPT | Mod: CPTII,S$GLB,, | Performed by: PODIATRIST

## 2022-03-07 PROCEDURE — 3008F BODY MASS INDEX DOCD: CPT | Mod: CPTII,S$GLB,, | Performed by: PODIATRIST

## 2022-03-07 PROCEDURE — 99999 PR PBB SHADOW E&M-EST. PATIENT-LVL III: ICD-10-PCS | Mod: PBBFAC,,, | Performed by: PODIATRIST

## 2022-03-07 PROCEDURE — 99214 OFFICE O/P EST MOD 30 MIN: CPT | Mod: 25,S$GLB,, | Performed by: PODIATRIST

## 2022-03-07 PROCEDURE — 99999 PR PBB SHADOW E&M-EST. PATIENT-LVL III: CPT | Mod: PBBFAC,,, | Performed by: PODIATRIST

## 2022-03-07 PROCEDURE — 1159F PR MEDICATION LIST DOCUMENTED IN MEDICAL RECORD: ICD-10-PCS | Mod: CPTII,S$GLB,, | Performed by: PODIATRIST

## 2022-03-07 PROCEDURE — 99214 PR OFFICE/OUTPT VISIT, EST, LEVL IV, 30-39 MIN: ICD-10-PCS | Mod: 25,S$GLB,, | Performed by: PODIATRIST

## 2022-03-07 PROCEDURE — 3008F PR BODY MASS INDEX (BMI) DOCUMENTED: ICD-10-PCS | Mod: CPTII,S$GLB,, | Performed by: PODIATRIST

## 2022-03-07 PROCEDURE — 1160F RVW MEDS BY RX/DR IN RCRD: CPT | Mod: CPTII,S$GLB,, | Performed by: PODIATRIST

## 2022-03-07 PROCEDURE — 1160F PR REVIEW ALL MEDS BY PRESCRIBER/CLIN PHARMACIST DOCUMENTED: ICD-10-PCS | Mod: CPTII,S$GLB,, | Performed by: PODIATRIST

## 2022-03-07 PROCEDURE — 11042 DBRDMT SUBQ TIS 1ST 20SQCM/<: CPT | Mod: S$GLB,,, | Performed by: PODIATRIST

## 2022-03-07 PROCEDURE — 11042 PR DEBRIDEMENT, SKIN, SUB-Q TISSUE,=<20 SQ CM: ICD-10-PCS | Mod: S$GLB,,, | Performed by: PODIATRIST

## 2022-03-08 NOTE — PROGRESS NOTES
Subjective:       Patient ID: Suellen Campos is a 61 y.o. female.    Chief Complaint: Follow-up (Coming in for a follow up, DOS:02/23/2022, rates pain 0/10, diabetic, wearing socks and shoes, last seen PCP Dr. Wheatley on 01/24/2022.)    HPI: Suellen Campos presents to the office today, with recurrent ulceration to the plantar aspect of the right great toe.  States that she has been doing quite well and has since had a reimplantation of her pain stimulator.  States her pain is 0/10 at present.  A1c under much better control with last reading of 6.3.    Review of patient's allergies indicates:  No Known Allergies    Past Medical History:   Diagnosis Date    Chronic pain     COVID-19     Depression     Diabetes mellitus     Hyperlipidemia     Hypertension     Neuropathy     Osteoarthritis     Spinal stenosis        Family History   Problem Relation Age of Onset    Diabetes Mellitus Mother     Hypertension Mother     Pulmonary fibrosis Father     Hypertension Sister     Diabetes Sister     Pulmonary fibrosis Brother        Social History     Socioeconomic History    Marital status:    Tobacco Use    Smoking status: Never Smoker    Smokeless tobacco: Never Used   Substance and Sexual Activity    Alcohol use: Never    Drug use: Never    Sexual activity: Yes     Partners: Male     Social Determinants of Health     Financial Resource Strain: Low Risk     Difficulty of Paying Living Expenses: Not very hard   Food Insecurity: No Food Insecurity    Worried About Running Out of Food in the Last Year: Never true    Ran Out of Food in the Last Year: Never true   Transportation Needs: No Transportation Needs    Lack of Transportation (Medical): No    Lack of Transportation (Non-Medical): No   Physical Activity: Inactive    Days of Exercise per Week: 0 days    Minutes of Exercise per Session: 0 min   Stress: No Stress Concern Present    Feeling of Stress : Not at all   Social Connections: Unknown     "Frequency of Communication with Friends and Family: More than three times a week    Frequency of Social Gatherings with Friends and Family: Three times a week    Active Member of Clubs or Organizations: No    Attends Club or Organization Meetings: Never    Marital Status:    Housing Stability: Unknown    Unable to Pay for Housing in the Last Year: No    Unstable Housing in the Last Year: No       Past Surgical History:   Procedure Laterality Date    HYSTERECTOMY      INCISION AND DRAINAGE FOOT Left 5/9/2021    Procedure: INCISION AND DRAINAGE, FOOT;  Surgeon: Shelby Stephen DPM;  Location: [facility] OR;  Service: Podiatry;  Laterality: Left;  GREAT TOE OF LEFT FOOT    SPINAL CORD STIMULATOR IMPLANT      TOE AMPUTATION Left 5/9/2021    Procedure: AMPUTATION, TOE;  Surgeon: Shelby Stephen DPM;  Location: [facility] OR;  Service: Podiatry;  Laterality: Left;  LEFT GREAT     TONSILLECTOMY      TOTAL ABDOMINAL HYSTERECTOMY W/ BILATERAL SALPINGOOPHORECTOMY         Review of Systems       Objective:   Ht 5' 7" (1.702 m)   Wt 75.3 kg (166 lb 0.1 oz)   BMI 26.00 kg/m²     CTA Chest Non-Coronary (PE Study)  Narrative: EXAMINATION:  CTA CHEST NON CORONARY    CLINICAL HISTORY:  Pulmonary embolism (PE) suspected, positive D-dimer;    TECHNIQUE:  Low dose axial images, sagittal and coronal reformations were obtained from the thoracic inlet to the lung bases following the IV administration of 100 mL of Omnipaque 350.  Contrast timing was optimized to evaluate the pulmonary arteries.  MIP images were performed.    COMPARISON:  None    FINDINGS:  No evidence for pulmonary embolism.  No evidence for aortic dissection or aneurysm.  Airspace disease in the right lower lobe and posterior aspect of right upper lobe consistent with right-sided pneumonia.  The left lung is clear.  No acute osseous injury.  Cardiovascular structures are unremarkable for stated age.  Intrathecal leads noted.  Mild atherosclerotic " changes.  Impression: No pulmonary embolism.  No dissection.  At least moderate right-sided pneumonia.    All CT scans   are performed using dose optimization techniques including the following: automated exposure control; adjustment of the mA and/or kV; use of iterative reconstruction technique.  Dose modulation was employed for ALARA by means of: Automated exposure control; adjustment of the mA and/or kV according to patient size (this includes techniques or standardized protocols for targeted exams where dose is matched to indication/reason for exam; i.e. extremities or head); and/or use of iterative reconstructive technique.    Electronically signed by: Pedro Cespedes  Date:    10/28/2021  Time:    23:18       Physical Exam   LOWER EXTREMITY PHYSICAL EXAMINATION    Vascular:  The right dorsalis pedis pulse 2/4 and the right posterior tibial pulse 2/4.  The left dorsalis pedis pulse 2/4 and posterior tibial pulse on the left is 2/4.  Capillary refill is intact.  Pedal hair growth intact  Neurological:  Protective sensation is not intact to the right left foot.  Vibratory sensation is diminished.  Proprioception is intact.  Sharp/dull is reduced.      Musculoskeletal: Manual Muscle Testing is 5/5 with dorsiflexion, plantar flexion, abduction, and adduction.  History of left great toe amputation    Dermatological:  Skin is supple and moist  There is an ulceration present to the underlying surface of the right great toe    Ulcer#1  Ulcer location:  Right great toe  Pre debridement Measurements:  Superficial callusing  Post debridement Measurements :  0.8 cm by 1.25 cm by 0.25 cm  Signs of infection:  None  Erythema:  None  Undermining/Tunneling:  None  Drainage:  Serous  Periwound skin:  Healthy  Wound Base:  Granular      Imaging:        No results found for this or any previous visit.      Results for orders placed during the hospital encounter of 07/14/21    X-Ray Foot Complete Left    Narrative  EXAMINATION:  XR  FOOT COMPLETE 3 VIEW LEFT    CLINICAL HISTORY:  . Cellulitis of left lower limb    TECHNIQUE:  AP, lateral, and oblique views of the left foot were performed.    COMPARISON:  05/09/2021.    FINDINGS:  Prior amputation of the 1st toe.  Mild soft tissue swelling within the amputation bed.  No soft tissue emphysema.  No focus of cortical erosion or periostitis within the distal 1st metatarsal head to suggest underlying changes of osteomyelitis.    There is an acute to subacute minimally displaced comminuted oblique fracture involving the proximal head of the proximal phalanx of the 3rd toe.  The fracture lucency extends to the 3rd MTP joint.  There is adjacent soft tissue swelling.  There is hammertoe formation.    Tarsal bones are intact with mild to moderate degenerative osteoarthrosis.  Normal tarsometatarsal alignment.  Mild widening of the Lisfranc interval may represent ligamentous injury.  Loss of the normal plantar arch on the lateral view suggesting pes planus.    Small dorsal and plantar calcaneal spurs.    Impression  1. Prior amputation of the 1st toe.  2. Acute to subacute minimally displaced comminuted oblique articular fracture of the proximal phalanx of the 3rd toe.  3. Hammertoe formation.  4. Mild widening of the Lisfranc interval may represent ligamentous injury.  5. Suspected pes planus.  This report was flagged in Epic as abnormal.      Electronically signed by: Joselo Ward  Date:    07/14/2021  Time:    15:53       No results found for this or any previous visit.          Assessment:     1. Diabetic ulcer of toe of right foot associated with type 2 diabetes mellitus, with fat layer exposed    2. History of amputation of left great toe    3. Diabetes mellitus type 2 with neurological manifestations        Plan:     Diabetic ulcer of toe of right foot associated with type 2 diabetes mellitus, with fat layer exposed  -     Skin Substitute Authorization    History of amputation of left great  toe    Diabetes mellitus type 2 with neurological manifestations      The wound was surgically debrided after adequate prep with alcohol and/or betadine paint. Excisional wound debridement was performed using sharp #10/#15 blade/rounded scalpel and tissue nipper, with removal of all non-viable skin and soft tissues; necrotic skin/tissue formation. The woundbase/wound bed was also debrided to encourage bleeding as to promote/stimulate healing. Debridement was excisional and included epidermal, dermal and subcutaneous tissues. Post debridement measurements are as above. Hemostasis was achieved. Patient tolerated procedure well and without complications. Local woundcare with collagen dressings and bandage thereafter.     As patient has had multiple applications of Integra Omni graft in the past with continue ulceration to the right great toe with improvement of the wound depth.  Unfortunately, she continues have an ulceration present with a granular wound bed and appears to have improved in depth.  I would recommend utilization of placental allograft skin substitute to continue to improve patient's wound healing in conjunction with healing this longstanding ulceration    Foot counseling and education is provided at this visit. Patient is advised to wear socks and shoes at all times.  Do not walk barefoot, or with just socks, even when indoors.  Be sure to check and inspect the inside of the shoe before putting them on her feet.  Protect your feet at all times.  Walking shoes and/or athletic shoes are the best types of shoe gear. Do not wear vinyl or plastic type shoe gear, as they do not stretch and/or breathe.  Protect your feet from hot and/or cold. Elevate the extremities when sitting.  Do not wear excessively tight socks and/or shoe gear. Wiggle your toes for a few minutes throughout the day. Move your ankles up and down, in and out, to help blood flow in your lower extremity.       No future appointments.

## 2022-03-22 ENCOUNTER — PATIENT MESSAGE (OUTPATIENT)
Dept: PODIATRY | Facility: CLINIC | Age: 62
End: 2022-03-22
Payer: COMMERCIAL

## 2022-03-30 ENCOUNTER — OFFICE VISIT (OUTPATIENT)
Dept: PODIATRY | Facility: CLINIC | Age: 62
End: 2022-03-30
Payer: COMMERCIAL

## 2022-03-30 DIAGNOSIS — L97.512 DIABETIC ULCER OF TOE OF RIGHT FOOT ASSOCIATED WITH TYPE 2 DIABETES MELLITUS, WITH FAT LAYER EXPOSED: Primary | ICD-10-CM

## 2022-03-30 DIAGNOSIS — E11.621 DIABETIC ULCER OF TOE OF RIGHT FOOT ASSOCIATED WITH TYPE 2 DIABETES MELLITUS, WITH FAT LAYER EXPOSED: Primary | ICD-10-CM

## 2022-03-30 DIAGNOSIS — E11.49 DIABETES MELLITUS TYPE 2 WITH NEUROLOGICAL MANIFESTATIONS: ICD-10-CM

## 2022-03-30 PROCEDURE — 15275 PR SKIN SUB GRAFT FACE/NK/HF/G UP TO 100 SQCM: ICD-10-PCS | Mod: S$GLB,,, | Performed by: PODIATRIST

## 2022-03-30 PROCEDURE — 99999 PR PBB SHADOW E&M-EST. PATIENT-LVL III: ICD-10-PCS | Mod: PBBFAC,,, | Performed by: PODIATRIST

## 2022-03-30 PROCEDURE — 1159F MED LIST DOCD IN RCRD: CPT | Mod: CPTII,S$GLB,, | Performed by: PODIATRIST

## 2022-03-30 PROCEDURE — 1160F RVW MEDS BY RX/DR IN RCRD: CPT | Mod: CPTII,S$GLB,, | Performed by: PODIATRIST

## 2022-03-30 PROCEDURE — 1160F PR REVIEW ALL MEDS BY PRESCRIBER/CLIN PHARMACIST DOCUMENTED: ICD-10-PCS | Mod: CPTII,S$GLB,, | Performed by: PODIATRIST

## 2022-03-30 PROCEDURE — 1159F PR MEDICATION LIST DOCUMENTED IN MEDICAL RECORD: ICD-10-PCS | Mod: CPTII,S$GLB,, | Performed by: PODIATRIST

## 2022-03-30 PROCEDURE — 99499 NO LOS: ICD-10-PCS | Mod: S$GLB,,, | Performed by: PODIATRIST

## 2022-03-30 PROCEDURE — 15275 SKIN SUB GRAFT FACE/NK/HF/G: CPT | Mod: S$GLB,,, | Performed by: PODIATRIST

## 2022-03-30 PROCEDURE — 99999 PR PBB SHADOW E&M-EST. PATIENT-LVL III: CPT | Mod: PBBFAC,,, | Performed by: PODIATRIST

## 2022-03-30 PROCEDURE — 99499 UNLISTED E&M SERVICE: CPT | Mod: S$GLB,,, | Performed by: PODIATRIST

## 2022-03-30 NOTE — PROGRESS NOTES
Subjective:       Patient ID: Suellen Campos is a 61 y.o. female.    Chief Complaint: Diabetic Foot Ulcer (Patient continues with DFU to right hallux. She denies pain at present. Today is affinity 1st application. )      HPI: Patient presents to the clinic today, for application of Affinity allograft to an ulceration located at the right plantar hallux. Patient's Primary Care Provider is Amelia Wheatley MD. The PMHx. does include type 2 diabetes mellitus with peripheral neuropathy and history of amputation to the left great toe.. To this juncture, patient has had no allograft applications.    Hemoglobin A1C   Date Value Ref Range Status   10/29/2021 6.3 (H) 4.0 - 5.6 % Final     Comment:     ADA Screening Guidelines:  5.7-6.4%  Consistent with prediabetes  >or=6.5%  Consistent with diabetes    High levels of fetal hemoglobin interfere with the HbA1C  assay. Heterozygous hemoglobin variants (HbS, HgC, etc)do  not significantly interfere with this assay.   However, presence of multiple variants may affect accuracy.     05/09/2021 6.8 (H) 4.0 - 5.6 % Final     Comment:     ADA Screening Guidelines:  5.7-6.4%  Consistent with prediabetes  >or=6.5%  Consistent with diabetes    High levels of fetal hemoglobin interfere with the HbA1C  assay. Heterozygous hemoglobin variants (HbS, HgC, etc)do  not significantly interfere with this assay.   However, presence of multiple variants may affect accuracy.     10/29/2010 6.0 4.0 - 6.2 % Final       Review of patient's allergies indicates:  No Known Allergies    Past Medical History:   Diagnosis Date    Chronic pain     COVID-19     Depression     Diabetes mellitus     Hyperlipidemia     Hypertension     Neuropathy     Osteoarthritis     Spinal stenosis        Family History   Problem Relation Age of Onset    Diabetes Mellitus Mother     Hypertension Mother     Pulmonary fibrosis Father     Hypertension Sister     Diabetes Sister     Pulmonary fibrosis Brother         Social History     Socioeconomic History    Marital status:    Tobacco Use    Smoking status: Never Smoker    Smokeless tobacco: Never Used   Substance and Sexual Activity    Alcohol use: Never    Drug use: Never    Sexual activity: Yes     Partners: Male     Social Determinants of Health     Financial Resource Strain: Low Risk     Difficulty of Paying Living Expenses: Not very hard   Food Insecurity: No Food Insecurity    Worried About Running Out of Food in the Last Year: Never true    Ran Out of Food in the Last Year: Never true   Transportation Needs: No Transportation Needs    Lack of Transportation (Medical): No    Lack of Transportation (Non-Medical): No   Physical Activity: Inactive    Days of Exercise per Week: 0 days    Minutes of Exercise per Session: 0 min   Stress: No Stress Concern Present    Feeling of Stress : Not at all   Social Connections: Unknown    Frequency of Communication with Friends and Family: More than three times a week    Frequency of Social Gatherings with Friends and Family: Three times a week    Active Member of Clubs or Organizations: No    Attends Club or Organization Meetings: Never    Marital Status:    Housing Stability: Unknown    Unable to Pay for Housing in the Last Year: No    Unstable Housing in the Last Year: No       Past Surgical History:   Procedure Laterality Date    HYSTERECTOMY      INCISION AND DRAINAGE FOOT Left 5/9/2021    Procedure: INCISION AND DRAINAGE, FOOT;  Surgeon: Shelby Stephen DPM;  Location: [facility] OR;  Service: Podiatry;  Laterality: Left;  GREAT TOE OF LEFT FOOT    SPINAL CORD STIMULATOR IMPLANT      TOE AMPUTATION Left 5/9/2021    Procedure: AMPUTATION, TOE;  Surgeon: Shelby Stephen DPM;  Location: [facility] OR;  Service: Podiatry;  Laterality: Left;  LEFT GREAT     TONSILLECTOMY      TOTAL ABDOMINAL HYSTERECTOMY W/ BILATERAL SALPINGOOPHORECTOMY         Review of Systems       Objective:   There were no  vitals taken for this visit.    Physical Exam  LOWER EXTREMITY PHYSICAL EXAMINATION    DERMATOLOGY:  Ulceration present to the right great toe with significant sloughing of the overlying tissue.  Status post debridement, the wound measures 0.5 cm x 1.45 cm x 0.2 cm.  There is a granular wound base.  The wound does not probe to bone.  There is no erythema.  No underlying fluctuance.    Assessment:     1. Diabetic ulcer of toe of right foot associated with type 2 diabetes mellitus, with fat layer exposed    2. Diabetes mellitus type 2 with neurological manifestations        Plan:     Diabetic ulcer of toe of right foot associated with type 2 diabetes mellitus, with fat layer exposed  -     Skin Substitute Authorization    Diabetes mellitus type 2 with neurological manifestations      The wound was surgically debrided after adequate prep with alcohol and/or betadine paint. Excisional wound debridement was performed using sharp #10/#15 blade/rounded scalpel and tissue nipper, with removal of all non-viable skin and soft tissues; necrotic skin/tissue formation. The woundbase/wound bed was also debrided to encourage bleeding as to promote/stimulate healing. Debridement was excisional and included epidermal, dermal and subcutaneous tissues. Post debridement measurements are as above. Hemostasis was achieved. Patient tolerated procedure well and without complications. Local woundcare with Organogenesis Affinity allograft 7cm2 graft with nonadherent dressings and bandage thereafter. All units of the graft were applied. The graft was packed deed into and around the wound as well as to the wound periphery as to stimulate cell migration and proliferation, deep to superficial, and to fill the defect. The graft is then secured in standard fashion. Today's graft application is # 1. Donor #: [id number]. Expiration Date: 03/31/2022 ID#: [id number]          No future appointments.

## 2022-04-18 ENCOUNTER — TELEPHONE (OUTPATIENT)
Dept: PODIATRY | Facility: CLINIC | Age: 62
End: 2022-04-18
Payer: COMMERCIAL

## 2022-04-18 NOTE — TELEPHONE ENCOUNTER
LVm informing patient appointment would need to be rescheduled to Wednesday or Thursday due to graft will not be delivered in time due to Delivery service.

## 2022-04-20 ENCOUNTER — OFFICE VISIT (OUTPATIENT)
Dept: PODIATRY | Facility: CLINIC | Age: 62
End: 2022-04-20
Payer: COMMERCIAL

## 2022-04-20 DIAGNOSIS — L97.512 DIABETIC ULCER OF TOE OF RIGHT FOOT ASSOCIATED WITH TYPE 2 DIABETES MELLITUS, WITH FAT LAYER EXPOSED: Primary | ICD-10-CM

## 2022-04-20 DIAGNOSIS — Z89.412 HISTORY OF AMPUTATION OF LEFT GREAT TOE: ICD-10-CM

## 2022-04-20 DIAGNOSIS — E11.621 DIABETIC ULCER OF TOE OF RIGHT FOOT ASSOCIATED WITH TYPE 2 DIABETES MELLITUS, WITH FAT LAYER EXPOSED: Primary | ICD-10-CM

## 2022-04-20 DIAGNOSIS — E11.49 DIABETES MELLITUS TYPE 2 WITH NEUROLOGICAL MANIFESTATIONS: ICD-10-CM

## 2022-04-20 PROCEDURE — 99499 NO LOS: ICD-10-PCS | Mod: S$GLB,,, | Performed by: PODIATRIST

## 2022-04-20 PROCEDURE — 1159F PR MEDICATION LIST DOCUMENTED IN MEDICAL RECORD: ICD-10-PCS | Mod: CPTII,S$GLB,, | Performed by: PODIATRIST

## 2022-04-20 PROCEDURE — 15275 SKIN SUB GRAFT FACE/NK/HF/G: CPT | Mod: S$GLB,,, | Performed by: PODIATRIST

## 2022-04-20 PROCEDURE — 15275 PR SKIN SUB GRAFT FACE/NK/HF/G UP TO 100 SQCM: ICD-10-PCS | Mod: S$GLB,,, | Performed by: PODIATRIST

## 2022-04-20 PROCEDURE — 1159F MED LIST DOCD IN RCRD: CPT | Mod: CPTII,S$GLB,, | Performed by: PODIATRIST

## 2022-04-20 PROCEDURE — 99499 UNLISTED E&M SERVICE: CPT | Mod: S$GLB,,, | Performed by: PODIATRIST

## 2022-04-20 PROCEDURE — 99999 PR PBB SHADOW E&M-EST. PATIENT-LVL III: CPT | Mod: PBBFAC,,, | Performed by: PODIATRIST

## 2022-04-20 PROCEDURE — 1160F RVW MEDS BY RX/DR IN RCRD: CPT | Mod: CPTII,S$GLB,, | Performed by: PODIATRIST

## 2022-04-20 PROCEDURE — 1160F PR REVIEW ALL MEDS BY PRESCRIBER/CLIN PHARMACIST DOCUMENTED: ICD-10-PCS | Mod: CPTII,S$GLB,, | Performed by: PODIATRIST

## 2022-04-20 PROCEDURE — 99999 PR PBB SHADOW E&M-EST. PATIENT-LVL III: ICD-10-PCS | Mod: PBBFAC,,, | Performed by: PODIATRIST

## 2022-04-20 NOTE — PROGRESS NOTES
Subjective:       Patient ID: Suellen Campos is a 62 y.o. female.    Chief Complaint: Wound Check (Coming in for a graft, rates pain 0/10, diabetic, wearing socks and tennis shoes, last seen PCP Dr. Wheatley on 01/24/2022.)      HPI: Patient presents to the clinic today, for application of Affinity allograft to an ulceration located at the right plantar hallux. Patient's Primary Care Provider is Amelia Wheatley MD. The PMHx. does include type 2 diabetes mellitus with peripheral neuropathy and history of amputation to the left great toe.. To this juncture, patient has had 1 allograft applications.    Hemoglobin A1C   Date Value Ref Range Status   10/29/2021 6.3 (H) 4.0 - 5.6 % Final     Comment:     ADA Screening Guidelines:  5.7-6.4%  Consistent with prediabetes  >or=6.5%  Consistent with diabetes    High levels of fetal hemoglobin interfere with the HbA1C  assay. Heterozygous hemoglobin variants (HbS, HgC, etc)do  not significantly interfere with this assay.   However, presence of multiple variants may affect accuracy.     05/09/2021 6.8 (H) 4.0 - 5.6 % Final     Comment:     ADA Screening Guidelines:  5.7-6.4%  Consistent with prediabetes  >or=6.5%  Consistent with diabetes    High levels of fetal hemoglobin interfere with the HbA1C  assay. Heterozygous hemoglobin variants (HbS, HgC, etc)do  not significantly interfere with this assay.   However, presence of multiple variants may affect accuracy.     10/29/2010 6.0 4.0 - 6.2 % Final       Review of patient's allergies indicates:  No Known Allergies    Past Medical History:   Diagnosis Date    Chronic pain     COVID-19     Depression     Diabetes mellitus     Hyperlipidemia     Hypertension     Neuropathy     Osteoarthritis     Spinal stenosis        Family History   Problem Relation Age of Onset    Diabetes Mellitus Mother     Hypertension Mother     Pulmonary fibrosis Father     Hypertension Sister     Diabetes Sister     Pulmonary fibrosis Brother         Social History     Socioeconomic History    Marital status:    Tobacco Use    Smoking status: Never Smoker    Smokeless tobacco: Never Used   Substance and Sexual Activity    Alcohol use: Never    Drug use: Never    Sexual activity: Yes     Partners: Male     Social Determinants of Health     Financial Resource Strain: Low Risk     Difficulty of Paying Living Expenses: Not very hard   Food Insecurity: No Food Insecurity    Worried About Running Out of Food in the Last Year: Never true    Ran Out of Food in the Last Year: Never true   Transportation Needs: No Transportation Needs    Lack of Transportation (Medical): No    Lack of Transportation (Non-Medical): No   Physical Activity: Inactive    Days of Exercise per Week: 0 days    Minutes of Exercise per Session: 0 min   Stress: No Stress Concern Present    Feeling of Stress : Not at all   Social Connections: Unknown    Frequency of Communication with Friends and Family: More than three times a week    Frequency of Social Gatherings with Friends and Family: Three times a week    Active Member of Clubs or Organizations: No    Attends Club or Organization Meetings: Never    Marital Status:    Housing Stability: Unknown    Unable to Pay for Housing in the Last Year: No    Unstable Housing in the Last Year: No       Past Surgical History:   Procedure Laterality Date    HYSTERECTOMY      INCISION AND DRAINAGE FOOT Left 5/9/2021    Procedure: INCISION AND DRAINAGE, FOOT;  Surgeon: Shelby Stephen DPM;  Location: [facility] OR;  Service: Podiatry;  Laterality: Left;  GREAT TOE OF LEFT FOOT    SPINAL CORD STIMULATOR IMPLANT      TOE AMPUTATION Left 5/9/2021    Procedure: AMPUTATION, TOE;  Surgeon: Shelby Stephen DPM;  Location: [facility] OR;  Service: Podiatry;  Laterality: Left;  LEFT GREAT     TONSILLECTOMY      TOTAL ABDOMINAL HYSTERECTOMY W/ BILATERAL SALPINGOOPHORECTOMY         Review of Systems       Objective:   There were no  vitals taken for this visit.    Physical Exam  LOWER EXTREMITY PHYSICAL EXAMINATION    DERMATOLOGY:  Ulceration present to the right great toe with significant sloughing of the overlying tissue.  Status post debridement, the wound measures 0.3 cm x 1.25 cm x 0.05 cm.  There is a granular wound base.  The wound does not probe to bone.  There is no erythema.  No underlying fluctuance.    Assessment:     1. Diabetic ulcer of toe of right foot associated with type 2 diabetes mellitus, with fat layer exposed    2. Diabetes mellitus type 2 with neurological manifestations    3. History of amputation of left great toe        Plan:     Diabetic ulcer of toe of right foot associated with type 2 diabetes mellitus, with fat layer exposed  -     Skin Substitute Authorization    Diabetes mellitus type 2 with neurological manifestations    History of amputation of left great toe      The wound was surgically debrided after adequate prep with alcohol and/or betadine paint. Excisional wound debridement was performed using sharp #10/#15 blade/rounded scalpel and tissue nipper, with removal of all non-viable skin and soft tissues; necrotic skin/tissue formation. The woundbase/wound bed was also debrided to encourage bleeding as to promote/stimulate healing. Debridement was excisional and included epidermal, dermal and subcutaneous tissues. Post debridement measurements are as above. Hemostasis was achieved. Patient tolerated procedure well and without complications. Local woundcare with Organogenesis Affinity allograft 7cm2 graft with nonadherent dressings and bandage thereafter. All units of the graft were applied. The graft was packed deed into and around the wound as well as to the wound periphery as to stimulate cell migration and proliferation, deep to superficial, and to fill the defect. The graft is then secured in standard fashion. Today's graft application is # 2. Donor #: [id number]. Expiration Date: 04/21/2022 ID#:  [id number]          No future appointments.

## 2022-04-28 ENCOUNTER — OFFICE VISIT (OUTPATIENT)
Dept: PODIATRY | Facility: CLINIC | Age: 62
End: 2022-04-28
Payer: COMMERCIAL

## 2022-04-28 DIAGNOSIS — E11.621 DIABETIC ULCER OF TOE OF RIGHT FOOT ASSOCIATED WITH TYPE 2 DIABETES MELLITUS, WITH FAT LAYER EXPOSED: Primary | ICD-10-CM

## 2022-04-28 DIAGNOSIS — L97.512 DIABETIC ULCER OF TOE OF RIGHT FOOT ASSOCIATED WITH TYPE 2 DIABETES MELLITUS, WITH FAT LAYER EXPOSED: Primary | ICD-10-CM

## 2022-04-28 DIAGNOSIS — Z89.412 HISTORY OF AMPUTATION OF LEFT GREAT TOE: ICD-10-CM

## 2022-04-28 DIAGNOSIS — E11.49 DIABETES MELLITUS TYPE 2 WITH NEUROLOGICAL MANIFESTATIONS: ICD-10-CM

## 2022-04-28 PROCEDURE — 99999 PR PBB SHADOW E&M-EST. PATIENT-LVL III: ICD-10-PCS | Mod: PBBFAC,,, | Performed by: PODIATRIST

## 2022-04-28 PROCEDURE — 99999 PR PBB SHADOW E&M-EST. PATIENT-LVL III: CPT | Mod: PBBFAC,,, | Performed by: PODIATRIST

## 2022-04-28 PROCEDURE — 1160F PR REVIEW ALL MEDS BY PRESCRIBER/CLIN PHARMACIST DOCUMENTED: ICD-10-PCS | Mod: CPTII,S$GLB,, | Performed by: PODIATRIST

## 2022-04-28 PROCEDURE — 99499 NO LOS: ICD-10-PCS | Mod: S$GLB,,, | Performed by: PODIATRIST

## 2022-04-28 PROCEDURE — 15275 PR SKIN SUB GRAFT FACE/NK/HF/G UP TO 100 SQCM: ICD-10-PCS | Mod: S$GLB,,, | Performed by: PODIATRIST

## 2022-04-28 PROCEDURE — 1160F RVW MEDS BY RX/DR IN RCRD: CPT | Mod: CPTII,S$GLB,, | Performed by: PODIATRIST

## 2022-04-28 PROCEDURE — 99499 UNLISTED E&M SERVICE: CPT | Mod: S$GLB,,, | Performed by: PODIATRIST

## 2022-04-28 PROCEDURE — 1159F MED LIST DOCD IN RCRD: CPT | Mod: CPTII,S$GLB,, | Performed by: PODIATRIST

## 2022-04-28 PROCEDURE — 1159F PR MEDICATION LIST DOCUMENTED IN MEDICAL RECORD: ICD-10-PCS | Mod: CPTII,S$GLB,, | Performed by: PODIATRIST

## 2022-04-28 PROCEDURE — 15275 SKIN SUB GRAFT FACE/NK/HF/G: CPT | Mod: S$GLB,,, | Performed by: PODIATRIST

## 2022-04-28 NOTE — PROGRESS NOTES
Subjective:       Patient ID: Suellen Campos is a 62 y.o. female.    Chief Complaint: Diabetic Foot Ulcer (Patient continues with care to DFU to right hallux. Denies pain at present. )      HPI: Patient presents to the clinic today, for application of Affinity allograft to an ulceration located at the right plantar hallux. Patient's Primary Care Provider is Amelia Wheatley MD. The PMHx. does include type 2 diabetes mellitus with peripheral neuropathy and history of amputation to the left great toe.. To this juncture, patient has had 2 allograft applications.    Hemoglobin A1C   Date Value Ref Range Status   10/29/2021 6.3 (H) 4.0 - 5.6 % Final     Comment:     ADA Screening Guidelines:  5.7-6.4%  Consistent with prediabetes  >or=6.5%  Consistent with diabetes    High levels of fetal hemoglobin interfere with the HbA1C  assay. Heterozygous hemoglobin variants (HbS, HgC, etc)do  not significantly interfere with this assay.   However, presence of multiple variants may affect accuracy.     05/09/2021 6.8 (H) 4.0 - 5.6 % Final     Comment:     ADA Screening Guidelines:  5.7-6.4%  Consistent with prediabetes  >or=6.5%  Consistent with diabetes    High levels of fetal hemoglobin interfere with the HbA1C  assay. Heterozygous hemoglobin variants (HbS, HgC, etc)do  not significantly interfere with this assay.   However, presence of multiple variants may affect accuracy.     10/29/2010 6.0 4.0 - 6.2 % Final       Review of patient's allergies indicates:  No Known Allergies    Past Medical History:   Diagnosis Date    Chronic pain     COVID-19     Depression     Diabetes mellitus     Hyperlipidemia     Hypertension     Neuropathy     Osteoarthritis     Spinal stenosis        Family History   Problem Relation Age of Onset    Diabetes Mellitus Mother     Hypertension Mother     Pulmonary fibrosis Father     Hypertension Sister     Diabetes Sister     Pulmonary fibrosis Brother        Social History      Socioeconomic History    Marital status:    Tobacco Use    Smoking status: Never Smoker    Smokeless tobacco: Never Used   Substance and Sexual Activity    Alcohol use: Never    Drug use: Never    Sexual activity: Yes     Partners: Male     Social Determinants of Health     Financial Resource Strain: Low Risk     Difficulty of Paying Living Expenses: Not very hard   Food Insecurity: No Food Insecurity    Worried About Running Out of Food in the Last Year: Never true    Ran Out of Food in the Last Year: Never true   Transportation Needs: No Transportation Needs    Lack of Transportation (Medical): No    Lack of Transportation (Non-Medical): No   Physical Activity: Inactive    Days of Exercise per Week: 0 days    Minutes of Exercise per Session: 0 min   Stress: No Stress Concern Present    Feeling of Stress : Not at all   Social Connections: Unknown    Frequency of Communication with Friends and Family: More than three times a week    Frequency of Social Gatherings with Friends and Family: Three times a week    Active Member of Clubs or Organizations: No    Attends Club or Organization Meetings: Never    Marital Status:    Housing Stability: Unknown    Unable to Pay for Housing in the Last Year: No    Unstable Housing in the Last Year: No       Past Surgical History:   Procedure Laterality Date    HYSTERECTOMY      INCISION AND DRAINAGE FOOT Left 5/9/2021    Procedure: INCISION AND DRAINAGE, FOOT;  Surgeon: Shelby Stephen DPM;  Location: [facility] OR;  Service: Podiatry;  Laterality: Left;  GREAT TOE OF LEFT FOOT    SPINAL CORD STIMULATOR IMPLANT      TOE AMPUTATION Left 5/9/2021    Procedure: AMPUTATION, TOE;  Surgeon: Shelby Stephen DPM;  Location: [facility] OR;  Service: Podiatry;  Laterality: Left;  LEFT GREAT     TONSILLECTOMY      TOTAL ABDOMINAL HYSTERECTOMY W/ BILATERAL SALPINGOOPHORECTOMY         Review of Systems       Objective:   There were no vitals taken for this  visit.    Physical Exam  LOWER EXTREMITY PHYSICAL EXAMINATION    DERMATOLOGY:  Ulceration present to the right great toe with significant sloughing of the overlying tissue.  Status post debridement, the wound measures 0.15-0.2 cm in an irregular shape from medial to lateral by  1.0 cm length x 0.05 cm deep.  There is a granular wound base.  The wound does not probe to bone.  There is no erythema.  No underlying fluctuance.    Assessment:     1. Diabetic ulcer of toe of right foot associated with type 2 diabetes mellitus, with fat layer exposed    2. Diabetes mellitus type 2 with neurological manifestations    3. History of amputation of left great toe        Plan:     Diabetic ulcer of toe of right foot associated with type 2 diabetes mellitus, with fat layer exposed  -     Skin Substitute Authorization    Diabetes mellitus type 2 with neurological manifestations    History of amputation of left great toe      The wound was surgically debrided after adequate prep with alcohol and/or betadine paint. Excisional wound debridement was performed using sharp #10/#15 blade/rounded scalpel and tissue nipper, with removal of all non-viable skin and soft tissues; necrotic skin/tissue formation. The woundbase/wound bed was also debrided to encourage bleeding as to promote/stimulate healing. Debridement was excisional and included epidermal, dermal and subcutaneous tissues. Post debridement measurements are as above. Hemostasis was achieved. Patient tolerated procedure well and without complications. Local woundcare with Organogenesis Affinity allograft 7cm2 graft with nonadherent dressings and bandage thereafter. All units of the graft were applied. The graft was packed deed into and around the wound as well as to the wound periphery as to stimulate cell migration and proliferation, deep to superficial, and to fill the defect. The graft is then secured in standard fashion. Today's graft application is # 3. Donor #: [id number].  Expiration Date: 04/29/2022 ID#: [id number]          No future appointments.

## 2022-05-18 ENCOUNTER — OFFICE VISIT (OUTPATIENT)
Dept: PODIATRY | Facility: CLINIC | Age: 62
End: 2022-05-18
Payer: COMMERCIAL

## 2022-05-18 VITALS — WEIGHT: 166 LBS | BODY MASS INDEX: 26.06 KG/M2 | HEIGHT: 67 IN

## 2022-05-18 DIAGNOSIS — L97.512 DIABETIC ULCER OF TOE OF RIGHT FOOT ASSOCIATED WITH TYPE 2 DIABETES MELLITUS, WITH FAT LAYER EXPOSED: Primary | ICD-10-CM

## 2022-05-18 DIAGNOSIS — Z89.412 HISTORY OF AMPUTATION OF LEFT GREAT TOE: ICD-10-CM

## 2022-05-18 DIAGNOSIS — E11.49 DIABETES MELLITUS TYPE 2 WITH NEUROLOGICAL MANIFESTATIONS: ICD-10-CM

## 2022-05-18 DIAGNOSIS — E11.621 DIABETIC ULCER OF TOE OF RIGHT FOOT ASSOCIATED WITH TYPE 2 DIABETES MELLITUS, WITH FAT LAYER EXPOSED: Primary | ICD-10-CM

## 2022-05-18 PROCEDURE — 15275 SKIN SUB GRAFT FACE/NK/HF/G: CPT | Mod: S$GLB,,, | Performed by: PODIATRIST

## 2022-05-18 PROCEDURE — 1159F MED LIST DOCD IN RCRD: CPT | Mod: CPTII,S$GLB,, | Performed by: PODIATRIST

## 2022-05-18 PROCEDURE — 1159F PR MEDICATION LIST DOCUMENTED IN MEDICAL RECORD: ICD-10-PCS | Mod: CPTII,S$GLB,, | Performed by: PODIATRIST

## 2022-05-18 PROCEDURE — 99499 UNLISTED E&M SERVICE: CPT | Mod: S$GLB,,, | Performed by: PODIATRIST

## 2022-05-18 PROCEDURE — 99499 NO LOS: ICD-10-PCS | Mod: S$GLB,,, | Performed by: PODIATRIST

## 2022-05-18 PROCEDURE — 99999 PR PBB SHADOW E&M-EST. PATIENT-LVL III: ICD-10-PCS | Mod: PBBFAC,,, | Performed by: PODIATRIST

## 2022-05-18 PROCEDURE — 3008F BODY MASS INDEX DOCD: CPT | Mod: CPTII,S$GLB,, | Performed by: PODIATRIST

## 2022-05-18 PROCEDURE — 15275 PR SKIN SUB GRAFT FACE/NK/HF/G UP TO 100 SQCM: ICD-10-PCS | Mod: S$GLB,,, | Performed by: PODIATRIST

## 2022-05-18 PROCEDURE — 3008F PR BODY MASS INDEX (BMI) DOCUMENTED: ICD-10-PCS | Mod: CPTII,S$GLB,, | Performed by: PODIATRIST

## 2022-05-18 PROCEDURE — 99999 PR PBB SHADOW E&M-EST. PATIENT-LVL III: CPT | Mod: PBBFAC,,, | Performed by: PODIATRIST

## 2022-05-18 PROCEDURE — 1160F PR REVIEW ALL MEDS BY PRESCRIBER/CLIN PHARMACIST DOCUMENTED: ICD-10-PCS | Mod: CPTII,S$GLB,, | Performed by: PODIATRIST

## 2022-05-18 PROCEDURE — 1160F RVW MEDS BY RX/DR IN RCRD: CPT | Mod: CPTII,S$GLB,, | Performed by: PODIATRIST

## 2022-05-18 NOTE — PROGRESS NOTES
Subjective:       Patient ID: Suellen Campos is a 62 y.o. female.    Chief Complaint: affinity graft ( Coming in for affinity application, rates pain 0/10, diabetic, wearing socks and casual shoes, last seen PCP Dr. Wheatley on 01/24/2022.)      HPI: Patient presents to the clinic today, for application of Affinity allograft to an ulceration located at the right plantar hallux. Patient's Primary Care Provider is Amelia Wheatley MD. The PMHx. does include type 2 diabetes mellitus with peripheral neuropathy and history of amputation to the left great toe. To this juncture, patient has had 3 allograft applications.    Hemoglobin A1C   Date Value Ref Range Status   10/29/2021 6.3 (H) 4.0 - 5.6 % Final     Comment:     ADA Screening Guidelines:  5.7-6.4%  Consistent with prediabetes  >or=6.5%  Consistent with diabetes    High levels of fetal hemoglobin interfere with the HbA1C  assay. Heterozygous hemoglobin variants (HbS, HgC, etc)do  not significantly interfere with this assay.   However, presence of multiple variants may affect accuracy.     05/09/2021 6.8 (H) 4.0 - 5.6 % Final     Comment:     ADA Screening Guidelines:  5.7-6.4%  Consistent with prediabetes  >or=6.5%  Consistent with diabetes    High levels of fetal hemoglobin interfere with the HbA1C  assay. Heterozygous hemoglobin variants (HbS, HgC, etc)do  not significantly interfere with this assay.   However, presence of multiple variants may affect accuracy.     10/29/2010 6.0 4.0 - 6.2 % Final       Review of patient's allergies indicates:  No Known Allergies    Past Medical History:   Diagnosis Date    Chronic pain     COVID-19     Depression     Diabetes mellitus     Hyperlipidemia     Hypertension     Neuropathy     Osteoarthritis     Spinal stenosis        Family History   Problem Relation Age of Onset    Diabetes Mellitus Mother     Hypertension Mother     Pulmonary fibrosis Father     Hypertension Sister     Diabetes Sister     Pulmonary  fibrosis Brother        Social History     Socioeconomic History    Marital status:    Tobacco Use    Smoking status: Never Smoker    Smokeless tobacco: Never Used   Substance and Sexual Activity    Alcohol use: Never    Drug use: Never    Sexual activity: Yes     Partners: Male     Social Determinants of Health     Financial Resource Strain: Low Risk     Difficulty of Paying Living Expenses: Not very hard   Food Insecurity: No Food Insecurity    Worried About Running Out of Food in the Last Year: Never true    Ran Out of Food in the Last Year: Never true   Transportation Needs: No Transportation Needs    Lack of Transportation (Medical): No    Lack of Transportation (Non-Medical): No   Physical Activity: Inactive    Days of Exercise per Week: 0 days    Minutes of Exercise per Session: 0 min   Stress: No Stress Concern Present    Feeling of Stress : Not at all   Social Connections: Unknown    Frequency of Communication with Friends and Family: More than three times a week    Frequency of Social Gatherings with Friends and Family: Three times a week    Active Member of Clubs or Organizations: No    Attends Club or Organization Meetings: Never    Marital Status:    Housing Stability: Unknown    Unable to Pay for Housing in the Last Year: No    Unstable Housing in the Last Year: No       Past Surgical History:   Procedure Laterality Date    HYSTERECTOMY      INCISION AND DRAINAGE FOOT Left 5/9/2021    Procedure: INCISION AND DRAINAGE, FOOT;  Surgeon: Shelby Stephen DPM;  Location: [facility] OR;  Service: Podiatry;  Laterality: Left;  GREAT TOE OF LEFT FOOT    SPINAL CORD STIMULATOR IMPLANT      TOE AMPUTATION Left 5/9/2021    Procedure: AMPUTATION, TOE;  Surgeon: Shelby Stephen DPM;  Location: [facility] OR;  Service: Podiatry;  Laterality: Left;  LEFT GREAT     TONSILLECTOMY      TOTAL ABDOMINAL HYSTERECTOMY W/ BILATERAL SALPINGOOPHORECTOMY         Review of Systems       Objective:  "  Ht 5' 7" (1.702 m)   Wt 75.3 kg (166 lb 0.1 oz)   BMI 26.00 kg/m²     Physical Exam  LOWER EXTREMITY PHYSICAL EXAMINATION    DERMATOLOGY:  Ulceration present to the right great toe with significant sloughing of the overlying tissue.  Status post debridement, the wound measures 1.0cmx 0.4 cm x 0.05 cm deep.  There is a granular wound base.  The wound does not probe to bone.  There is no erythema.  No underlying fluctuance.    Assessment:     1. Diabetic ulcer of toe of right foot associated with type 2 diabetes mellitus, with fat layer exposed    2. Diabetes mellitus type 2 with neurological manifestations    3. History of amputation of left great toe        Plan:     Diabetic ulcer of toe of right foot associated with type 2 diabetes mellitus, with fat layer exposed  -     Skin Substitute Authorization    Diabetes mellitus type 2 with neurological manifestations    History of amputation of left great toe      The wound was surgically debrided after adequate prep with alcohol and/or betadine paint. Excisional wound debridement was performed using sharp #10/#15 blade/rounded scalpel and tissue nipper, with removal of all non-viable skin and soft tissues; necrotic skin/tissue formation. The woundbase/wound bed was also debrided to encourage bleeding as to promote/stimulate healing. Debridement was excisional and included epidermal, dermal and subcutaneous tissues. Post debridement measurements are as above. Hemostasis was achieved. Patient tolerated procedure well and without complications. Local woundcare with Organogenesis Affinity allograft 7cm2 graft with nonadherent dressings and bandage thereafter. All units of the graft were applied. The graft was packed deed into and around the wound as well as to the wound periphery as to stimulate cell migration and proliferation, deep to superficial, and to fill the defect. The graft is then secured in standard fashion. Today's graft application is # 4. Donor #: [id number]. " Expiration Date: 05/20/2022 ID#: [id number]          No future appointments.

## 2022-05-25 ENCOUNTER — PATIENT MESSAGE (OUTPATIENT)
Dept: PODIATRY | Facility: CLINIC | Age: 62
End: 2022-05-25
Payer: COMMERCIAL

## 2022-06-13 ENCOUNTER — OFFICE VISIT (OUTPATIENT)
Dept: PODIATRY | Facility: CLINIC | Age: 62
End: 2022-06-13
Payer: COMMERCIAL

## 2022-06-13 VITALS — BODY MASS INDEX: 26.06 KG/M2 | HEIGHT: 67 IN | WEIGHT: 166 LBS

## 2022-06-13 DIAGNOSIS — L97.512 DIABETIC ULCER OF TOE OF RIGHT FOOT ASSOCIATED WITH TYPE 2 DIABETES MELLITUS, WITH FAT LAYER EXPOSED: Primary | ICD-10-CM

## 2022-06-13 DIAGNOSIS — E11.621 DIABETIC ULCER OF TOE OF RIGHT FOOT ASSOCIATED WITH TYPE 2 DIABETES MELLITUS, WITH FAT LAYER EXPOSED: Primary | ICD-10-CM

## 2022-06-13 PROCEDURE — 15275 SKIN SUB GRAFT FACE/NK/HF/G: CPT | Mod: S$GLB,,, | Performed by: PODIATRIST

## 2022-06-13 PROCEDURE — 99999 PR PBB SHADOW E&M-EST. PATIENT-LVL III: ICD-10-PCS | Mod: PBBFAC,,, | Performed by: PODIATRIST

## 2022-06-13 PROCEDURE — 1159F PR MEDICATION LIST DOCUMENTED IN MEDICAL RECORD: ICD-10-PCS | Mod: CPTII,S$GLB,, | Performed by: PODIATRIST

## 2022-06-13 PROCEDURE — 99499 NO LOS: ICD-10-PCS | Mod: S$GLB,,, | Performed by: PODIATRIST

## 2022-06-13 PROCEDURE — 1160F PR REVIEW ALL MEDS BY PRESCRIBER/CLIN PHARMACIST DOCUMENTED: ICD-10-PCS | Mod: CPTII,S$GLB,, | Performed by: PODIATRIST

## 2022-06-13 PROCEDURE — 1160F RVW MEDS BY RX/DR IN RCRD: CPT | Mod: CPTII,S$GLB,, | Performed by: PODIATRIST

## 2022-06-13 PROCEDURE — 15275 PR SKIN SUB GRAFT FACE/NK/HF/G UP TO 100 SQCM: ICD-10-PCS | Mod: S$GLB,,, | Performed by: PODIATRIST

## 2022-06-13 PROCEDURE — 99999 PR PBB SHADOW E&M-EST. PATIENT-LVL III: CPT | Mod: PBBFAC,,, | Performed by: PODIATRIST

## 2022-06-13 PROCEDURE — 1159F MED LIST DOCD IN RCRD: CPT | Mod: CPTII,S$GLB,, | Performed by: PODIATRIST

## 2022-06-13 PROCEDURE — 99499 UNLISTED E&M SERVICE: CPT | Mod: S$GLB,,, | Performed by: PODIATRIST

## 2022-06-13 PROCEDURE — 3008F BODY MASS INDEX DOCD: CPT | Mod: CPTII,S$GLB,, | Performed by: PODIATRIST

## 2022-06-13 PROCEDURE — 3008F PR BODY MASS INDEX (BMI) DOCUMENTED: ICD-10-PCS | Mod: CPTII,S$GLB,, | Performed by: PODIATRIST

## 2022-06-13 NOTE — PROGRESS NOTES
Subjective:       Patient ID: Suellen Campos is a 62 y.o. female.    Chief Complaint: Wound Check (Coming in for a affinity graft, rates pain 0/10, diabetic, wearing sock and surgical shoe, last seen PCP Dr. Wheatley on 01/24/2022.)      HPI: Patient presents to the clinic today, for application of Affinity allograft to an ulceration located at the right plantar hallux. Patient's Primary Care Provider is Amelia Wheatley MD. The PMHx. does include type 2 diabetes mellitus with peripheral neuropathy and history of amputation to the left great toe. To this juncture, patient has had 4 allograft applications.    Hemoglobin A1C   Date Value Ref Range Status   10/29/2021 6.3 (H) 4.0 - 5.6 % Final     Comment:     ADA Screening Guidelines:  5.7-6.4%  Consistent with prediabetes  >or=6.5%  Consistent with diabetes    High levels of fetal hemoglobin interfere with the HbA1C  assay. Heterozygous hemoglobin variants (HbS, HgC, etc)do  not significantly interfere with this assay.   However, presence of multiple variants may affect accuracy.     05/09/2021 6.8 (H) 4.0 - 5.6 % Final     Comment:     ADA Screening Guidelines:  5.7-6.4%  Consistent with prediabetes  >or=6.5%  Consistent with diabetes    High levels of fetal hemoglobin interfere with the HbA1C  assay. Heterozygous hemoglobin variants (HbS, HgC, etc)do  not significantly interfere with this assay.   However, presence of multiple variants may affect accuracy.     10/29/2010 6.0 4.0 - 6.2 % Final       Review of patient's allergies indicates:  No Known Allergies    Past Medical History:   Diagnosis Date    Chronic pain     COVID-19     Depression     Diabetes mellitus     Hyperlipidemia     Hypertension     Neuropathy     Osteoarthritis     Spinal stenosis        Family History   Problem Relation Age of Onset    Diabetes Mellitus Mother     Hypertension Mother     Pulmonary fibrosis Father     Hypertension Sister     Diabetes Sister     Pulmonary fibrosis  Brother        Social History     Socioeconomic History    Marital status:    Tobacco Use    Smoking status: Never Smoker    Smokeless tobacco: Never Used   Substance and Sexual Activity    Alcohol use: Never    Drug use: Never    Sexual activity: Yes     Partners: Male     Social Determinants of Health     Financial Resource Strain: Low Risk     Difficulty of Paying Living Expenses: Not very hard   Food Insecurity: No Food Insecurity    Worried About Running Out of Food in the Last Year: Never true    Ran Out of Food in the Last Year: Never true   Transportation Needs: No Transportation Needs    Lack of Transportation (Medical): No    Lack of Transportation (Non-Medical): No   Physical Activity: Inactive    Days of Exercise per Week: 0 days    Minutes of Exercise per Session: 0 min   Stress: No Stress Concern Present    Feeling of Stress : Not at all   Social Connections: Unknown    Frequency of Communication with Friends and Family: More than three times a week    Frequency of Social Gatherings with Friends and Family: Three times a week    Active Member of Clubs or Organizations: No    Attends Club or Organization Meetings: Never    Marital Status:    Housing Stability: Unknown    Unable to Pay for Housing in the Last Year: No    Unstable Housing in the Last Year: No       Past Surgical History:   Procedure Laterality Date    HYSTERECTOMY      INCISION AND DRAINAGE FOOT Left 5/9/2021    Procedure: INCISION AND DRAINAGE, FOOT;  Surgeon: Shelby Stephen DPM;  Location: [facility] OR;  Service: Podiatry;  Laterality: Left;  GREAT TOE OF LEFT FOOT    SPINAL CORD STIMULATOR IMPLANT      TOE AMPUTATION Left 5/9/2021    Procedure: AMPUTATION, TOE;  Surgeon: Shelby Stephen DPM;  Location: [facility] OR;  Service: Podiatry;  Laterality: Left;  LEFT GREAT     TONSILLECTOMY      TOTAL ABDOMINAL HYSTERECTOMY W/ BILATERAL SALPINGOOPHORECTOMY         Review of Systems       Objective:   Ht 5'  "7" (1.702 m)   Wt 75.3 kg (166 lb 0.1 oz)   BMI 26.00 kg/m²     Physical Exam  LOWER EXTREMITY PHYSICAL EXAMINATION    DERMATOLOGY:  Ulceration present to the right great toe with significant sloughing of the overlying tissue.  Status post debridement, the wound measures 0.8 cmx 0.25 cm x 0.05 cm deep.  There is a granular wound base.  The wound does not probe to bone.  There is no erythema.  No underlying fluctuance.    Assessment:     1. Diabetic ulcer of toe of right foot associated with type 2 diabetes mellitus, with fat layer exposed        Plan:     Diabetic ulcer of toe of right foot associated with type 2 diabetes mellitus, with fat layer exposed  -     Skin Substitute Authorization      The wound was surgically debrided after adequate prep with alcohol and/or betadine paint. Excisional wound debridement was performed using sharp #10/#15 blade/rounded scalpel and tissue nipper, with removal of all non-viable skin and soft tissues; necrotic skin/tissue formation. The woundbase/wound bed was also debrided to encourage bleeding as to promote/stimulate healing. Debridement was excisional and included epidermal, dermal and subcutaneous tissues. Post debridement measurements are as above. Hemostasis was achieved. Patient tolerated procedure well and without complications. Local woundcare with Organogenesis Affinity allograft 7cm2 graft with nonadherent dressings and bandage thereafter. All units of the graft were applied. The graft was packed deed into and around the wound as well as to the wound periphery as to stimulate cell migration and proliferation, deep to superficial, and to fill the defect. The graft is then secured in standard fashion. Today's graft application is # 5. Donor #: [id number]. Expiration Date: 06/15/2022 ID#: [id number]          No future appointments.  "

## 2022-07-27 ENCOUNTER — PATIENT MESSAGE (OUTPATIENT)
Dept: PODIATRY | Facility: CLINIC | Age: 62
End: 2022-07-27
Payer: COMMERCIAL

## 2022-08-03 ENCOUNTER — TELEPHONE (OUTPATIENT)
Dept: PODIATRY | Facility: CLINIC | Age: 62
End: 2022-08-03
Payer: COMMERCIAL

## 2022-08-03 NOTE — TELEPHONE ENCOUNTER
LVm informing patient graft authorization was approved and patient can schedule when convenient next week for application.

## 2022-08-11 ENCOUNTER — OFFICE VISIT (OUTPATIENT)
Dept: PODIATRY | Facility: CLINIC | Age: 62
End: 2022-08-11
Payer: COMMERCIAL

## 2022-08-11 VITALS — WEIGHT: 166 LBS | BODY MASS INDEX: 26.06 KG/M2 | HEIGHT: 67 IN

## 2022-08-11 DIAGNOSIS — L97.512 DIABETIC ULCER OF TOE OF RIGHT FOOT ASSOCIATED WITH TYPE 2 DIABETES MELLITUS, WITH FAT LAYER EXPOSED: Primary | ICD-10-CM

## 2022-08-11 DIAGNOSIS — E11.621 DIABETIC ULCER OF TOE OF RIGHT FOOT ASSOCIATED WITH TYPE 2 DIABETES MELLITUS, WITH FAT LAYER EXPOSED: Primary | ICD-10-CM

## 2022-08-11 DIAGNOSIS — E11.49 DIABETES MELLITUS TYPE 2 WITH NEUROLOGICAL MANIFESTATIONS: ICD-10-CM

## 2022-08-11 DIAGNOSIS — Z89.412 HISTORY OF AMPUTATION OF LEFT GREAT TOE: ICD-10-CM

## 2022-08-11 PROCEDURE — 99999 PR PBB SHADOW E&M-EST. PATIENT-LVL IV: CPT | Mod: PBBFAC,,, | Performed by: PODIATRIST

## 2022-08-11 PROCEDURE — 15275 SKIN SUB GRAFT FACE/NK/HF/G: CPT | Mod: S$GLB,,, | Performed by: PODIATRIST

## 2022-08-11 PROCEDURE — 1159F MED LIST DOCD IN RCRD: CPT | Mod: CPTII,S$GLB,, | Performed by: PODIATRIST

## 2022-08-11 PROCEDURE — 1160F RVW MEDS BY RX/DR IN RCRD: CPT | Mod: CPTII,S$GLB,, | Performed by: PODIATRIST

## 2022-08-11 PROCEDURE — 3008F PR BODY MASS INDEX (BMI) DOCUMENTED: ICD-10-PCS | Mod: CPTII,S$GLB,, | Performed by: PODIATRIST

## 2022-08-11 PROCEDURE — 1160F PR REVIEW ALL MEDS BY PRESCRIBER/CLIN PHARMACIST DOCUMENTED: ICD-10-PCS | Mod: CPTII,S$GLB,, | Performed by: PODIATRIST

## 2022-08-11 PROCEDURE — 99499 UNLISTED E&M SERVICE: CPT | Mod: S$GLB,,, | Performed by: PODIATRIST

## 2022-08-11 PROCEDURE — 3008F BODY MASS INDEX DOCD: CPT | Mod: CPTII,S$GLB,, | Performed by: PODIATRIST

## 2022-08-11 PROCEDURE — 99499 NO LOS: ICD-10-PCS | Mod: S$GLB,,, | Performed by: PODIATRIST

## 2022-08-11 PROCEDURE — 1159F PR MEDICATION LIST DOCUMENTED IN MEDICAL RECORD: ICD-10-PCS | Mod: CPTII,S$GLB,, | Performed by: PODIATRIST

## 2022-08-11 PROCEDURE — 15275 PR SKIN SUB GRAFT FACE/NK/HF/G UP TO 100 SQCM: ICD-10-PCS | Mod: S$GLB,,, | Performed by: PODIATRIST

## 2022-08-11 PROCEDURE — 99999 PR PBB SHADOW E&M-EST. PATIENT-LVL IV: ICD-10-PCS | Mod: PBBFAC,,, | Performed by: PODIATRIST

## 2022-08-11 NOTE — PROGRESS NOTES
Subjective:       Patient ID: Suellen Campos is a 62 y.o. female.    Chief Complaint: Follow-up (Skin graft on toe, rates pain 0/10, diabetic, wearing socks and shoes, last seen PCP Dr. Wheatley on 01/24/2022.)      HPI: Patient presents to the clinic today, for application of Affinity allograft to an ulceration located at the right plantar hallux. Patient's Primary Care Provider is Amelia Wheatley MD. The PMHx. does include type 2 diabetes mellitus with peripheral neuropathy and history of amputation to the left great toe. To this juncture, patient has had 5 allograft applications.    Hemoglobin A1C   Date Value Ref Range Status   10/29/2021 6.3 (H) 4.0 - 5.6 % Final     Comment:     ADA Screening Guidelines:  5.7-6.4%  Consistent with prediabetes  >or=6.5%  Consistent with diabetes    High levels of fetal hemoglobin interfere with the HbA1C  assay. Heterozygous hemoglobin variants (HbS, HgC, etc)do  not significantly interfere with this assay.   However, presence of multiple variants may affect accuracy.     05/09/2021 6.8 (H) 4.0 - 5.6 % Final     Comment:     ADA Screening Guidelines:  5.7-6.4%  Consistent with prediabetes  >or=6.5%  Consistent with diabetes    High levels of fetal hemoglobin interfere with the HbA1C  assay. Heterozygous hemoglobin variants (HbS, HgC, etc)do  not significantly interfere with this assay.   However, presence of multiple variants may affect accuracy.     10/29/2010 6.0 4.0 - 6.2 % Final       Review of patient's allergies indicates:  No Known Allergies    Past Medical History:   Diagnosis Date    Chronic pain     COVID-19     Depression     Diabetes mellitus     Hyperlipidemia     Hypertension     Neuropathy     Osteoarthritis     Spinal stenosis        Family History   Problem Relation Age of Onset    Diabetes Mellitus Mother     Hypertension Mother     Pulmonary fibrosis Father     Hypertension Sister     Diabetes Sister     Pulmonary fibrosis Brother        Social  "History     Socioeconomic History    Marital status:    Tobacco Use    Smoking status: Never Smoker    Smokeless tobacco: Never Used   Substance and Sexual Activity    Alcohol use: Never    Drug use: Never    Sexual activity: Yes     Partners: Male     Social Determinants of Health     Financial Resource Strain: Low Risk     Difficulty of Paying Living Expenses: Not very hard   Food Insecurity: No Food Insecurity    Worried About Running Out of Food in the Last Year: Never true    Ran Out of Food in the Last Year: Never true   Transportation Needs: No Transportation Needs    Lack of Transportation (Medical): No    Lack of Transportation (Non-Medical): No   Physical Activity: Inactive    Days of Exercise per Week: 0 days    Minutes of Exercise per Session: 0 min   Stress: No Stress Concern Present    Feeling of Stress : Not at all   Social Connections: Unknown    Frequency of Communication with Friends and Family: More than three times a week    Frequency of Social Gatherings with Friends and Family: Three times a week    Active Member of Clubs or Organizations: No    Attends Club or Organization Meetings: Never    Marital Status:    Housing Stability: Unknown    Unable to Pay for Housing in the Last Year: No    Unstable Housing in the Last Year: No       Past Surgical History:   Procedure Laterality Date    HYSTERECTOMY      INCISION AND DRAINAGE FOOT Left 5/9/2021    Procedure: INCISION AND DRAINAGE, FOOT;  Surgeon: Shelby Stephen DPM;  Location: [facility] OR;  Service: Podiatry;  Laterality: Left;  GREAT TOE OF LEFT FOOT    SPINAL CORD STIMULATOR IMPLANT      TOE AMPUTATION Left 5/9/2021    Procedure: AMPUTATION, TOE;  Surgeon: Shelby Stephen DPM;  Location: [facility] OR;  Service: Podiatry;  Laterality: Left;  LEFT GREAT     TONSILLECTOMY      TOTAL ABDOMINAL HYSTERECTOMY W/ BILATERAL SALPINGOOPHORECTOMY         Review of Systems       Objective:   Ht 5' 7" (1.702 m)   Wt " 75.3 kg (166 lb 0.1 oz)   BMI 26.00 kg/m²     Physical Exam  LOWER EXTREMITY PHYSICAL EXAMINATION    DERMATOLOGY:  Ulceration present to the right great toe with significant sloughing of the overlying tissue.  Status post debridement, the wound measures 0.8 cmx 0.25 cm x 0.05 cm deep.  There is a granular wound base.  The wound does not probe to bone.  There is no erythema.  No underlying fluctuance.    Assessment:     1. Diabetic ulcer of toe of right foot associated with type 2 diabetes mellitus, with fat layer exposed    2. Diabetes mellitus type 2 with neurological manifestations    3. History of amputation of left great toe        Plan:     Diabetic ulcer of toe of right foot associated with type 2 diabetes mellitus, with fat layer exposed    Diabetes mellitus type 2 with neurological manifestations    History of amputation of left great toe      The wound was surgically debrided after adequate prep with alcohol and/or betadine paint. Excisional wound debridement was performed using sharp #10/#15 blade/rounded scalpel and tissue nipper, with removal of all non-viable skin and soft tissues; necrotic skin/tissue formation. The woundbase/wound bed was also debrided to encourage bleeding as to promote/stimulate healing. Debridement was excisional and included epidermal, dermal and subcutaneous tissues. Post debridement measurements are as above. Hemostasis was achieved. Patient tolerated procedure well and without complications. Local woundcare with [employer] Affinity allograft 7cm2 graft with nonadherent dressings and bandage thereafter. All units of the graft were applied. The graft was packed deed into and around the wound as well as to the wound periphery as to stimulate cell migration and proliferation, deep to superficial, and to fill the defect. The graft is then secured in standard fashion. Today's graft application is # 6. Donor #: [id number]. Expiration Date: 08/12/2022 ID#: [id number]          Future  Appointments   Date Time Provider Department Center   8/17/2022  2:30 PM Shelby Stephen DPM [facility]

## 2022-08-17 ENCOUNTER — PATIENT MESSAGE (OUTPATIENT)
Dept: PODIATRY | Facility: CLINIC | Age: 62
End: 2022-08-17

## 2022-08-17 ENCOUNTER — OFFICE VISIT (OUTPATIENT)
Dept: PODIATRY | Facility: CLINIC | Age: 62
End: 2022-08-17
Payer: COMMERCIAL

## 2022-08-17 DIAGNOSIS — Z89.412 HISTORY OF AMPUTATION OF LEFT GREAT TOE: ICD-10-CM

## 2022-08-17 DIAGNOSIS — E11.621 DIABETIC ULCER OF TOE OF RIGHT FOOT ASSOCIATED WITH TYPE 2 DIABETES MELLITUS, WITH FAT LAYER EXPOSED: Primary | ICD-10-CM

## 2022-08-17 DIAGNOSIS — L97.512 DIABETIC ULCER OF TOE OF RIGHT FOOT ASSOCIATED WITH TYPE 2 DIABETES MELLITUS, WITH FAT LAYER EXPOSED: Primary | ICD-10-CM

## 2022-08-17 DIAGNOSIS — E11.49 DIABETES MELLITUS TYPE 2 WITH NEUROLOGICAL MANIFESTATIONS: ICD-10-CM

## 2022-08-17 PROCEDURE — 1159F PR MEDICATION LIST DOCUMENTED IN MEDICAL RECORD: ICD-10-PCS | Mod: CPTII,S$GLB,, | Performed by: PODIATRIST

## 2022-08-17 PROCEDURE — 1159F MED LIST DOCD IN RCRD: CPT | Mod: CPTII,S$GLB,, | Performed by: PODIATRIST

## 2022-08-17 PROCEDURE — 99499 NO LOS: ICD-10-PCS | Mod: S$GLB,,, | Performed by: PODIATRIST

## 2022-08-17 PROCEDURE — 15275 PR SKIN SUB GRAFT FACE/NK/HF/G UP TO 100 SQCM: ICD-10-PCS | Mod: S$GLB,,, | Performed by: PODIATRIST

## 2022-08-17 PROCEDURE — 1160F PR REVIEW ALL MEDS BY PRESCRIBER/CLIN PHARMACIST DOCUMENTED: ICD-10-PCS | Mod: CPTII,S$GLB,, | Performed by: PODIATRIST

## 2022-08-17 PROCEDURE — 99499 UNLISTED E&M SERVICE: CPT | Mod: S$GLB,,, | Performed by: PODIATRIST

## 2022-08-17 PROCEDURE — 1160F RVW MEDS BY RX/DR IN RCRD: CPT | Mod: CPTII,S$GLB,, | Performed by: PODIATRIST

## 2022-08-17 PROCEDURE — 99999 PR PBB SHADOW E&M-EST. PATIENT-LVL III: ICD-10-PCS | Mod: PBBFAC,,, | Performed by: PODIATRIST

## 2022-08-17 PROCEDURE — 15275 SKIN SUB GRAFT FACE/NK/HF/G: CPT | Mod: S$GLB,,, | Performed by: PODIATRIST

## 2022-08-17 PROCEDURE — 99999 PR PBB SHADOW E&M-EST. PATIENT-LVL III: CPT | Mod: PBBFAC,,, | Performed by: PODIATRIST

## 2022-08-17 NOTE — PROGRESS NOTES
Subjective:       Patient ID: Suellen Campos is a 62 y.o. female.    Chief Complaint: Diabetic Foot Ulcer (Patient present with DFU to right hallux.  She denies pain at present. )      HPI: Patient presents to the clinic today, for application of Affinity allograft to an ulceration located at the right plantar hallux. Patient's Primary Care Provider is Amelia Wheatley MD. The PMHx. does include type 2 diabetes mellitus with peripheral neuropathy and history of amputation to the left great toe. To this juncture, patient has had 6 allograft applications.    Hemoglobin A1C   Date Value Ref Range Status   10/29/2021 6.3 (H) 4.0 - 5.6 % Final     Comment:     ADA Screening Guidelines:  5.7-6.4%  Consistent with prediabetes  >or=6.5%  Consistent with diabetes    High levels of fetal hemoglobin interfere with the HbA1C  assay. Heterozygous hemoglobin variants (HbS, HgC, etc)do  not significantly interfere with this assay.   However, presence of multiple variants may affect accuracy.     05/09/2021 6.8 (H) 4.0 - 5.6 % Final     Comment:     ADA Screening Guidelines:  5.7-6.4%  Consistent with prediabetes  >or=6.5%  Consistent with diabetes    High levels of fetal hemoglobin interfere with the HbA1C  assay. Heterozygous hemoglobin variants (HbS, HgC, etc)do  not significantly interfere with this assay.   However, presence of multiple variants may affect accuracy.     10/29/2010 6.0 4.0 - 6.2 % Final       Review of patient's allergies indicates:  No Known Allergies    Past Medical History:   Diagnosis Date    Chronic pain     COVID-19     Depression     Diabetes mellitus     Hyperlipidemia     Hypertension     Neuropathy     Osteoarthritis     Spinal stenosis        Family History   Problem Relation Age of Onset    Diabetes Mellitus Mother     Hypertension Mother     Pulmonary fibrosis Father     Hypertension Sister     Diabetes Sister     Pulmonary fibrosis Brother        Social History     Socioeconomic  History    Marital status:    Tobacco Use    Smoking status: Never Smoker    Smokeless tobacco: Never Used   Substance and Sexual Activity    Alcohol use: Never    Drug use: Never    Sexual activity: Yes     Partners: Male     Social Determinants of Health     Financial Resource Strain: Low Risk     Difficulty of Paying Living Expenses: Not very hard   Food Insecurity: No Food Insecurity    Worried About Running Out of Food in the Last Year: Never true    Ran Out of Food in the Last Year: Never true   Transportation Needs: No Transportation Needs    Lack of Transportation (Medical): No    Lack of Transportation (Non-Medical): No   Physical Activity: Inactive    Days of Exercise per Week: 0 days    Minutes of Exercise per Session: 0 min   Stress: No Stress Concern Present    Feeling of Stress : Not at all   Social Connections: Unknown    Frequency of Communication with Friends and Family: More than three times a week    Frequency of Social Gatherings with Friends and Family: Three times a week    Active Member of Clubs or Organizations: No    Attends Club or Organization Meetings: Never    Marital Status:    Housing Stability: Unknown    Unable to Pay for Housing in the Last Year: No    Unstable Housing in the Last Year: No       Past Surgical History:   Procedure Laterality Date    HYSTERECTOMY      INCISION AND DRAINAGE FOOT Left 5/9/2021    Procedure: INCISION AND DRAINAGE, FOOT;  Surgeon: Shelby Stephen DPM;  Location: [facility] OR;  Service: Podiatry;  Laterality: Left;  GREAT TOE OF LEFT FOOT    SPINAL CORD STIMULATOR IMPLANT      TOE AMPUTATION Left 5/9/2021    Procedure: AMPUTATION, TOE;  Surgeon: Shelby Stephen DPM;  Location: [facility] OR;  Service: Podiatry;  Laterality: Left;  LEFT GREAT     TONSILLECTOMY      TOTAL ABDOMINAL HYSTERECTOMY W/ BILATERAL SALPINGOOPHORECTOMY         Review of Systems       Objective:   There were no vitals taken for this visit.    Physical  Exam  LOWER EXTREMITY PHYSICAL EXAMINATION    DERMATOLOGY:  Ulceration present to the right great toe with significant sloughing of the overlying tissue.  Pre debridement, the wound measures 0.8 cmx 0.25 cm x 0.2 cm deep.  Post debridement, the wound measures 0.6 x 0.3cm x 0.05cm.  There is a granular wound base.  The wound does not probe to bone.  There is no erythema.  No underlying fluctuance.    Assessment:     1. Diabetic ulcer of toe of right foot associated with type 2 diabetes mellitus, with fat layer exposed    2. Diabetes mellitus type 2 with neurological manifestations    3. History of amputation of left great toe        Plan:     Diabetic ulcer of toe of right foot associated with type 2 diabetes mellitus, with fat layer exposed    Diabetes mellitus type 2 with neurological manifestations    History of amputation of left great toe      The wound was surgically debrided after adequate prep with alcohol and/or betadine paint. Excisional wound debridement was performed using sharp #10/#15 blade/rounded scalpel and tissue nipper, with removal of all non-viable skin and soft tissues; necrotic skin/tissue formation. The woundbase/wound bed was also debrided to encourage bleeding as to promote/stimulate healing. Debridement was excisional and included epidermal, dermal and subcutaneous tissues. Post debridement measurements are as above. Hemostasis was achieved. Patient tolerated procedure well and without complications. Local woundcare with Organogenesis Affinity allograft 7cm2 graft with nonadherent dressings and bandage thereafter. All units of the graft were applied. The graft was packed deed into and around the wound as well as to the wound periphery as to stimulate cell migration and proliferation, deep to superficial, and to fill the defect. The graft is then secured in standard fashion. Today's graft application is # 7. Donor #: [id number]. Expiration Date: 08/18/2022 ID#: [id number]    Recommend patient  follow-up in approximately 1 week.  Patient to be placed in a total contact cast with Prudence.  She will present with a .  We discussed possibility of casting to see if this will help heal the residual ulceration.  Will also consider wound care referral.  Did discuss great toe amputation if she continues have difficulties healing.  Patient states that she would like to avoid a wound care center if possible and is interested in total contact casting.          No future appointments.

## 2022-08-23 ENCOUNTER — TELEPHONE (OUTPATIENT)
Dept: PODIATRY | Facility: CLINIC | Age: 62
End: 2022-08-23
Payer: COMMERCIAL

## 2022-08-23 NOTE — TELEPHONE ENCOUNTER
Informed patient appointment was changed with Dr. Stephen to Dr. Li and at 11:30 instead for graft application.

## 2022-08-24 ENCOUNTER — TELEPHONE (OUTPATIENT)
Dept: RHEUMATOLOGY | Facility: CLINIC | Age: 62
End: 2022-08-24
Payer: COMMERCIAL

## 2022-08-24 ENCOUNTER — OFFICE VISIT (OUTPATIENT)
Dept: PODIATRY | Facility: CLINIC | Age: 62
End: 2022-08-24
Payer: COMMERCIAL

## 2022-08-24 ENCOUNTER — PATIENT MESSAGE (OUTPATIENT)
Dept: PODIATRY | Facility: CLINIC | Age: 62
End: 2022-08-24

## 2022-08-24 VITALS — BODY MASS INDEX: 26.06 KG/M2 | WEIGHT: 166 LBS | HEIGHT: 67 IN

## 2022-08-24 DIAGNOSIS — L97.512 DIABETIC ULCER OF TOE OF RIGHT FOOT ASSOCIATED WITH TYPE 2 DIABETES MELLITUS, WITH FAT LAYER EXPOSED: Primary | ICD-10-CM

## 2022-08-24 DIAGNOSIS — M20.31 HALLUX MALLEUS OF RIGHT FOOT: ICD-10-CM

## 2022-08-24 DIAGNOSIS — E11.621 DIABETIC ULCER OF TOE OF RIGHT FOOT ASSOCIATED WITH TYPE 2 DIABETES MELLITUS, WITH FAT LAYER EXPOSED: Primary | ICD-10-CM

## 2022-08-24 DIAGNOSIS — E11.49 DIABETES MELLITUS TYPE 2 WITH NEUROLOGICAL MANIFESTATIONS: ICD-10-CM

## 2022-08-24 DIAGNOSIS — M24.571 CONTRACTURE, RIGHT ANKLE: ICD-10-CM

## 2022-08-24 PROCEDURE — 15275 SKIN SUB GRAFT FACE/NK/HF/G: CPT | Mod: S$GLB,,, | Performed by: PODIATRIST

## 2022-08-24 PROCEDURE — 1159F MED LIST DOCD IN RCRD: CPT | Mod: CPTII,S$GLB,, | Performed by: PODIATRIST

## 2022-08-24 PROCEDURE — 99999 PR PBB SHADOW E&M-EST. PATIENT-LVL IV: CPT | Mod: PBBFAC,,, | Performed by: PODIATRIST

## 2022-08-24 PROCEDURE — 15275 PR SKIN SUB GRAFT FACE/NK/HF/G UP TO 100 SQCM: ICD-10-PCS | Mod: S$GLB,,, | Performed by: PODIATRIST

## 2022-08-24 PROCEDURE — 99214 OFFICE O/P EST MOD 30 MIN: CPT | Mod: 25,S$GLB,, | Performed by: PODIATRIST

## 2022-08-24 PROCEDURE — 99214 PR OFFICE/OUTPT VISIT, EST, LEVL IV, 30-39 MIN: ICD-10-PCS | Mod: 25,S$GLB,, | Performed by: PODIATRIST

## 2022-08-24 PROCEDURE — 99999 PR PBB SHADOW E&M-EST. PATIENT-LVL IV: ICD-10-PCS | Mod: PBBFAC,,, | Performed by: PODIATRIST

## 2022-08-24 PROCEDURE — 1160F RVW MEDS BY RX/DR IN RCRD: CPT | Mod: CPTII,S$GLB,, | Performed by: PODIATRIST

## 2022-08-24 PROCEDURE — 1160F PR REVIEW ALL MEDS BY PRESCRIBER/CLIN PHARMACIST DOCUMENTED: ICD-10-PCS | Mod: CPTII,S$GLB,, | Performed by: PODIATRIST

## 2022-08-24 PROCEDURE — 3008F BODY MASS INDEX DOCD: CPT | Mod: CPTII,S$GLB,, | Performed by: PODIATRIST

## 2022-08-24 PROCEDURE — 3008F PR BODY MASS INDEX (BMI) DOCUMENTED: ICD-10-PCS | Mod: CPTII,S$GLB,, | Performed by: PODIATRIST

## 2022-08-24 PROCEDURE — 1159F PR MEDICATION LIST DOCUMENTED IN MEDICAL RECORD: ICD-10-PCS | Mod: CPTII,S$GLB,, | Performed by: PODIATRIST

## 2022-08-24 NOTE — Clinical Note
We sure she is approved for multiple grafts? Most recent authorization says just 1 (and that has already been placed long ago).!?

## 2022-08-24 NOTE — PROGRESS NOTES
Subjective:       Patient ID: Suellen Campos is a 62 y.o. female.    Chief Complaint: Follow-up (Skin graft, rates pain 0/10, diabetic, wearing socks and shoes, last seen PCP Dr. Wheatley on 01/24/2022.)      HPI: Patient presents to the clinic today, for follow up concerning application of Affinity allograft to an ulceration located at the right plantar 1st toe. Patient's Primary Care Provider is Amelia Wheatley MD. The PMHx. does include DMII with neuropathy. To this juncture, patient has had 7 allograft applications. Daughter is present.     Hemoglobin A1C   Date Value Ref Range Status   10/29/2021 6.3 (H) 4.0 - 5.6 % Final     Comment:     ADA Screening Guidelines:  5.7-6.4%  Consistent with prediabetes  >or=6.5%  Consistent with diabetes    High levels of fetal hemoglobin interfere with the HbA1C  assay. Heterozygous hemoglobin variants (HbS, HgC, etc)do  not significantly interfere with this assay.   However, presence of multiple variants may affect accuracy.     05/09/2021 6.8 (H) 4.0 - 5.6 % Final     Comment:     ADA Screening Guidelines:  5.7-6.4%  Consistent with prediabetes  >or=6.5%  Consistent with diabetes    High levels of fetal hemoglobin interfere with the HbA1C  assay. Heterozygous hemoglobin variants (HbS, HgC, etc)do  not significantly interfere with this assay.   However, presence of multiple variants may affect accuracy.     10/29/2010 6.0 4.0 - 6.2 % Final       Review of patient's allergies indicates:  No Known Allergies    Past Medical History:   Diagnosis Date    Chronic pain     COVID-19     Depression     Diabetes mellitus     Hyperlipidemia     Hypertension     Neuropathy     Osteoarthritis     Spinal stenosis        Family History   Problem Relation Age of Onset    Diabetes Mellitus Mother     Hypertension Mother     Pulmonary fibrosis Father     Hypertension Sister     Diabetes Sister     Pulmonary fibrosis Brother        Social History     Socioeconomic History     Marital status:    Tobacco Use    Smoking status: Never Smoker    Smokeless tobacco: Never Used   Substance and Sexual Activity    Alcohol use: Never    Drug use: Never    Sexual activity: Yes     Partners: Male     Social Determinants of Health     Financial Resource Strain: Low Risk     Difficulty of Paying Living Expenses: Not very hard   Food Insecurity: No Food Insecurity    Worried About Running Out of Food in the Last Year: Never true    Ran Out of Food in the Last Year: Never true   Transportation Needs: No Transportation Needs    Lack of Transportation (Medical): No    Lack of Transportation (Non-Medical): No   Physical Activity: Inactive    Days of Exercise per Week: 0 days    Minutes of Exercise per Session: 0 min   Stress: No Stress Concern Present    Feeling of Stress : Not at all   Social Connections: Unknown    Frequency of Communication with Friends and Family: More than three times a week    Frequency of Social Gatherings with Friends and Family: Three times a week    Active Member of Clubs or Organizations: No    Attends Club or Organization Meetings: Never    Marital Status:    Housing Stability: Unknown    Unable to Pay for Housing in the Last Year: No    Unstable Housing in the Last Year: No       Past Surgical History:   Procedure Laterality Date    HYSTERECTOMY      INCISION AND DRAINAGE FOOT Left 5/9/2021    Procedure: INCISION AND DRAINAGE, FOOT;  Surgeon: Shelby Stephen DPM;  Location: [facility] OR;  Service: Podiatry;  Laterality: Left;  GREAT TOE OF LEFT FOOT    SPINAL CORD STIMULATOR IMPLANT      TOE AMPUTATION Left 5/9/2021    Procedure: AMPUTATION, TOE;  Surgeon: Shelby Stephen DPM;  Location: [facility] OR;  Service: Podiatry;  Laterality: Left;  LEFT GREAT     TONSILLECTOMY      TOTAL ABDOMINAL HYSTERECTOMY W/ BILATERAL SALPINGOOPHORECTOMY         Review of Systems   Constitutional: Negative for chills, fatigue and fever.   HENT: Negative for  "hearing loss.    Eyes: Negative for photophobia and visual disturbance.   Respiratory: Negative for cough, chest tightness, shortness of breath and wheezing.    Cardiovascular: Negative for chest pain and palpitations.   Gastrointestinal: Negative for constipation, diarrhea, nausea and vomiting.   Endocrine: Negative for cold intolerance and heat intolerance.   Genitourinary: Negative for flank pain.   Musculoskeletal: Positive for gait problem. Negative for neck pain and neck stiffness.   Skin: Positive for wound. Negative for color change.   Neurological: Positive for numbness. Negative for light-headedness and headaches.   Psychiatric/Behavioral: Negative for sleep disturbance.          Objective:   Ht 5' 7" (1.702 m)   Wt 75.3 kg (166 lb 0.1 oz)   BMI 26.00 kg/m²                   LOWER EXTREMITY PHYSICAL EXAMINATION    DERMATOLOGY: There ulceration at the plantar RLE 1st toe at the IPJ measures approximately 1.0cm x 0.50cm x 0.20cm. No maceration is noted. No malodor is noted. Copious bleeding with debridement is noted. Mild dilan-wound erythema w/o cellulitis is noted (due to dressings). Mild dilan-wound hyperkeratosis is noted.    clinical features of onychomycosis.     VASCULAR: The RLE dorsalis pedis pulse is 2/4 and the posterior tibial pulse is 1/4. Hair growth is noted on the dorsal foot and digits. Proximal to distal, warm to warm. Capillary refill time is WNL at less than 3s.    ORTHOPEDIC: Manual Muscle Testing is 5/5 in all planes on the RLE, without pains, with and without resistance. GS Equinus is noted. Hallux malleus, mild, is noted.    NEUROLOGY: Sensation to light touch is intact. Proprioception is intact. Sensation to pin prick is reduced to absent. Examination with 5.07 Vulcan Meredith monofilament reveals that protective sensation is not intact to the right plantar surfaces of the foot and digits, as the patient has no sensation/detection at greater than 4 distinct points of contact. "     Assessment:     1. Diabetic ulcer of toe of right foot associated with type 2 diabetes mellitus, with fat layer exposed    2. Diabetes mellitus type 2 with neurological manifestations    3. Contracture, right ankle    4. Hallux malleus of right foot        Plan:     Diabetic ulcer of toe of right foot associated with type 2 diabetes mellitus, with fat layer exposed  The wound was surgically debrided after adequate prep with alcohol and/or betadine paint. Excisional wound debridement was performed using sharp #10/#15 blade/rounded scalpel and tissue nipper, with removal of all non-viable skin and soft tissues; necrotic skin/tissue formation. The woundbase/wound bed was also debrided to encourage bleeding as to promote/stimulate healing. Debridement was excisional and included epidermal, dermal and subcutaneous tissues. Post debridement measurements are as above. Hemostasis was achieved. Patient tolerated procedure well and without complications. Local woundcare with Organogenesis Affinity allograft 6.25cm2 graft with nonadherent dressings and bandage thereafter. All units of the graft were applied. The graft was packed deed into and around the wound as well as to the wound periphery as to stimulate cell migration and proliferation, deep to superficial, and to fill the defect. The graft is then secured in standard fashion. Today's graft application is # 1. Donor #: [id number]. Expiration Date: 08/25/2022. ID#: [id number].    Diabetes mellitus type 2 with neurological manifestations  Neuropathic foot counseling and education is provided at this visit. Patient is advised to wear socks and shoes at all times.  Do not walk barefoot, or with just socks, even when indoors.  Be sure to check and inspect the inside of the shoe before putting them on her feet.  Protect your feet at all times.  Walking shoes and/or athletic shoes are the best types of shoe gear. Do not wear vinyl or plastic type shoe gear, as they do not  stretch and/or breathe.  Protect your feet from hot and/or cold. Elevate the extremities when sitting.  Do not wear excessively tight socks and/or shoe gear. Wiggle your toes for a few minutes throughout the day. Move your ankles up and down, in and out, to help blood flow in your lower extremity.     Contracture, right ankle  Hallux malleus of right foot  Thorough discussion is had with the patient today, concerning the diagnosis, its etiology, and the treatment algorithm at present.     If no underlying osseous pathology, the procedure of (RLE TESS with 1st toe fusion at the IPJ) was thoroughly explained to the patient. Its necessity and/or purpose and the implications therein were outlined, including any pertinent advantages and/or disadvantages, and possible complications, if any. Possible complications include recurrence of pathology and/or deformity, infection (cellulitis, drainage, purulence, malodor, etc...), pain, numbness, neuritis, edema, burning, loss of function, need for further surgery, possible need for removal of any implanted hardware, soft tissue contracture and/or scarring, etc... No guarantees were given and/or implied. Post-operative expectations and weightbearing protocol is thoroughly explained to the patient, who acknowledges understanding.               No future appointments.

## 2022-09-14 ENCOUNTER — OFFICE VISIT (OUTPATIENT)
Dept: PODIATRY | Facility: CLINIC | Age: 62
End: 2022-09-14
Payer: COMMERCIAL

## 2022-09-14 VITALS — HEIGHT: 67 IN | BODY MASS INDEX: 26.06 KG/M2 | WEIGHT: 166 LBS

## 2022-09-14 DIAGNOSIS — L97.512 DIABETIC ULCER OF TOE OF RIGHT FOOT ASSOCIATED WITH TYPE 2 DIABETES MELLITUS, WITH FAT LAYER EXPOSED: Primary | ICD-10-CM

## 2022-09-14 DIAGNOSIS — E11.49 DIABETES MELLITUS TYPE 2 WITH NEUROLOGICAL MANIFESTATIONS: ICD-10-CM

## 2022-09-14 DIAGNOSIS — E11.621 DIABETIC ULCER OF TOE OF RIGHT FOOT ASSOCIATED WITH TYPE 2 DIABETES MELLITUS, WITH FAT LAYER EXPOSED: Primary | ICD-10-CM

## 2022-09-14 DIAGNOSIS — Z89.412 HISTORY OF AMPUTATION OF LEFT GREAT TOE: ICD-10-CM

## 2022-09-14 PROCEDURE — 99999 PR PBB SHADOW E&M-EST. PATIENT-LVL IV: ICD-10-PCS | Mod: PBBFAC,,, | Performed by: PODIATRIST

## 2022-09-14 PROCEDURE — 15275 PR SKIN SUB GRAFT FACE/NK/HF/G UP TO 100 SQCM: ICD-10-PCS | Mod: S$GLB,,, | Performed by: PODIATRIST

## 2022-09-14 PROCEDURE — 3008F PR BODY MASS INDEX (BMI) DOCUMENTED: ICD-10-PCS | Mod: CPTII,S$GLB,, | Performed by: PODIATRIST

## 2022-09-14 PROCEDURE — 15275 SKIN SUB GRAFT FACE/NK/HF/G: CPT | Mod: S$GLB,,, | Performed by: PODIATRIST

## 2022-09-14 PROCEDURE — 3008F BODY MASS INDEX DOCD: CPT | Mod: CPTII,S$GLB,, | Performed by: PODIATRIST

## 2022-09-14 PROCEDURE — 1159F MED LIST DOCD IN RCRD: CPT | Mod: CPTII,S$GLB,, | Performed by: PODIATRIST

## 2022-09-14 PROCEDURE — 99499 NO LOS: ICD-10-PCS | Mod: S$GLB,,, | Performed by: PODIATRIST

## 2022-09-14 PROCEDURE — 1159F PR MEDICATION LIST DOCUMENTED IN MEDICAL RECORD: ICD-10-PCS | Mod: CPTII,S$GLB,, | Performed by: PODIATRIST

## 2022-09-14 PROCEDURE — 1160F RVW MEDS BY RX/DR IN RCRD: CPT | Mod: CPTII,S$GLB,, | Performed by: PODIATRIST

## 2022-09-14 PROCEDURE — 99999 PR PBB SHADOW E&M-EST. PATIENT-LVL IV: CPT | Mod: PBBFAC,,, | Performed by: PODIATRIST

## 2022-09-14 PROCEDURE — 99499 UNLISTED E&M SERVICE: CPT | Mod: S$GLB,,, | Performed by: PODIATRIST

## 2022-09-14 PROCEDURE — 1160F PR REVIEW ALL MEDS BY PRESCRIBER/CLIN PHARMACIST DOCUMENTED: ICD-10-PCS | Mod: CPTII,S$GLB,, | Performed by: PODIATRIST

## 2022-09-14 NOTE — PROGRESS NOTES
Subjective:       Patient ID: Suellen Campos is a 62 y.o. female.    Chief Complaint: Follow-up (Coming in for affinity graft, rates pain 0/10, diabetic, wearign socks and shoes,last seen PCP Dr. Wheatley on 09/08/2022.)      HPI: Patient presents to the clinic today, for application of Affinity allograft to an ulceration located at the right plantar hallux. Patient's Primary Care Provider is Amelia Wheatley MD. The PMHx. does include type 2 diabetes mellitus with peripheral neuropathy and history of amputation to the left great toe. To this juncture, patient has had 8 allograft applications.    Hemoglobin A1C   Date Value Ref Range Status   10/29/2021 6.3 (H) 4.0 - 5.6 % Final     Comment:     ADA Screening Guidelines:  5.7-6.4%  Consistent with prediabetes  >or=6.5%  Consistent with diabetes    High levels of fetal hemoglobin interfere with the HbA1C  assay. Heterozygous hemoglobin variants (HbS, HgC, etc)do  not significantly interfere with this assay.   However, presence of multiple variants may affect accuracy.     05/09/2021 6.8 (H) 4.0 - 5.6 % Final     Comment:     ADA Screening Guidelines:  5.7-6.4%  Consistent with prediabetes  >or=6.5%  Consistent with diabetes    High levels of fetal hemoglobin interfere with the HbA1C  assay. Heterozygous hemoglobin variants (HbS, HgC, etc)do  not significantly interfere with this assay.   However, presence of multiple variants may affect accuracy.     10/29/2010 6.0 4.0 - 6.2 % Final       Review of patient's allergies indicates:  No Known Allergies    Past Medical History:   Diagnosis Date    Chronic pain     COVID-19     Depression     Diabetes mellitus     Hyperlipidemia     Hypertension     Neuropathy     Osteoarthritis     Spinal stenosis        Family History   Problem Relation Age of Onset    Diabetes Mellitus Mother     Hypertension Mother     Pulmonary fibrosis Father     Hypertension Sister     Diabetes Sister     Pulmonary fibrosis Brother   "      Social History     Socioeconomic History    Marital status:    Tobacco Use    Smoking status: Never    Smokeless tobacco: Never   Substance and Sexual Activity    Alcohol use: Never    Drug use: Never    Sexual activity: Yes     Partners: Male     Social Determinants of Health     Financial Resource Strain: Low Risk     Difficulty of Paying Living Expenses: Not very hard   Food Insecurity: No Food Insecurity    Worried About Running Out of Food in the Last Year: Never true    Ran Out of Food in the Last Year: Never true   Transportation Needs: No Transportation Needs    Lack of Transportation (Medical): No    Lack of Transportation (Non-Medical): No   Physical Activity: Inactive    Days of Exercise per Week: 0 days    Minutes of Exercise per Session: 0 min   Stress: No Stress Concern Present    Feeling of Stress : Not at all   Social Connections: Unknown    Frequency of Communication with Friends and Family: More than three times a week    Frequency of Social Gatherings with Friends and Family: Three times a week    Active Member of Clubs or Organizations: No    Attends Club or Organization Meetings: Never    Marital Status:    Housing Stability: Unknown    Unable to Pay for Housing in the Last Year: No    Unstable Housing in the Last Year: No       Past Surgical History:   Procedure Laterality Date    HYSTERECTOMY      INCISION AND DRAINAGE FOOT Left 5/9/2021    Procedure: INCISION AND DRAINAGE, FOOT;  Surgeon: Shelby Stephen DPM;  Location: [facility] OR;  Service: Podiatry;  Laterality: Left;  GREAT TOE OF LEFT FOOT    SPINAL CORD STIMULATOR IMPLANT      TOE AMPUTATION Left 5/9/2021    Procedure: AMPUTATION, TOE;  Surgeon: Shelby Stephen DPM;  Location: [facility] OR;  Service: Podiatry;  Laterality: Left;  LEFT GREAT     TONSILLECTOMY      TOTAL ABDOMINAL HYSTERECTOMY W/ BILATERAL SALPINGOOPHORECTOMY         Review of Systems       Objective:   Ht 5' 7" (1.702 m)   Wt " 75.3 kg (166 lb 0.1 oz)   BMI 26.00 kg/m²     Physical Exam  LOWER EXTREMITY PHYSICAL EXAMINATION    DERMATOLOGY:  Ulceration present to the right great toe with significant sloughing of the overlying tissue.  The wound measures 0.3 x 0.15cm x 0.05cm.  There is a granular wound base.  The wound does not probe to bone.  There is no erythema.  No underlying fluctuance.    Assessment:     1. Diabetic ulcer of toe of right foot associated with type 2 diabetes mellitus, with fat layer exposed    2. Diabetes mellitus type 2 with neurological manifestations    3. History of amputation of left great toe        Plan:     Diabetic ulcer of toe of right foot associated with type 2 diabetes mellitus, with fat layer exposed    Diabetes mellitus type 2 with neurological manifestations    History of amputation of left great toe    The wound was surgically debrided after adequate prep with alcohol and/or betadine paint. Excisional wound debridement was performed using sharp #10/#15 blade/rounded scalpel and tissue nipper, with removal of all non-viable skin and soft tissues; necrotic skin/tissue formation. The woundbase/wound bed was also debrided to encourage bleeding as to promote/stimulate healing. Debridement was excisional and included epidermal, dermal and subcutaneous tissues. Post debridement measurements are as above. Hemostasis was achieved. Patient tolerated procedure well and without complications. Local woundcare with Organogenesis Affinity allograft 7cm2 graft with nonadherent dressings and bandage thereafter. All units of the graft were applied. The graft was packed deed into and around the wound as well as to the wound periphery as to stimulate cell migration and proliferation, deep to superficial, and to fill the defect. The graft is then secured in standard fashion. Today's graft application is # 9. Donor #: [id number]. Expiration Date: 09/16/2022 ID#: [id number]    Recommend patient follow-up in approximately 1  week.  Patient to be placed in a total contact cast with Affinity.     Foot counseling and education is provided at this visit. Patient is advised to wear socks and shoes at all times.  Do not walk barefoot, or with just socks, even when indoors.  Be sure to check and inspect the inside of the shoe before putting them on her feet.  Protect your feet at all times.  Walking shoes and/or athletic shoes are the best types of shoe gear. Do not wear vinyl or plastic type shoe gear, as they do not stretch and/or breathe.  Protect your feet from hot and/or cold. Elevate the extremities when sitting.  Do not wear excessively tight socks and/or shoe gear. Wiggle your toes for a few minutes throughout the day. Move your ankles up and down, in and out, to help blood flow in your lower extremity.           Future Appointments   Date Time Provider Department Center   9/28/2022  2:45 PM Shelby Stephen DPM [facility]

## 2022-09-28 ENCOUNTER — OFFICE VISIT (OUTPATIENT)
Dept: PODIATRY | Facility: CLINIC | Age: 62
End: 2022-09-28
Payer: COMMERCIAL

## 2022-09-28 DIAGNOSIS — L97.512 DIABETIC ULCER OF TOE OF RIGHT FOOT ASSOCIATED WITH TYPE 2 DIABETES MELLITUS, WITH FAT LAYER EXPOSED: Primary | ICD-10-CM

## 2022-09-28 DIAGNOSIS — E11.49 DIABETES MELLITUS TYPE 2 WITH NEUROLOGICAL MANIFESTATIONS: ICD-10-CM

## 2022-09-28 DIAGNOSIS — E11.621 DIABETIC ULCER OF TOE OF RIGHT FOOT ASSOCIATED WITH TYPE 2 DIABETES MELLITUS, WITH FAT LAYER EXPOSED: Primary | ICD-10-CM

## 2022-09-28 DIAGNOSIS — Z89.412 HISTORY OF AMPUTATION OF LEFT GREAT TOE: ICD-10-CM

## 2022-09-28 PROCEDURE — 99999 PR PBB SHADOW E&M-EST. PATIENT-LVL III: ICD-10-PCS | Mod: PBBFAC,,, | Performed by: PODIATRIST

## 2022-09-28 PROCEDURE — 11042 PR DEBRIDEMENT, SKIN, SUB-Q TISSUE,=<20 SQ CM: ICD-10-PCS | Mod: S$GLB,,, | Performed by: PODIATRIST

## 2022-09-28 PROCEDURE — 99499 NO LOS: ICD-10-PCS | Mod: S$GLB,,, | Performed by: PODIATRIST

## 2022-09-28 PROCEDURE — 1160F PR REVIEW ALL MEDS BY PRESCRIBER/CLIN PHARMACIST DOCUMENTED: ICD-10-PCS | Mod: CPTII,S$GLB,, | Performed by: PODIATRIST

## 2022-09-28 PROCEDURE — 11042 DBRDMT SUBQ TIS 1ST 20SQCM/<: CPT | Mod: S$GLB,,, | Performed by: PODIATRIST

## 2022-09-28 PROCEDURE — 99499 UNLISTED E&M SERVICE: CPT | Mod: S$GLB,,, | Performed by: PODIATRIST

## 2022-09-28 PROCEDURE — 99999 PR PBB SHADOW E&M-EST. PATIENT-LVL III: CPT | Mod: PBBFAC,,, | Performed by: PODIATRIST

## 2022-09-28 PROCEDURE — 1160F RVW MEDS BY RX/DR IN RCRD: CPT | Mod: CPTII,S$GLB,, | Performed by: PODIATRIST

## 2022-09-28 PROCEDURE — 1159F PR MEDICATION LIST DOCUMENTED IN MEDICAL RECORD: ICD-10-PCS | Mod: CPTII,S$GLB,, | Performed by: PODIATRIST

## 2022-09-28 PROCEDURE — 1159F MED LIST DOCD IN RCRD: CPT | Mod: CPTII,S$GLB,, | Performed by: PODIATRIST

## 2022-09-28 NOTE — PROGRESS NOTES
Subjective:       Patient ID: Suellen Campos is a 62 y.o. female.    Chief Complaint: Diabetic Foot Ulcer (Patient is a diabetic and continues with DFU to right hallux nd plantar foot. )      HPI: Patient presents to the clinic today, for an ulceration located at the right plantar hallux. Patient's Primary Care Provider is Amelia Wheatley MD. The PMHx. does include type 2 diabetes mellitus with peripheral neuropathy and history of amputation to the left great toe.  She reports that she did get sweaty and change the dressing herself.  States that she did get into a swimming full with a protective shoe on her foot in hopes this will help prevent soaking or subsequent infection.     Hemoglobin A1C   Date Value Ref Range Status   10/29/2021 6.3 (H) 4.0 - 5.6 % Final     Comment:     ADA Screening Guidelines:  5.7-6.4%  Consistent with prediabetes  >or=6.5%  Consistent with diabetes    High levels of fetal hemoglobin interfere with the HbA1C  assay. Heterozygous hemoglobin variants (HbS, HgC, etc)do  not significantly interfere with this assay.   However, presence of multiple variants may affect accuracy.     05/09/2021 6.8 (H) 4.0 - 5.6 % Final     Comment:     ADA Screening Guidelines:  5.7-6.4%  Consistent with prediabetes  >or=6.5%  Consistent with diabetes    High levels of fetal hemoglobin interfere with the HbA1C  assay. Heterozygous hemoglobin variants (HbS, HgC, etc)do  not significantly interfere with this assay.   However, presence of multiple variants may affect accuracy.     10/29/2010 6.0 4.0 - 6.2 % Final       Review of patient's allergies indicates:  No Known Allergies    Past Medical History:   Diagnosis Date    Chronic pain     COVID-19     Depression     Diabetes mellitus     Hyperlipidemia     Hypertension     Neuropathy     Osteoarthritis     Spinal stenosis        Family History   Problem Relation Age of Onset    Diabetes Mellitus Mother     Hypertension Mother     Pulmonary fibrosis Father      Hypertension Sister     Diabetes Sister     Pulmonary fibrosis Brother        Social History     Socioeconomic History    Marital status:    Tobacco Use    Smoking status: Never    Smokeless tobacco: Never   Substance and Sexual Activity    Alcohol use: Never    Drug use: Never    Sexual activity: Yes     Partners: Male     Social Determinants of Health     Financial Resource Strain: Low Risk     Difficulty of Paying Living Expenses: Not very hard   Food Insecurity: No Food Insecurity    Worried About Running Out of Food in the Last Year: Never true    Ran Out of Food in the Last Year: Never true   Transportation Needs: No Transportation Needs    Lack of Transportation (Medical): No    Lack of Transportation (Non-Medical): No   Physical Activity: Inactive    Days of Exercise per Week: 0 days    Minutes of Exercise per Session: 0 min   Stress: No Stress Concern Present    Feeling of Stress : Not at all   Social Connections: Unknown    Frequency of Communication with Friends and Family: More than three times a week    Frequency of Social Gatherings with Friends and Family: Three times a week    Active Member of Clubs or Organizations: No    Attends Club or Organization Meetings: Never    Marital Status:    Housing Stability: Unknown    Unable to Pay for Housing in the Last Year: No    Unstable Housing in the Last Year: No       Past Surgical History:   Procedure Laterality Date    HYSTERECTOMY      INCISION AND DRAINAGE FOOT Left 5/9/2021    Procedure: INCISION AND DRAINAGE, FOOT;  Surgeon: Shelby Stephen DPM;  Location: [facility] OR;  Service: Podiatry;  Laterality: Left;  GREAT TOE OF LEFT FOOT    SPINAL CORD STIMULATOR IMPLANT      TOE AMPUTATION Left 5/9/2021    Procedure: AMPUTATION, TOE;  Surgeon: Shelby Stephen DPM;  Location: [facility] OR;  Service: Podiatry;  Laterality: Left;  LEFT GREAT     TONSILLECTOMY      TOTAL ABDOMINAL HYSTERECTOMY W/ BILATERAL SALPINGOOPHORECTOMY         Review of  Systems       Objective:   There were no vitals taken for this visit.    Physical Exam  LOWER EXTREMITY PHYSICAL EXAMINATION    DERMATOLOGY:  Ulceration present to the right great toe with significant sloughing of the overlying tissue.  The wound measures 0.5 x 0.25cm x 0.05cm.  There is a granular wound base.  The wound does not probe to bone.  There is no erythema.  No underlying fluctuance.    Assessment:     1. Diabetic ulcer of toe of right foot associated with type 2 diabetes mellitus, with fat layer exposed    2. Diabetes mellitus type 2 with neurological manifestations    3. History of amputation of left great toe        Plan:     Diabetic ulcer of toe of right foot associated with type 2 diabetes mellitus, with fat layer exposed    Diabetes mellitus type 2 with neurological manifestations    History of amputation of left great toe    The wound was surgically debrided after adequate prep with alcohol and/or betadine paint. Excisional wound debridement was performed using sharp #10/#15 blade/rounded scalpel and tissue nipper, with removal of all non-viable skin and soft tissues; necrotic skin/tissue formation. The woundbase/wound bed was also debrided to encourage bleeding as to promote/stimulate healing. Debridement was excisional and included epidermal, dermal and subcutaneous tissues. Post debridement measurements are as above. Hemostasis was achieved. Patient tolerated procedure well and without complications. Local woundcare with Hydrofera Blue, gauze, Kerlix.    We discussed the importance of keeping the foot clean and dry.  Patient to consider utilizing PEG assist in a postoperative shoe to completely offload the area.    She will change dressings every 2-3 days.  She will keep the area clean and dry.  She will be compliant and consistent with postoperative shoe use    Foot counseling and education is provided at this visit. Patient is advised to wear socks and shoes at all times.  Do not walk  barefoot, or with just socks, even when indoors.  Be sure to check and inspect the inside of the shoe before putting them on her feet.  Protect your feet at all times.  Walking shoes and/or athletic shoes are the best types of shoe gear. Do not wear vinyl or plastic type shoe gear, as they do not stretch and/or breathe.  Protect your feet from hot and/or cold. Elevate the extremities when sitting.  Do not wear excessively tight socks and/or shoe gear. Wiggle your toes for a few minutes throughout the day. Move your ankles up and down, in and out, to help blood flow in your lower extremity.           Future Appointments   Date Time Provider Department Center   10/12/2022  2:45 PM Shelby Stephen DPM [facility]

## 2022-10-12 ENCOUNTER — OFFICE VISIT (OUTPATIENT)
Dept: PODIATRY | Facility: CLINIC | Age: 62
End: 2022-10-12
Payer: COMMERCIAL

## 2022-10-12 VITALS — HEIGHT: 67 IN | BODY MASS INDEX: 26.06 KG/M2 | WEIGHT: 166 LBS

## 2022-10-12 DIAGNOSIS — E11.49 DIABETES MELLITUS TYPE 2 WITH NEUROLOGICAL MANIFESTATIONS: ICD-10-CM

## 2022-10-12 DIAGNOSIS — L97.512 DIABETIC ULCER OF TOE OF RIGHT FOOT ASSOCIATED WITH TYPE 2 DIABETES MELLITUS, WITH FAT LAYER EXPOSED: Primary | ICD-10-CM

## 2022-10-12 DIAGNOSIS — Z89.412 HISTORY OF AMPUTATION OF LEFT GREAT TOE: ICD-10-CM

## 2022-10-12 DIAGNOSIS — E11.621 DIABETIC ULCER OF TOE OF RIGHT FOOT ASSOCIATED WITH TYPE 2 DIABETES MELLITUS, WITH FAT LAYER EXPOSED: Primary | ICD-10-CM

## 2022-10-12 PROCEDURE — 99499 NO LOS: ICD-10-PCS | Mod: S$GLB,,, | Performed by: PODIATRIST

## 2022-10-12 PROCEDURE — 99999 PR PBB SHADOW E&M-EST. PATIENT-LVL III: ICD-10-PCS | Mod: PBBFAC,,, | Performed by: PODIATRIST

## 2022-10-12 PROCEDURE — 11042 PR DEBRIDEMENT, SKIN, SUB-Q TISSUE,=<20 SQ CM: ICD-10-PCS | Mod: S$GLB,,, | Performed by: PODIATRIST

## 2022-10-12 PROCEDURE — 99999 PR PBB SHADOW E&M-EST. PATIENT-LVL III: CPT | Mod: PBBFAC,,, | Performed by: PODIATRIST

## 2022-10-12 PROCEDURE — 11042 DBRDMT SUBQ TIS 1ST 20SQCM/<: CPT | Mod: S$GLB,,, | Performed by: PODIATRIST

## 2022-10-12 PROCEDURE — 1159F PR MEDICATION LIST DOCUMENTED IN MEDICAL RECORD: ICD-10-PCS | Mod: CPTII,S$GLB,, | Performed by: PODIATRIST

## 2022-10-12 PROCEDURE — 1159F MED LIST DOCD IN RCRD: CPT | Mod: CPTII,S$GLB,, | Performed by: PODIATRIST

## 2022-10-12 PROCEDURE — 1160F RVW MEDS BY RX/DR IN RCRD: CPT | Mod: CPTII,S$GLB,, | Performed by: PODIATRIST

## 2022-10-12 PROCEDURE — 99499 UNLISTED E&M SERVICE: CPT | Mod: S$GLB,,, | Performed by: PODIATRIST

## 2022-10-12 PROCEDURE — 1160F PR REVIEW ALL MEDS BY PRESCRIBER/CLIN PHARMACIST DOCUMENTED: ICD-10-PCS | Mod: CPTII,S$GLB,, | Performed by: PODIATRIST

## 2022-10-12 PROCEDURE — 3008F BODY MASS INDEX DOCD: CPT | Mod: CPTII,S$GLB,, | Performed by: PODIATRIST

## 2022-10-12 PROCEDURE — 3008F PR BODY MASS INDEX (BMI) DOCUMENTED: ICD-10-PCS | Mod: CPTII,S$GLB,, | Performed by: PODIATRIST

## 2022-10-12 NOTE — PROGRESS NOTES
Subjective:       Patient ID: Suellen Campos is a 62 y.o. female.    Chief Complaint: Diabetic Foot Ulcer (2 wk DFU, rates pain 0/10, diabetic, wearing socks and shoes , last seen PCP Dr. Wheatley on 09/08/2022.)      HPI: Patient presents to the clinic today, for an ulceration located at the right plantar hallux. Patient's Primary Care Provider is Amelia Wheatley MD. The PMHx. does include type 2 diabetes mellitus with peripheral neuropathy and history of amputation to the left great toe.  Was compliant with ambulating in postoperative shoe and not getting the toe wet in the shower.  Has noticed great improvement in the size of the wound    Hemoglobin A1C   Date Value Ref Range Status   10/29/2021 6.3 (H) 4.0 - 5.6 % Final     Comment:     ADA Screening Guidelines:  5.7-6.4%  Consistent with prediabetes  >or=6.5%  Consistent with diabetes    High levels of fetal hemoglobin interfere with the HbA1C  assay. Heterozygous hemoglobin variants (HbS, HgC, etc)do  not significantly interfere with this assay.   However, presence of multiple variants may affect accuracy.     05/09/2021 6.8 (H) 4.0 - 5.6 % Final     Comment:     ADA Screening Guidelines:  5.7-6.4%  Consistent with prediabetes  >or=6.5%  Consistent with diabetes    High levels of fetal hemoglobin interfere with the HbA1C  assay. Heterozygous hemoglobin variants (HbS, HgC, etc)do  not significantly interfere with this assay.   However, presence of multiple variants may affect accuracy.     10/29/2010 6.0 4.0 - 6.2 % Final       Review of patient's allergies indicates:  No Known Allergies    Past Medical History:   Diagnosis Date    Chronic pain     COVID-19     Depression     Diabetes mellitus     Hyperlipidemia     Hypertension     Neuropathy     Osteoarthritis     Spinal stenosis        Family History   Problem Relation Age of Onset    Diabetes Mellitus Mother     Hypertension Mother     Pulmonary fibrosis Father     Hypertension Sister     Diabetes Sister      "Pulmonary fibrosis Brother        Social History     Socioeconomic History    Marital status:    Tobacco Use    Smoking status: Never    Smokeless tobacco: Never   Substance and Sexual Activity    Alcohol use: Never    Drug use: Never    Sexual activity: Yes     Partners: Male     Social Determinants of Health     Financial Resource Strain: Low Risk     Difficulty of Paying Living Expenses: Not very hard   Food Insecurity: No Food Insecurity    Worried About Running Out of Food in the Last Year: Never true    Ran Out of Food in the Last Year: Never true   Transportation Needs: No Transportation Needs    Lack of Transportation (Medical): No    Lack of Transportation (Non-Medical): No   Physical Activity: Inactive    Days of Exercise per Week: 0 days    Minutes of Exercise per Session: 0 min   Stress: No Stress Concern Present    Feeling of Stress : Not at all   Social Connections: Unknown    Frequency of Communication with Friends and Family: More than three times a week    Frequency of Social Gatherings with Friends and Family: Three times a week    Active Member of Clubs or Organizations: No    Attends Club or Organization Meetings: Never    Marital Status:    Housing Stability: Unknown    Unable to Pay for Housing in the Last Year: No    Unstable Housing in the Last Year: No       Past Surgical History:   Procedure Laterality Date    HYSTERECTOMY      INCISION AND DRAINAGE FOOT Left 5/9/2021    Procedure: INCISION AND DRAINAGE, FOOT;  Surgeon: Shelby Stephen DPM;  Location: [facility] OR;  Service: Podiatry;  Laterality: Left;  GREAT TOE OF LEFT FOOT    SPINAL CORD STIMULATOR IMPLANT      TOE AMPUTATION Left 5/9/2021    Procedure: AMPUTATION, TOE;  Surgeon: Shelby Stephen DPM;  Location: [facility] [place] OR;  Service: Podiatry;  Laterality: Left;  LEFT GREAT     TONSILLECTOMY      TOTAL ABDOMINAL HYSTERECTOMY W/ BILATERAL SALPINGOOPHORECTOMY         Review of Systems       Objective:   Ht 5' 7" (1.702 m)   Wt " 75.3 kg (166 lb 0.1 oz)   BMI 26.00 kg/m²     Physical Exam  LOWER EXTREMITY PHYSICAL EXAMINATION    DERMATOLOGY:  Ulceration present to the right great toe with significant sloughing of the overlying tissue.  The wound measures 0.3 x 0.15cm x 0.05cm.  There is a granular wound base.  The wound does not probe to bone.  There is no erythema.  No underlying fluctuance.    Assessment:     1. Diabetic ulcer of toe of right foot associated with type 2 diabetes mellitus, with fat layer exposed    2. Diabetes mellitus type 2 with neurological manifestations    3. History of amputation of left great toe        Plan:     Diabetic ulcer of toe of right foot associated with type 2 diabetes mellitus, with fat layer exposed    Diabetes mellitus type 2 with neurological manifestations    History of amputation of left great toe    The wound was surgically debrided after adequate prep with alcohol and/or betadine paint. Excisional wound debridement was performed using sharp #10/#15 blade/rounded scalpel and tissue nipper, with removal of all non-viable skin and soft tissues; necrotic skin/tissue formation. The woundbase/wound bed was also debrided to encourage bleeding as to promote/stimulate healing. Debridement was excisional and included epidermal, dermal and subcutaneous tissues. Post debridement measurements are as above. Hemostasis was achieved. Patient tolerated procedure well and without complications. Local woundcare with Hydrofera Blue, gauze, Kerlix.    Continue to ambulate in postoperative shoe with offloading pads to allow the great toe to be completely suspended from the underlying weight-bearing surface.    Foot counseling and education is provided at this visit. Patient is advised to wear socks and shoes at all times.  Do not walk barefoot, or with just socks, even when indoors.  Be sure to check and inspect the inside of the shoe before putting them on her feet.  Protect your feet at all times.  Walking shoes  and/or athletic shoes are the best types of shoe gear. Do not wear vinyl or plastic type shoe gear, as they do not stretch and/or breathe.  Protect your feet from hot and/or cold. Elevate the extremities when sitting.  Do not wear excessively tight socks and/or shoe gear. Wiggle your toes for a few minutes throughout the day. Move your ankles up and down, in and out, to help blood flow in your lower extremity.           No future appointments.

## 2022-11-08 ENCOUNTER — PATIENT MESSAGE (OUTPATIENT)
Dept: PODIATRY | Facility: CLINIC | Age: 62
End: 2022-11-08
Payer: COMMERCIAL

## 2022-11-14 ENCOUNTER — OFFICE VISIT (OUTPATIENT)
Dept: PODIATRY | Facility: CLINIC | Age: 62
End: 2022-11-14
Payer: COMMERCIAL

## 2022-11-14 VITALS — WEIGHT: 166 LBS | BODY MASS INDEX: 26.06 KG/M2 | HEIGHT: 67 IN

## 2022-11-14 DIAGNOSIS — E11.49 DIABETES MELLITUS TYPE 2 WITH NEUROLOGICAL MANIFESTATIONS: ICD-10-CM

## 2022-11-14 DIAGNOSIS — Z89.412 HISTORY OF AMPUTATION OF LEFT GREAT TOE: ICD-10-CM

## 2022-11-14 DIAGNOSIS — L03.031 CELLULITIS OF GREAT TOE, RIGHT: ICD-10-CM

## 2022-11-14 DIAGNOSIS — E11.621 DIABETIC ULCER OF TOE OF RIGHT FOOT ASSOCIATED WITH TYPE 2 DIABETES MELLITUS, LIMITED TO BREAKDOWN OF SKIN: ICD-10-CM

## 2022-11-14 DIAGNOSIS — L97.511 DIABETIC ULCER OF TOE OF RIGHT FOOT ASSOCIATED WITH TYPE 2 DIABETES MELLITUS, LIMITED TO BREAKDOWN OF SKIN: ICD-10-CM

## 2022-11-14 DIAGNOSIS — L97.511 DIABETIC ULCER OF OTHER PART OF RIGHT FOOT ASSOCIATED WITH DIABETES MELLITUS DUE TO UNDERLYING CONDITION, LIMITED TO BREAKDOWN OF SKIN: Primary | ICD-10-CM

## 2022-11-14 DIAGNOSIS — E08.621 DIABETIC ULCER OF OTHER PART OF RIGHT FOOT ASSOCIATED WITH DIABETES MELLITUS DUE TO UNDERLYING CONDITION, LIMITED TO BREAKDOWN OF SKIN: Primary | ICD-10-CM

## 2022-11-14 PROCEDURE — 3008F PR BODY MASS INDEX (BMI) DOCUMENTED: ICD-10-PCS | Mod: CPTII,S$GLB,, | Performed by: PODIATRIST

## 2022-11-14 PROCEDURE — 99214 OFFICE O/P EST MOD 30 MIN: CPT | Mod: 25,S$GLB,, | Performed by: PODIATRIST

## 2022-11-14 PROCEDURE — 1159F MED LIST DOCD IN RCRD: CPT | Mod: CPTII,S$GLB,, | Performed by: PODIATRIST

## 2022-11-14 PROCEDURE — 1160F RVW MEDS BY RX/DR IN RCRD: CPT | Mod: CPTII,S$GLB,, | Performed by: PODIATRIST

## 2022-11-14 PROCEDURE — 99999 PR PBB SHADOW E&M-EST. PATIENT-LVL III: CPT | Mod: PBBFAC,,, | Performed by: PODIATRIST

## 2022-11-14 PROCEDURE — 11042 DBRDMT SUBQ TIS 1ST 20SQCM/<: CPT | Mod: S$GLB,,, | Performed by: PODIATRIST

## 2022-11-14 PROCEDURE — 11042 PR DEBRIDEMENT, SKIN, SUB-Q TISSUE,=<20 SQ CM: ICD-10-PCS | Mod: S$GLB,,, | Performed by: PODIATRIST

## 2022-11-14 PROCEDURE — 1160F PR REVIEW ALL MEDS BY PRESCRIBER/CLIN PHARMACIST DOCUMENTED: ICD-10-PCS | Mod: CPTII,S$GLB,, | Performed by: PODIATRIST

## 2022-11-14 PROCEDURE — 1159F PR MEDICATION LIST DOCUMENTED IN MEDICAL RECORD: ICD-10-PCS | Mod: CPTII,S$GLB,, | Performed by: PODIATRIST

## 2022-11-14 PROCEDURE — 99214 PR OFFICE/OUTPT VISIT, EST, LEVL IV, 30-39 MIN: ICD-10-PCS | Mod: 25,S$GLB,, | Performed by: PODIATRIST

## 2022-11-14 PROCEDURE — 99999 PR PBB SHADOW E&M-EST. PATIENT-LVL III: ICD-10-PCS | Mod: PBBFAC,,, | Performed by: PODIATRIST

## 2022-11-14 PROCEDURE — 3008F BODY MASS INDEX DOCD: CPT | Mod: CPTII,S$GLB,, | Performed by: PODIATRIST

## 2022-11-14 RX ORDER — SULFAMETHOXAZOLE AND TRIMETHOPRIM 800; 160 MG/1; MG/1
1 TABLET ORAL 2 TIMES DAILY
Qty: 14 TABLET | Refills: 0 | Status: SHIPPED | OUTPATIENT
Start: 2022-11-14 | End: 2022-11-21

## 2022-11-14 NOTE — PROGRESS NOTES
Subjective:       Patient ID: Suellen Campos is a 62 y.o. female.    Chief Complaint: Foot Ulcer (C/o DFU, rates pain 0/10, pt explains her ulcer is giving her problems, diabetic, wearing socks and shoes, last seen PCP Dr. Wheatley on 09/08/2022.)    HPI: Suellen Campos presents to the office today, with an a new open ulceration to the plantar aspect of the right foot.  States this is causing 0/10 pain.  She denies any recent trauma or injury.  Reports that this likely associated with the recent change in her shoe.  Was not wearing her postoperative shoe prior to the development of a new ulceration.    Review of patient's allergies indicates:  No Known Allergies    Past Medical History:   Diagnosis Date    Chronic pain     COVID-19     Depression     Diabetes mellitus     Hyperlipidemia     Hypertension     Neuropathy     Osteoarthritis     Spinal stenosis        Family History   Problem Relation Age of Onset    Diabetes Mellitus Mother     Hypertension Mother     Pulmonary fibrosis Father     Hypertension Sister     Diabetes Sister     Pulmonary fibrosis Brother        Social History     Socioeconomic History    Marital status:    Tobacco Use    Smoking status: Never    Smokeless tobacco: Never   Substance and Sexual Activity    Alcohol use: Never    Drug use: Never    Sexual activity: Yes     Partners: Male       Past Surgical History:   Procedure Laterality Date    HYSTERECTOMY      INCISION AND DRAINAGE FOOT Left 5/9/2021    Procedure: INCISION AND DRAINAGE, FOOT;  Surgeon: Shelby Stephen DPM;  Location: [facility] OR;  Service: Podiatry;  Laterality: Left;  GREAT TOE OF LEFT FOOT    SPINAL CORD STIMULATOR IMPLANT      TOE AMPUTATION Left 5/9/2021    Procedure: AMPUTATION, TOE;  Surgeon: Shelby Stephen DPM;  Location: [facility] OR;  Service: Podiatry;  Laterality: Left;  LEFT GREAT     TONSILLECTOMY      TOTAL ABDOMINAL HYSTERECTOMY W/ BILATERAL SALPINGOOPHORECTOMY         Review of Systems       Objective:   Torie 5'  "7" (1.702 m)   Wt 75.3 kg (166 lb 0.1 oz)   BMI 26.00 kg/m²     CTA Chest Non-Coronary (PE Study)  Narrative: EXAMINATION:  CTA CHEST NON CORONARY    CLINICAL HISTORY:  Pulmonary embolism (PE) suspected, positive D-dimer;    TECHNIQUE:  Low dose axial images, sagittal and coronal reformations were obtained from the thoracic inlet to the lung bases following the IV administration of 100 mL of Omnipaque 350.  Contrast timing was optimized to evaluate the pulmonary arteries.  MIP images were performed.    COMPARISON:  None    FINDINGS:  No evidence for pulmonary embolism.  No evidence for aortic dissection or aneurysm.  Airspace disease in the right lower lobe and posterior aspect of right upper lobe consistent with right-sided pneumonia.  The left lung is clear.  No acute osseous injury.  Cardiovascular structures are unremarkable for stated age.  Intrathecal leads noted.  Mild atherosclerotic changes.  Impression: No pulmonary embolism.  No dissection.  At least moderate right-sided pneumonia.    All CT scans   are performed using dose optimization techniques including the following: automated exposure control; adjustment of the mA and/or kV; use of iterative reconstruction technique.  Dose modulation was employed for ALARA by means of: Automated exposure control; adjustment of the mA and/or kV according to patient size (this includes techniques or standardized protocols for targeted exams where dose is matched to indication/reason for exam; i.e. extremities or head); and/or use of iterative reconstructive technique.    Electronically signed by: Pedro Cespedes  Date:    10/28/2021  Time:    23:18       Physical Exam   LOWER EXTREMITY PHYSICAL EXAMINATION    Vascular:  The right dorsalis pedis pulse 2/4 and the right posterior tibial pulse 2/4.   Capillary refill is intact.  Pedal hair growth intact    Neurological:  Protective sensation is not intact to the right left foot.  Vibratory sensation is diminished.  " Proprioception is intact.  Sharp/dull is reduced.    Musculoskeletal: Manual Muscle Testing is 5/5 with dorsiflexion, plantar flexion, abduction, and adduction.   There is normal range of motion in the forefoot, hindfoot, and Ankle joint.  History of left great toe amputation.    Dermatological:  Skin is supple and moist.  There is an ulceration present to the plantar aspect of the 1st metatarsophalangeal joint as well as a superficial wound present to the distal aspect of the right great toe    Ulcer#1  Ulcer location:  Plantar right 1st MPJ  Pre debridement Measurements:  1.2 cm x 1.0 cm x 0.05 cm with a fissure noted on the medial aspect of the 1st metatarsophalangeal joint  Post debridement Measurements :  1.15 cm x 1.0 cm x 0.05 cm.  Subsequently healed there is heel with a   Signs of infection:  Slight cellulitis  Erythema:  Present to the toe and medial 1st MPJ  Undermining/Tunneling:  Not probe to bone, deep soft tissue  Drainage:  Serous  Periwound skin:  Hyperkeratotic  Wound Base:  Granular      Imaging:         Results for orders placed during the hospital encounter of 07/14/21    X-Ray Foot Complete Left    Narrative  EXAMINATION:  XR FOOT COMPLETE 3 VIEW LEFT    CLINICAL HISTORY:  . Cellulitis of left lower limb    TECHNIQUE:  AP, lateral, and oblique views of the left foot were performed.    COMPARISON:  05/09/2021.    FINDINGS:  Prior amputation of the 1st toe.  Mild soft tissue swelling within the amputation bed.  No soft tissue emphysema.  No focus of cortical erosion or periostitis within the distal 1st metatarsal head to suggest underlying changes of osteomyelitis.    There is an acute to subacute minimally displaced comminuted oblique fracture involving the proximal head of the proximal phalanx of the 3rd toe.  The fracture lucency extends to the 3rd MTP joint.  There is adjacent soft tissue swelling.  There is hammertoe formation.    Tarsal bones are intact with mild to moderate degenerative  osteoarthrosis.  Normal tarsometatarsal alignment.  Mild widening of the Lisfranc interval may represent ligamentous injury.  Loss of the normal plantar arch on the lateral view suggesting pes planus.    Small dorsal and plantar calcaneal spurs.    Impression  1. Prior amputation of the 1st toe.  2. Acute to subacute minimally displaced comminuted oblique articular fracture of the proximal phalanx of the 3rd toe.  3. Hammertoe formation.  4. Mild widening of the Lisfranc interval may represent ligamentous injury.  5. Suspected pes planus.  This report was flagged in Epic as abnormal.      Electronically signed by: Joselo Ward  Date:    07/14/2021  Time:    15:53       No results found for this or any previous visit.          Assessment:     1. Diabetic ulcer of other part of right foot associated with diabetes mellitus due to underlying condition, limited to breakdown of skin    2. Diabetic ulcer of toe of right foot associated with type 2 diabetes mellitus, limited to breakdown of skin    3. History of amputation of left great toe    4. Diabetes mellitus type 2 with neurological manifestations    5. Cellulitis of great toe, right        Plan:     Diabetic ulcer of other part of right foot associated with diabetes mellitus due to underlying condition, limited to breakdown of skin    Diabetic ulcer of toe of right foot associated with type 2 diabetes mellitus, limited to breakdown of skin    History of amputation of left great toe    Diabetes mellitus type 2 with neurological manifestations    Cellulitis of great toe, right    Other orders  -     sulfamethoxazole-trimethoprim 800-160mg (BACTRIM DS) 800-160 mg Tab; Take 1 tablet by mouth 2 (two) times daily. for 7 days  Dispense: 14 tablet; Refill: 0      Thorough discussion is had with the patient today, concerning the diagnosis, its etiology, and the treatment algorithm at present.    The wound was surgically debrided after adequate prep with alcohol and/or  betadine paint. Excisional wound debridement was performed using sharp #10/#15 blade/rounded scalpel and tissue nipper, with removal of all non-viable skin and soft tissues; necrotic skin/tissue formation. The woundbase/wound bed was also debrided to encourage bleeding as to promote/stimulate healing. Debridement was excisional and included epidermal, dermal and subcutaneous tissues. Post debridement measurements are as above. Hemostasis was achieved. Patient tolerated procedure well and without complications. Local woundcare with Hydrofera blue Medihoney dressings and bandage thereafter.     Patient to perform dressing changes daily.  Keep the great toe clean and free from getting wet or soaking.    Would recommend starting patient on oral antibiotic due to area of cellulitis and redness to the medial aspect the great toe given patient's increased risk of diabetic foot infections due to diabetes mellitus and peripheral neuropathy    Recommend patient follow-up in 2-3 weeks      No future appointments.

## 2023-01-08 ENCOUNTER — PATIENT MESSAGE (OUTPATIENT)
Dept: PODIATRY | Facility: CLINIC | Age: 63
End: 2023-01-08
Payer: COMMERCIAL

## 2023-01-17 ENCOUNTER — OFFICE VISIT (OUTPATIENT)
Dept: PODIATRY | Facility: CLINIC | Age: 63
End: 2023-01-17
Payer: COMMERCIAL

## 2023-01-17 DIAGNOSIS — L97.511 DIABETIC ULCER OF OTHER PART OF RIGHT FOOT ASSOCIATED WITH DIABETES MELLITUS DUE TO UNDERLYING CONDITION, LIMITED TO BREAKDOWN OF SKIN: Primary | ICD-10-CM

## 2023-01-17 DIAGNOSIS — Z89.412 HISTORY OF AMPUTATION OF LEFT GREAT TOE: ICD-10-CM

## 2023-01-17 DIAGNOSIS — E11.621 DIABETIC ULCER OF TOE OF RIGHT FOOT ASSOCIATED WITH TYPE 2 DIABETES MELLITUS, LIMITED TO BREAKDOWN OF SKIN: ICD-10-CM

## 2023-01-17 DIAGNOSIS — E08.621 DIABETIC ULCER OF OTHER PART OF RIGHT FOOT ASSOCIATED WITH DIABETES MELLITUS DUE TO UNDERLYING CONDITION, LIMITED TO BREAKDOWN OF SKIN: Primary | ICD-10-CM

## 2023-01-17 DIAGNOSIS — L97.511 DIABETIC ULCER OF TOE OF RIGHT FOOT ASSOCIATED WITH TYPE 2 DIABETES MELLITUS, LIMITED TO BREAKDOWN OF SKIN: ICD-10-CM

## 2023-01-17 DIAGNOSIS — E11.49 DIABETES MELLITUS TYPE 2 WITH NEUROLOGICAL MANIFESTATIONS: ICD-10-CM

## 2023-01-17 PROCEDURE — 99999 PR PBB SHADOW E&M-EST. PATIENT-LVL III: ICD-10-PCS | Mod: PBBFAC,,, | Performed by: PODIATRIST

## 2023-01-17 PROCEDURE — 1160F RVW MEDS BY RX/DR IN RCRD: CPT | Mod: CPTII,S$GLB,, | Performed by: PODIATRIST

## 2023-01-17 PROCEDURE — 1159F MED LIST DOCD IN RCRD: CPT | Mod: CPTII,S$GLB,, | Performed by: PODIATRIST

## 2023-01-17 PROCEDURE — 11042 PR DEBRIDEMENT, SKIN, SUB-Q TISSUE,=<20 SQ CM: ICD-10-PCS | Mod: S$GLB,,, | Performed by: PODIATRIST

## 2023-01-17 PROCEDURE — 1160F PR REVIEW ALL MEDS BY PRESCRIBER/CLIN PHARMACIST DOCUMENTED: ICD-10-PCS | Mod: CPTII,S$GLB,, | Performed by: PODIATRIST

## 2023-01-17 PROCEDURE — 99213 PR OFFICE/OUTPT VISIT, EST, LEVL III, 20-29 MIN: ICD-10-PCS | Mod: 25,S$GLB,, | Performed by: PODIATRIST

## 2023-01-17 PROCEDURE — 99213 OFFICE O/P EST LOW 20 MIN: CPT | Mod: 25,S$GLB,, | Performed by: PODIATRIST

## 2023-01-17 PROCEDURE — 1159F PR MEDICATION LIST DOCUMENTED IN MEDICAL RECORD: ICD-10-PCS | Mod: CPTII,S$GLB,, | Performed by: PODIATRIST

## 2023-01-17 PROCEDURE — 11042 DBRDMT SUBQ TIS 1ST 20SQCM/<: CPT | Mod: S$GLB,,, | Performed by: PODIATRIST

## 2023-01-17 PROCEDURE — 99999 PR PBB SHADOW E&M-EST. PATIENT-LVL III: CPT | Mod: PBBFAC,,, | Performed by: PODIATRIST

## 2023-01-18 NOTE — PROGRESS NOTES
Subjective:       Patient ID: Suellen Campos is a 62 y.o. female.    Chief Complaint: Diabetic Foot Ulcer (Patient present with DFU to right plantar foot. Denies pain at present. )    HPI: Suellen Campos presents to the office today, with an a new open ulceration to the plantar aspect of the right foot at the metatarsophalangeal joint.  She reports she has been bathing and showering but applying Betadine to the wound bed to keep that area dry applying hydrating cream to the surrounding areas.  States this is causing 0/10 pain.  She denies any recent trauma or injury.  Reports that this likely associated with the recent change in her shoe.  Was not wearing her postoperative shoe prior to the development of a new ulceration.    Review of patient's allergies indicates:  No Known Allergies    Past Medical History:   Diagnosis Date    Chronic pain     COVID-19     Depression     Diabetes mellitus     Hyperlipidemia     Hypertension     Neuropathy     Osteoarthritis     Spinal stenosis        Family History   Problem Relation Age of Onset    Diabetes Mellitus Mother     Hypertension Mother     Pulmonary fibrosis Father     Hypertension Sister     Diabetes Sister     Pulmonary fibrosis Brother        Social History     Socioeconomic History    Marital status:    Tobacco Use    Smoking status: Never    Smokeless tobacco: Never   Substance and Sexual Activity    Alcohol use: Never    Drug use: Never    Sexual activity: Yes     Partners: Male       Past Surgical History:   Procedure Laterality Date    HYSTERECTOMY      INCISION AND DRAINAGE FOOT Left 5/9/2021    Procedure: INCISION AND DRAINAGE, FOOT;  Surgeon: Shelby Stephen DPM;  Location: [facility] OR;  Service: Podiatry;  Laterality: Left;  GREAT TOE OF LEFT FOOT    SPINAL CORD STIMULATOR IMPLANT      TOE AMPUTATION Left 5/9/2021    Procedure: AMPUTATION, TOE;  Surgeon: Shelby Stephen DPM;  Location: [facility] OR;  Service: Podiatry;  Laterality: Left;  LEFT GREAT      TONSILLECTOMY      TOTAL ABDOMINAL HYSTERECTOMY W/ BILATERAL SALPINGOOPHORECTOMY         Review of Systems       Objective:   There were no vitals taken for this visit.    CTA Chest Non-Coronary (PE Study)  Narrative: EXAMINATION:  CTA CHEST NON CORONARY    CLINICAL HISTORY:  Pulmonary embolism (PE) suspected, positive D-dimer;    TECHNIQUE:  Low dose axial images, sagittal and coronal reformations were obtained from the thoracic inlet to the lung bases following the IV administration of 100 mL of Omnipaque 350.  Contrast timing was optimized to evaluate the pulmonary arteries.  MIP images were performed.    COMPARISON:  None    FINDINGS:  No evidence for pulmonary embolism.  No evidence for aortic dissection or aneurysm.  Airspace disease in the right lower lobe and posterior aspect of right upper lobe consistent with right-sided pneumonia.  The left lung is clear.  No acute osseous injury.  Cardiovascular structures are unremarkable for stated age.  Intrathecal leads noted.  Mild atherosclerotic changes.  Impression: No pulmonary embolism.  No dissection.  At least moderate right-sided pneumonia.    All CT scans   are performed using dose optimization techniques including the following: automated exposure control; adjustment of the mA and/or kV; use of iterative reconstruction technique.  Dose modulation was employed for ALARA by means of: Automated exposure control; adjustment of the mA and/or kV according to patient size (this includes techniques or standardized protocols for targeted exams where dose is matched to indication/reason for exam; i.e. extremities or head); and/or use of iterative reconstructive technique.    Electronically signed by: Pedro Cespedes  Date:    10/28/2021  Time:    23:18       Physical Exam   LOWER EXTREMITY PHYSICAL EXAMINATION    Vascular:  The right dorsalis pedis pulse 2/4 and the right posterior tibial pulse 2/4.   Capillary refill is intact.  Pedal hair growth  intact    Neurological:  Protective sensation is not intact to the right left foot.  Vibratory sensation is diminished.  Proprioception is intact.  Sharp/dull is reduced.    Musculoskeletal: Manual Muscle Testing is 5/5 with dorsiflexion, plantar flexion, abduction, and adduction.   There is normal range of motion in the forefoot, hindfoot, and Ankle joint.  History of left great toe amputation.    Dermatological:  Skin is supple and moist.  There is an ulceration present to the plantar aspect of the 1st metatarsophalangeal joint.    Original ulceration present to the plantar aspect of the hallux.  Deep tissue appears to be healed well.  Superficial tissue slightly macerated.    Ulcer#1  Ulcer location:  Plantar right 1st MPJ  Pre debridement Measurements:  1.2 cm x 1.0 cm x 0.05 cm with a fissure noted on the medial aspect of the 1st metatarsophalangeal joint  Post debridement Measurements :  1.25 cm x 1.0 cm x 0.05 cm.  Subsequently healed there is heel with a   Signs of infection:  Slight cellulitis  Erythema:  Present to the toe and medial 1st MPJ  Undermining/Tunneling:  Not probe to bone, deep soft tissue  Drainage:  Serous  Periwound skin:  Hyperkeratotic  Wound Base:  Granular      Imaging:         Results for orders placed during the hospital encounter of 07/14/21    X-Ray Foot Complete Left    Narrative  EXAMINATION:  XR FOOT COMPLETE 3 VIEW LEFT    CLINICAL HISTORY:  . Cellulitis of left lower limb    TECHNIQUE:  AP, lateral, and oblique views of the left foot were performed.    COMPARISON:  05/09/2021.    FINDINGS:  Prior amputation of the 1st toe.  Mild soft tissue swelling within the amputation bed.  No soft tissue emphysema.  No focus of cortical erosion or periostitis within the distal 1st metatarsal head to suggest underlying changes of osteomyelitis.    There is an acute to subacute minimally displaced comminuted oblique fracture involving the proximal head of the proximal phalanx of the 3rd toe.   The fracture lucency extends to the 3rd MTP joint.  There is adjacent soft tissue swelling.  There is hammertoe formation.    Tarsal bones are intact with mild to moderate degenerative osteoarthrosis.  Normal tarsometatarsal alignment.  Mild widening of the Lisfranc interval may represent ligamentous injury.  Loss of the normal plantar arch on the lateral view suggesting pes planus.    Small dorsal and plantar calcaneal spurs.    Impression  1. Prior amputation of the 1st toe.  2. Acute to subacute minimally displaced comminuted oblique articular fracture of the proximal phalanx of the 3rd toe.  3. Hammertoe formation.  4. Mild widening of the Lisfranc interval may represent ligamentous injury.  5. Suspected pes planus.  This report was flagged in Epic as abnormal.      Electronically signed by: Joselo Ward  Date:    07/14/2021  Time:    15:53       No results found for this or any previous visit.          Assessment:     1. Diabetic ulcer of other part of right foot associated with diabetes mellitus due to underlying condition, limited to breakdown of skin    2. Diabetic ulcer of toe of right foot associated with type 2 diabetes mellitus, limited to breakdown of skin    3. History of amputation of left great toe    4. Diabetes mellitus type 2 with neurological manifestations          Plan:     Diabetic ulcer of other part of right foot associated with diabetes mellitus due to underlying condition, limited to breakdown of skin    Diabetic ulcer of toe of right foot associated with type 2 diabetes mellitus, limited to breakdown of skin    History of amputation of left great toe    Diabetes mellitus type 2 with neurological manifestations        Thorough discussion is had with the patient today, concerning the diagnosis, its etiology, and the treatment algorithm at present.    The wound was surgically debrided after adequate prep with alcohol and/or betadine paint. Excisional wound debridement was performed using  sharp #10/#15 blade/rounded scalpel and tissue nipper, with removal of all non-viable skin and soft tissues; necrotic skin/tissue formation. The woundbase/wound bed was also debrided to encourage bleeding as to promote/stimulate healing. Debridement was excisional and included epidermal, dermal and subcutaneous tissues. Post debridement measurements are as above. Hemostasis was achieved. Patient tolerated procedure well and without complications. Local woundcare with Hydrofera blue Medihoney dressings and bandage thereafter.     Patient to perform dressing changes daily.  Keep the great toe clean and free from getting wet or soaking.      Based on patient's history and difficulties healing.  I would recommend placing patient in a total contact cast to completely offload the great toe and allow this to heal appropriately.  I feel that the added pressure to the great toe with any ambulation is exacerbating the symptoms and prevent healing.    Patient will follow-up in 2 weeks for the application of total contact cast.      Future Appointments   Date Time Provider Department Center   1/31/2023  2:00 PM Shelby Stephen DPM [facility]

## 2023-01-31 ENCOUNTER — OFFICE VISIT (OUTPATIENT)
Dept: PODIATRY | Facility: CLINIC | Age: 63
End: 2023-01-31
Payer: COMMERCIAL

## 2023-01-31 VITALS — HEIGHT: 67 IN | BODY MASS INDEX: 26.06 KG/M2 | WEIGHT: 166 LBS

## 2023-01-31 DIAGNOSIS — L97.512 DIABETIC ULCER OF TOE OF RIGHT FOOT ASSOCIATED WITH TYPE 2 DIABETES MELLITUS, WITH FAT LAYER EXPOSED: ICD-10-CM

## 2023-01-31 DIAGNOSIS — E11.621 DIABETIC ULCER OF TOE OF RIGHT FOOT ASSOCIATED WITH TYPE 2 DIABETES MELLITUS, WITH FAT LAYER EXPOSED: ICD-10-CM

## 2023-01-31 DIAGNOSIS — E11.49 DIABETES MELLITUS TYPE 2 WITH NEUROLOGICAL MANIFESTATIONS: ICD-10-CM

## 2023-01-31 DIAGNOSIS — E08.621 DIABETIC ULCER OF OTHER PART OF RIGHT FOOT ASSOCIATED WITH DIABETES MELLITUS DUE TO UNDERLYING CONDITION, WITH FAT LAYER EXPOSED: Primary | ICD-10-CM

## 2023-01-31 DIAGNOSIS — Z89.412 HISTORY OF AMPUTATION OF LEFT GREAT TOE: ICD-10-CM

## 2023-01-31 DIAGNOSIS — L97.512 DIABETIC ULCER OF OTHER PART OF RIGHT FOOT ASSOCIATED WITH DIABETES MELLITUS DUE TO UNDERLYING CONDITION, WITH FAT LAYER EXPOSED: Primary | ICD-10-CM

## 2023-01-31 PROCEDURE — 1159F MED LIST DOCD IN RCRD: CPT | Mod: CPTII,S$GLB,, | Performed by: PODIATRIST

## 2023-01-31 PROCEDURE — 29445 APPL RIGID TOT CNTC LEG CAST: CPT | Mod: 59,RT,S$GLB, | Performed by: PODIATRIST

## 2023-01-31 PROCEDURE — 99499 NO LOS: ICD-10-PCS | Mod: S$GLB,,, | Performed by: PODIATRIST

## 2023-01-31 PROCEDURE — 1160F RVW MEDS BY RX/DR IN RCRD: CPT | Mod: CPTII,S$GLB,, | Performed by: PODIATRIST

## 2023-01-31 PROCEDURE — 99499 UNLISTED E&M SERVICE: CPT | Mod: S$GLB,,, | Performed by: PODIATRIST

## 2023-01-31 PROCEDURE — 99999 PR PBB SHADOW E&M-EST. PATIENT-LVL IV: ICD-10-PCS | Mod: PBBFAC,,, | Performed by: PODIATRIST

## 2023-01-31 PROCEDURE — 29445 PR APPLY RIGID LEG CAST: ICD-10-PCS | Mod: 59,RT,S$GLB, | Performed by: PODIATRIST

## 2023-01-31 PROCEDURE — 11042 PR DEBRIDEMENT, SKIN, SUB-Q TISSUE,=<20 SQ CM: ICD-10-PCS | Mod: S$GLB,,, | Performed by: PODIATRIST

## 2023-01-31 PROCEDURE — 1160F PR REVIEW ALL MEDS BY PRESCRIBER/CLIN PHARMACIST DOCUMENTED: ICD-10-PCS | Mod: CPTII,S$GLB,, | Performed by: PODIATRIST

## 2023-01-31 PROCEDURE — 99999 PR PBB SHADOW E&M-EST. PATIENT-LVL IV: CPT | Mod: PBBFAC,,, | Performed by: PODIATRIST

## 2023-01-31 PROCEDURE — 11042 DBRDMT SUBQ TIS 1ST 20SQCM/<: CPT | Mod: S$GLB,,, | Performed by: PODIATRIST

## 2023-01-31 PROCEDURE — 1159F PR MEDICATION LIST DOCUMENTED IN MEDICAL RECORD: ICD-10-PCS | Mod: CPTII,S$GLB,, | Performed by: PODIATRIST

## 2023-01-31 PROCEDURE — 3008F BODY MASS INDEX DOCD: CPT | Mod: CPTII,S$GLB,, | Performed by: PODIATRIST

## 2023-01-31 PROCEDURE — 3008F PR BODY MASS INDEX (BMI) DOCUMENTED: ICD-10-PCS | Mod: CPTII,S$GLB,, | Performed by: PODIATRIST

## 2023-01-31 NOTE — PROGRESS NOTES
"  Subjective:       Patient ID: Suellen Campos is a 62 y.o. female.    Chief Complaint: Diabetic Foot Ulcer (Coming in for DFU, rates pain 0/10, diabetic, wearing socks and shoes, last seen PCP Dr. Wheatley on 09/08/2022.)    HPI: Suellen Campos presents to the office today, with an open ulceration to the plantar aspect of the right foot at the metatarsophalangeal joint.  Reports 0/10 pain.  Does present to clinic today with her  present.  Does have anxiety associated with total contact casting.    Review of patient's allergies indicates:  No Known Allergies    Past Medical History:   Diagnosis Date    Chronic pain     COVID-19     Depression     Diabetes mellitus     Hyperlipidemia     Hypertension     Neuropathy     Osteoarthritis     Spinal stenosis        Family History   Problem Relation Age of Onset    Diabetes Mellitus Mother     Hypertension Mother     Pulmonary fibrosis Father     Hypertension Sister     Diabetes Sister     Pulmonary fibrosis Brother        Social History     Socioeconomic History    Marital status:    Tobacco Use    Smoking status: Never    Smokeless tobacco: Never   Substance and Sexual Activity    Alcohol use: Never    Drug use: Never    Sexual activity: Yes     Partners: Male       Past Surgical History:   Procedure Laterality Date    HYSTERECTOMY      INCISION AND DRAINAGE FOOT Left 5/9/2021    Procedure: INCISION AND DRAINAGE, FOOT;  Surgeon: Shelby Stephen DPM;  Location: [facility] OR;  Service: Podiatry;  Laterality: Left;  GREAT TOE OF LEFT FOOT    SPINAL CORD STIMULATOR IMPLANT      TOE AMPUTATION Left 5/9/2021    Procedure: AMPUTATION, TOE;  Surgeon: Shelby Stephen DPM;  Location: [facility] OR;  Service: Podiatry;  Laterality: Left;  LEFT GREAT     TONSILLECTOMY      TOTAL ABDOMINAL HYSTERECTOMY W/ BILATERAL SALPINGOOPHORECTOMY         Review of Systems       Objective:   Ht 5' 7" (1.702 m)   Wt 75.3 kg (166 lb 0.1 oz)   BMI 26.00 kg/m²     CTA Chest Non-Coronary (PE " Study)  Narrative: EXAMINATION:  CTA CHEST NON CORONARY    CLINICAL HISTORY:  Pulmonary embolism (PE) suspected, positive D-dimer;    TECHNIQUE:  Low dose axial images, sagittal and coronal reformations were obtained from the thoracic inlet to the lung bases following the IV administration of 100 mL of Omnipaque 350.  Contrast timing was optimized to evaluate the pulmonary arteries.  MIP images were performed.    COMPARISON:  None    FINDINGS:  No evidence for pulmonary embolism.  No evidence for aortic dissection or aneurysm.  Airspace disease in the right lower lobe and posterior aspect of right upper lobe consistent with right-sided pneumonia.  The left lung is clear.  No acute osseous injury.  Cardiovascular structures are unremarkable for stated age.  Intrathecal leads noted.  Mild atherosclerotic changes.  Impression: No pulmonary embolism.  No dissection.  At least moderate right-sided pneumonia.    All CT scans   are performed using dose optimization techniques including the following: automated exposure control; adjustment of the mA and/or kV; use of iterative reconstruction technique.  Dose modulation was employed for ALARA by means of: Automated exposure control; adjustment of the mA and/or kV according to patient size (this includes techniques or standardized protocols for targeted exams where dose is matched to indication/reason for exam; i.e. extremities or head); and/or use of iterative reconstructive technique.    Electronically signed by: Pedro Cespedes  Date:    10/28/2021  Time:    23:18       Physical Exam   LOWER EXTREMITY PHYSICAL EXAMINATION    Vascular:  The right dorsalis pedis pulse 2/4 and the right posterior tibial pulse 2/4.   Capillary refill is intact.  Pedal hair growth intact    Dermatological:  Skin is supple and moist.  There is an ulceration present to the plantar aspect of the 1st metatarsophalangeal joint.    Original ulceration present to the plantar aspect of the hallux.  Deep  tissue appears to be healed well.  Superficial tissue slightly macerated.    Ulcer#1  Ulcer location:  Plantar right 1st MPJ  Pre debridement Measurements:  1.2 cm x 1.0 cm x 0.05 cm with a fissure noted on the medial aspect of the 1st metatarsophalangeal joint  Post debridement Measurements :  1.25 cm x 1.0 cm x 0.05 cm.  Signs of infection:  Slight cellulitis  Erythema:  Present to the toe and medial 1st MPJ  Undermining/Tunneling:  Not probe to bone, deep soft tissue  Drainage:  Serous  Periwound skin:  Hyperkeratotic  Wound Base:  Granular    Small wound present to the distal aspect of the great toe.  Wound pre debridement has no open areas.  Post debridement, 0.5 cm circumferentiall.  The wound depth is 0.2 cm.  Does not probe to bone or deep tissue.  Slight hyperkeratosis circumferentially.  Wound base is granular.      Imaging:         Results for orders placed during the hospital encounter of 07/14/21    X-Ray Foot Complete Left    Narrative  EXAMINATION:  XR FOOT COMPLETE 3 VIEW LEFT    CLINICAL HISTORY:  . Cellulitis of left lower limb    TECHNIQUE:  AP, lateral, and oblique views of the left foot were performed.    COMPARISON:  05/09/2021.    FINDINGS:  Prior amputation of the 1st toe.  Mild soft tissue swelling within the amputation bed.  No soft tissue emphysema.  No focus of cortical erosion or periostitis within the distal 1st metatarsal head to suggest underlying changes of osteomyelitis.    There is an acute to subacute minimally displaced comminuted oblique fracture involving the proximal head of the proximal phalanx of the 3rd toe.  The fracture lucency extends to the 3rd MTP joint.  There is adjacent soft tissue swelling.  There is hammertoe formation.    Tarsal bones are intact with mild to moderate degenerative osteoarthrosis.  Normal tarsometatarsal alignment.  Mild widening of the Lisfranc interval may represent ligamentous injury.  Loss of the normal plantar arch on the lateral view  suggesting pes planus.    Small dorsal and plantar calcaneal spurs.    Impression  1. Prior amputation of the 1st toe.  2. Acute to subacute minimally displaced comminuted oblique articular fracture of the proximal phalanx of the 3rd toe.  3. Hammertoe formation.  4. Mild widening of the Lisfranc interval may represent ligamentous injury.  5. Suspected pes planus.  This report was flagged in Epic as abnormal.      Electronically signed by: Joselo Ward  Date:    07/14/2021  Time:    15:53       No results found for this or any previous visit.          Assessment:     1. Diabetic ulcer of other part of right foot associated with diabetes mellitus due to underlying condition, with fat layer exposed    2. Diabetic ulcer of toe of right foot associated with type 2 diabetes mellitus, with fat layer exposed    3. History of amputation of left great toe    4. Diabetes mellitus type 2 with neurological manifestations            Plan:     Diabetic ulcer of other part of right foot associated with diabetes mellitus due to underlying condition, with fat layer exposed    Diabetic ulcer of toe of right foot associated with type 2 diabetes mellitus, with fat layer exposed    History of amputation of left great toe    Diabetes mellitus type 2 with neurological manifestations          Thorough discussion is had with the patient today, concerning the diagnosis, its etiology, and the treatment algorithm at present.    The wounds to the great toe were surgically debrided after adequate prep with alcohol and/or betadine paint. Excisional wound debridement was performed using sharp #10/#15 blade/rounded scalpel and tissue nipper, with removal of all non-viable skin and soft tissues; necrotic skin/tissue formation. The woundbase/wound bed was also debrided to encourage bleeding as to promote/stimulate healing. Debridement was excisional and included epidermal, dermal and subcutaneous tissues. Post debridement measurements are as above.  Hemostasis was achieved. Patient tolerated procedure well and without complications. Local woundcare with Annie collagen.  dressings and bandage thereafter.     An EZ-TCC total contact casting kit was placed on the right foot.  Care was taken to completely offload the ulcerations to the right great toe plantarly.  Care was taken to ensure the cast had completely hardened prior to placement in overlying boot.    She will follow-up in 2 weeks for reassessment and consideration of additional total contact cast      Future Appointments   Date Time Provider Department Center   2/16/2023  2:00 PM Shelby Stephen DPM [facility]

## 2023-02-12 ENCOUNTER — PATIENT MESSAGE (OUTPATIENT)
Dept: PODIATRY | Facility: CLINIC | Age: 63
End: 2023-02-12
Payer: COMMERCIAL

## 2023-02-13 ENCOUNTER — OFFICE VISIT (OUTPATIENT)
Dept: PODIATRY | Facility: CLINIC | Age: 63
End: 2023-02-13
Payer: COMMERCIAL

## 2023-02-13 VITALS — HEIGHT: 67 IN | BODY MASS INDEX: 26.06 KG/M2 | WEIGHT: 166 LBS

## 2023-02-13 DIAGNOSIS — E08.621 DIABETIC ULCER OF OTHER PART OF RIGHT FOOT ASSOCIATED WITH DIABETES MELLITUS DUE TO UNDERLYING CONDITION, WITH FAT LAYER EXPOSED: ICD-10-CM

## 2023-02-13 DIAGNOSIS — E11.621 DIABETIC ULCER OF TOE OF RIGHT FOOT ASSOCIATED WITH TYPE 2 DIABETES MELLITUS, WITH FAT LAYER EXPOSED: ICD-10-CM

## 2023-02-13 DIAGNOSIS — L97.512 DIABETIC ULCER OF OTHER PART OF RIGHT FOOT ASSOCIATED WITH DIABETES MELLITUS DUE TO UNDERLYING CONDITION, WITH FAT LAYER EXPOSED: ICD-10-CM

## 2023-02-13 DIAGNOSIS — Z89.412 HISTORY OF AMPUTATION OF LEFT GREAT TOE: ICD-10-CM

## 2023-02-13 DIAGNOSIS — E11.49 DIABETES MELLITUS TYPE 2 WITH NEUROLOGICAL MANIFESTATIONS: Primary | ICD-10-CM

## 2023-02-13 DIAGNOSIS — L97.512 DIABETIC ULCER OF TOE OF RIGHT FOOT ASSOCIATED WITH TYPE 2 DIABETES MELLITUS, WITH FAT LAYER EXPOSED: ICD-10-CM

## 2023-02-13 PROCEDURE — 1159F MED LIST DOCD IN RCRD: CPT | Mod: CPTII,S$GLB,, | Performed by: PODIATRIST

## 2023-02-13 PROCEDURE — 3008F BODY MASS INDEX DOCD: CPT | Mod: CPTII,S$GLB,, | Performed by: PODIATRIST

## 2023-02-13 PROCEDURE — 99214 OFFICE O/P EST MOD 30 MIN: CPT | Mod: S$GLB,,, | Performed by: PODIATRIST

## 2023-02-13 PROCEDURE — 3008F PR BODY MASS INDEX (BMI) DOCUMENTED: ICD-10-PCS | Mod: CPTII,S$GLB,, | Performed by: PODIATRIST

## 2023-02-13 PROCEDURE — 1159F PR MEDICATION LIST DOCUMENTED IN MEDICAL RECORD: ICD-10-PCS | Mod: CPTII,S$GLB,, | Performed by: PODIATRIST

## 2023-02-13 PROCEDURE — 99999 PR PBB SHADOW E&M-EST. PATIENT-LVL III: CPT | Mod: PBBFAC,,, | Performed by: PODIATRIST

## 2023-02-13 PROCEDURE — 99214 PR OFFICE/OUTPT VISIT, EST, LEVL IV, 30-39 MIN: ICD-10-PCS | Mod: S$GLB,,, | Performed by: PODIATRIST

## 2023-02-13 PROCEDURE — 99999 PR PBB SHADOW E&M-EST. PATIENT-LVL III: ICD-10-PCS | Mod: PBBFAC,,, | Performed by: PODIATRIST

## 2023-02-13 PROCEDURE — 1160F RVW MEDS BY RX/DR IN RCRD: CPT | Mod: CPTII,S$GLB,, | Performed by: PODIATRIST

## 2023-02-13 PROCEDURE — 1160F PR REVIEW ALL MEDS BY PRESCRIBER/CLIN PHARMACIST DOCUMENTED: ICD-10-PCS | Mod: CPTII,S$GLB,, | Performed by: PODIATRIST

## 2023-02-13 NOTE — PROGRESS NOTES
Subjective:       Patient ID: Suellen Campos is a 62 y.o. female.    Chief Complaint: Follow-up (Coming in for cast removal, rates pain 0/10, diabetic, wearing cast, last seen PCP Dr. Wheatley on )    HPI: Suellen Campos presents to the office today, with an open ulceration to the plantar aspect of the right foot at the metatarsophalangeal joint. Has been ambulating in a total contact cast for 1.5 weeks. There is a noted broken fragment of case on the medial aspect. Has been ambulating in cast boot.  Reports 0/10 pain.  Does present to clinic today with her daughter. present.  Request TCC be removed as she had becomae aware of the upcoming passing of her mother. Has been declining for some time but only recently told that her time is dwindling.     Review of patient's allergies indicates:  No Known Allergies    Past Medical History:   Diagnosis Date    Chronic pain     COVID-19     Depression     Diabetes mellitus     Hyperlipidemia     Hypertension     Neuropathy     Osteoarthritis     Spinal stenosis        Family History   Problem Relation Age of Onset    Diabetes Mellitus Mother     Hypertension Mother     Pulmonary fibrosis Father     Hypertension Sister     Diabetes Sister     Pulmonary fibrosis Brother        Social History     Socioeconomic History    Marital status:    Tobacco Use    Smoking status: Never    Smokeless tobacco: Never   Substance and Sexual Activity    Alcohol use: Never    Drug use: Never    Sexual activity: Yes     Partners: Male       Past Surgical History:   Procedure Laterality Date    HYSTERECTOMY      INCISION AND DRAINAGE FOOT Left 5/9/2021    Procedure: INCISION AND DRAINAGE, FOOT;  Surgeon: Shelby Stephen DPM;  Location: [facility] OR;  Service: Podiatry;  Laterality: Left;  GREAT TOE OF LEFT FOOT    SPINAL CORD STIMULATOR IMPLANT      TOE AMPUTATION Left 5/9/2021    Procedure: AMPUTATION, TOE;  Surgeon: Shelby Stephen DPM;  Location: [facility] OR;  Service: Podiatry;  Laterality:  "Left;  LEFT GREAT     TONSILLECTOMY      TOTAL ABDOMINAL HYSTERECTOMY W/ BILATERAL SALPINGOOPHORECTOMY         Review of Systems       Objective:   Ht 5' 7" (1.702 m)   Wt 75.3 kg (166 lb 0.1 oz)   BMI 26.00 kg/m²     CTA Chest Non-Coronary (PE Study)  Narrative: EXAMINATION:  CTA CHEST NON CORONARY    CLINICAL HISTORY:  Pulmonary embolism (PE) suspected, positive D-dimer;    TECHNIQUE:  Low dose axial images, sagittal and coronal reformations were obtained from the thoracic inlet to the lung bases following the IV administration of 100 mL of Omnipaque 350.  Contrast timing was optimized to evaluate the pulmonary arteries.  MIP images were performed.    COMPARISON:  None    FINDINGS:  No evidence for pulmonary embolism.  No evidence for aortic dissection or aneurysm.  Airspace disease in the right lower lobe and posterior aspect of right upper lobe consistent with right-sided pneumonia.  The left lung is clear.  No acute osseous injury.  Cardiovascular structures are unremarkable for stated age.  Intrathecal leads noted.  Mild atherosclerotic changes.  Impression: No pulmonary embolism.  No dissection.  At least moderate right-sided pneumonia.    All CT scans   are performed using dose optimization techniques including the following: automated exposure control; adjustment of the mA and/or kV; use of iterative reconstruction technique.  Dose modulation was employed for ALARA by means of: Automated exposure control; adjustment of the mA and/or kV according to patient size (this includes techniques or standardized protocols for targeted exams where dose is matched to indication/reason for exam; i.e. extremities or head); and/or use of iterative reconstructive technique.    Electronically signed by: Pedro Cespedes  Date:    10/28/2021  Time:    23:18       Physical Exam   LOWER EXTREMITY PHYSICAL EXAMINATION    Vascular:  The right dorsalis pedis pulse 2/4 and the right posterior tibial pulse 2/4.   Capillary refill is " intact.  Pedal hair growth intact    Dermatological:  Skin is supple and moist.  There is an ulceration present to the plantar aspect of the 1st metatarsophalangeal joint. Wound shows great imrpovement with minimal callus around the area. Would measures 0.6cm x 0.3cm. Limited to skin now.     MSK: history of left hallux amputation    Neuro: Protective sensation absent.  Sensation to light touch absent.  [place] Meredith monofilament is decreased bilaterally.       Imaging:         Results for orders placed during the hospital encounter of 07/14/21    X-Ray Foot Complete Left    Narrative  EXAMINATION:  XR FOOT COMPLETE 3 VIEW LEFT    CLINICAL HISTORY:  . Cellulitis of left lower limb    TECHNIQUE:  AP, lateral, and oblique views of the left foot were performed.    COMPARISON:  05/09/2021.    FINDINGS:  Prior amputation of the 1st toe.  Mild soft tissue swelling within the amputation bed.  No soft tissue emphysema.  No focus of cortical erosion or periostitis within the distal 1st metatarsal head to suggest underlying changes of osteomyelitis.    There is an acute to subacute minimally displaced comminuted oblique fracture involving the proximal head of the proximal phalanx of the 3rd toe.  The fracture lucency extends to the 3rd MTP joint.  There is adjacent soft tissue swelling.  There is hammertoe formation.    Tarsal bones are intact with mild to moderate degenerative osteoarthrosis.  Normal tarsometatarsal alignment.  Mild widening of the Lisfranc interval may represent ligamentous injury.  Loss of the normal plantar arch on the lateral view suggesting pes planus.    Small dorsal and plantar calcaneal spurs.    Impression  1. Prior amputation of the 1st toe.  2. Acute to subacute minimally displaced comminuted oblique articular fracture of the proximal phalanx of the 3rd toe.  3. Hammertoe formation.  4. Mild widening of the Lisfranc interval may represent ligamentous injury.  5. Suspected pes planus.  This  report was flagged in Epic as abnormal.      Electronically signed by: Joselo Ward  Date:    07/14/2021  Time:    15:53       No results found for this or any previous visit.          Assessment:     1. Diabetes mellitus type 2 with neurological manifestations    2. Diabetic ulcer of other part of right foot associated with diabetes mellitus due to underlying condition, with fat layer exposed    3. Diabetic ulcer of toe of right foot associated with type 2 diabetes mellitus, with fat layer exposed    4. History of amputation of left great toe            Plan:     Diabetes mellitus type 2 with neurological manifestations  -     DIABETIC SHOES FOR HOME USE    Diabetic ulcer of other part of right foot associated with diabetes mellitus due to underlying condition, with fat layer exposed  -     DIABETIC SHOES FOR HOME USE    Diabetic ulcer of toe of right foot associated with type 2 diabetes mellitus, with fat layer exposed  -     DIABETIC SHOES FOR HOME USE    History of amputation of left great toe  -     DIABETIC SHOES FOR HOME USE        Thorough discussion is had with the patient today, concerning the diagnosis, its etiology, and the treatment algorithm at present.    The wounds to the great toe were surgically debrided after adequate prep with alcohol and/or betadine paint. Excisional wound debridement was performed using sharp #10/#15 blade/rounded scalpel and tissue nipper, with removal of all non-viable skin and soft tissues; necrotic skin/tissue formation. The woundbase/wound bed was also debrided to encourage bleeding as to promote/stimulate healing. Debridement was excisional and included epidermal, dermal and subcutaneous tissues. Post debridement measurements are as above. Hemostasis was achieved. Patient tolerated procedure well and without complications. Local woundcare with collagen wound gel  dressings and bandage thereafter.     An EZ-TCC total contact casting kit was removed on the right foot.   Care was taken to completely offload the ulcerations to the right great toe plantarly.  Will start using collagen wound gel with offloading post-operative shoe.    Did discuss repeat casting once family matters have been addressed.     Recommend diabetic shoes with custom inserts to prevent additional wound development and decrease risk of additional amputations. Prescription was given to the patient.      No future appointments.

## 2023-02-27 ENCOUNTER — OFFICE VISIT (OUTPATIENT)
Dept: PODIATRY | Facility: CLINIC | Age: 63
End: 2023-02-27
Payer: COMMERCIAL

## 2023-02-27 DIAGNOSIS — E08.621 DIABETIC ULCER OF OTHER PART OF RIGHT FOOT ASSOCIATED WITH DIABETES MELLITUS DUE TO UNDERLYING CONDITION, WITH FAT LAYER EXPOSED: Primary | ICD-10-CM

## 2023-02-27 DIAGNOSIS — L97.511 DIABETIC ULCER OF OTHER PART OF RIGHT FOOT ASSOCIATED WITH DIABETES MELLITUS DUE TO UNDERLYING CONDITION, LIMITED TO BREAKDOWN OF SKIN: ICD-10-CM

## 2023-02-27 DIAGNOSIS — L97.512 DIABETIC ULCER OF OTHER PART OF RIGHT FOOT ASSOCIATED WITH DIABETES MELLITUS DUE TO UNDERLYING CONDITION, WITH FAT LAYER EXPOSED: Primary | ICD-10-CM

## 2023-02-27 DIAGNOSIS — E08.621 DIABETIC ULCER OF OTHER PART OF RIGHT FOOT ASSOCIATED WITH DIABETES MELLITUS DUE TO UNDERLYING CONDITION, LIMITED TO BREAKDOWN OF SKIN: ICD-10-CM

## 2023-02-27 DIAGNOSIS — Z89.412 HISTORY OF AMPUTATION OF LEFT GREAT TOE: ICD-10-CM

## 2023-02-27 DIAGNOSIS — E11.49 DIABETES MELLITUS TYPE 2 WITH NEUROLOGICAL MANIFESTATIONS: ICD-10-CM

## 2023-02-27 PROCEDURE — 1160F RVW MEDS BY RX/DR IN RCRD: CPT | Mod: CPTII,S$GLB,, | Performed by: PODIATRIST

## 2023-02-27 PROCEDURE — 1160F PR REVIEW ALL MEDS BY PRESCRIBER/CLIN PHARMACIST DOCUMENTED: ICD-10-PCS | Mod: CPTII,S$GLB,, | Performed by: PODIATRIST

## 2023-02-27 PROCEDURE — 29445 PR APPLY RIGID LEG CAST: ICD-10-PCS | Mod: 59,RT,S$GLB, | Performed by: PODIATRIST

## 2023-02-27 PROCEDURE — 1159F MED LIST DOCD IN RCRD: CPT | Mod: CPTII,S$GLB,, | Performed by: PODIATRIST

## 2023-02-27 PROCEDURE — 99999 PR PBB SHADOW E&M-EST. PATIENT-LVL III: ICD-10-PCS | Mod: PBBFAC,,, | Performed by: PODIATRIST

## 2023-02-27 PROCEDURE — 11042 PR DEBRIDEMENT, SKIN, SUB-Q TISSUE,=<20 SQ CM: ICD-10-PCS | Mod: S$GLB,,, | Performed by: PODIATRIST

## 2023-02-27 PROCEDURE — 99999 PR PBB SHADOW E&M-EST. PATIENT-LVL III: CPT | Mod: PBBFAC,,, | Performed by: PODIATRIST

## 2023-02-27 PROCEDURE — 99499 NO LOS: ICD-10-PCS | Mod: S$GLB,,, | Performed by: PODIATRIST

## 2023-02-27 PROCEDURE — 99499 UNLISTED E&M SERVICE: CPT | Mod: S$GLB,,, | Performed by: PODIATRIST

## 2023-02-27 PROCEDURE — 11042 DBRDMT SUBQ TIS 1ST 20SQCM/<: CPT | Mod: S$GLB,,, | Performed by: PODIATRIST

## 2023-02-27 PROCEDURE — 1159F PR MEDICATION LIST DOCUMENTED IN MEDICAL RECORD: ICD-10-PCS | Mod: CPTII,S$GLB,, | Performed by: PODIATRIST

## 2023-02-27 PROCEDURE — 29445 APPL RIGID TOT CNTC LEG CAST: CPT | Mod: 59,RT,S$GLB, | Performed by: PODIATRIST

## 2023-02-27 NOTE — PROGRESS NOTES
Subjective:       Patient ID: Suellen Campos is a 62 y.o. female.    Chief Complaint: Diabetic Foot Ulcer (Patient continues with DFU to right plantar foot. She denies pain at present. )    HPI: Suellen Campos presents to the office today, with an open ulceration to the plantar aspect of the right foot at the metatarsophalangeal joint.  Reports 0/10 pain.  Does present to clinic today with her daughter present.     Review of patient's allergies indicates:  No Known Allergies    Past Medical History:   Diagnosis Date    Chronic pain     COVID-19     Depression     Diabetes mellitus     Hyperlipidemia     Hypertension     Neuropathy     Osteoarthritis     Spinal stenosis        Family History   Problem Relation Age of Onset    Diabetes Mellitus Mother     Hypertension Mother     Pulmonary fibrosis Father     Hypertension Sister     Diabetes Sister     Pulmonary fibrosis Brother        Social History     Socioeconomic History    Marital status:    Tobacco Use    Smoking status: Never    Smokeless tobacco: Never   Substance and Sexual Activity    Alcohol use: Never    Drug use: Never    Sexual activity: Yes     Partners: Male       Past Surgical History:   Procedure Laterality Date    HYSTERECTOMY      INCISION AND DRAINAGE FOOT Left 5/9/2021    Procedure: INCISION AND DRAINAGE, FOOT;  Surgeon: Shelby Stephen DPM;  Location: [facility] OR;  Service: Podiatry;  Laterality: Left;  GREAT TOE OF LEFT FOOT    SPINAL CORD STIMULATOR IMPLANT      TOE AMPUTATION Left 5/9/2021    Procedure: AMPUTATION, TOE;  Surgeon: Shelby Stephen DPM;  Location: [facility] OR;  Service: Podiatry;  Laterality: Left;  LEFT GREAT     TONSILLECTOMY      TOTAL ABDOMINAL HYSTERECTOMY W/ BILATERAL SALPINGOOPHORECTOMY         Review of Systems       Objective:   There were no vitals taken for this visit.    CTA Chest Non-Coronary (PE Study)  Narrative: EXAMINATION:  CTA CHEST NON CORONARY    CLINICAL HISTORY:  Pulmonary embolism (PE) suspected,  positive D-dimer;    TECHNIQUE:  Low dose axial images, sagittal and coronal reformations were obtained from the thoracic inlet to the lung bases following the IV administration of 100 mL of Omnipaque 350.  Contrast timing was optimized to evaluate the pulmonary arteries.  MIP images were performed.    COMPARISON:  None    FINDINGS:  No evidence for pulmonary embolism.  No evidence for aortic dissection or aneurysm.  Airspace disease in the right lower lobe and posterior aspect of right upper lobe consistent with right-sided pneumonia.  The left lung is clear.  No acute osseous injury.  Cardiovascular structures are unremarkable for stated age.  Intrathecal leads noted.  Mild atherosclerotic changes.  Impression: No pulmonary embolism.  No dissection.  At least moderate right-sided pneumonia.    All CT scans   are performed using dose optimization techniques including the following: automated exposure control; adjustment of the mA and/or kV; use of iterative reconstruction technique.  Dose modulation was employed for ALARA by means of: Automated exposure control; adjustment of the mA and/or kV according to patient size (this includes techniques or standardized protocols for targeted exams where dose is matched to indication/reason for exam; i.e. extremities or head); and/or use of iterative reconstructive technique.    Electronically signed by: Pedro Cespedes  Date:    10/28/2021  Time:    23:18       Physical Exam   LOWER EXTREMITY PHYSICAL EXAMINATION    Vascular:  The right dorsalis pedis pulse 2/4 and the right posterior tibial pulse 2/4.   Capillary refill is intact.  Pedal hair growth intact    Dermatological:  Skin is supple and moist.  There is an ulceration present to the plantar aspect of the 1st metatarsophalangeal joint.    Original ulceration present to the plantar aspect of the hallux.  Deep tissue appears to be healed well.  Superficial tissue slightly macerated.    Ulcer#1  Ulcer location:  Plantar  right 1st MPJ  Pre debridement Measurements:  0.7cm x 0.4 cm x 0.05 cm with a fissure noted on the medial aspect of the 1st metatarsophalangeal joint  Post debridement Measurements : 0.7cm x 0.4cm  Signs of infection:none  Erythema:  none  Undermining/Tunneling:  Not probe to bone, deep soft tissue  Drainage:  Serous  Periwound skin:  Hyperkeratotic  Wound Base:  Granular    Small wound present to the distal aspect of the great toe.  Wound pre debridement has no open areas.  Post debridement, 0.2 cm circumferentiall.  The wound depth is 0.05 cm.  Does not probe to bone or deep tissue.  Slight hyperkeratosis circumferentially.  Wound base is granular.      Imaging:         Results for orders placed during the hospital encounter of 07/14/21    X-Ray Foot Complete Left    Narrative  EXAMINATION:  XR FOOT COMPLETE 3 VIEW LEFT    CLINICAL HISTORY:  . Cellulitis of left lower limb    TECHNIQUE:  AP, lateral, and oblique views of the left foot were performed.    COMPARISON:  05/09/2021.    FINDINGS:  Prior amputation of the 1st toe.  Mild soft tissue swelling within the amputation bed.  No soft tissue emphysema.  No focus of cortical erosion or periostitis within the distal 1st metatarsal head to suggest underlying changes of osteomyelitis.    There is an acute to subacute minimally displaced comminuted oblique fracture involving the proximal head of the proximal phalanx of the 3rd toe.  The fracture lucency extends to the 3rd MTP joint.  There is adjacent soft tissue swelling.  There is hammertoe formation.    Tarsal bones are intact with mild to moderate degenerative osteoarthrosis.  Normal tarsometatarsal alignment.  Mild widening of the Lisfranc interval may represent ligamentous injury.  Loss of the normal plantar arch on the lateral view suggesting pes planus.    Small dorsal and plantar calcaneal spurs.    Impression  1. Prior amputation of the 1st toe.  2. Acute to subacute minimally displaced comminuted oblique  articular fracture of the proximal phalanx of the 3rd toe.  3. Hammertoe formation.  4. Mild widening of the Lisfranc interval may represent ligamentous injury.  5. Suspected pes planus.  This report was flagged in Epic as abnormal.      Electronically signed by: Joselo Ward  Date:    07/14/2021  Time:    15:53       No results found for this or any previous visit.          Assessment:     1. Diabetic ulcer of other part of right foot associated with diabetes mellitus due to underlying condition, with fat layer exposed    2. Diabetes mellitus type 2 with neurological manifestations    3. Diabetic ulcer of other part of right foot associated with diabetes mellitus due to underlying condition, limited to breakdown of skin    4. History of amputation of left great toe            Plan:     Diabetic ulcer of other part of right foot associated with diabetes mellitus due to underlying condition, with fat layer exposed    Diabetes mellitus type 2 with neurological manifestations    Diabetic ulcer of other part of right foot associated with diabetes mellitus due to underlying condition, limited to breakdown of skin    History of amputation of left great toe        Thorough discussion is had with the patient today, concerning the diagnosis, its etiology, and the treatment algorithm at present.    The wounds to the great toe were surgically debrided after adequate prep with alcohol and/or betadine paint. Excisional wound debridement was performed using sharp #10/#15 blade/rounded scalpel and tissue nipper, with removal of all non-viable skin and soft tissues; necrotic skin/tissue formation. The woundbase/wound bed was also debrided to encourage bleeding as to promote/stimulate healing. Debridement was excisional and included epidermal, dermal and subcutaneous tissues. Post debridement measurements are as above. Hemostasis was achieved. Patient tolerated procedure well and without complications. Local woundcare with Annie  collagen.  dressings and bandage thereafter.     An EZ-TCC total contact casting kit was placed on the right foot.  Care was taken to completely offload the ulcerations to the right great toe plantarly.  Care was taken to ensure the cast had completely hardened prior to placement in overlying boot.    She will follow-up in 2 weeks for reassessment and consideration of additional total contact cast      Future Appointments   Date Time Provider Department Center   3/13/2023 11:00 AM Shelby Stephen DPM [facility]

## 2023-03-07 ENCOUNTER — PATIENT MESSAGE (OUTPATIENT)
Dept: PODIATRY | Facility: CLINIC | Age: 63
End: 2023-03-07
Payer: COMMERCIAL

## 2023-03-13 ENCOUNTER — OFFICE VISIT (OUTPATIENT)
Dept: PODIATRY | Facility: CLINIC | Age: 63
End: 2023-03-13
Payer: COMMERCIAL

## 2023-03-13 VITALS — BODY MASS INDEX: 26.06 KG/M2 | WEIGHT: 166 LBS | HEIGHT: 67 IN

## 2023-03-13 DIAGNOSIS — L97.512 DIABETIC ULCER OF OTHER PART OF RIGHT FOOT ASSOCIATED WITH DIABETES MELLITUS DUE TO UNDERLYING CONDITION, WITH FAT LAYER EXPOSED: Primary | ICD-10-CM

## 2023-03-13 DIAGNOSIS — E08.621 DIABETIC ULCER OF OTHER PART OF RIGHT FOOT ASSOCIATED WITH DIABETES MELLITUS DUE TO UNDERLYING CONDITION, WITH FAT LAYER EXPOSED: Primary | ICD-10-CM

## 2023-03-13 DIAGNOSIS — S90.521S: ICD-10-CM

## 2023-03-13 DIAGNOSIS — E11.49 DIABETES MELLITUS TYPE 2 WITH NEUROLOGICAL MANIFESTATIONS: ICD-10-CM

## 2023-03-13 DIAGNOSIS — Z89.412 HISTORY OF AMPUTATION OF LEFT GREAT TOE: ICD-10-CM

## 2023-03-13 PROCEDURE — 99999 PR PBB SHADOW E&M-EST. PATIENT-LVL IV: ICD-10-PCS | Mod: PBBFAC,,, | Performed by: PODIATRIST

## 2023-03-13 PROCEDURE — 11042 DBRDMT SUBQ TIS 1ST 20SQCM/<: CPT | Mod: S$GLB,,, | Performed by: PODIATRIST

## 2023-03-13 PROCEDURE — 3008F BODY MASS INDEX DOCD: CPT | Mod: CPTII,S$GLB,, | Performed by: PODIATRIST

## 2023-03-13 PROCEDURE — 29445 PR APPLY RIGID LEG CAST: ICD-10-PCS | Mod: 59,RT,S$GLB, | Performed by: PODIATRIST

## 2023-03-13 PROCEDURE — 1160F RVW MEDS BY RX/DR IN RCRD: CPT | Mod: CPTII,S$GLB,, | Performed by: PODIATRIST

## 2023-03-13 PROCEDURE — 1160F PR REVIEW ALL MEDS BY PRESCRIBER/CLIN PHARMACIST DOCUMENTED: ICD-10-PCS | Mod: CPTII,S$GLB,, | Performed by: PODIATRIST

## 2023-03-13 PROCEDURE — 99999 PR PBB SHADOW E&M-EST. PATIENT-LVL IV: CPT | Mod: PBBFAC,,, | Performed by: PODIATRIST

## 2023-03-13 PROCEDURE — 99213 PR OFFICE/OUTPT VISIT, EST, LEVL III, 20-29 MIN: ICD-10-PCS | Mod: 25,S$GLB,, | Performed by: PODIATRIST

## 2023-03-13 PROCEDURE — 11042 PR DEBRIDEMENT, SKIN, SUB-Q TISSUE,=<20 SQ CM: ICD-10-PCS | Mod: S$GLB,,, | Performed by: PODIATRIST

## 2023-03-13 PROCEDURE — 29445 APPL RIGID TOT CNTC LEG CAST: CPT | Mod: 59,RT,S$GLB, | Performed by: PODIATRIST

## 2023-03-13 PROCEDURE — 3008F PR BODY MASS INDEX (BMI) DOCUMENTED: ICD-10-PCS | Mod: CPTII,S$GLB,, | Performed by: PODIATRIST

## 2023-03-13 PROCEDURE — 99213 OFFICE O/P EST LOW 20 MIN: CPT | Mod: 25,S$GLB,, | Performed by: PODIATRIST

## 2023-03-13 PROCEDURE — 1159F MED LIST DOCD IN RCRD: CPT | Mod: CPTII,S$GLB,, | Performed by: PODIATRIST

## 2023-03-13 PROCEDURE — 1159F PR MEDICATION LIST DOCUMENTED IN MEDICAL RECORD: ICD-10-PCS | Mod: CPTII,S$GLB,, | Performed by: PODIATRIST

## 2023-03-13 NOTE — PROGRESS NOTES
"  Subjective:       Patient ID: Suellen Campos is a 62 y.o. female.    Chief Complaint: Wound Check (Wound check, right foot wound, 0 pain, diabetic wears total contact cast, Last seen PCP Dr. Wheatley 09/08/2022)    HPI: Suellen Campos presents to the office today, with an open ulceration to the plantar aspect of the right foot at the metatarsophalangeal joint.  Reports 0/10 pain to the foot. Blister noted to the medial ankle.  Does present to clinic today with her  present.     Review of patient's allergies indicates:  No Known Allergies    Past Medical History:   Diagnosis Date    Chronic pain     COVID-19     Depression     Diabetes mellitus     Diabetes mellitus, type 2     Hyperlipidemia     Hypertension     Neuropathy     Osteoarthritis     Spinal stenosis        Family History   Problem Relation Age of Onset    Diabetes Mellitus Mother     Hypertension Mother     Pulmonary fibrosis Father     Hypertension Sister     Diabetes Sister     Pulmonary fibrosis Brother        Social History     Socioeconomic History    Marital status:    Tobacco Use    Smoking status: Never    Smokeless tobacco: Never   Substance and Sexual Activity    Alcohol use: Never    Drug use: Never    Sexual activity: Yes     Partners: Male       Past Surgical History:   Procedure Laterality Date    HYSTERECTOMY      INCISION AND DRAINAGE FOOT Left 5/9/2021    Procedure: INCISION AND DRAINAGE, FOOT;  Surgeon: Shelby Stephen DPM;  Location: [facility] OR;  Service: Podiatry;  Laterality: Left;  GREAT TOE OF LEFT FOOT    SPINAL CORD STIMULATOR IMPLANT      TOE AMPUTATION Left 5/9/2021    Procedure: AMPUTATION, TOE;  Surgeon: Shelby Stephen DPM;  Location: [facility] OR;  Service: Podiatry;  Laterality: Left;  LEFT GREAT     TONSILLECTOMY      TOTAL ABDOMINAL HYSTERECTOMY W/ BILATERAL SALPINGOOPHORECTOMY         Review of Systems       Objective:   Ht 5' 7" (1.702 m)   Wt 75.3 kg (166 lb)   BMI 26.00 kg/m²     CTA Chest Non-Coronary (PE " Study)  Narrative: EXAMINATION:  CTA CHEST NON CORONARY    CLINICAL HISTORY:  Pulmonary embolism (PE) suspected, positive D-dimer;    TECHNIQUE:  Low dose axial images, sagittal and coronal reformations were obtained from the thoracic inlet to the lung bases following the IV administration of 100 mL of Omnipaque 350.  Contrast timing was optimized to evaluate the pulmonary arteries.  MIP images were performed.    COMPARISON:  None    FINDINGS:  No evidence for pulmonary embolism.  No evidence for aortic dissection or aneurysm.  Airspace disease in the right lower lobe and posterior aspect of right upper lobe consistent with right-sided pneumonia.  The left lung is clear.  No acute osseous injury.  Cardiovascular structures are unremarkable for stated age.  Intrathecal leads noted.  Mild atherosclerotic changes.  Impression: No pulmonary embolism.  No dissection.  At least moderate right-sided pneumonia.    All CT scans   are performed using dose optimization techniques including the following: automated exposure control; adjustment of the mA and/or kV; use of iterative reconstruction technique.  Dose modulation was employed for ALARA by means of: Automated exposure control; adjustment of the mA and/or kV according to patient size (this includes techniques or standardized protocols for targeted exams where dose is matched to indication/reason for exam; i.e. extremities or head); and/or use of iterative reconstructive technique.    Electronically signed by: Pedro Cespedes  Date:    10/28/2021  Time:    23:18       Physical Exam   LOWER EXTREMITY PHYSICAL EXAMINATION    Vascular:  The right dorsalis pedis pulse 2/4 and the right posterior tibial pulse 2/4.   Capillary refill is intact.  Pedal hair growth intact    Dermatological:  Skin is supple and moist.  There is an ulceration present to the plantar aspect of the 1st metatarsophalangeal joint.    Original ulceration present to the plantar aspect of the hallux is  completely healed and closed.      Ulcer#1  Ulcer location:  Plantar right 1st MPJ  Pre debridement Measurements:  0.1cm x 0.2 cm x 0.05 cm with a fissure noted on the medial aspect of the 1st metatarsophalangeal joint  Post debridement Measurements : 0.2cm x 0.2cm x 0.05cm  Signs of infection:None  Erythema:  None  Undermining/Tunneling:  Not probe to bone, deep soft tissue  Drainage:  Serous  Periwound skin:  Hyperkeratotic  Wound Base:  Granular    Blister formation limited to the skin present to the medial aspect of the right ankle.  No acute signs of infection or edema.  No obvious underlying fluctuance.  Granular wound base is noted.    Imaging:         Results for orders placed during the hospital encounter of 07/14/21    X-Ray Foot Complete Left    Narrative  EXAMINATION:  XR FOOT COMPLETE 3 VIEW LEFT    CLINICAL HISTORY:  . Cellulitis of left lower limb    TECHNIQUE:  AP, lateral, and oblique views of the left foot were performed.    COMPARISON:  05/09/2021.    FINDINGS:  Prior amputation of the 1st toe.  Mild soft tissue swelling within the amputation bed.  No soft tissue emphysema.  No focus of cortical erosion or periostitis within the distal 1st metatarsal head to suggest underlying changes of osteomyelitis.    There is an acute to subacute minimally displaced comminuted oblique fracture involving the proximal head of the proximal phalanx of the 3rd toe.  The fracture lucency extends to the 3rd MTP joint.  There is adjacent soft tissue swelling.  There is hammertoe formation.    Tarsal bones are intact with mild to moderate degenerative osteoarthrosis.  Normal tarsometatarsal alignment.  Mild widening of the Lisfranc interval may represent ligamentous injury.  Loss of the normal plantar arch on the lateral view suggesting pes planus.    Small dorsal and plantar calcaneal spurs.    Impression  1. Prior amputation of the 1st toe.  2. Acute to subacute minimally displaced comminuted oblique articular  fracture of the proximal phalanx of the 3rd toe.  3. Hammertoe formation.  4. Mild widening of the Lisfranc interval may represent ligamentous injury.  5. Suspected pes planus.  This report was flagged in Epic as abnormal.      Electronically signed by: Joselo Ward  Date:    07/14/2021  Time:    15:53       No results found for this or any previous visit.          Assessment:     1. Diabetic ulcer of other part of right foot associated with diabetes mellitus due to underlying condition, with fat layer exposed    2. Blister of ankle without infection, right, sequela    3. Diabetes mellitus type 2 with neurological manifestations    4. History of amputation of left great toe            Plan:     Diabetic ulcer of other part of right foot associated with diabetes mellitus due to underlying condition, with fat layer exposed    Blister of ankle without infection, right, sequela    Diabetes mellitus type 2 with neurological manifestations    History of amputation of left great toe        Thorough discussion is had with the patient today, concerning the diagnosis, its etiology, and the treatment algorithm at present.    The wounds to the great toe were surgically debrided after adequate prep with alcohol and/or betadine paint. Excisional wound debridement was performed using sharp #10/#15 blade/rounded scalpel and tissue nipper, with removal of all non-viable skin and soft tissues; necrotic skin/tissue formation. The woundbase/wound bed was also debrided to encourage bleeding as to promote/stimulate healing. Debridement was excisional and included epidermal, dermal and subcutaneous tissues. Post debridement measurements are as above. Hemostasis was achieved. Patient tolerated procedure well and without complications. Local woundcare with Annie collagen.  dressings and bandage thereafter.     Care was taken to increase the padding circumferentially around the ankle as there was likely a shift in the padding at that area  which likely caused some friction and rubbing.    An EZ-TCC total contact casting kit was placed on the right foot.  Care was taken to completely offload the ulcerations to the right great toe plantarly.  Care was taken to ensure the cast had completely hardened prior to placement in overlying boot.    She will follow-up in 1 weeks for reassessment and consideration of additional total contact cast      Future Appointments   Date Time Provider Department Center   3/20/2023 10:30 AM Shelby Stephen DPM [facility]

## 2023-03-20 ENCOUNTER — OFFICE VISIT (OUTPATIENT)
Dept: PODIATRY | Facility: CLINIC | Age: 63
End: 2023-03-20
Payer: COMMERCIAL

## 2023-03-20 DIAGNOSIS — Z89.412 HISTORY OF AMPUTATION OF LEFT GREAT TOE: ICD-10-CM

## 2023-03-20 DIAGNOSIS — S90.521S: ICD-10-CM

## 2023-03-20 DIAGNOSIS — E11.49 DIABETES MELLITUS TYPE 2 WITH NEUROLOGICAL MANIFESTATIONS: ICD-10-CM

## 2023-03-20 DIAGNOSIS — Z86.31 HISTORY OF DIABETIC ULCER OF FOOT: Primary | ICD-10-CM

## 2023-03-20 PROCEDURE — 99213 PR OFFICE/OUTPT VISIT, EST, LEVL III, 20-29 MIN: ICD-10-PCS | Mod: S$GLB,,, | Performed by: PODIATRIST

## 2023-03-20 PROCEDURE — 1160F RVW MEDS BY RX/DR IN RCRD: CPT | Mod: CPTII,S$GLB,, | Performed by: PODIATRIST

## 2023-03-20 PROCEDURE — 1160F PR REVIEW ALL MEDS BY PRESCRIBER/CLIN PHARMACIST DOCUMENTED: ICD-10-PCS | Mod: CPTII,S$GLB,, | Performed by: PODIATRIST

## 2023-03-20 PROCEDURE — 99999 PR PBB SHADOW E&M-EST. PATIENT-LVL III: ICD-10-PCS | Mod: PBBFAC,,, | Performed by: PODIATRIST

## 2023-03-20 PROCEDURE — 1159F PR MEDICATION LIST DOCUMENTED IN MEDICAL RECORD: ICD-10-PCS | Mod: CPTII,S$GLB,, | Performed by: PODIATRIST

## 2023-03-20 PROCEDURE — 1159F MED LIST DOCD IN RCRD: CPT | Mod: CPTII,S$GLB,, | Performed by: PODIATRIST

## 2023-03-20 PROCEDURE — 99213 OFFICE O/P EST LOW 20 MIN: CPT | Mod: S$GLB,,, | Performed by: PODIATRIST

## 2023-03-20 PROCEDURE — 99999 PR PBB SHADOW E&M-EST. PATIENT-LVL III: CPT | Mod: PBBFAC,,, | Performed by: PODIATRIST

## 2023-03-20 NOTE — PROGRESS NOTES
Subjective:       Patient ID: Suellen Campos is a 62 y.o. female.    Chief Complaint: Diabetic Foot Ulcer (1 week right DFU check. Patient denies pain at present and tolerates TCC well. )    HPI: Suellen Campos presents to the office today to follow-up on ulceration present to the plantar aspect of the right great toe at the metatarsophalangeal joint.  She has been ambulating in a total contact cast to offload the area.  Does state having considerable improvement to the medial ankle secondary to blister formation.  Presents to clinic today with her daughter present.     Review of patient's allergies indicates:  No Known Allergies    Past Medical History:   Diagnosis Date    Chronic pain     COVID-19     Depression     Diabetes mellitus     Diabetes mellitus, type 2     Hyperlipidemia     Hypertension     Neuropathy     Osteoarthritis     Spinal stenosis        Family History   Problem Relation Age of Onset    Diabetes Mellitus Mother     Hypertension Mother     Pulmonary fibrosis Father     Hypertension Sister     Diabetes Sister     Pulmonary fibrosis Brother        Social History     Socioeconomic History    Marital status:    Tobacco Use    Smoking status: Never    Smokeless tobacco: Never   Substance and Sexual Activity    Alcohol use: Never    Drug use: Never    Sexual activity: Yes     Partners: Male       Past Surgical History:   Procedure Laterality Date    HYSTERECTOMY      INCISION AND DRAINAGE FOOT Left 5/9/2021    Procedure: INCISION AND DRAINAGE, FOOT;  Surgeon: Shelby Stephen DPM;  Location: [facility];  Service: Podiatry;  Laterality: Left;  GREAT TOE OF LEFT FOOT    SPINAL CORD STIMULATOR IMPLANT      TOE AMPUTATION Left 5/9/2021    Procedure: AMPUTATION, TOE;  Surgeon: Shelby Stephen DPM;  Location: [facility];  Service: Podiatry;  Laterality: Left;  LEFT GREAT     TONSILLECTOMY      TOTAL ABDOMINAL HYSTERECTOMY W/ BILATERAL SALPINGOOPHORECTOMY         Review of Systems       Objective:    There were no vitals taken for this visit.    CTA Chest Non-Coronary (PE Study)  Narrative: EXAMINATION:  CTA CHEST NON CORONARY    CLINICAL HISTORY:  Pulmonary embolism (PE) suspected, positive D-dimer;    TECHNIQUE:  Low dose axial images, sagittal and coronal reformations were obtained from the thoracic inlet to the lung bases following the IV administration of 100 mL of Omnipaque 350.  Contrast timing was optimized to evaluate the pulmonary arteries.  MIP images were performed.    COMPARISON:  None    FINDINGS:  No evidence for pulmonary embolism.  No evidence for aortic dissection or aneurysm.  Airspace disease in the right lower lobe and posterior aspect of right upper lobe consistent with right-sided pneumonia.  The left lung is clear.  No acute osseous injury.  Cardiovascular structures are unremarkable for stated age.  Intrathecal leads noted.  Mild atherosclerotic changes.  Impression: No pulmonary embolism.  No dissection.  At least moderate right-sided pneumonia.    All CT scans   are performed using dose optimization techniques including the following: automated exposure control; adjustment of the mA and/or kV; use of iterative reconstruction technique.  Dose modulation was employed for ALARA by means of: Automated exposure control; adjustment of the mA and/or kV according to patient size (this includes techniques or standardized protocols for targeted exams where dose is matched to indication/reason for exam; i.e. extremities or head); and/or use of iterative reconstructive technique.    Electronically signed by: Pedro Cespedes  Date:    10/28/2021  Time:    23:18       Physical Exam   LOWER EXTREMITY PHYSICAL EXAMINATION    Vascular:  The right dorsalis pedis pulse 2/4 and the right posterior tibial pulse 2/4.   Capillary refill is intact.  Pedal hair growth intact    Dermatological:  Skin is supple and moist.  There is an ulceration present to the plantar aspect of the 1st metatarsophalangeal  joint.    Ulceration present to the plantar aspect of the hallux and the plantar aspect of the right 1st metatarsophalangeal joint are completely closed.  There is a small circular wound which is superficial in nature and limited to the skin present to the medial aspect of the ankle secondary to blister/friction from total contact cast.  There is no signs of acute infection.  Moderate serous drainage present.  Wound measures 0.4 cm circumferentially.     Imaging:         Results for orders placed during the hospital encounter of 07/14/21    X-Ray Foot Complete Left    Narrative  EXAMINATION:  XR FOOT COMPLETE 3 VIEW LEFT    CLINICAL HISTORY:  . Cellulitis of left lower limb    TECHNIQUE:  AP, lateral, and oblique views of the left foot were performed.    COMPARISON:  05/09/2021.    FINDINGS:  Prior amputation of the 1st toe.  Mild soft tissue swelling within the amputation bed.  No soft tissue emphysema.  No focus of cortical erosion or periostitis within the distal 1st metatarsal head to suggest underlying changes of osteomyelitis.    There is an acute to subacute minimally displaced comminuted oblique fracture involving the proximal head of the proximal phalanx of the 3rd toe.  The fracture lucency extends to the 3rd MTP joint.  There is adjacent soft tissue swelling.  There is hammertoe formation.    Tarsal bones are intact with mild to moderate degenerative osteoarthrosis.  Normal tarsometatarsal alignment.  Mild widening of the Lisfranc interval may represent ligamentous injury.  Loss of the normal plantar arch on the lateral view suggesting pes planus.    Small dorsal and plantar calcaneal spurs.    Impression  1. Prior amputation of the 1st toe.  2. Acute to subacute minimally displaced comminuted oblique articular fracture of the proximal phalanx of the 3rd toe.  3. Hammertoe formation.  4. Mild widening of the Lisfranc interval may represent ligamentous injury.  5. Suspected pes planus.  This report was  flagged in Epic as abnormal.      Electronically signed by: Joselo Ward  Date:    07/14/2021  Time:    15:53       No results found for this or any previous visit.          Assessment:     1. History of diabetic ulcer of foot    2. Blister of ankle without infection, right, sequela    3. Diabetes mellitus type 2 with neurological manifestations    4. History of amputation of left great toe              Plan:     History of diabetic ulcer of foot    Blister of ankle without infection, right, sequela    Diabetes mellitus type 2 with neurological manifestations    History of amputation of left great toe        Thorough discussion is had with the patient today, concerning the diagnosis, its etiology, and the treatment algorithm at present.    Total contact cast was removed to show complete consolidation and healing present to the ulceration to the right great toe and the plantar aspect of the 1st metatarsophalangeal joint.    Wound secondary to blister formation present to the right ankle which is limited to the skin is noted.  Will place order for Mepilex borders be sent to the patient to help provide foam layer due to blister and help absorb drainage with additional padding over blister to prevent subsequent rubbing and friction patient will utilize Hydrogel collagen to the area and cover with appropriate foam and dressings until the borders can be sent to the patient.    Foot counseling and education is provided at this visit. Patient is advised to wear socks and shoes at all times.  Do not walk barefoot, or with just socks, even when indoors.  Be sure to check and inspect the inside of the shoe before putting them on her feet.  Protect your feet at all times.  Walking shoes and/or athletic shoes are the best types of shoe gear. Do not wear vinyl or plastic type shoe gear, as they do not stretch and/or breathe.  Protect your feet from hot and/or cold. Elevate the extremities when sitting.  Do not wear excessively  tight socks and/or shoe gear. Wiggle your toes for a few minutes throughout the day. Move your ankles up and down, in and out, to help blood flow in your lower extremity.       No future appointments.

## 2023-05-28 ENCOUNTER — PATIENT MESSAGE (OUTPATIENT)
Dept: PODIATRY | Facility: CLINIC | Age: 63
End: 2023-05-28
Payer: COMMERCIAL

## 2023-05-29 ENCOUNTER — PATIENT MESSAGE (OUTPATIENT)
Dept: PODIATRY | Facility: CLINIC | Age: 63
End: 2023-05-29

## 2023-05-29 ENCOUNTER — OFFICE VISIT (OUTPATIENT)
Dept: PODIATRY | Facility: CLINIC | Age: 63
End: 2023-05-29
Payer: COMMERCIAL

## 2023-05-29 ENCOUNTER — HOSPITAL ENCOUNTER (OUTPATIENT)
Dept: RADIOLOGY | Facility: HOSPITAL | Age: 63
Discharge: HOME | End: 2023-05-29
Attending: PODIATRIST
Payer: COMMERCIAL

## 2023-05-29 DIAGNOSIS — S92.322S CLOSED DISPLACED FRACTURE OF SECOND METATARSAL BONE OF LEFT FOOT, SEQUELA: ICD-10-CM

## 2023-05-29 DIAGNOSIS — M79.674 PAIN OF TOE OF RIGHT FOOT: Primary | ICD-10-CM

## 2023-05-29 DIAGNOSIS — L03.032 CELLULITIS OF SECOND TOE, LEFT: ICD-10-CM

## 2023-05-29 DIAGNOSIS — Z89.412 HISTORY OF AMPUTATION OF LEFT GREAT TOE: ICD-10-CM

## 2023-05-29 DIAGNOSIS — L97.522 ULCER OF LEFT SECOND TOE, WITH FAT LAYER EXPOSED: ICD-10-CM

## 2023-05-29 DIAGNOSIS — Z86.31 HISTORY OF DIABETIC ULCER OF FOOT: ICD-10-CM

## 2023-05-29 DIAGNOSIS — M79.672 LEFT FOOT PAIN: ICD-10-CM

## 2023-05-29 DIAGNOSIS — M79.674 PAIN OF TOE OF RIGHT FOOT: ICD-10-CM

## 2023-05-29 DIAGNOSIS — Z01.818 PREOPERATIVE CLEARANCE: ICD-10-CM

## 2023-05-29 DIAGNOSIS — M79.672 LEFT FOOT PAIN: Primary | ICD-10-CM

## 2023-05-29 PROCEDURE — 1159F MED LIST DOCD IN RCRD: CPT | Mod: CPTII,S$GLB,, | Performed by: PODIATRIST

## 2023-05-29 PROCEDURE — 99999 PR PBB SHADOW E&M-EST. PATIENT-LVL III: ICD-10-PCS | Mod: PBBFAC,,, | Performed by: PODIATRIST

## 2023-05-29 PROCEDURE — 99214 OFFICE O/P EST MOD 30 MIN: CPT | Mod: S$GLB,,, | Performed by: PODIATRIST

## 2023-05-29 PROCEDURE — 73630 X-RAY EXAM OF FOOT: CPT | Mod: 26,LT,, | Performed by: RADIOLOGY

## 2023-05-29 PROCEDURE — 1160F PR REVIEW ALL MEDS BY PRESCRIBER/CLIN PHARMACIST DOCUMENTED: ICD-10-PCS | Mod: CPTII,S$GLB,, | Performed by: PODIATRIST

## 2023-05-29 PROCEDURE — 99999 PR PBB SHADOW E&M-EST. PATIENT-LVL III: CPT | Mod: PBBFAC,,, | Performed by: PODIATRIST

## 2023-05-29 PROCEDURE — 1160F RVW MEDS BY RX/DR IN RCRD: CPT | Mod: CPTII,S$GLB,, | Performed by: PODIATRIST

## 2023-05-29 PROCEDURE — 73630 X-RAY EXAM OF FOOT: CPT | Mod: TC,LT

## 2023-05-29 PROCEDURE — 73630 XR FOOT COMPLETE 3 VIEW LEFT: ICD-10-PCS | Mod: 26,LT,, | Performed by: RADIOLOGY

## 2023-05-29 PROCEDURE — 1159F PR MEDICATION LIST DOCUMENTED IN MEDICAL RECORD: ICD-10-PCS | Mod: CPTII,S$GLB,, | Performed by: PODIATRIST

## 2023-05-29 PROCEDURE — 99214 PR OFFICE/OUTPT VISIT, EST, LEVL IV, 30-39 MIN: ICD-10-PCS | Mod: S$GLB,,, | Performed by: PODIATRIST

## 2023-05-29 RX ORDER — DOXYCYCLINE HYCLATE 100 MG
100 TABLET ORAL 2 TIMES DAILY
Qty: 20 TABLET | Refills: 0 | Status: SHIPPED | OUTPATIENT
Start: 2023-05-29 | End: 2023-05-30 | Stop reason: SDUPTHER

## 2023-05-29 NOTE — PROGRESS NOTES
Subjective:       Patient ID: Suellen Campos is a 63 y.o. female.    Chief Complaint: Diabetic Foot Ulcer (Patient present with Dfu to left second toe. Swelling, redness and discoloration noted to area. Patient complains of 4/10 pain to area. )    HPI: Suellen Campos presents to the office today after patient sustained a new ulceration to the left 2nd toe.  Patient previously had injury while stepping out of her 's truck which ultimately resulted in a fracture of the ankle.  She has been following with Dr. Lenin Ferguson after being referred from the initial urgent care.  Patient was placed in a tall Cam boot.  She did report that she was concerned about the 2nd digit which now ultimately has an ulceration with cellulitis.  She states 4/10 pain.  She continues to ambulate in the Cam boot secondary to history of ankle fracture.  Reports interest in removing the 2nd toe    Review of patient's allergies indicates:  No Known Allergies    Past Medical History:   Diagnosis Date    Chronic pain     COVID-19     Depression     Diabetes mellitus     Diabetes mellitus, type 2     Hyperlipidemia     Hypertension     Neuropathy     Osteoarthritis     Spinal stenosis        Family History   Problem Relation Age of Onset    Diabetes Mellitus Mother     Hypertension Mother     Pulmonary fibrosis Father     Hypertension Sister     Diabetes Sister     Pulmonary fibrosis Brother        Social History     Socioeconomic History    Marital status:    Tobacco Use    Smoking status: Never    Smokeless tobacco: Never   Substance and Sexual Activity    Alcohol use: Never    Drug use: Never    Sexual activity: Yes     Partners: Male       Past Surgical History:   Procedure Laterality Date    HYSTERECTOMY      INCISION AND DRAINAGE FOOT Left 5/9/2021    Procedure: INCISION AND DRAINAGE, FOOT;  Surgeon: Shelby Stephen DPM;  Location: [facility];  Service: Podiatry;  Laterality: Left;  GREAT TOE OF LEFT FOOT    SPINAL CORD STIMULATOR  IMPLANT      TOE AMPUTATION Left 5/9/2021    Procedure: AMPUTATION, TOE;  Surgeon: Shelby Stephen DPM;  Location: [facility];  Service: Podiatry;  Laterality: Left;  LEFT GREAT     TONSILLECTOMY      TOTAL ABDOMINAL HYSTERECTOMY W/ BILATERAL SALPINGOOPHORECTOMY         Review of Systems       Objective:   There were no vitals taken for this visit.    X-Ray Foot Complete Left  Narrative: EXAMINATION:  XR FOOT COMPLETE 3 VIEW LEFT    CLINICAL HISTORY:  .  Pain in left foot    TECHNIQUE:  AP, lateral and oblique views of the left foot were performed.    COMPARISON:  07/14/2021    FINDINGS:  Amputation changes noted at the 1st MTP joint.  Chronic nonunited fracture deformity at the 2nd metatarsal.  Similar chronic fracture deformity with erosion changes of the 3rd proximal phalanx.  Similar midfoot arthritic findings present including subcortical cystic change.  Early Charcot joint not excluded.  Pes planus.  No focal soft tissue abnormality.  Impression: As above    Electronically signed by: Tristen Vincent MD  Date:    05/29/2023  Time:    10:42       Physical Exam   LOWER EXTREMITY PHYSICAL EXAMINATION    Vascular:   The left dorsalis pedis pulse 2/4 and posterior tibial pulse on the left is 2/4.  Capillary refill is intact.  Pedal hair growth intact    Neurological:  Protective sensation is not intact to the right left foot.  Vibratory sensation is diminished.  Proprioception is intact.  Sharp/dull is reduced.    Musculoskeletal:  History of left hallux amputation.  Hammertoe left 2nd digit.    Dermatological:  Skin is cellulitic in nature.  Moderate to severe swelling and erythema present to the 2nd digit.  Does not progress to the metatarsophalangeal joint.  Ulceration noted dorsally at the proximal phalangeal joint.  Ulceration measures 0.6 x 0.6 cm.  Small area slight tendon is visualized, however does not probe to bone.  Serous drainage present.  Moderate to severe cellulitis noted.  No evidence of  purulence.    Imaging:         Results for orders placed during the hospital encounter of 05/29/23    X-Ray Foot Complete Left    Narrative  EXAMINATION:  XR FOOT COMPLETE 3 VIEW LEFT    CLINICAL HISTORY:  .  Pain in left foot    TECHNIQUE:  AP, lateral and oblique views of the left foot were performed.    COMPARISON:  07/14/2021    FINDINGS:  Amputation changes noted at the 1st MTP joint.  Chronic nonunited fracture deformity at the 2nd metatarsal.  Similar chronic fracture deformity with erosion changes of the 3rd proximal phalanx.  Similar midfoot arthritic findings present including subcortical cystic change.  Early Charcot joint not excluded.  Pes planus.  No focal soft tissue abnormality.    Impression  As above      Electronically signed by: Tristen Vincent MD  Date:    05/29/2023  Time:    10:42       No results found for this or any previous visit.          Assessment:     1. Left foot pain    2. Cellulitis of second toe, left    3. Closed displaced fracture of second metatarsal bone of left foot, sequela    4. Ulcer of left second toe, with fat layer exposed    5. History of diabetic ulcer of foot    6. History of amputation of left great toe    7. Preoperative clearance        Plan:     Left foot pain  -     X-Ray Foot Complete Left; Future; Expected date: 05/29/2023    Cellulitis of second toe, left  -     Ambulatory referral/consult to Pre-Admit; Future; Expected date: 06/05/2023    Closed displaced fracture of second metatarsal bone of left foot, sequela  -     Ambulatory referral/consult to Pre-Admit; Future; Expected date: 06/05/2023    Ulcer of left second toe, with fat layer exposed    History of diabetic ulcer of foot    History of amputation of left great toe    Preoperative clearance    Other orders  -     doxycycline (VIBRA-TABS) 100 MG tablet; Take 1 tablet (100 mg total) by mouth 2 (two) times daily. for 10 days  Dispense: 20 tablet; Refill: 0      Thorough discussion is had with the  patient today, concerning the diagnosis, its etiology, and the treatment algorithm at present.    Discussed pathology in etiology associated with hammertoe deformity which has ultimately resulted in cellulitis of the 2nd digit with an ulceration overlying the proximal interphalangeal joint.  We discussed treatment options regarding anti biotic medication to help decrease swelling and inflammation.  We also discussed performing x-rays today to evaluate the 2nd digit.    X-rays were taken and reviewed by myself patient room.  Although there is no underlying osseous changes to the 2nd digit, there is a nonunited fracture of the 2nd metatarsal bone as well as the fibula.  We discussed treatment options regarding the management of both pathologies.    I feel that patient would need to continue to ambulate in a boot to help protect the fibula and allow that to heal appropriately.  We discussed allowing the ulceration to heal versus surgical amputation.  Patient relates interested in surgical amputation as she is concerned about the recurrent nature of the ulceration given her history of diabetes mellitus and peripheral polyneuropathy.    The procedure of (left 2nd toe amputation with removal of bone fragments) was thoroughly explained to the patient. Its necessity and/or purpose and the implications therein were outlined, including any pertinent advantages and/or disadvantages, and possible complications, if any. Possible complications include recurrence of pathology and/or deformity, infection (cellulitis, drainage, purulence, malodor, etc...), pain, numbness, neuritis, edema, burning, loss of function, need for further surgery, possible need for removal of any implanted hardware, soft tissue contracture and/or scarring, etc... No guarantees were given and/or implied. Post-operative expectations and weightbearing protocol is thoroughly explained the patient, who acknowledges understanding. Rx. is given for pre-operative  labs and for medical clearance by [facility]'s [facility] [place] [facility].    No future appointments.;

## 2023-05-29 NOTE — H&P (VIEW-ONLY)
Subjective:       Patient ID: Suellen Campos is a 63 y.o. female.    Chief Complaint: Diabetic Foot Ulcer (Patient present with Dfu to left second toe. Swelling, redness and discoloration noted to area. Patient complains of 4/10 pain to area. )    HPI: Suellen Campos presents to the office today after patient sustained a new ulceration to the left 2nd toe.  Patient previously had injury while stepping out of her 's truck which ultimately resulted in a fracture of the ankle.  She has been following with Dr. Lenin Ferguson after being referred from the initial urgent care.  Patient was placed in a tall Cam boot.  She did report that she was concerned about the 2nd digit which now ultimately has an ulceration with cellulitis.  She states 4/10 pain.  She continues to ambulate in the Cam boot secondary to history of ankle fracture.  Reports interest in removing the 2nd toe    Review of patient's allergies indicates:  No Known Allergies    Past Medical History:   Diagnosis Date    Chronic pain     COVID-19     Depression     Diabetes mellitus     Diabetes mellitus, type 2     Hyperlipidemia     Hypertension     Neuropathy     Osteoarthritis     Spinal stenosis        Family History   Problem Relation Age of Onset    Diabetes Mellitus Mother     Hypertension Mother     Pulmonary fibrosis Father     Hypertension Sister     Diabetes Sister     Pulmonary fibrosis Brother        Social History     Socioeconomic History    Marital status:    Tobacco Use    Smoking status: Never    Smokeless tobacco: Never   Substance and Sexual Activity    Alcohol use: Never    Drug use: Never    Sexual activity: Yes     Partners: Male       Past Surgical History:   Procedure Laterality Date    HYSTERECTOMY      INCISION AND DRAINAGE FOOT Left 5/9/2021    Procedure: INCISION AND DRAINAGE, FOOT;  Surgeon: Shelby Stephen DPM;  Location: [facility];  Service: Podiatry;  Laterality: Left;  GREAT TOE OF LEFT FOOT    SPINAL CORD STIMULATOR  IMPLANT      TOE AMPUTATION Left 5/9/2021    Procedure: AMPUTATION, TOE;  Surgeon: Shelby Stephen DPM;  Location: [facility];  Service: Podiatry;  Laterality: Left;  LEFT GREAT     TONSILLECTOMY      TOTAL ABDOMINAL HYSTERECTOMY W/ BILATERAL SALPINGOOPHORECTOMY         Review of Systems       Objective:   There were no vitals taken for this visit.    X-Ray Foot Complete Left  Narrative: EXAMINATION:  XR FOOT COMPLETE 3 VIEW LEFT    CLINICAL HISTORY:  .  Pain in left foot    TECHNIQUE:  AP, lateral and oblique views of the left foot were performed.    COMPARISON:  07/14/2021    FINDINGS:  Amputation changes noted at the 1st MTP joint.  Chronic nonunited fracture deformity at the 2nd metatarsal.  Similar chronic fracture deformity with erosion changes of the 3rd proximal phalanx.  Similar midfoot arthritic findings present including subcortical cystic change.  Early Charcot joint not excluded.  Pes planus.  No focal soft tissue abnormality.  Impression: As above    Electronically signed by: Tristen Vincent MD  Date:    05/29/2023  Time:    10:42       Physical Exam   LOWER EXTREMITY PHYSICAL EXAMINATION    Vascular:   The left dorsalis pedis pulse 2/4 and posterior tibial pulse on the left is 2/4.  Capillary refill is intact.  Pedal hair growth intact    Neurological:  Protective sensation is not intact to the right left foot.  Vibratory sensation is diminished.  Proprioception is intact.  Sharp/dull is reduced.    Musculoskeletal:  History of left hallux amputation.  Hammertoe left 2nd digit.    Dermatological:  Skin is cellulitic in nature.  Moderate to severe swelling and erythema present to the 2nd digit.  Does not progress to the metatarsophalangeal joint.  Ulceration noted dorsally at the proximal phalangeal joint.  Ulceration measures 0.6 x 0.6 cm.  Small area slight tendon is visualized, however does not probe to bone.  Serous drainage present.  Moderate to severe cellulitis noted.  No evidence of  purulence.    Imaging:         Results for orders placed during the hospital encounter of 05/29/23    X-Ray Foot Complete Left    Narrative  EXAMINATION:  XR FOOT COMPLETE 3 VIEW LEFT    CLINICAL HISTORY:  .  Pain in left foot    TECHNIQUE:  AP, lateral and oblique views of the left foot were performed.    COMPARISON:  07/14/2021    FINDINGS:  Amputation changes noted at the 1st MTP joint.  Chronic nonunited fracture deformity at the 2nd metatarsal.  Similar chronic fracture deformity with erosion changes of the 3rd proximal phalanx.  Similar midfoot arthritic findings present including subcortical cystic change.  Early Charcot joint not excluded.  Pes planus.  No focal soft tissue abnormality.    Impression  As above      Electronically signed by: Tristen Vincent MD  Date:    05/29/2023  Time:    10:42       No results found for this or any previous visit.          Assessment:     1. Left foot pain    2. Cellulitis of second toe, left    3. Closed displaced fracture of second metatarsal bone of left foot, sequela    4. Ulcer of left second toe, with fat layer exposed    5. History of diabetic ulcer of foot    6. History of amputation of left great toe    7. Preoperative clearance        Plan:     Left foot pain  -     X-Ray Foot Complete Left; Future; Expected date: 05/29/2023    Cellulitis of second toe, left  -     Ambulatory referral/consult to Pre-Admit; Future; Expected date: 06/05/2023    Closed displaced fracture of second metatarsal bone of left foot, sequela  -     Ambulatory referral/consult to Pre-Admit; Future; Expected date: 06/05/2023    Ulcer of left second toe, with fat layer exposed    History of diabetic ulcer of foot    History of amputation of left great toe    Preoperative clearance    Other orders  -     doxycycline (VIBRA-TABS) 100 MG tablet; Take 1 tablet (100 mg total) by mouth 2 (two) times daily. for 10 days  Dispense: 20 tablet; Refill: 0      Thorough discussion is had with the  patient today, concerning the diagnosis, its etiology, and the treatment algorithm at present.    Discussed pathology in etiology associated with hammertoe deformity which has ultimately resulted in cellulitis of the 2nd digit with an ulceration overlying the proximal interphalangeal joint.  We discussed treatment options regarding anti biotic medication to help decrease swelling and inflammation.  We also discussed performing x-rays today to evaluate the 2nd digit.    X-rays were taken and reviewed by myself patient room.  Although there is no underlying osseous changes to the 2nd digit, there is a nonunited fracture of the 2nd metatarsal bone as well as the fibula.  We discussed treatment options regarding the management of both pathologies.    I feel that patient would need to continue to ambulate in a boot to help protect the fibula and allow that to heal appropriately.  We discussed allowing the ulceration to heal versus surgical amputation.  Patient relates interested in surgical amputation as she is concerned about the recurrent nature of the ulceration given her history of diabetes mellitus and peripheral polyneuropathy.    The procedure of (left 2nd toe amputation with removal of bone fragments) was thoroughly explained to the patient. Its necessity and/or purpose and the implications therein were outlined, including any pertinent advantages and/or disadvantages, and possible complications, if any. Possible complications include recurrence of pathology and/or deformity, infection (cellulitis, drainage, purulence, malodor, etc...), pain, numbness, neuritis, edema, burning, loss of function, need for further surgery, possible need for removal of any implanted hardware, soft tissue contracture and/or scarring, etc... No guarantees were given and/or implied. Post-operative expectations and weightbearing protocol is thoroughly explained the patient, who acknowledges understanding. Rx. is given for pre-operative  labs and for medical [facility].    No future appointments.;

## 2023-05-30 ENCOUNTER — PATIENT MESSAGE (OUTPATIENT)
Dept: SURGERY | Facility: HOSPITAL | Age: 63
End: 2023-05-30
Payer: COMMERCIAL

## 2023-05-30 DIAGNOSIS — Z01.818 PREOP TESTING: Primary | ICD-10-CM

## 2023-05-30 RX ORDER — DOXYCYCLINE HYCLATE 100 MG
100 TABLET ORAL 2 TIMES DAILY
Qty: 20 TABLET | Refills: 0 | Status: SHIPPED | OUTPATIENT
Start: 2023-05-30 | End: 2023-06-09

## 2023-06-05 ENCOUNTER — OFFICE VISIT (OUTPATIENT)
Dept: INTERNAL MEDICINE | Facility: CLINIC | Age: 63
End: 2023-06-05
Payer: COMMERCIAL

## 2023-06-05 ENCOUNTER — HOSPITAL ENCOUNTER (OUTPATIENT)
Dept: CARDIOLOGY | Facility: HOSPITAL | Age: 63
Discharge: HOME | End: 2023-06-05
Attending: ANESTHESIOLOGY
Payer: COMMERCIAL

## 2023-06-05 VITALS
TEMPERATURE: 97 F | DIASTOLIC BLOOD PRESSURE: 73 MMHG | OXYGEN SATURATION: 94 % | HEART RATE: 106 BPM | SYSTOLIC BLOOD PRESSURE: 127 MMHG

## 2023-06-05 DIAGNOSIS — G89.4 CHRONIC PAIN SYNDROME: ICD-10-CM

## 2023-06-05 DIAGNOSIS — E78.5 HYPERLIPIDEMIA, UNSPECIFIED HYPERLIPIDEMIA TYPE: ICD-10-CM

## 2023-06-05 DIAGNOSIS — M79.675 PAIN OF TOE OF LEFT FOOT: ICD-10-CM

## 2023-06-05 DIAGNOSIS — E11.49 DIABETES MELLITUS TYPE 2 WITH NEUROLOGICAL MANIFESTATIONS: ICD-10-CM

## 2023-06-05 DIAGNOSIS — Z01.818 PRE-OP EVALUATION: ICD-10-CM

## 2023-06-05 DIAGNOSIS — F32.A DEPRESSION, UNSPECIFIED DEPRESSION TYPE: ICD-10-CM

## 2023-06-05 DIAGNOSIS — I10 ESSENTIAL HYPERTENSION: ICD-10-CM

## 2023-06-05 DIAGNOSIS — Z01.818 PREOP TESTING: ICD-10-CM

## 2023-06-05 DIAGNOSIS — L03.032 CELLULITIS OF SECOND TOE, LEFT: ICD-10-CM

## 2023-06-05 DIAGNOSIS — S92.322S CLOSED DISPLACED FRACTURE OF SECOND METATARSAL BONE OF LEFT FOOT, SEQUELA: ICD-10-CM

## 2023-06-05 PROBLEM — G47.00 INSOMNIA: Status: RESOLVED | Noted: 2021-05-10 | Resolved: 2023-06-05

## 2023-06-05 PROCEDURE — 93010 EKG 12-LEAD: ICD-10-PCS | Mod: ,,, | Performed by: STUDENT IN AN ORGANIZED HEALTH CARE EDUCATION/TRAINING PROGRAM

## 2023-06-05 PROCEDURE — 99999 PR PBB SHADOW E&M-EST. PATIENT-LVL IV: CPT | Mod: PBBFAC,,,

## 2023-06-05 PROCEDURE — 99999 PR PBB SHADOW E&M-EST. PATIENT-LVL IV: ICD-10-PCS | Mod: PBBFAC,,,

## 2023-06-05 PROCEDURE — 93010 ELECTROCARDIOGRAM REPORT: CPT | Mod: ,,, | Performed by: STUDENT IN AN ORGANIZED HEALTH CARE EDUCATION/TRAINING PROGRAM

## 2023-06-05 PROCEDURE — 93005 ELECTROCARDIOGRAM TRACING: CPT

## 2023-06-05 NOTE — PROGRESS NOTES
Preoperative History and Physical                                                                     Chief Complaint: Preoperative evaluation     History of Present Illness:      Suellen Campos is a 63 y.o. female with a history of DM, HTN, chronic pain (followed by pain mgt) who presents to the office today for a preoperative consultation at the request of Dr. Stephen who plans on performing left 2nd toe amputation on June 8.     Functional Status:      The patient is able to climb a flight of stairs. The patient is able to ambulate without difficulty (at times does use rolling walker). The patient's functional status is not affected by the surgical problem. The patient's functional status is affected by shortness of breath, chest pain, dyspnea on exertion and fatigue.    MET score greater than 4    Past Medical History:      Past Medical History:   Diagnosis Date    Chronic pain     COVID-19     Depression     Diabetes mellitus     Diabetes mellitus, type 2     Hyperlipidemia     Hypertension     Neuropathy     Osteoarthritis     Spinal stenosis         Past Surgical History:      Past Surgical History:   Procedure Laterality Date    HYSTERECTOMY      INCISION AND DRAINAGE FOOT Left 5/9/2021    Procedure: INCISION AND DRAINAGE, FOOT;  Surgeon: Shelby Stephen DPM;  Location: [facility] OR;  Service: Podiatry;  Laterality: Left;  GREAT TOE OF LEFT FOOT    SPINAL CORD STIMULATOR IMPLANT      TOE AMPUTATION Left 5/9/2021    Procedure: AMPUTATION, TOE;  Surgeon: Shelby Stephen DPM;  Location: [facility] OR;  Service: Podiatry;  Laterality: Left;  LEFT GREAT     TONSILLECTOMY      TOTAL ABDOMINAL HYSTERECTOMY W/ BILATERAL SALPINGOOPHORECTOMY          Social History:      Social History     Socioeconomic History    Marital status:    Tobacco Use    Smoking status: Never    Smokeless tobacco: Never   Substance and Sexual Activity    Alcohol use: Never    Drug use: Never     Sexual activity: Yes     Partners: Male        Family History:      Family History   Problem Relation Age of Onset    Diabetes Mellitus Mother     Hypertension Mother     Pulmonary fibrosis Father     Hypertension Sister     Diabetes Sister     Pulmonary fibrosis Brother        Allergies:      Review of patient's allergies indicates:  No Known Allergies    Medications:      Current Outpatient Medications   Medication Sig    atorvastatin (LIPITOR) 10 MG tablet TAKE 1 TABLET BY MOUTH EVERY DAY AT NIGHT    doxycycline (VIBRA-TABS) 100 MG tablet Take 1 tablet (100 mg total) by mouth 2 (two) times daily. for 10 days    fish oil-omega-3 fatty acids 300-1,000 mg capsule Take 2 g by mouth once daily.    ibuprofen (ADVIL,MOTRIN) 200 MG tablet Take 200 mg by mouth.    JARDIANCE 10 mg tablet Take 10 mg by mouth once daily.    Lactobacillus rhamnosus GG (CULTURELLE) 10 billion cell capsule Take 1 capsule by mouth once daily.    metaxalone (SKELAXIN) 800 MG tablet Take 800 mg by mouth 3 (three) times daily as needed.    metformin (GLUMETZA) 500 MG (MOD) 24 hr tablet Take 1,000 mg by mouth 2 (two) times daily with meals.     morphine (MSIR) 30 MG tablet Take 60 mg by mouth every 12 (twelve) hours.    multivitamin capsule Take 1 capsule by mouth once daily.    oxycodone (ROXICODONE) 15 MG Tab Take 15 mg by mouth every 8 (eight) hours as needed.     promethazine-dextromethorphan (PROMETHAZINE-DM) 6.25-15 mg/5 mL Syrp promethazine-DM Take 5 ml (oral) every 4 hours PRN - Cough for 5 days will cause drowsiness [id number] syrup every 4 hours oral 5 days active 6.25-15 mg/5 mL    traMADoL (ULTRAM) 50 mg tablet Take 50 mg by mouth 3 (three) times daily as needed.    valsartan-hydrochlorothiazide (DIOVAN-HCT) 320-25 mg per tablet Take 1 tablet by mouth every evening.     venlafaxine (EFFEXOR-XR) 150 MG Cp24 Take 150 mg by mouth every evening.     zinc gluconate 50 mg tablet Take 50 mg by mouth.     No current facility-administered  medications for this visit.       Vitals:      Vitals:    06/05/23 1424   BP: 127/73   Pulse: 106   Temp: 97.3 °F (36.3 °C)       Review of Systems:        Constitutional: Negative for fever, chills, weight loss, malaise/fatigue and diaphoresis.   HENT: Negative for hearing loss, ear pain, nosebleeds, congestion, sore throat, neck pain, tinnitus and ear discharge.    Eyes: Negative for blurred vision, double vision, photophobia, pain, discharge and redness.   Respiratory: Negative for cough, hemoptysis, sputum production, shortness of breath, wheezing and stridor.    Cardiovascular: Negative for chest pain, palpitations, orthopnea, claudication, leg swelling and PND.   Gastrointestinal: Negative for heartburn, nausea, vomiting, abdominal pain, diarrhea, constipation, blood in stool and melena.   Genitourinary: Negative for dysuria, urgency, frequency, hematuria and flank pain.   Musculoskeletal: BLE neuropathy, chornic, unchanged Negative for myalgias, + chronic back/neck pain,  Skin: Negative for itching and rash.   Neurological: Negative for dizziness, tingling, tremors, sensory change, speech change, focal weakness, seizures, loss of consciousness, weakness and headaches.   Endo/Heme/Allergies: Negative for environmental allergies and polydipsia. Does not bruise/bleed easily.   Psychiatric/Behavioral: Negative for depression, suicidal ideas, hallucinations, memory loss and substance abuse. The patient is not nervous/anxious and does not have insomnia.    All 14 systems reviewed and negative except as noted above.    Physical Exam:      Constitutional: Appears well-developed, well-nourished and in no acute distress.  Patient is oriented to person, place, and time.   Head: Normocephalic and atraumatic. Mucous membranes moist.  Neck: Neck supple no mass.   Cardiovascular: Normal rate and regular rhythm.  S1 S2 appreciated by ascultation.  Pulmonary/Chest: Effort normal and clear to auscultation bilaterally. No  respiratory distress.   Abdomen: Soft. Non-tender and non-distended. Bowel sounds are normal.   Neurological: Patient is alert and oriented to person, place and time. Moves all extremities.  Skin: Warm and dry. No lesions.  Extremities: Left foot in boot d/t previous fracture (follows with Dr. Ferguson) No clubbing, cyanosis or edema.    Laboratory data:      Reviewed and noted in plan where applicable. Please see chart for full laboratory data.  Lab Results   Component Value Date    WBC 6.76 06/05/2023    HGB 13.4 06/05/2023    HCT 41.0 06/05/2023    MCV 91 06/05/2023     06/05/2023       Sodium   Date Value Ref Range Status   06/05/2023 139 136 - 145 mmol/L Final     Chloride   Date Value Ref Range Status   06/05/2023 96 95 - 110 mmol/L Final     CO2   Date Value Ref Range Status   06/05/2023 29 23 - 29 mmol/L Final     Glucose   Date Value Ref Range Status   06/05/2023 165 (H) 70 - 110 mg/dL Final     BUN   Date Value Ref Range Status   06/05/2023 20 8 - 23 mg/dL Final     Creatinine   Date Value Ref Range Status   06/05/2023 0.9 0.5 - 1.4 mg/dL Final     Calcium   Date Value Ref Range Status   06/05/2023 10.2 8.7 - 10.5 mg/dL Final     Total Protein   Date Value Ref Range Status   10/30/2021 7.4 6.0 - 8.4 g/dL Final     Albumin   Date Value Ref Range Status   10/30/2021 2.6 (L) 3.5 - 5.2 g/dL Final     Total Bilirubin   Date Value Ref Range Status   10/30/2021 0.1 0.1 - 1.0 mg/dL Final     Comment:     For infants and newborns, interpretation of results should be based  on gestational age, weight and in agreement with clinical  observations.    Premature Infant recommended reference ranges:  Up to 24 hours.............<8.0 mg/dL  Up to 48 hours............<12.0 mg/dL  3-5 days..................<15.0 mg/dL  6-29 days.................<15.0 mg/dL       Alkaline Phosphatase   Date Value Ref Range Status   10/30/2021 82 55 - 135 U/L Final     AST   Date Value Ref Range Status   10/30/2021 14 10 - 40 U/L Final      ALT   Date Value Ref Range Status   10/30/2021 16 10 - 44 U/L Final     Anion Gap   Date Value Ref Range Status   06/05/2023 14 8 - 16 mmol/L Final     eGFR   Date Value Ref Range Status   06/05/2023 >60 >60 mL/min/1.73 m^2 Final           Predictors of intubation difficulty:       Morbid obesity? no   Anatomically abnormal facies? no   Prominent incisors? no   Receding mandible? no   Short, thick neck? no   Neck range of motion: normal   Dentition: No chipped, loose, or missing teeth.  Based on the Modified Mallampati, patient is a mallampati score: I (soft palate, uvula, fauces, and tonsillar pillars visible)    Cardiographics:      ECG: normal sinus rhythm, no blocks or conduction defects, no ischemic changes  Echocardiogram: not indicated    Imaging:      Chest x-ray: not indicated    Assessment and Plan:      Pre-op evaluation  Known risk factors for perioperative complications: HTN, HLD, DM  Difficulty with intubation is not anticipated.    Cardiac Risk Estimation: Based on the Revised Cardiac Risk index, patient is a Class I risk with a 3.9% risk of a major cardiac event in a low risk procedure.    1.) Preoperative workup as follows: ECG, hemoglobin, hematocrit, electrolytes, creatinine, glucose.  2.) Change in medication regimen before surgery: discontinue ASA 6 days before surgery, discontinue NSAIDs 5 days before surgery, hold Metformin 24 hours prior to surgery.  3.) Prophylaxis for cardiac events with perioperative beta-blockers: not indicated.  4.) Invasive hemodynamic monitoring perioperatively: not indicated.  5.) Deep vein thrombosis prophylaxis postoperatively: intermittent pneumatic compression boots and regimen to be chosen by surgical team.  6.) Surveillance for postoperative MI with ECG immediately postoperatively and on postoperati ve days 1 and 2 AND troponin levels 24 hours postoperatively and on day 4 or hospital discharge (whichever comes first): not indicated.  7.) Current medications  which may produce withdrawal symptoms if withheld perioperatively: none  8.) Other measures: Postoperative hypertension management with IV hydralazine until able to take oral medications.  Postoperative incentive spirometry to prevent pneumonia.    Cellulitis of second toe, left   Closed displaced fracture of second metatarsal bone of left foot, sequela  - hx of left great toe amputation  - on doxy per podiatry for left toe infection/cellulitis  - planning on left second toe amputation per Dr. Stephen on 6/8    Chronic pain syndrome  - spinal stenosis, OA and chronic neck/back pain   - followed by pain mgt, Dr. Shaikh  - s/p spinal cord stimulator (battery changed approx 7 months ago)  - on MSIR 30mg BID and Essie 15 q 8 prn     Depression  - continue Effexor nightly     Essential hypertension  - continue Diovan nightly   - bp controled    Diabetes mellitus type 2 with neurological manifestations  - A1C in 10/2022 6.3  - home regimen includes jardiance and metformin; hold metformin 24 hours prior to sx      HLD (hyperlipidemia)  - continue statin nightly         Electronically signed by Charlotte Domingo MSN, FNP-C on 6/5/2023 at 2:47 PM.

## 2023-06-05 NOTE — PRE ADMISSION SCREENING
Pre op instructions reviewed with patient during Clinic Visit with Provider, verbalized understanding.    To confirm, Surgery is scheduled on 6/08/23. We will call you late afternoon the business day prior to surgery with your arrival time.    *Please report to the [facility] Lobby (1st Floor) located off of [facility] (2nd Entrance/Building on the left, in front of the flag pole).  Address: [address] [place] [address] [place] [address]      INSTRUCTIONS IMPORTANT!!!  Do Not Eat, Drink, or Smoke after 12 midnight unless instructed otherwise by your Surgeon. OK to brush teeth, no gum, candy or mints!      *Take Only these medications with a small sip of water Morning of Surgery as instructed by Charlotte Domingo NP:  Morphine      HOLD THE FOLLOWING MEDICATIONS, PRIOR TO SURGERY, STARTING TODAY, 6/05/23:  - FISH OIL  - IBUPROFEN  - MULTI-VITAMIN  - ZINC    Take your last Metformin, prior to surgery, 6/6/23    ____  HOLD all vitamins, herbal supplements, aspirin products & NSAIDS 7 days prior to surgery, as these can thin the blood.  ____  Avoid Alcoholic beverages 3 days prior to surgery, as it can thin the blood.  ____  NO Acrylic/fake nails or nail polish worn day of surgery (specifically hand/arm & foot surgeries).  ____  NO powder, lotions, deodorants, oils or cream on body.  ____  Remove all jewelry & piercings prior to surgery.  ____  Remove Dentures, Hearing Aids & Contact Lens prior to surgery.  ____  Bring photo ID and insurance information to hospital (Leave Valuables at Home).  ____  If going home the same day, arrange for a ride home. You will not be able to drive for 24 hrs if Anesthesia was used.   ____  Females (ages 11-60): may need to give a urine sample the morning of surgery; please see Pre op Nurse prior to using the restroom.  ____  Wear clean, loose fitting clothing to allow for dressings/ bandages.            Diabetic Patients: If you take diabetic medication, do NOT take morning of  surgery unless instructed by Doctor. Metformin to be stopped 24 hrs prior to surgery time. DO NOT take long-acting insulin the evening before surgery. Blood sugars will be checked in pre-op by Nurse.    Bathing Instructions:    -Shower with anti-bacterial Soap (Hibiclens or Dial) the night before surgery and the morning of.   -Do not use Hibiclens on your face or genitals.   -Apply clean clothes after shower.  -Do not shave your face or body 2 days prior to surgery unless instructed otherwise by your Surgeon.  -Do not shave pubic hair 7 days prior to surgery (gyn pt's).    [facility]/Ride Policy:  Only 2 adults allowed (over the age of 18) to accompany you to the Hospital. You Must have a ride home from a responsible adult that you know and trust. Medical Transport, Uber or Lyft can only be used if patient has a responsible adult to accompany them during ride home.    Discharge Instructions: You will receive Post-op/Discharge instructions by your Discharge Nurse prior to going home. Please call your Surgeon's office with any post-surgery questions/concerns @ [phone].    *If you are running late or have questions the morning of surgery, please call the Surgery Dept @ [phone].  *Insurance/ Financial Questions, please call [phone].  *If you need to Cancel/Reschedule Surgery, please call Surgeon office @ [phone].      Thank you,  -[facility] Pre Admit Dept.  [phone]   or [phone]  M-F 7:30 am-4:00 pm (Closed Major Holidays)    Additional Testing Scheduled Today:  Labs (1st Floor) Check in at the !

## 2023-06-05 NOTE — ASSESSMENT & PLAN NOTE
- spinal stenosis, OA and chronic neck/back pain   - followed by pain mgt, Dr. Shaikh  - s/p spinal cord stimulator (battery changed approx 7 months ago)  - on MSIR 30mg BID and Essie 15 q 8 prn

## 2023-06-05 NOTE — ASSESSMENT & PLAN NOTE
Known risk factors for perioperative complications: HTN, HLD,   Difficulty with intubation is not anticipated.    Cardiac Risk Estimation: Based on the Revised Cardiac Risk index, patient is a Class risk with a % risk of a major cardiac event in a low risk procedure.    1.) Preoperative workup as follows: ECG, hemoglobin, hematocrit, electrolytes, creatinine, glucose.  2.) Change in medication regimen before surgery: discontinue ASA 6 days before surgery, discontinue NSAIDs 5 days before surgery, hold Metformin 24 hours prior to surgery.  3.) Prophylaxis for cardiac events with perioperative beta-blockers: not indicated.  4.) Invasive hemodynamic monitoring perioperatively: not indicated.  5.) Deep vein thrombosis prophylaxis postoperatively: intermittent pneumatic compression boots and regimen to be chosen by surgical team.  6.) Surveillance for postoperative MI with ECG immediately postoperatively and on postoperati ve days 1 and 2 AND troponin levels 24 hours postoperatively and on day 4 or hospital discharge (whichever comes first): not indicated.  7.) Current medications which may produce withdrawal symptoms if withheld perioperatively: none  8.) Other measures: Postoperative hypertension management with IV hydralazine until able to take oral medications.  Postoperative incentive spirometry to prevent pneumonia.

## 2023-06-05 NOTE — DISCHARGE INSTRUCTIONS
Pre op instructions reviewed with patient during Clinic Visit with Provider, verbalized understanding.    To confirm, Surgery is scheduled on 6/08/23. We will call you late afternoon the business day prior to surgery with your arrival time.    *Please report to the [facility] (1st Floor) located off of [facility] (2nd Entrance/Building on the left, in front of the flag pole).  Address: [address] [place] [address] [place]. [address]      INSTRUCTIONS IMPORTANT!!!  Do Not Eat, Drink, or Smoke after 12 midnight unless instructed otherwise by your Surgeon. OK to brush teeth, no gum, candy or mints!      *Take Only these medications with a small sip of water Morning of Surgery as instructed by Charlotte Domingo NP:  Morphine      HOLD THE FOLLOWING MEDICATIONS, PRIOR TO SURGERY, STARTING TODAY, 6/05/23:  - FISH OIL  - IBUPROFEN  - MULTI-VITAMIN  - ZINC    Take your last Metformin, prior to surgery, 6/6/23    ____  HOLD all vitamins, herbal supplements, aspirin products & NSAIDS 7 days prior to surgery, as these can thin the blood.  ____  Avoid Alcoholic beverages 3 days prior to surgery, as it can thin the blood.  ____  NO Acrylic/fake nails or nail polish worn day of surgery (specifically hand/arm & foot surgeries).  ____  NO powder, lotions, deodorants, oils or cream on body.  ____  Remove all jewelry & piercings prior to surgery.  ____  Remove Dentures, Hearing Aids & Contact Lens prior to surgery.  ____  Bring photo ID and insurance information to hospital (Leave Valuables at Home).  ____  If going home the same day, arrange for a ride home. You will not be able to drive for 24 hrs if Anesthesia was used.   ____  Females (ages 11-60): may need to give a urine sample the morning of surgery; please see Pre op Nurse prior to using the restroom.  ____  Wear clean, loose fitting clothing to allow for dressings/ bandages.            Diabetic Patients: If you take diabetic medication, do NOT take morning of  surgery unless instructed by Doctor. Metformin to be stopped 24 hrs prior to surgery time. DO NOT take long-acting insulin the evening before surgery. Blood sugars will be checked in pre-op by Nurse.    Bathing Instructions:    -Shower with anti-bacterial Soap (Hibiclens or Dial) the night before surgery and the morning of.   -Do not use Hibiclens on your face or genitals.   -Apply clean clothes after shower.  -Do not shave your face or body 2 days prior to surgery unless instructed otherwise by your Surgeon.  -Do not shave pubic hair 7 days prior to surgery (gyn pt's).    [facility]/Ride Policy:  Only 2 adults allowed (over the age of 18) to accompany you to the Hospital. You Must have a ride home from a responsible adult that you know and trust. Medical Transport, Uber or Lyft can only be used if patient has a responsible adult to accompany them during ride home.    Discharge Instructions: You will receive Post-op/Discharge instructions by your Discharge Nurse prior to going home. Please call your Surgeon's office with any post-surgery questions/concerns @ [phone].    *If you are running late or have questions the morning of surgery, please call the Surgery Dept @ [phone].  *Insurance/ Financial Questions, please call [phone].  *If you need to Cancel/Reschedule Surgery, please call Surgeon office @ [phone].      Thank you,  -[facility] Pre [facility].  [phone]   or [phone]  M-F 7:30 am-4:00 pm (Closed Major Holidays)    Additional Testing Scheduled Today:  Labs (1st Floor) Check in at the !

## 2023-06-05 NOTE — ASSESSMENT & PLAN NOTE
- A1C in 10/2022 6.3  - home regimen includes jardiance and metformin; hold metformin 24 hours prior to sx

## 2023-06-07 ENCOUNTER — TELEPHONE (OUTPATIENT)
Dept: PREADMISSION TESTING | Facility: HOSPITAL | Age: 63
End: 2023-06-07
Payer: COMMERCIAL

## 2023-06-07 NOTE — TELEPHONE ENCOUNTER
Called and LVM for pt about the following:     Please arrive to [facility] ([facility]) at 1130am on 6/8/23 for your scheduled procedure.  Address: [address] [place] [address] [place]. [address] (2nd Building on left, 1st Floor Lobby)  >>>NO eating or drinking after midnight unless instructed otherwise by your Surgeon<<<    Thank you,  -[facility] Pre [facility] Testing Dept.  Mon-Fri 8 am - 4 pm [phone]

## 2023-06-08 ENCOUNTER — HOSPITAL ENCOUNTER (OUTPATIENT)
Facility: HOSPITAL | Age: 63
Discharge: HOME | End: 2023-06-08
Attending: PODIATRIST | Admitting: PODIATRIST
Payer: COMMERCIAL

## 2023-06-08 ENCOUNTER — ANESTHESIA (OUTPATIENT)
Dept: SURGERY | Facility: HOSPITAL | Age: 63
End: 2023-06-08
Payer: COMMERCIAL

## 2023-06-08 ENCOUNTER — ANESTHESIA EVENT (OUTPATIENT)
Dept: SURGERY | Facility: HOSPITAL | Age: 63
End: 2023-06-08
Payer: COMMERCIAL

## 2023-06-08 DIAGNOSIS — M79.675 PAIN OF TOE OF LEFT FOOT: ICD-10-CM

## 2023-06-08 DIAGNOSIS — S92.322A CLOSED DISPLACED FRACTURE OF SECOND METATARSAL BONE OF LEFT FOOT, INITIAL ENCOUNTER: Primary | ICD-10-CM

## 2023-06-08 DIAGNOSIS — L03.032 CELLULITIS OF SECOND TOE, LEFT: ICD-10-CM

## 2023-06-08 LAB
POCT GLUCOSE: 115 MG/DL (ref 70–110)
POCT GLUCOSE: 148 MG/DL (ref 70–110)

## 2023-06-08 PROCEDURE — 36000707: Performed by: PODIATRIST

## 2023-06-08 PROCEDURE — 71000033 HC RECOVERY, INTIAL HOUR: Performed by: PODIATRIST

## 2023-06-08 PROCEDURE — 28820 PR AMPUTATION TOE,MT-P JT: ICD-10-PCS | Mod: T1,,, | Performed by: PODIATRIST

## 2023-06-08 PROCEDURE — 88311 PR  DECALCIFY TISSUE: ICD-10-PCS | Mod: 26,,, | Performed by: PATHOLOGY

## 2023-06-08 PROCEDURE — 88305 TISSUE EXAM BY PATHOLOGIST: CPT | Performed by: PATHOLOGY

## 2023-06-08 PROCEDURE — 36000706: Performed by: PODIATRIST

## 2023-06-08 PROCEDURE — 37000009 HC ANESTHESIA EA ADD 15 MINS: Performed by: PODIATRIST

## 2023-06-08 PROCEDURE — 63600175 PHARM REV CODE 636 W HCPCS

## 2023-06-08 PROCEDURE — 25000003 PHARM REV CODE 250: Performed by: PODIATRIST

## 2023-06-08 PROCEDURE — 37000008 HC ANESTHESIA 1ST 15 MINUTES: Performed by: PODIATRIST

## 2023-06-08 PROCEDURE — 28820 AMPUTATION OF TOE: CPT | Mod: T1,,, | Performed by: PODIATRIST

## 2023-06-08 PROCEDURE — 88305 TISSUE EXAM BY PATHOLOGIST: CPT | Mod: 26,,, | Performed by: PATHOLOGY

## 2023-06-08 PROCEDURE — 88311 DECALCIFY TISSUE: CPT | Performed by: PATHOLOGY

## 2023-06-08 PROCEDURE — 88311 DECALCIFY TISSUE: CPT | Mod: 26,,, | Performed by: PATHOLOGY

## 2023-06-08 PROCEDURE — 88305 TISSUE EXAM BY PATHOLOGIST: ICD-10-PCS | Mod: 26,,, | Performed by: PATHOLOGY

## 2023-06-08 PROCEDURE — 25000003 PHARM REV CODE 250

## 2023-06-08 PROCEDURE — 71000015 HC POSTOP RECOV 1ST HR: Performed by: PODIATRIST

## 2023-06-08 RX ORDER — KETAMINE HCL IN 0.9 % NACL 50 MG/5 ML
SYRINGE (ML) INTRAVENOUS
Status: DISCONTINUED | OUTPATIENT
Start: 2023-06-08 | End: 2023-06-08

## 2023-06-08 RX ORDER — HYDROMORPHONE HYDROCHLORIDE 2 MG/ML
0.2 INJECTION, SOLUTION INTRAMUSCULAR; INTRAVENOUS; SUBCUTANEOUS EVERY 5 MIN PRN
Status: DISCONTINUED | OUTPATIENT
Start: 2023-06-08 | End: 2023-06-08 | Stop reason: HOSPADM

## 2023-06-08 RX ORDER — MIDAZOLAM HYDROCHLORIDE 1 MG/ML
INJECTION INTRAMUSCULAR; INTRAVENOUS
Status: DISCONTINUED | OUTPATIENT
Start: 2023-06-08 | End: 2023-06-08

## 2023-06-08 RX ORDER — CEFAZOLIN SODIUM 1 G/3ML
INJECTION, POWDER, FOR SOLUTION INTRAMUSCULAR; INTRAVENOUS
Status: DISCONTINUED | OUTPATIENT
Start: 2023-06-08 | End: 2023-06-08

## 2023-06-08 RX ORDER — PROPOFOL 10 MG/ML
VIAL (ML) INTRAVENOUS
Status: DISCONTINUED | OUTPATIENT
Start: 2023-06-08 | End: 2023-06-08

## 2023-06-08 RX ORDER — BUPIVACAINE HYDROCHLORIDE 5 MG/ML
INJECTION, SOLUTION EPIDURAL; INTRACAUDAL
Status: DISCONTINUED | OUTPATIENT
Start: 2023-06-08 | End: 2023-06-08 | Stop reason: HOSPADM

## 2023-06-08 RX ORDER — OXYCODONE AND ACETAMINOPHEN 5; 325 MG/1; MG/1
1 TABLET ORAL
Status: DISCONTINUED | OUTPATIENT
Start: 2023-06-08 | End: 2023-06-08 | Stop reason: HOSPADM

## 2023-06-08 RX ORDER — ONDANSETRON 2 MG/ML
4 INJECTION INTRAMUSCULAR; INTRAVENOUS DAILY PRN
Status: DISCONTINUED | OUTPATIENT
Start: 2023-06-08 | End: 2023-06-08 | Stop reason: HOSPADM

## 2023-06-08 RX ORDER — FENTANYL CITRATE 50 UG/ML
INJECTION, SOLUTION INTRAMUSCULAR; INTRAVENOUS
Status: DISCONTINUED | OUTPATIENT
Start: 2023-06-08 | End: 2023-06-08

## 2023-06-08 RX ADMIN — Medication 25 MG: at 01:06

## 2023-06-08 RX ADMIN — PROPOFOL 50 MG: 10 INJECTION, EMULSION INTRAVENOUS at 01:06

## 2023-06-08 RX ADMIN — SODIUM CHLORIDE, POTASSIUM CHLORIDE, SODIUM LACTATE AND CALCIUM CHLORIDE: 600; 310; 30; 20 INJECTION, SOLUTION INTRAVENOUS at 01:06

## 2023-06-08 RX ADMIN — CEFAZOLIN 2 G: 330 INJECTION, POWDER, FOR SOLUTION INTRAMUSCULAR; INTRAVENOUS at 01:06

## 2023-06-08 RX ADMIN — MIDAZOLAM HYDROCHLORIDE 2 MG: 1 INJECTION, SOLUTION INTRAMUSCULAR; INTRAVENOUS at 01:06

## 2023-06-08 RX ADMIN — FENTANYL CITRATE 100 MCG: 50 INJECTION, SOLUTION INTRAMUSCULAR; INTRAVENOUS at 01:06

## 2023-06-08 NOTE — OP NOTE
[facility]  Podiatric Medicine & Surgery  Operative Report    SUMMARY     Date of Procedure: 6/8/2023    Surgeon(s) and Role: Surgeon(s) and Role:     * Shelby Stephen DPM - Primary    Pre-Operative Diagnosis: Pre-Op Diagnosis Codes:     * Left foot pain [M79.672]     * Cellulitis of second toe, left [L03.032]     * Closed displaced fracture of second metatarsal bone of left foot, sequela [S92.322S]    Post-Operative Diagnosis: Post-Op Diagnosis Codes:     * Left foot pain [M79.672]     * Cellulitis of second toe, left [L03.032]     * Closed displaced fracture of second metatarsal bone of left foot, sequela [S92.322S]    Planned Procedure: Procedure(s):  AMPUTATION, TOE    Technical Procedures Used:   1. Amputation of left 2nd toe at metatarsophalangeal joint    Anesthesia: Local MAC    Hemostasis: Well padded tourniquet inflated to 250 mmHg applied to the left ankle   Estimated Blood Loss (EBL): Minimal    Drains:  None    Specimens: * No specimens in log *    Implants: * No implants in log *    Complications: * No complications entered in OR log *      Description of the Findings of the Procedure: The patient was seen in the Holding Room. The risks, benefits, complications, treatment options, and expected outcomes were discussed with the patient. The risks and potential complications of their problem and purposed treatment include but are not limited to infection, nerve injury, vascular injury, nonunion/malunion/delayed union of the surgical site, persistent pain, potential skin necrosis, deep vein thrombosis, possible pulmonary embolus, complications of the anesthetics and failure of the implant.  The patient concurred with the proposed plan, giving informed consent. The patient is aware that the procedure may be a part of a staged collection of procedures for definitive cure and/or alleviation of symptoms. The site of surgery properly noted/marked. Preoperative intravenous  antibiotics are hanging at bedside, and are currently being administered via the heparin lock. The patient was taken to Operating Suite.    Once in the operative suite, the patient is transferred onto the operative table in the supine position. A TIME-OUT is taken to identify the proper patient, procedure to be performed, and laterality.  The patient is properly positioned on the operating room table for ease of dissection and for any ancillary imaging.  A well-padded tourniquet was applied to the left ankle.  Nursing and ancillary OR staff prepared the patient for the procedure. The patient is adequately sedated by the Attending Anesthesiologist and/or covering CRNA.  Next, the operative limb was rendered sterile using chlorhexidine paint and scrub. Another TIME-OUT is taken as per protocol to identify the proper patient, correct extremity, and procedure to be performed. Once this coincided with all members of the team, the extremity was scrubbed, prepped, and draped in a sterile aseptic fashion.    The tourniquet is elevated to 250 mmHg after the limb is exsanguinated for approximately 2 min with an Esmarch bandage.    Ensure was directed to the left 2nd toe where hammertoe deformity was noted with a small ulceration which measured approximately 0.2 x 0.3 cm circumferentially.  The 2nd toe was infiltrated with 10 cc 0.5% Marcaine plain to locally anesthetize the area.  An inverted racquet incision was made at the metatarsophalangeal joint of the left foot.  Incision was blade.  The dissection was then carried down to the level of the underlying 2nd metatarsophalangeal joint.  The 2nd toe was disarticulated at the metatarsophalangeal joint and removed in full.  The 2nd metatarsal head, which does have a history of a fracture, appears to be healing well without any bony fragmented pieces.  It was decided to leave this bone in place and allow for continued healing.  The areas flushed copious amounts normal saline.   The subcutaneous tissue closed with 3-0 Vicryl suture the skin closed with 3-0 nylon.  Xeroform, fluffs, soft dressing was applied to left lower extremity.  Patient was replaced in a walking boot as she does have a history of ankle fracture to the left ankle.    The anesthesia is weaned. There were no complications to this procedure. Any final necessary imaging to be performed in the PACU if it was not performed here in the OR suite.  The patient is transferred to the [facility] bed/stretcher, [facility]. The patient is transferred to the PACU.    Condition: stable    Disposition: PACU - hemodynamically stable.    Attestation: I performed the procedure.

## 2023-06-08 NOTE — BRIEF OP NOTE
[facility] - Surgery (Hospital)  Brief Operative Note    Surgery Date: 6/8/2023     Surgeon(s) and Role:     * Shelby Parker DPM - Primary    Assisting Surgeon: None    Pre-op Diagnosis:  Left foot pain [M79.672]  Cellulitis of second toe, left [L03.032]  Closed displaced fracture of second metatarsal bone of left foot, sequela [S92.322S]    Post-op Diagnosis:  Post-Op Diagnosis Codes:     * Left foot pain [M79.672]     * Cellulitis of second toe, left [L03.032]     * Closed displaced fracture of second metatarsal bone of left foot, sequela [S92.322S]    Procedure(s) (LRB):  AMPUTATION, TOE (Left)    Anesthesia: Local MAC    Operative Findings: ulcer to the dorsal left 2nd toe with hammertoe deformity    Estimated Blood Loss: * No values recorded between 6/8/2023  1:21 PM and 6/8/2023  1:38 PM *         Specimens:   Specimen (24h ago, onward)       Start     Ordered    06/08/23 1333  Specimen to Pathology, Surgery Other (Podiatry)  Once        Comments: Pre-op Diagnosis: Left foot pain [M79.672]Cellulitis of second toe, left [L03.032]Closed displaced fracture of second metatarsal bone of left foot, sequela [S92.322S]Procedure(s):AMPUTATION, TOE Number of specimens: 1Name of specimens: 1. Left Second Toe (PERM)     References:    Click here for ordering Quick Tip   Question Answer Comment   Procedure Type: Other Podiatry   Specimen Class: Routine/Screening    Which provider would you like to cc? SHELBY PARKER    Release to patient Immediate        06/08/23 1332                      Discharge Note    OUTCOME: Patient tolerated treatment/procedure well without complication and is now ready for discharge.    DISPOSITION: Home or Self Care    FINAL DIAGNOSIS:    Ulceration of the left 2nd toe.    Hammertoe deformity left 2nd toe.  Type 2 diabetes mellitus with peripheral polyneuropathy.    History of ulceration.    History of left great toe amputation    FOLLOWUP: In clinic    DISCHARGE INSTRUCTIONS:    Discharge  Procedure Orders   X-Ray Foot 2 View Left   Standing Status: Future Standing Exp. Date: 06/08/24     Order Specific Question Answer Comments   May the Radiologist modify the order per protocol to meet the clinical needs of the patient? Yes    Release to patient Immediate      Keep surgical extremity elevated     Ice to affected area     Notify your health care provider if you experience any of the following:  temperature >100.4     Notify your health care provider if you experience any of the following:  persistent nausea and vomiting or diarrhea     Notify your health care provider if you experience any of the following:  severe uncontrolled pain     Notify your health care provider if you experience any of the following:  redness, tenderness, or signs of infection (pain, swelling, redness, odor or green/yellow discharge around incision site)     Notify your health care provider if you experience any of the following:  difficulty breathing or increased cough     Notify your health care provider if you experience any of the following:  severe persistent headache     Notify your health care provider if you experience any of the following:  worsening rash     Notify your health care provider if you experience any of the following:  persistent dizziness, light-headedness, or visual disturbances     Notify your health care provider if you experience any of the following:  increased confusion or weakness     Leave dressing on - Keep it clean, dry, and intact until clinic visit     Weight bearing restrictions (specify):   Order Comments: WB as tolerated in post op shoe

## 2023-06-08 NOTE — ANESTHESIA PREPROCEDURE EVALUATION
06/08/2023  Suellen Campos is a 63 y.o., female.    Patient Active Problem List   Diagnosis    Cellulitis of left foot    Diabetes mellitus type 2 with neurological manifestations    Chronic pain syndrome    Essential hypertension    Depression    HLD (hyperlipidemia)    History of amputation of left great toe    Diabetic ulcer of right great toe    Pneumonia, post-viral    Acute renal failure    Pre-op evaluation    Pain of toe of left foot    Closed displaced fracture of second metatarsal bone of left foot    Cellulitis of second toe, left     Past Surgical History:   Procedure Laterality Date    HYSTERECTOMY      INCISION AND DRAINAGE FOOT Left 5/9/2021    Procedure: INCISION AND DRAINAGE, FOOT;  Surgeon: Shelby Stephen DPM;  Location: [facility] OR;  Service: Podiatry;  Laterality: Left;  GREAT TOE OF LEFT FOOT    SPINAL CORD STIMULATOR IMPLANT      TOE AMPUTATION Left 5/9/2021    Procedure: AMPUTATION, TOE;  Surgeon: Shelby Stephen DPM;  Location: [facility] OR;  Service: Podiatry;  Laterality: Left;  LEFT GREAT     TONSILLECTOMY      TOTAL ABDOMINAL HYSTERECTOMY W/ BILATERAL SALPINGOOPHORECTOMY         Pre-op Assessment    I have reviewed the Patient Summary Reports.    I have reviewed the NPO Status.   I have reviewed the Medications.     Review of Systems  Anesthesia Hx:  No problems with previous Anesthesia    Social:  Non-Smoker    Hematology/Oncology:  Hematology Normal        Cardiovascular:   hyperlipidemia ECG has been reviewed.    Renal/:  Renal/ Normal     Hepatic/GI:  Hepatic/GI Normal    Musculoskeletal:   Arthritis     Neurological:  Neurology Normal Neuropathy  Spinal stenosis   Endocrine:   Diabetes        Physical Exam  General: Well nourished    Airway:  Mallampati: II   Mouth Opening: Normal  TM Distance: Normal  Neck ROM: Normal  ROM    Dental:  Intact        Anesthesia Plan  Type of Anesthesia, risks & benefits discussed:    Anesthesia Type: MAC  Intra-op Monitoring Plan: Standard ASA Monitors  Post Op Pain Control Plan: multimodal analgesia  Induction:  IV  Airway Plan: , Post-Induction  Informed Consent: Informed consent signed with the Patient and all parties understand the risks and agree with anesthesia plan.  All questions answered.   ASA Score: 2    Ready For Surgery From Anesthesia Perspective.     .    Chemistry        Component Value Date/Time     06/05/2023 1451    K 4.1 06/05/2023 1451    CL 96 06/05/2023 1451    CO2 29 06/05/2023 1451    BUN 20 06/05/2023 1451    CREATININE 0.9 06/05/2023 1451     (H) 06/05/2023 1451        Component Value Date/Time    CALCIUM 10.2 06/05/2023 1451    ALKPHOS 82 10/30/2021 0731    AST 14 10/30/2021 0731    ALT 16 10/30/2021 0731    BILITOT 0.1 10/30/2021 0731    ESTGFRAFRICA >60 10/30/2021 0731    EGFRNONAA >60 10/30/2021 0731        Lab Results   Component Value Date    WBC 6.76 06/05/2023    HGB 13.4 06/05/2023    HCT 41.0 06/05/2023    MCV 91 06/05/2023     06/05/2023

## 2023-06-08 NOTE — TRANSFER OF CARE
"Anesthesia Transfer of Care Note    Patient: Suellen Campos    Procedure(s) Performed: Procedure(s) (LRB):  AMPUTATION, TOE (Left)    Patient location: PACU    Anesthesia Type: MAC    Transport from OR: Transported from OR on room air with adequate spontaneous ventilation    Post pain: adequate analgesia    Post assessment: no apparent anesthetic complications and tolerated procedure well    Post vital signs: stable    Level of consciousness: awake and alert    Nausea/Vomiting: no nausea/vomiting    Complications: none    Transfer of care protocol was followed      Last vitals:   Visit Vitals  BP (!) 150/70 (BP Location: Right arm, Patient Position: Sitting)   Pulse 86   Temp 37.2 °C (99 °F) (Temporal)   Resp 18   Ht 5' 7" (1.702 m)   SpO2 99%   Breastfeeding No   BMI 26.00 kg/m²     "

## 2023-06-08 NOTE — ANESTHESIA POSTPROCEDURE EVALUATION
Anesthesia Post Evaluation    Patient: Suellen Campos    Procedure(s) Performed: Procedure(s) (LRB):  AMPUTATION, TOE (Left)    Final Anesthesia Type: MAC      Patient location during evaluation: PACU  Patient participation: Yes- Able to Participate  Level of consciousness: awake and alert  Post-procedure vital signs: reviewed and stable  Airway patency: patent      Anesthetic complications: no      Cardiovascular status: blood pressure returned to baseline  Respiratory status: unassisted and spontaneous ventilation  Hydration status: euvolemic  Follow-up not needed.          Vitals Value Taken Time   /68 06/08/23 1414   Temp 36.2 °C (97.2 °F) 06/08/23 1340   Pulse 75 06/08/23 1417   Resp 43 06/08/23 1416   SpO2 95 % 06/08/23 1417   Vitals shown include unvalidated device data.      Event Time   Out of Recovery 14:19:04         Pain/Ambrocio Score: Ambrocio Score: 10 (6/8/2023  2:15 PM)

## 2023-06-08 NOTE — DISCHARGE SUMMARY
[facility] - Surgery (Hospital)  Discharge Note  Short Stay    Procedure(s) (LRB):  AMPUTATION, TOE (Left)      OUTCOME: Patient tolerated treatment/procedure well without complication and is now ready for discharge.    DISPOSITION: Home or Self Care    FINAL DIAGNOSIS:    Ulceration of the left 2nd toe.    Hammertoe deformity left 2nd toe.  Type 2 diabetes mellitus with peripheral polyneuropathy.    History of ulceration.    History of left great toe amputation    FOLLOWUP: In clinic    DISCHARGE INSTRUCTIONS:    Discharge Procedure Orders   X-Ray Foot 2 View Left   Standing Status: Future Standing Exp. Date: 06/08/24     Order Specific Question Answer Comments   May the Radiologist modify the order per protocol to meet the clinical needs of the patient? Yes    Release to patient Immediate      Keep surgical extremity elevated     Ice to affected area     Notify your health care provider if you experience any of the following:  temperature >100.4     Notify your health care provider if you experience any of the following:  persistent nausea and vomiting or diarrhea     Notify your health care provider if you experience any of the following:  severe uncontrolled pain     Notify your health care provider if you experience any of the following:  redness, tenderness, or signs of infection (pain, swelling, redness, odor or green/yellow discharge around incision site)     Notify your health care provider if you experience any of the following:  difficulty breathing or increased cough     Notify your health care provider if you experience any of the following:  severe persistent headache     Notify your health care provider if you experience any of the following:  worsening rash     Notify your health care provider if you experience any of the following:  persistent dizziness, light-headedness, or visual disturbances     Notify your health care provider if you experience any of the following:  increased confusion or weakness      Leave dressing on - Keep it clean, dry, and intact until clinic visit     Weight bearing restrictions (specify):   Order Comments: WB as tolerated in post op shoe

## 2023-06-12 VITALS
DIASTOLIC BLOOD PRESSURE: 66 MMHG | OXYGEN SATURATION: 98 % | BODY MASS INDEX: 26 KG/M2 | HEIGHT: 67 IN | TEMPERATURE: 97 F | HEART RATE: 79 BPM | RESPIRATION RATE: 23 BRPM | SYSTOLIC BLOOD PRESSURE: 129 MMHG

## 2023-06-13 LAB
FINAL PATHOLOGIC DIAGNOSIS: NORMAL
GROSS: NORMAL
Lab: NORMAL

## 2023-06-15 ENCOUNTER — OFFICE VISIT (OUTPATIENT)
Dept: PODIATRY | Facility: CLINIC | Age: 63
End: 2023-06-15
Payer: COMMERCIAL

## 2023-06-15 DIAGNOSIS — Z86.31 HISTORY OF DIABETIC ULCER OF FOOT: ICD-10-CM

## 2023-06-15 DIAGNOSIS — Z89.422 H/O AMPUTATION OF LESSER TOE, LEFT: ICD-10-CM

## 2023-06-15 DIAGNOSIS — E11.49 DIABETES MELLITUS TYPE 2 WITH NEUROLOGICAL MANIFESTATIONS: Primary | ICD-10-CM

## 2023-06-15 DIAGNOSIS — Z89.412 HISTORY OF AMPUTATION OF LEFT GREAT TOE: ICD-10-CM

## 2023-06-15 DIAGNOSIS — Z09 FOLLOW-UP EXAMINATION FOLLOWING SURGERY: ICD-10-CM

## 2023-06-15 PROCEDURE — 1160F RVW MEDS BY RX/DR IN RCRD: CPT | Mod: CPTII,S$GLB,, | Performed by: PODIATRIST

## 2023-06-15 PROCEDURE — 99999 PR PBB SHADOW E&M-EST. PATIENT-LVL II: CPT | Mod: PBBFAC,,, | Performed by: PODIATRIST

## 2023-06-15 PROCEDURE — 1160F PR REVIEW ALL MEDS BY PRESCRIBER/CLIN PHARMACIST DOCUMENTED: ICD-10-PCS | Mod: CPTII,S$GLB,, | Performed by: PODIATRIST

## 2023-06-15 PROCEDURE — 99213 PR OFFICE/OUTPT VISIT, EST, LEVL III, 20-29 MIN: ICD-10-PCS | Mod: S$GLB,,, | Performed by: PODIATRIST

## 2023-06-15 PROCEDURE — 1159F MED LIST DOCD IN RCRD: CPT | Mod: CPTII,S$GLB,, | Performed by: PODIATRIST

## 2023-06-15 PROCEDURE — 99999 PR PBB SHADOW E&M-EST. PATIENT-LVL II: ICD-10-PCS | Mod: PBBFAC,,, | Performed by: PODIATRIST

## 2023-06-15 PROCEDURE — 1159F PR MEDICATION LIST DOCUMENTED IN MEDICAL RECORD: ICD-10-PCS | Mod: CPTII,S$GLB,, | Performed by: PODIATRIST

## 2023-06-15 PROCEDURE — 99213 OFFICE O/P EST LOW 20 MIN: CPT | Mod: S$GLB,,, | Performed by: PODIATRIST

## 2023-06-15 NOTE — PROGRESS NOTES
Subjective:       Patient ID: Suellen Campos is a 63 y.o. female.    Chief Complaint: Post-op Evaluation (6.8.23 left second toe amp. Patient denies pain at present. )    HPI: Suellen Campos presents clinic status post left 2nd toe amputation due to history of dorsal proximal interphalangeal joint ulceration in hammertoe deformity.  Patient relates 0/10 pain currently.  Has kept dressings clean, dry, intact.  Has had no complications since the surgery.  Continues to ambulate in a Cam boot on the left lower extremity secondary to history of left ankle fracture.    Review of patient's allergies indicates:  No Known Allergies    Past Medical History:   Diagnosis Date    Chronic pain     COVID-19     Depression     Diabetes mellitus     Diabetes mellitus, type 2     Hyperlipidemia     Hypertension     Neuropathy     Osteoarthritis     Spinal stenosis        Family History   Problem Relation Age of Onset    Diabetes Mellitus Mother     Hypertension Mother     Pulmonary fibrosis Father     Hypertension Sister     Diabetes Sister     Pulmonary fibrosis Brother        Social History     Socioeconomic History    Marital status:    Tobacco Use    Smoking status: Never    Smokeless tobacco: Never   Substance and Sexual Activity    Alcohol use: Never    Drug use: Never    Sexual activity: Yes     Partners: Male       Past Surgical History:   Procedure Laterality Date    HYSTERECTOMY      INCISION AND DRAINAGE FOOT Left 5/9/2021    Procedure: INCISION AND DRAINAGE, FOOT;  Surgeon: Shelby Stephen DPM;  Location: [facility] OR;  Service: Podiatry;  Laterality: Left;  GREAT TOE OF LEFT FOOT    SPINAL CORD STIMULATOR IMPLANT      TOE AMPUTATION Left 5/9/2021    Procedure: AMPUTATION, TOE;  Surgeon: Shelby Stephen DPM;  Location: [facility] OR;  Service: Podiatry;  Laterality: Left;  LEFT GREAT     TOE AMPUTATION Left 6/8/2023    Procedure: AMPUTATION, TOE;  Surgeon: Shelby Stephen DPM;  Location: [facility] OR;  Service: Podiatry;   Laterality: Left;    TONSILLECTOMY      TOTAL ABDOMINAL HYSTERECTOMY W/ BILATERAL SALPINGOOPHORECTOMY         Review of Systems       Objective:   There were no vitals taken for this visit.    X-Ray Foot 2 View Left  Narrative: EXAMINATION:  XR FOOT 2 VIEW LEFT    CLINICAL HISTORY:  s/p toe amputation;    TECHNIQUE:  Two images of the left foot    COMPARISON:  05/29/2023    FINDINGS:  Left 2nd digit amputation.  Incompletely healed fracture of the 2nd distal metatarsal neck again noted.  Prior 1st digit amputation again noted.  Degenerative changes midfoot.  Impression: No acute abnormality.    Electronically signed by: Karl Odell  Date:    06/08/2023  Time:    15:26       Physical Exam   LOWER EXTREMITY PHYSICAL EXAMINATION    Vascular:  The right dorsalis pedis pulse 2/4 and the right posterior tibial pulse 2/4.  The left dorsalis pedis pulse 2/4 and posterior tibial pulse on the left is 2/4.  Capillary refill is intact.  Pedal hair growth intact    Neurological:  Protective sensation is not intact to the right left foot.  Vibratory sensation is diminished.  Proprioception is intact.  Sharp/dull is reduced.    Musculoskeletal:  History of left great toe and left 2nd toe amputation    Dermatological:  Steri-Strips/sutures are intact. There is no signs of increased edema or erythema noted. The skin is well approximated and coapting.  No signs of postoperative infection.  Skin lines are present to the level affected lower extremity as result of decreased edema    Imaging:         Results for orders placed during the hospital encounter of 05/29/23    X-Ray Foot Complete Left    Narrative  EXAMINATION:  XR FOOT COMPLETE 3 VIEW LEFT    CLINICAL HISTORY:  .  Pain in left foot    TECHNIQUE:  AP, lateral and oblique views of the left foot were performed.    COMPARISON:  07/14/2021    FINDINGS:  Amputation changes noted at the 1st MTP joint.  Chronic nonunited fracture deformity at the 2nd metatarsal.  Similar chronic  fracture deformity with erosion changes of the 3rd proximal phalanx.  Similar midfoot arthritic findings present including subcortical cystic change.  Early Charcot joint not excluded.  Pes planus.  No focal soft tissue abnormality.    Impression  As above      Electronically signed by: Tristen Vincent MD  Date:    05/29/2023  Time:    10:42       No results found for this or any previous visit.          Assessment:     1. Diabetes mellitus type 2 with neurological manifestations    2. History of diabetic ulcer of foot    3. Follow-up examination following surgery    4. History of amputation of left great toe    5. H/O amputation of 2nd toe, left        Plan:     Diabetes mellitus type 2 with neurological manifestations    History of diabetic ulcer of foot    Follow-up examination following surgery    History of amputation of left great toe    H/O amputation of 2nd toe, left        Thorough discussion is had with the patient today, concerning the diagnosis, its etiology, and the treatment algorithm at present.    Sutures were removed today without complication.  Did discuss applying a dry dressing overlying the areas patient is in a Cam boot with likely cause the friction development to the 2nd toe resulting in a 2nd toe amputation.  Will keep the dressing over this area to prevent friction being applied to the amputation site as the sutures have been removed.    She will follow-up in 1 week    Foot counseling and education is provided at this visit. Patient is advised to wear socks and shoes at all times.  Do not walk barefoot, or with just socks, even when indoors.  Be sure to check and inspect the inside of the shoe before putting them on her feet.  Protect your feet at all times.  Walking shoes and/or athletic shoes are the best types of shoe gear. Do not wear vinyl or plastic type shoe gear, as they do not stretch and/or breathe.  Protect your feet from hot and/or cold. Elevate the extremities when sitting.   Do not wear excessively tight socks and/or shoe gear. Wiggle your toes for a few minutes throughout the day. Move your ankles up and down, in and out, to help blood flow in your lower extremity.     Continue to reinforce with the patient the importance of calling immediately if there is any noted complications or sudden changes to the patient's condition.  We discuss red flag risk factors of things to continue to monitor for.  Patient relates understanding as discussed    Future Appointments   Date Time Provider Department Center   6/22/2023  1:15 PM Shelby Stephen DPM [facility]

## 2023-06-22 ENCOUNTER — OFFICE VISIT (OUTPATIENT)
Dept: PODIATRY | Facility: CLINIC | Age: 63
End: 2023-06-22
Payer: COMMERCIAL

## 2023-06-22 DIAGNOSIS — Z86.31 HISTORY OF DIABETIC ULCER OF FOOT: ICD-10-CM

## 2023-06-22 DIAGNOSIS — E11.49 DIABETES MELLITUS TYPE 2 WITH NEUROLOGICAL MANIFESTATIONS: ICD-10-CM

## 2023-06-22 DIAGNOSIS — Z09 FOLLOW-UP EXAMINATION FOLLOWING SURGERY: Primary | ICD-10-CM

## 2023-06-22 DIAGNOSIS — Z89.422 H/O AMPUTATION OF LESSER TOE, LEFT: ICD-10-CM

## 2023-06-22 PROCEDURE — 99213 PR OFFICE/OUTPT VISIT, EST, LEVL III, 20-29 MIN: ICD-10-PCS | Mod: S$GLB,,, | Performed by: PODIATRIST

## 2023-06-22 PROCEDURE — 99999 PR PBB SHADOW E&M-EST. PATIENT-LVL III: ICD-10-PCS | Mod: PBBFAC,,, | Performed by: PODIATRIST

## 2023-06-22 PROCEDURE — 1159F MED LIST DOCD IN RCRD: CPT | Mod: CPTII,S$GLB,, | Performed by: PODIATRIST

## 2023-06-22 PROCEDURE — 99213 OFFICE O/P EST LOW 20 MIN: CPT | Mod: S$GLB,,, | Performed by: PODIATRIST

## 2023-06-22 PROCEDURE — 1160F PR REVIEW ALL MEDS BY PRESCRIBER/CLIN PHARMACIST DOCUMENTED: ICD-10-PCS | Mod: CPTII,S$GLB,, | Performed by: PODIATRIST

## 2023-06-22 PROCEDURE — 1159F PR MEDICATION LIST DOCUMENTED IN MEDICAL RECORD: ICD-10-PCS | Mod: CPTII,S$GLB,, | Performed by: PODIATRIST

## 2023-06-22 PROCEDURE — 99999 PR PBB SHADOW E&M-EST. PATIENT-LVL III: CPT | Mod: PBBFAC,,, | Performed by: PODIATRIST

## 2023-06-22 PROCEDURE — 1160F RVW MEDS BY RX/DR IN RCRD: CPT | Mod: CPTII,S$GLB,, | Performed by: PODIATRIST

## 2023-06-22 NOTE — PROGRESS NOTES
Subjective:       Patient ID: Suellen Campos is a 63 y.o. female.    Chief Complaint: Post-op Evaluation (Patient in for post op follow up with no complaints, pt is a diabetic and was last seen by her pcp on 6/5/2023)    HPI: Suellen Campos presents clinic status post left 2nd toe amputation due to history of dorsal proximal interphalangeal joint ulceration in hammertoe deformity.  Patient relates 0/10 pain currently.  Has kept dressings clean, dry, intact.  Has had no complications since the surgery.  Recently released from Cam boot on the left lower extremity due to history of ankle fracture    Review of patient's allergies indicates:  No Known Allergies    Past Medical History:   Diagnosis Date    Chronic pain     COVID-19     Depression     Diabetes mellitus     Diabetes mellitus, type 2     Hyperlipidemia     Hypertension     Neuropathy     Osteoarthritis     Spinal stenosis        Family History   Problem Relation Age of Onset    Diabetes Mellitus Mother     Hypertension Mother     Pulmonary fibrosis Father     Hypertension Sister     Diabetes Sister     Pulmonary fibrosis Brother        Social History     Socioeconomic History    Marital status:    Tobacco Use    Smoking status: Never    Smokeless tobacco: Never   Substance and Sexual Activity    Alcohol use: Never    Drug use: Never    Sexual activity: Yes     Partners: Male       Past Surgical History:   Procedure Laterality Date    HYSTERECTOMY      INCISION AND DRAINAGE FOOT Left 5/9/2021    Procedure: INCISION AND DRAINAGE, FOOT;  Surgeon: Shelby Stephen DPM;  Location: [facility] OR;  Service: Podiatry;  Laterality: Left;  GREAT TOE OF LEFT FOOT    SPINAL CORD STIMULATOR IMPLANT      TOE AMPUTATION Left 5/9/2021    Procedure: AMPUTATION, TOE;  Surgeon: Shelby Stephen DPM;  Location: [facility] OR;  Service: Podiatry;  Laterality: Left;  LEFT GREAT     TOE AMPUTATION Left 6/8/2023    Procedure: AMPUTATION, TOE;  Surgeon: Shelby Stephen DPM;   Location: [facility] OR;  Service: Podiatry;  Laterality: Left;    TONSILLECTOMY      TOTAL ABDOMINAL HYSTERECTOMY W/ BILATERAL SALPINGOOPHORECTOMY       Review of Systems    Objective:   There were no vitals taken for this visit.    X-Ray Foot 2 View Left  Narrative: EXAMINATION:  XR FOOT 2 VIEW LEFT    CLINICAL HISTORY:  s/p toe amputation;    TECHNIQUE:  Two images of the left foot    COMPARISON:  05/29/2023    FINDINGS:  Left 2nd digit amputation.  Incompletely healed fracture of the 2nd distal metatarsal neck again noted.  Prior 1st digit amputation again noted.  Degenerative changes midfoot.  Impression: No acute abnormality.    Electronically signed by: Karl Odell  Date:    06/08/2023  Time:    15:26       Physical Exam   LOWER EXTREMITY PHYSICAL EXAMINATION    Vascular:  The right dorsalis pedis pulse 2/4 and the right posterior tibial pulse 2/4.  The left dorsalis pedis pulse 2/4 and posterior tibial pulse on the left is 2/4.  Capillary refill is intact.  Pedal hair growth intact    Neurological:  Protective sensation is not intact to the right left foot.  Vibratory sensation is diminished.  Proprioception is intact.  Sharp/dull is reduced.    Musculoskeletal:  History of left great toe and left 2nd toe amputation    Dermatological:  Steri-Strips/sutures are intact. There is no signs of increased edema or erythema noted. The skin is well approximated and coapting.  No signs of postoperative infection.  Skin lines are present to the level affected lower extremity as result of decreased edema    Imaging:       Results for orders placed during the hospital encounter of 05/29/23    X-Ray Foot Complete Left    Narrative  EXAMINATION:  XR FOOT COMPLETE 3 VIEW LEFT    CLINICAL HISTORY:  .  Pain in left foot    TECHNIQUE:  AP, lateral and oblique views of the left foot were performed.    COMPARISON:  07/14/2021    FINDINGS:  Amputation changes noted at the 1st MTP joint.  Chronic nonunited fracture deformity at the 2nd  metatarsal.  Similar chronic fracture deformity with erosion changes of the 3rd proximal phalanx.  Similar midfoot arthritic findings present including subcortical cystic change.  Early Charcot joint not excluded.  Pes planus.  No focal soft tissue abnormality.    Impression  As above      Electronically signed by: Tristen Vincent MD  Date:    05/29/2023  Time:    10:42       No results found for this or any previous visit.    Assessment:     1. Follow-up examination following surgery    2. H/O amputation of 2nd toe, left    3. Diabetes mellitus type 2 with neurological manifestations    4. History of diabetic ulcer of foot      Plan:     Follow-up examination following surgery    H/O amputation of 2nd toe, left    Diabetes mellitus type 2 with neurological manifestations    History of diabetic ulcer of foot      Thorough discussion is had with the patient today, concerning the diagnosis, its etiology, and the treatment algorithm at present.    Foot counseling and education is provided at this visit. Patient is advised to wear socks and shoes at all times.  Do not walk barefoot, or with just socks, even when indoors.  Be sure to check and inspect the inside of the shoe before putting them on her feet.  Protect your feet at all times.  Walking shoes and/or athletic shoes are the best types of shoe gear. Do not wear vinyl or plastic type shoe gear, as they do not stretch and/or breathe.  Protect your feet from hot and/or cold. Elevate the extremities when sitting.  Do not wear excessively tight socks and/or shoe gear. Wiggle your toes for a few minutes throughout the day. Move your ankles up and down, in and out, to help blood flow in your lower extremity.     Continue to reinforce with the patient the importance of calling immediately if there is any noted complications or sudden changes to the patient's condition.  We discuss red flag risk factors of things to continue to monitor for.  Patient relates  understanding as discussed    Future Appointments   Date Time Provider Department Center   10/23/2023 12:30 PM Shelby Stephen DPM [facility]

## 2023-07-26 ENCOUNTER — PATIENT MESSAGE (OUTPATIENT)
Dept: PODIATRY | Facility: CLINIC | Age: 63
End: 2023-07-26
Payer: COMMERCIAL

## 2023-07-27 RX ORDER — DOXYCYCLINE HYCLATE 100 MG
100 TABLET ORAL 2 TIMES DAILY
Qty: 20 TABLET | Refills: 0 | Status: SHIPPED | OUTPATIENT
Start: 2023-07-27 | End: 2023-08-01 | Stop reason: SDUPTHER

## 2023-07-30 ENCOUNTER — PATIENT MESSAGE (OUTPATIENT)
Dept: PODIATRY | Facility: CLINIC | Age: 63
End: 2023-07-30
Payer: COMMERCIAL

## 2023-08-01 ENCOUNTER — OFFICE VISIT (OUTPATIENT)
Dept: PODIATRY | Facility: CLINIC | Age: 63
End: 2023-08-01
Payer: COMMERCIAL

## 2023-08-01 DIAGNOSIS — Z86.31 HISTORY OF DIABETIC ULCER OF FOOT: ICD-10-CM

## 2023-08-01 DIAGNOSIS — E11.49 DIABETES MELLITUS TYPE 2 WITH NEUROLOGICAL MANIFESTATIONS: ICD-10-CM

## 2023-08-01 DIAGNOSIS — Z89.422 H/O AMPUTATION OF LESSER TOE, LEFT: ICD-10-CM

## 2023-08-01 DIAGNOSIS — Z89.412 HISTORY OF AMPUTATION OF LEFT GREAT TOE: ICD-10-CM

## 2023-08-01 DIAGNOSIS — L03.031 CELLULITIS OF FOURTH TOE, RIGHT: Primary | ICD-10-CM

## 2023-08-01 PROCEDURE — 99999 PR PBB SHADOW E&M-EST. PATIENT-LVL III: ICD-10-PCS | Mod: PBBFAC,,, | Performed by: PODIATRIST

## 2023-08-01 PROCEDURE — 99999 PR PBB SHADOW E&M-EST. PATIENT-LVL III: CPT | Mod: PBBFAC,,, | Performed by: PODIATRIST

## 2023-08-01 PROCEDURE — 1159F PR MEDICATION LIST DOCUMENTED IN MEDICAL RECORD: ICD-10-PCS | Mod: CPTII,S$GLB,, | Performed by: PODIATRIST

## 2023-08-01 PROCEDURE — 99214 OFFICE O/P EST MOD 30 MIN: CPT | Mod: S$GLB,,, | Performed by: PODIATRIST

## 2023-08-01 PROCEDURE — 1160F PR REVIEW ALL MEDS BY PRESCRIBER/CLIN PHARMACIST DOCUMENTED: ICD-10-PCS | Mod: CPTII,S$GLB,, | Performed by: PODIATRIST

## 2023-08-01 PROCEDURE — 99214 PR OFFICE/OUTPT VISIT, EST, LEVL IV, 30-39 MIN: ICD-10-PCS | Mod: S$GLB,,, | Performed by: PODIATRIST

## 2023-08-01 PROCEDURE — 1160F RVW MEDS BY RX/DR IN RCRD: CPT | Mod: CPTII,S$GLB,, | Performed by: PODIATRIST

## 2023-08-01 PROCEDURE — 1159F MED LIST DOCD IN RCRD: CPT | Mod: CPTII,S$GLB,, | Performed by: PODIATRIST

## 2023-08-01 RX ORDER — LEVOFLOXACIN 750 MG/1
750 TABLET ORAL DAILY
Qty: 7 TABLET | Refills: 0 | Status: SHIPPED | OUTPATIENT
Start: 2023-08-01

## 2023-08-01 RX ORDER — DOXYCYCLINE HYCLATE 100 MG
100 TABLET ORAL 2 TIMES DAILY
Qty: 20 TABLET | Refills: 0 | Status: SHIPPED | OUTPATIENT
Start: 2023-08-01 | End: 2023-08-11

## 2023-08-01 NOTE — PROGRESS NOTES
Subjective:       Patient ID: Suellen Campos is a 63 y.o. female.    Chief Complaint: Nail Problem (Right foot toe , no pain, diabetic, last seen Charlotte Domingo NP at 6/5/2023, )    HPI: Suellen Campos presents to the office today after patient developed a new ulceration to the right third toe.  Not currently on antibiotics. Feels that this occurred due to rubbing in diabetic shoes.      Review of patient's allergies indicates:  No Known Allergies    Past Medical History:   Diagnosis Date    Chronic pain     COVID-19     Depression     Diabetes mellitus     Diabetes mellitus, type 2     Hyperlipidemia     Hypertension     Neuropathy     Osteoarthritis     Spinal stenosis        Family History   Problem Relation Age of Onset    Diabetes Mellitus Mother     Hypertension Mother     Pulmonary fibrosis Father     Hypertension Sister     Diabetes Sister     Pulmonary fibrosis Brother        Social History     Socioeconomic History    Marital status:    Tobacco Use    Smoking status: Never    Smokeless tobacco: Never   Substance and Sexual Activity    Alcohol use: Never    Drug use: Never    Sexual activity: Yes     Partners: Male     Social Determinants of Health     Financial Resource Strain: Low Risk  (10/29/2021)    Overall Financial Resource Strain (CARDIA)     Difficulty of Paying Living Expenses: Not very hard   Food Insecurity: No Food Insecurity (10/29/2021)    Hunger Vital Sign     Worried About Running Out of Food in the Last Year: Never true     Ran Out of Food in the Last Year: Never true   Transportation Needs: No Transportation Needs (10/29/2021)    PRAPARE - Transportation     Lack of Transportation (Medical): No     Lack of Transportation (Non-Medical): No   Physical Activity: Inactive (10/29/2021)    Exercise Vital Sign     Days of Exercise per Week: 0 days     Minutes of Exercise per Session: 0 min   Stress: No Stress Concern Present (10/29/2021)    [employer]  - Occupational Stress Questionnaire     Feeling of Stress : Not at all   Social Connections: Unknown (10/29/2021)    Social Connection and Isolation Panel [NHANES]     Frequency of Communication with Friends and Family: More than three times a week     Frequency of Social Gatherings with Friends and Family: Three times a week     Active Member of Clubs or Organizations: No     Attends Club or Organization Meetings: Never     Marital Status:    Housing Stability: Unknown (10/29/2021)    Housing Stability Vital Sign     Unable to Pay for Housing in the Last Year: No     Unstable Housing in the Last Year: No       Past Surgical History:   Procedure Laterality Date    HYSTERECTOMY      INCISION AND DRAINAGE FOOT Left 5/9/2021    Procedure: INCISION AND DRAINAGE, FOOT;  Surgeon: Shelby Stephen DPM;  Location: [facility] OR;  Service: Podiatry;  Laterality: Left;  GREAT TOE OF LEFT FOOT    SPINAL CORD STIMULATOR IMPLANT      TOE AMPUTATION Left 5/9/2021    Procedure: AMPUTATION, TOE;  Surgeon: Shelby Stephen DPM;  Location: [facility] OR;  Service: Podiatry;  Laterality: Left;  LEFT GREAT     TOE AMPUTATION Left 6/8/2023    Procedure: AMPUTATION, TOE;  Surgeon: Shelby Stephen DPM;  Location: [facility] OR;  Service: Podiatry;  Laterality: Left;    TONSILLECTOMY      TOTAL ABDOMINAL HYSTERECTOMY W/ BILATERAL SALPINGOOPHORECTOMY         Review of Systems       Objective:   There were no vitals taken for this visit.    X-Ray Foot 2 View Left  Narrative: EXAMINATION:  XR FOOT 2 VIEW LEFT    CLINICAL HISTORY:  s/p toe amputation;    TECHNIQUE:  Two images of the left foot    COMPARISON:  05/29/2023    FINDINGS:  Left 2nd digit amputation.  Incompletely healed fracture of the 2nd distal metatarsal neck again noted.  Prior 1st digit amputation again noted.  Degenerative changes midfoot.  Impression: No acute abnormality.    Electronically signed by: Karl Odell  Date:    06/08/2023  Time:    15:26       Physical Exam   LOWER  EXTREMITY PHYSICAL EXAMINATION    Vascular:   The left dorsalis pedis pulse 2/4 and posterior tibial pulse on the left is 2/4.  Capillary refill is intact.  Pedal hair growth intact    Neurological:  Protective sensation is not intact to the right left foot.  Vibratory sensation is diminished.  Proprioception is intact.  Sharp/dull is reduced.    Musculoskeletal:  History of left hallux amputation.  Hammertoe left 2nd digit.    Dermatological:  Skin is cellulitic in nature to the right fourth toe. Superficial ulceration limited to the skin present distally to the PIP joint.   No probe to bone. Serous drainage present.  Moderate to severe cellulitis noted.  No evidence of purulence.    Imaging:         Results for orders placed during the hospital encounter of 05/29/23    X-Ray Foot Complete Left    Narrative  EXAMINATION:  XR FOOT COMPLETE 3 VIEW LEFT    CLINICAL HISTORY:  .  Pain in left foot    TECHNIQUE:  AP, lateral and oblique views of the left foot were performed.    COMPARISON:  07/14/2021    FINDINGS:  Amputation changes noted at the 1st MTP joint.  Chronic nonunited fracture deformity at the 2nd metatarsal.  Similar chronic fracture deformity with erosion changes of the 3rd proximal phalanx.  Similar midfoot arthritic findings present including subcortical cystic change.  Early Charcot joint not excluded.  Pes planus.  No focal soft tissue abnormality.    Impression  As above      Electronically signed by: Tristen Vincent MD  Date:    05/29/2023  Time:    10:42       No results found for this or any previous visit.          Assessment:     1. Cellulitis of fourth toe, right    2. Diabetes mellitus type 2 with neurological manifestations    3. History of diabetic ulcer of foot    4. H/O amputation of 2nd toe, left    5. History of amputation of left great toe        Plan:     Cellulitis of fourth toe, right    Diabetes mellitus type 2 with neurological manifestations    History of diabetic ulcer of  foot    H/O amputation of 2nd toe, left    History of amputation of left great toe    Other orders  -     doxycycline (VIBRA-TABS) 100 MG tablet; Take 1 tablet (100 mg total) by mouth 2 (two) times daily. for 10 days  Dispense: 20 tablet; Refill: 0  -     levoFLOXacin (LEVAQUIN) 750 MG tablet; Take 1 tablet (750 mg total) by mouth once daily.  Dispense: 7 tablet; Refill: 0      Thorough discussion is had with the patient today, concerning the diagnosis, its etiology, and the treatment algorithm at present.    Discussed pathology in etiology associated with hammertoe deformity which has ultimately resulted in cellulitis of the 4th digit with a superficial ulceration.  We discussed treatment options regarding anti\biotic medication to help decrease swelling and inflammation.      Foot counseling and education is provided at this visit. Patient is advised to wear socks and shoes at all times.  Do not walk barefoot, or with just socks, even when indoors.  Be sure to check and inspect the inside of the shoe before putting them on her feet.  Protect your feet at all times.  Walking shoes and/or athletic shoes are the best types of shoe gear. Do not wear vinyl or plastic type shoe gear, as they do not stretch and/or breathe.  Protect your feet from hot and/or cold. Elevate the extremities when sitting.  Do not wear excessively tight socks and/or shoe gear. Wiggle your toes for a few minutes throughout the day. Move your ankles up and down, in and out, to help blood flow in your lower extremity.   Future Appointments   Date Time Provider Department Center   10/23/2023 12:30 PM Shelby Stephen DPM [facility]   ;

## 2023-08-14 ENCOUNTER — TELEPHONE (OUTPATIENT)
Dept: PODIATRY | Facility: CLINIC | Age: 63
End: 2023-08-14
Payer: COMMERCIAL

## 2023-08-14 NOTE — TELEPHONE ENCOUNTER
----- Message from Kristen Briggs MA sent at 8/14/2023  3:05 PM CDT -----  The patient calling to speak with staff about getting a sooner appointment than October. Says she has a infected toe and is a diabetic. Please give her a call back at [phone]

## 2023-08-22 ENCOUNTER — PATIENT MESSAGE (OUTPATIENT)
Dept: PODIATRY | Facility: CLINIC | Age: 63
End: 2023-08-22
Payer: COMMERCIAL

## 2023-08-28 ENCOUNTER — PATIENT MESSAGE (OUTPATIENT)
Dept: PODIATRY | Facility: CLINIC | Age: 63
End: 2023-08-28
Payer: COMMERCIAL

## 2023-09-06 ENCOUNTER — OFFICE VISIT (OUTPATIENT)
Dept: PODIATRY | Facility: CLINIC | Age: 63
End: 2023-09-06
Payer: COMMERCIAL

## 2023-09-06 DIAGNOSIS — E11.49 DIABETES MELLITUS TYPE 2 WITH NEUROLOGICAL MANIFESTATIONS: ICD-10-CM

## 2023-09-06 DIAGNOSIS — Z89.422 H/O AMPUTATION OF LESSER TOE, LEFT: ICD-10-CM

## 2023-09-06 DIAGNOSIS — L97.512 ULCER OF RIGHT FOOT WITH FAT LAYER EXPOSED: Primary | ICD-10-CM

## 2023-09-06 DIAGNOSIS — L97.521 ULCER OF LEFT FOOT, LIMITED TO BREAKDOWN OF SKIN: ICD-10-CM

## 2023-09-06 DIAGNOSIS — Z89.412 HISTORY OF AMPUTATION OF LEFT GREAT TOE: ICD-10-CM

## 2023-09-06 PROCEDURE — 99213 PR OFFICE/OUTPT VISIT, EST, LEVL III, 20-29 MIN: ICD-10-PCS | Mod: 25,S$GLB,, | Performed by: PODIATRIST

## 2023-09-06 PROCEDURE — 1159F PR MEDICATION LIST DOCUMENTED IN MEDICAL RECORD: ICD-10-PCS | Mod: CPTII,S$GLB,, | Performed by: PODIATRIST

## 2023-09-06 PROCEDURE — 11042 PR DEBRIDEMENT, SKIN, SUB-Q TISSUE,=<20 SQ CM: ICD-10-PCS | Mod: S$GLB,,, | Performed by: PODIATRIST

## 2023-09-06 PROCEDURE — 11042 DBRDMT SUBQ TIS 1ST 20SQCM/<: CPT | Mod: S$GLB,,, | Performed by: PODIATRIST

## 2023-09-06 PROCEDURE — 99213 OFFICE O/P EST LOW 20 MIN: CPT | Mod: 25,S$GLB,, | Performed by: PODIATRIST

## 2023-09-06 PROCEDURE — 1160F PR REVIEW ALL MEDS BY PRESCRIBER/CLIN PHARMACIST DOCUMENTED: ICD-10-PCS | Mod: CPTII,S$GLB,, | Performed by: PODIATRIST

## 2023-09-06 PROCEDURE — 99999 PR PBB SHADOW E&M-EST. PATIENT-LVL III: ICD-10-PCS | Mod: PBBFAC,,, | Performed by: PODIATRIST

## 2023-09-06 PROCEDURE — 1159F MED LIST DOCD IN RCRD: CPT | Mod: CPTII,S$GLB,, | Performed by: PODIATRIST

## 2023-09-06 PROCEDURE — 1160F RVW MEDS BY RX/DR IN RCRD: CPT | Mod: CPTII,S$GLB,, | Performed by: PODIATRIST

## 2023-09-06 PROCEDURE — 99999 PR PBB SHADOW E&M-EST. PATIENT-LVL III: CPT | Mod: PBBFAC,,, | Performed by: PODIATRIST

## 2023-09-06 NOTE — PROGRESS NOTES
Subjective:       Patient ID: Suellen Campos is a 63 y.o. female.    Chief Complaint: Diabetic Foot Ulcer (Patient present with multiple DFU to bilateral feet and toes. She denies pain at present )    HPI: Suellen Campos presents to the office today to with new ulceration present to the plantar aspect the right 1st metatarsophalangeal joint and a potential new ulceration to the plantar aspect of the 2nd metatarsophalangeal joint of the left foot.  States having discoloration underlying the skin at these areas likely associated with blister formation.  Recently obtained her custom diabetic shoes to offload these areas.  States that these wounds were present prior to applying the new diabetic shoe gear.  She is currently doing dressing changes at home with iodine and Medihoney.  Reports no acute infection or increase in redness.  Not currently on antibiotics.    Review of patient's allergies indicates:  No Known Allergies    Past Medical History:   Diagnosis Date    Chronic pain     COVID-19     Depression     Diabetes mellitus     Diabetes mellitus, type 2     Hyperlipidemia     Hypertension     Neuropathy     Osteoarthritis     Spinal stenosis        Family History   Problem Relation Age of Onset    Diabetes Mellitus Mother     Hypertension Mother     Pulmonary fibrosis Father     Hypertension Sister     Diabetes Sister     Pulmonary fibrosis Brother        Social History     Socioeconomic History    Marital status:    Tobacco Use    Smoking status: Never    Smokeless tobacco: Never   Substance and Sexual Activity    Alcohol use: Never    Drug use: Never    Sexual activity: Yes     Partners: Male     Social Determinants of Health     Financial Resource Strain: Low Risk  (10/29/2021)    Overall Financial Resource Strain (CARDIA)     Difficulty of Paying Living Expenses: Not very hard   Food Insecurity: No Food Insecurity (10/29/2021)    Hunger Vital Sign     Worried About Running Out of Food in the Last  Year: Never true     Ran Out of Food in the Last Year: Never true   Transportation Needs: No Transportation Needs (10/29/2021)    PRAPARE - Transportation     Lack of Transportation (Medical): No     Lack of Transportation (Non-Medical): No   Physical Activity: Inactive (10/29/2021)    Exercise Vital Sign     Days of Exercise per Week: 0 days     Minutes of Exercise per Session: 0 min   Stress: No Stress Concern Present (10/29/2021)    [employer] - Occupational Stress Questionnaire     Feeling of Stress : Not at all   Social Connections: Unknown (10/29/2021)    Social Connection and Isolation Panel [NHANES]     Frequency of Communication with Friends and Family: More than three times a week     Frequency of Social Gatherings with Friends and Family: Three times a week     Active Member of Clubs or Organizations: No     Attends Club or Organization Meetings: Never     Marital Status:    Housing Stability: Unknown (10/29/2021)    Housing Stability Vital Sign     Unable to Pay for Housing in the Last Year: No     Unstable Housing in the Last Year: No       Past Surgical History:   Procedure Laterality Date    HYSTERECTOMY      INCISION AND DRAINAGE FOOT Left 5/9/2021    Procedure: INCISION AND DRAINAGE, FOOT;  Surgeon: Shelby Stephen DPM;  Location: [facility] OR;  Service: Podiatry;  Laterality: Left;  GREAT TOE OF LEFT FOOT    SPINAL CORD STIMULATOR IMPLANT      TOE AMPUTATION Left 5/9/2021    Procedure: AMPUTATION, TOE;  Surgeon: Shelby Stephen DPM;  Location: [facility] OR;  Service: Podiatry;  Laterality: Left;  LEFT GREAT     TOE AMPUTATION Left 6/8/2023    Procedure: AMPUTATION, TOE;  Surgeon: Shelby Stephen DPM;  Location: [facility] OR;  Service: Podiatry;  Laterality: Left;    TONSILLECTOMY      TOTAL ABDOMINAL HYSTERECTOMY W/ BILATERAL SALPINGOOPHORECTOMY         Review of Systems       Objective:   There were no vitals taken for this visit.    X-Ray Foot 2 View Left  Narrative:  EXAMINATION:  XR FOOT 2 VIEW LEFT    CLINICAL HISTORY:  s/p toe amputation;    TECHNIQUE:  Two images of the left foot    COMPARISON:  05/29/2023    FINDINGS:  Left 2nd digit amputation.  Incompletely healed fracture of the 2nd distal metatarsal neck again noted.  Prior 1st digit amputation again noted.  Degenerative changes midfoot.  Impression: No acute abnormality.    Electronically signed by: Karl Odell  Date:    06/08/2023  Time:    15:26       Physical Exam   LOWER EXTREMITY PHYSICAL EXAMINATION    Vascular:  The right dorsalis pedis pulse 2/4 and the right posterior tibial pulse 2/4.   Capillary refill is intact.  Pedal hair growth intact    Dermatological:  Skin is supple and moist.  There is an ulceration present to the plantar aspect of the right 1st metatarsophalangeal joint with overlying callus tissue.  Upon removal of the callusing tissue, the wound does measure 0.2 cm x 1.25 cm. No evidence of probe to bone. No acute signs of infection.     Also an ulceration present to the plantar aspect the left 2nd metatarsophalangeal joint.  Overlying soft callus formation present with blister formation proximally.  Upon removal of the blister, there is a 0.4 x 0.3 mm wound.  Does not probe to bone.  Does not probe to subcutaneous deep tissue.     Imaging:         Results for orders placed during the hospital encounter of 07/14/21    X-Ray Foot Complete Left    Narrative  EXAMINATION:  XR FOOT COMPLETE 3 VIEW LEFT    CLINICAL HISTORY:  . Cellulitis of left lower limb    TECHNIQUE:  AP, lateral, and oblique views of the left foot were performed.    COMPARISON:  05/09/2021.    FINDINGS:  Prior amputation of the 1st toe.  Mild soft tissue swelling within the amputation bed.  No soft tissue emphysema.  No focus of cortical erosion or periostitis within the distal 1st metatarsal head to suggest underlying changes of osteomyelitis.    There is an acute to subacute minimally displaced comminuted oblique fracture  involving the proximal head of the proximal phalanx of the 3rd toe.  The fracture lucency extends to the 3rd MTP joint.  There is adjacent soft tissue swelling.  There is hammertoe formation.    Tarsal bones are intact with mild to moderate degenerative osteoarthrosis.  Normal tarsometatarsal alignment.  Mild widening of the Lisfranc interval may represent ligamentous injury.  Loss of the normal plantar arch on the lateral view suggesting pes planus.    Small dorsal and plantar calcaneal spurs.    Impression  1. Prior amputation of the 1st toe.  2. Acute to subacute minimally displaced comminuted oblique articular fracture of the proximal phalanx of the 3rd toe.  3. Hammertoe formation.  4. Mild widening of the Lisfranc interval may represent ligamentous injury.  5. Suspected pes planus.  This report was flagged in Epic as abnormal.      Electronically signed by: Joselo Ward  Date:    07/14/2021  Time:    15:53       No results found for this or any previous visit.          Assessment:     1. Ulcer of right foot with fat layer exposed    2. Ulcer of left foot, limited to breakdown of skin    3. Diabetes mellitus type 2 with neurological manifestations    4. H/O amputation of 2nd toe, left    5. History of amputation of left great toe                Plan:     Ulcer of right foot with fat layer exposed    Ulcer of left foot, limited to breakdown of skin    Diabetes mellitus type 2 with neurological manifestations    H/O amputation of 2nd toe, left    History of amputation of left great toe          Thorough discussion is had with the patient today, concerning the diagnosis, its etiology, and the treatment algorithm at present.    The wound to the plantar aspect of the right 1st metatarsophalangeal joint was surgically debrided after adequate prep with alcohol and/or betadine paint. Excisional wound debridement was performed using sharp #10/#15 blade/rounded scalpel and tissue nipper, with removal of all non-viable  skin and soft tissues; necrotic skin/tissue formation. The woundbase/wound bed was also debrided to encourage bleeding as to promote/stimulate healing. Debridement was excisional and included epidermal, dermal and subcutaneous tissues. Post debridement measurements are as above. Hemostasis was achieved. Patient tolerated procedure well and without complications. Local woundcare with Medihoney dressings and bandage thereafter.     Foot counseling and education is provided at this visit. Patient is advised to wear socks and shoes at all times.  Do not walk barefoot, or with just socks, even when indoors.  Be sure to check and inspect the inside of the shoe before putting them on her feet.  Protect your feet at all times.  Walking shoes and/or athletic shoes are the best types of shoe gear. Do not wear vinyl or plastic type shoe gear, as they do not stretch and/or breathe.  Protect your feet from hot and/or cold. Elevate the extremities when sitting.  Do not wear excessively tight socks and/or shoe gear. Wiggle your toes for a few minutes throughout the day. Move your ankles up and down, in and out, to help blood flow in your lower extremity.       Future Appointments   Date Time Provider Department Center   9/27/2023 10:30 AM Shelby Stephen DPM [facility]   10/23/2023 12:30 PM Shelby Stephen DPM [facility]

## 2023-09-27 ENCOUNTER — OFFICE VISIT (OUTPATIENT)
Dept: PODIATRY | Facility: CLINIC | Age: 63
End: 2023-09-27
Payer: COMMERCIAL

## 2023-09-27 VITALS — BODY MASS INDEX: 26.06 KG/M2 | HEIGHT: 67 IN | WEIGHT: 166 LBS

## 2023-09-27 DIAGNOSIS — Z89.412 HISTORY OF AMPUTATION OF LEFT GREAT TOE: ICD-10-CM

## 2023-09-27 DIAGNOSIS — L97.521 ULCER OF LEFT FOOT, LIMITED TO BREAKDOWN OF SKIN: ICD-10-CM

## 2023-09-27 DIAGNOSIS — Z89.422 H/O AMPUTATION OF LESSER TOE, LEFT: ICD-10-CM

## 2023-09-27 DIAGNOSIS — L97.512 ULCER OF RIGHT FOOT WITH FAT LAYER EXPOSED: Primary | ICD-10-CM

## 2023-09-27 DIAGNOSIS — E11.49 DIABETES MELLITUS TYPE 2 WITH NEUROLOGICAL MANIFESTATIONS: ICD-10-CM

## 2023-09-27 PROCEDURE — 99499 NO LOS: ICD-10-PCS | Mod: S$GLB,,, | Performed by: PODIATRIST

## 2023-09-27 PROCEDURE — 11042 PR DEBRIDEMENT, SKIN, SUB-Q TISSUE,=<20 SQ CM: ICD-10-PCS | Mod: S$GLB,,, | Performed by: PODIATRIST

## 2023-09-27 PROCEDURE — 99999 PR PBB SHADOW E&M-EST. PATIENT-LVL III: ICD-10-PCS | Mod: PBBFAC,,, | Performed by: PODIATRIST

## 2023-09-27 PROCEDURE — 11042 DBRDMT SUBQ TIS 1ST 20SQCM/<: CPT | Mod: S$GLB,,, | Performed by: PODIATRIST

## 2023-09-27 PROCEDURE — 99999 PR PBB SHADOW E&M-EST. PATIENT-LVL III: CPT | Mod: PBBFAC,,, | Performed by: PODIATRIST

## 2023-09-27 PROCEDURE — 99499 UNLISTED E&M SERVICE: CPT | Mod: S$GLB,,, | Performed by: PODIATRIST

## 2023-09-27 NOTE — PROGRESS NOTES
Subjective:       Patient ID: Suellen Campos is a 63 y.o. female.    Chief Complaint: Wound Check (Pt here for check on ulcers on NATHANAEL foot. Pain 0/10. Diabetic wearing closed toe shoes. Last saw PCP Amelia Wheatley MD on 06/19/23)    HPI: Suellen Campos presents to the office today to with  ulceration present to the plantar aspect the right 1st metatarsophalangeal joint and small wound to the plantar aspect of the 2nd metatarsophalangeal joint of the left foot.  Currently wearing her custom diabetic shoes to offload these areas. She is currently doing dressing changes at home with iodine and Medihoney.  Reports no acute infection or increase in redness.  Not currently on antibiotics.    Review of patient's allergies indicates:  No Known Allergies    Past Medical History:   Diagnosis Date    Chronic pain     COVID-19     Depression     Diabetes mellitus     Diabetes mellitus, type 2     Hyperlipidemia     Hypertension     Neuropathy     Osteoarthritis     Spinal stenosis        Family History   Problem Relation Age of Onset    Diabetes Mellitus Mother     Hypertension Mother     Pulmonary fibrosis Father     Hypertension Sister     Diabetes Sister     Pulmonary fibrosis Brother        Social History     Socioeconomic History    Marital status:    Tobacco Use    Smoking status: Never    Smokeless tobacco: Never   Substance and Sexual Activity    Alcohol use: Never    Drug use: Never    Sexual activity: Yes     Partners: Male     Social Determinants of Health     Financial Resource Strain: Low Risk  (10/29/2021)    Overall Financial Resource Strain (CARDIA)     Difficulty of Paying Living Expenses: Not very hard   Food Insecurity: No Food Insecurity (10/29/2021)    Hunger Vital Sign     Worried About Running Out of Food in the Last Year: Never true     Ran Out of Food in the Last Year: Never true   Transportation Needs: No Transportation Needs (10/29/2021)    PRAPARE - Transportation     Lack of  "Transportation (Medical): No     Lack of Transportation (Non-Medical): No   Physical Activity: Inactive (10/29/2021)    Exercise Vital Sign     Days of Exercise per Week: 0 days     Minutes of Exercise per Session: 0 min   Stress: No Stress Concern Present (10/29/2021)    [employer] [place] [employer] - Occupational Stress Questionnaire     Feeling of Stress : Not at all   Social Connections: Unknown (10/29/2021)    Social Connection and Isolation Panel [NHANES]     Frequency of Communication with Friends and Family: More than three times a week     Frequency of Social Gatherings with Friends and Family: Three times a week     Active Member of Clubs or Organizations: No     Attends Club or Organization Meetings: Never     Marital Status:    Housing Stability: Unknown (10/29/2021)    Housing Stability Vital Sign     Unable to Pay for Housing in the Last Year: No     Unstable Housing in the Last Year: No       Past Surgical History:   Procedure Laterality Date    HYSTERECTOMY      INCISION AND DRAINAGE FOOT Left 5/9/2021    Procedure: INCISION AND DRAINAGE, FOOT;  Surgeon: Shelby Stephen DPM;  Location: [facility] OR;  Service: Podiatry;  Laterality: Left;  GREAT TOE OF LEFT FOOT    SPINAL CORD STIMULATOR IMPLANT      TOE AMPUTATION Left 5/9/2021    Procedure: AMPUTATION, TOE;  Surgeon: Shelby Stephen DPM;  Location: [facility] OR;  Service: Podiatry;  Laterality: Left;  LEFT GREAT     TOE AMPUTATION Left 6/8/2023    Procedure: AMPUTATION, TOE;  Surgeon: Shelby Stephen DPM;  Location: [facility] OR;  Service: Podiatry;  Laterality: Left;    TONSILLECTOMY      TOTAL ABDOMINAL HYSTERECTOMY W/ BILATERAL SALPINGOOPHORECTOMY         Review of Systems       Objective:   Ht 5' 7" (1.702 m)   Wt 75.3 kg (166 lb)   BMI 26.00 kg/m²     X-Ray Foot 2 View Left  Narrative: EXAMINATION:  XR FOOT 2 VIEW LEFT    CLINICAL HISTORY:  s/p toe amputation;    TECHNIQUE:  Two images of the left " foot    COMPARISON:  05/29/2023    FINDINGS:  Left 2nd digit amputation.  Incompletely healed fracture of the 2nd distal metatarsal neck again noted.  Prior 1st digit amputation again noted.  Degenerative changes midfoot.  Impression: No acute abnormality.    Electronically signed by: Karl Odell  Date:    06/08/2023  Time:    15:26       Physical Exam   LOWER EXTREMITY PHYSICAL EXAMINATION    Vascular:  The right dorsalis pedis pulse 2/4 and the right posterior tibial pulse 2/4.   Capillary refill is intact.  Pedal hair growth intact    Dermatological:  Skin is supple and moist.  There is an ulceration present to the plantar aspect of the right 1st metatarsophalangeal joint with overlying callus tissue.  Upon removal of the callusing tissue, the wound does measure 0.2 cm x 0.9 cm.wound depth of 0.1 cm.  No evidence of probe to bone. No acute signs of infection.     Ulceration present to the plantar aspect the left 2nd metatarsophalangeal joint.  Overlying soft callus formation present with blister formation proximally.  Upon removal of the blister, there is a 0.1 x 0.2 mm wound.  Does not probe to bone.  Does not probe to subcutaneous deep tissue.     Imaging:         Results for orders placed during the hospital encounter of 07/14/21    X-Ray Foot Complete Left    Narrative  EXAMINATION:  XR FOOT COMPLETE 3 VIEW LEFT    CLINICAL HISTORY:  . Cellulitis of left lower limb    TECHNIQUE:  AP, lateral, and oblique views of the left foot were performed.    COMPARISON:  05/09/2021.    FINDINGS:  Prior amputation of the 1st toe.  Mild soft tissue swelling within the amputation bed.  No soft tissue emphysema.  No focus of cortical erosion or periostitis within the distal 1st metatarsal head to suggest underlying changes of osteomyelitis.    There is an acute to subacute minimally displaced comminuted oblique fracture involving the proximal head of the proximal phalanx of the 3rd toe.  The fracture lucency extends to the  3rd MTP joint.  There is adjacent soft tissue swelling.  There is hammertoe formation.    Tarsal bones are intact with mild to moderate degenerative osteoarthrosis.  Normal tarsometatarsal alignment.  Mild widening of the Lisfranc interval may represent ligamentous injury.  Loss of the normal plantar arch on the lateral view suggesting pes planus.    Small dorsal and plantar calcaneal spurs.    Impression  1. Prior amputation of the 1st toe.  2. Acute to subacute minimally displaced comminuted oblique articular fracture of the proximal phalanx of the 3rd toe.  3. Hammertoe formation.  4. Mild widening of the Lisfranc interval may represent ligamentous injury.  5. Suspected pes planus.  This report was flagged in Epic as abnormal.      Electronically signed by: Joselo Ward  Date:    07/14/2021  Time:    15:53       No results found for this or any previous visit.    Assessment:     1. Ulcer of right foot with fat layer exposed    2. Ulcer of left foot, limited to breakdown of skin    3. Diabetes mellitus type 2 with neurological manifestations    4. H/O amputation of 2nd toe, left    5. History of amputation of left great toe        Plan:     Ulcer of right foot with fat layer exposed    Ulcer of left foot, limited to breakdown of skin    Diabetes mellitus type 2 with neurological manifestations    H/O amputation of 2nd toe, left    History of amputation of left great toe      Thorough discussion is had with the patient today, concerning the diagnosis, its etiology, and the treatment algorithm at present.    The wound to the plantar aspect of the right 1st metatarsophalangeal joint was surgically debrided after adequate prep with alcohol and/or betadine paint. Excisional wound debridement was performed using sharp #10/#15 blade/rounded scalpel and tissue nipper, with removal of all non-viable skin and soft tissues; necrotic skin/tissue formation. The woundbase/wound bed was also debrided to encourage bleeding as  to promote/stimulate healing. Debridement was excisional and included epidermal, dermal and subcutaneous tissues. Post debridement measurements are as above. Hemostasis was achieved. Patient tolerated procedure well and without complications. Local woundcare with Medihoney dressings and bandage thereafter.     Foot counseling and education is provided at this visit. Patient is advised to wear socks and shoes at all times.  Do not walk barefoot, or with just socks, even when indoors.  Be sure to check and inspect the inside of the shoe before putting them on her feet.  Protect your feet at all times.  Walking shoes and/or athletic shoes are the best types of shoe gear. Do not wear vinyl or plastic type shoe gear, as they do not stretch and/or breathe.  Protect your feet from hot and/or cold. Elevate the extremities when sitting.  Do not wear excessively tight socks and/or shoe gear. Wiggle your toes for a few minutes throughout the day. Move your ankles up and down, in and out, to help blood flow in your lower extremity.       Future Appointments   Date Time Provider Department Center   10/19/2023  2:45 PM Shelby Stephen DPM [facility]   10/23/2023 12:30 PM Shelby Stephen DPM [facility]

## 2023-10-23 ENCOUNTER — OFFICE VISIT (OUTPATIENT)
Dept: PODIATRY | Facility: CLINIC | Age: 63
End: 2023-10-23
Payer: COMMERCIAL

## 2023-10-23 VITALS — BODY MASS INDEX: 26.06 KG/M2 | WEIGHT: 166 LBS | HEIGHT: 67 IN

## 2023-10-23 DIAGNOSIS — Z89.422 H/O AMPUTATION OF LESSER TOE, LEFT: ICD-10-CM

## 2023-10-23 DIAGNOSIS — L97.522 ULCER OF LEFT FOOT WITH FAT LAYER EXPOSED: ICD-10-CM

## 2023-10-23 DIAGNOSIS — Z89.412 HISTORY OF AMPUTATION OF LEFT GREAT TOE: ICD-10-CM

## 2023-10-23 DIAGNOSIS — E11.49 DIABETES MELLITUS TYPE 2 WITH NEUROLOGICAL MANIFESTATIONS: ICD-10-CM

## 2023-10-23 DIAGNOSIS — L97.512 ULCER OF RIGHT FOOT WITH FAT LAYER EXPOSED: Primary | ICD-10-CM

## 2023-10-23 PROCEDURE — 99499 NO LOS: ICD-10-PCS | Mod: S$GLB,,, | Performed by: PODIATRIST

## 2023-10-23 PROCEDURE — 99999 PR PBB SHADOW E&M-EST. PATIENT-LVL III: CPT | Mod: PBBFAC,,, | Performed by: PODIATRIST

## 2023-10-23 PROCEDURE — 11042 PR DEBRIDEMENT, SKIN, SUB-Q TISSUE,=<20 SQ CM: ICD-10-PCS | Mod: S$GLB,,, | Performed by: PODIATRIST

## 2023-10-23 PROCEDURE — 11042 DBRDMT SUBQ TIS 1ST 20SQCM/<: CPT | Mod: S$GLB,,, | Performed by: PODIATRIST

## 2023-10-23 PROCEDURE — 99499 UNLISTED E&M SERVICE: CPT | Mod: S$GLB,,, | Performed by: PODIATRIST

## 2023-10-23 PROCEDURE — 99999 PR PBB SHADOW E&M-EST. PATIENT-LVL III: ICD-10-PCS | Mod: PBBFAC,,, | Performed by: PODIATRIST

## 2023-10-23 NOTE — PROGRESS NOTES
Subjective:       Patient ID: Suellen Campos is a 63 y.o. female.    Chief Complaint: Ulcer (Pt presents with red toes and callouses, Pain 0/10, Diabetic wearing closed toe shoes and socks, last seen 09/27/23)    HPI: Suellen Campos presents to the office today to with  ulceration present to the plantar aspect the right 1st metatarsophalangeal joint and small wound to the plantar aspect of the 2nd metatarsophalangeal joint of the left foot.  Currently wearing her custom diabetic shoes. She is currently doing dressing changes at home with iodine and Medihoney.  Reports no acute infection or increase in redness.  Not currently on antibiotics.    Review of patient's allergies indicates:  No Known Allergies    Past Medical History:   Diagnosis Date    Chronic pain     COVID-19     Depression     Diabetes mellitus     Diabetes mellitus, type 2     Hyperlipidemia     Hypertension     Neuropathy     Osteoarthritis     Spinal stenosis        Family History   Problem Relation Age of Onset    Diabetes Mellitus Mother     Hypertension Mother     Pulmonary fibrosis Father     Hypertension Sister     Diabetes Sister     Pulmonary fibrosis Brother        Social History     Socioeconomic History    Marital status:    Tobacco Use    Smoking status: Never    Smokeless tobacco: Never   Substance and Sexual Activity    Alcohol use: Never    Drug use: Never    Sexual activity: Yes     Partners: Male     Social Determinants of Health     Financial Resource Strain: Low Risk  (10/29/2021)    Overall Financial Resource Strain (CARDIA)     Difficulty of Paying Living Expenses: Not very hard   Food Insecurity: No Food Insecurity (10/29/2021)    Hunger Vital Sign     Worried About Running Out of Food in the Last Year: Never true     Ran Out of Food in the Last Year: Never true   Transportation Needs: No Transportation Needs (10/29/2021)    PRAPARE - Transportation     Lack of Transportation (Medical): No     Lack of Transportation  "(Non-Medical): No   Physical Activity: Inactive (10/29/2021)    Exercise Vital Sign     Days of Exercise per Week: 0 days     Minutes of Exercise per Session: 0 min   Stress: No Stress Concern Present (10/29/2021)    [employer] [place] [employer] - Occupational Stress Questionnaire     Feeling of Stress : Not at all   Social Connections: Unknown (10/29/2021)    Social Connection and Isolation Panel [NHANES]     Frequency of Communication with Friends and Family: More than three times a week     Frequency of Social Gatherings with Friends and Family: Three times a week     Active Member of Clubs or Organizations: No     Attends Club or Organization Meetings: Never     Marital Status:    Housing Stability: Unknown (10/29/2021)    Housing Stability Vital Sign     Unable to Pay for Housing in the Last Year: No     Unstable Housing in the Last Year: No       Past Surgical History:   Procedure Laterality Date    HYSTERECTOMY      INCISION AND DRAINAGE FOOT Left 5/9/2021    Procedure: INCISION AND DRAINAGE, FOOT;  Surgeon: Shelby Stephen DPM;  Location: [facility] OR;  Service: Podiatry;  Laterality: Left;  GREAT TOE OF LEFT FOOT    SPINAL CORD STIMULATOR IMPLANT      TOE AMPUTATION Left 5/9/2021    Procedure: AMPUTATION, TOE;  Surgeon: Shelby Stephen DPM;  Location: [facility] OR;  Service: Podiatry;  Laterality: Left;  LEFT GREAT     TOE AMPUTATION Left 6/8/2023    Procedure: AMPUTATION, TOE;  Surgeon: Shelby Stephen DPM;  Location: [facility] OR;  Service: Podiatry;  Laterality: Left;    TONSILLECTOMY      TOTAL ABDOMINAL HYSTERECTOMY W/ BILATERAL SALPINGOOPHORECTOMY         Review of Systems       Objective:   Ht 5' 7" (1.702 m)   Wt 75.3 kg (166 lb)   BMI 26.00 kg/m²     X-Ray Foot 2 View Left  Narrative: EXAMINATION:  XR FOOT 2 VIEW LEFT    CLINICAL HISTORY:  s/p toe amputation;    TECHNIQUE:  Two images of the left foot    COMPARISON:  05/29/2023    FINDINGS:  Left 2nd digit amputation.  Incompletely " healed fracture of the 2nd distal metatarsal neck again noted.  Prior 1st digit amputation again noted.  Degenerative changes midfoot.  Impression: No acute abnormality.    Electronically signed by: Karl Odell  Date:    06/08/2023  Time:    15:26       Physical Exam   LOWER EXTREMITY PHYSICAL EXAMINATION    Vascular:  The right dorsalis pedis pulse 2/4 and the right posterior tibial pulse 2/4.   Capillary refill is intact.  Pedal hair growth intact    Dermatological:  Skin is supple and moist.  There is an ulceration present to the plantar aspect of the right 1st metatarsophalangeal joint with overlying callus tissue.  Upon removal of the callusing tissue, the wound does measure 0.2 cm x 0.75 cm.wound depth of 0.1 cm.  No evidence of probe to bone. No acute signs of infection.     Ulceration present to the plantar aspect the left 2nd metatarsophalangeal joint.  Overlying soft callus formation present.  Upon removal of the blister, there is a 0.2 x 0.2 mm wound.  Does not probe to bone.  Does not probe to subcutaneous deep tissue.     Imaging:         Results for orders placed during the hospital encounter of 07/14/21    X-Ray Foot Complete Left    Narrative  EXAMINATION:  XR FOOT COMPLETE 3 VIEW LEFT    CLINICAL HISTORY:  . Cellulitis of left lower limb    TECHNIQUE:  AP, lateral, and oblique views of the left foot were performed.    COMPARISON:  05/09/2021.    FINDINGS:  Prior amputation of the 1st toe.  Mild soft tissue swelling within the amputation bed.  No soft tissue emphysema.  No focus of cortical erosion or periostitis within the distal 1st metatarsal head to suggest underlying changes of osteomyelitis.    There is an acute to subacute minimally displaced comminuted oblique fracture involving the proximal head of the proximal phalanx of the 3rd toe.  The fracture lucency extends to the 3rd MTP joint.  There is adjacent soft tissue swelling.  There is hammertoe formation.    Tarsal bones are intact with  mild to moderate degenerative osteoarthrosis.  Normal tarsometatarsal alignment.  Mild widening of the Lisfranc interval may represent ligamentous injury.  Loss of the normal plantar arch on the lateral view suggesting pes planus.    Small dorsal and plantar calcaneal spurs.    Impression  1. Prior amputation of the 1st toe.  2. Acute to subacute minimally displaced comminuted oblique articular fracture of the proximal phalanx of the 3rd toe.  3. Hammertoe formation.  4. Mild widening of the Lisfranc interval may represent ligamentous injury.  5. Suspected pes planus.  This report was flagged in Epic as abnormal.      Electronically signed by: Joselo Ward  Date:    07/14/2021  Time:    15:53       No results found for this or any previous visit.    Assessment:     1. Ulcer of right foot with fat layer exposed    2. Ulcer of left foot with fat layer exposed    3. Diabetes mellitus type 2 with neurological manifestations    4. H/O amputation of 2nd toe, left    5. History of amputation of left great toe        Plan:     Ulcer of right foot with fat layer exposed    Ulcer of left foot with fat layer exposed    Diabetes mellitus type 2 with neurological manifestations    H/O amputation of 2nd toe, left    History of amputation of left great toe      Thorough discussion is had with the patient today, concerning the diagnosis, its etiology, and the treatment algorithm at present.    The wound to the plantar aspect of the right 1st metatarsophalangeal joint was surgically debrided after adequate prep with alcohol and/or betadine paint. Excisional wound debridement was performed using sharp #10/#15 blade/rounded scalpel and tissue nipper, with removal of all non-viable skin and soft tissues; necrotic skin/tissue formation. The woundbase/wound bed was also debrided to encourage bleeding as to promote/stimulate healing. Debridement was excisional and included epidermal, dermal and subcutaneous tissues. Post debridement  measurements are as above. Hemostasis was achieved. Patient tolerated procedure well and without complications. Local woundcare with Medihoney dressings and bandage thereafter.     Foot counseling and education is provided at this visit. Patient is advised to wear socks and shoes at all times.  Do not walk barefoot, or with just socks, even when indoors.  Be sure to check and inspect the inside of the shoe before putting them on her feet.  Protect your feet at all times.  Walking shoes and/or athletic shoes are the best types of shoe gear. Do not wear vinyl or plastic type shoe gear, as they do not stretch and/or breathe.  Protect your feet from hot and/or cold. Elevate the extremities when sitting.  Do not wear excessively tight socks and/or shoe gear. Wiggle your toes for a few minutes throughout the day. Move your ankles up and down, in and out, to help blood flow in your lower extremity.     1/4 inch felt padding with centralize offloading was placed in the patient's but diabetic shoes with inserts to further offload the area in reduce pressure associated to callus location.    No future appointments.        Continue court-ordered medication. PT CANNOT REFUSE COURT-ORDERED MEDICATION:    Continue HALDOL TO 7.5 MG PO QHS.    IF PT REFUSES PO HALDOL, PLEASE GIVE HALDOL 7.5 MG IM. PLEASE HAVE SECURITY PRESENT PRIOR TO ADMINISTRATION OF IM MEDICATION.

## 2023-11-16 ENCOUNTER — OFFICE VISIT (OUTPATIENT)
Dept: PODIATRY | Facility: CLINIC | Age: 63
End: 2023-11-16
Payer: COMMERCIAL

## 2023-11-16 VITALS — BODY MASS INDEX: 26.06 KG/M2 | HEIGHT: 67 IN | WEIGHT: 166 LBS

## 2023-11-16 DIAGNOSIS — Z89.422 H/O AMPUTATION OF LESSER TOE, LEFT: ICD-10-CM

## 2023-11-16 DIAGNOSIS — E11.49 DIABETES MELLITUS TYPE 2 WITH NEUROLOGICAL MANIFESTATIONS: ICD-10-CM

## 2023-11-16 DIAGNOSIS — L97.522 ULCER OF LEFT FOOT WITH FAT LAYER EXPOSED: ICD-10-CM

## 2023-11-16 DIAGNOSIS — Z89.412 HISTORY OF AMPUTATION OF LEFT GREAT TOE: ICD-10-CM

## 2023-11-16 DIAGNOSIS — L97.512 ULCER OF RIGHT FOOT WITH FAT LAYER EXPOSED: Primary | ICD-10-CM

## 2023-11-16 PROCEDURE — 11042 DBRDMT SUBQ TIS 1ST 20SQCM/<: CPT | Mod: S$GLB,,, | Performed by: PODIATRIST

## 2023-11-16 PROCEDURE — 11042 PR DEBRIDEMENT, SKIN, SUB-Q TISSUE,=<20 SQ CM: ICD-10-PCS | Mod: S$GLB,,, | Performed by: PODIATRIST

## 2023-11-16 PROCEDURE — 99999 PR PBB SHADOW E&M-EST. PATIENT-LVL III: ICD-10-PCS | Mod: PBBFAC,,, | Performed by: PODIATRIST

## 2023-11-16 PROCEDURE — 99999 PR PBB SHADOW E&M-EST. PATIENT-LVL III: CPT | Mod: PBBFAC,,, | Performed by: PODIATRIST

## 2023-11-16 PROCEDURE — 99499 UNLISTED E&M SERVICE: CPT | Mod: S$GLB,,, | Performed by: PODIATRIST

## 2023-11-16 PROCEDURE — 99499 NO LOS: ICD-10-PCS | Mod: S$GLB,,, | Performed by: PODIATRIST

## 2023-11-16 NOTE — PROGRESS NOTES
\  Subjective:       Patient ID: Suellen Campos is a 63 y.o. female.    Chief Complaint: Ulcer (Pt presents with red toes and callouses, Pain 0/10, Diabetic wearing closed toe shoes and socks, last seen 09/27/23)    HPI: Suellen Campos presents to the office today to with  ulceration present to the plantar aspect the right 1st metatarsophalangeal joint and small wound to the plantar aspect of the 2nd metatarsophalangeal joint of the left foot.  Currently wearing her custom diabetic shoes. She is currently doing dressing changes at home with iodine and Medihoney.  Reports no acute infection or increase in redness.  Not currently on antibiotics.    Review of patient's allergies indicates:  No Known Allergies    Past Medical History:   Diagnosis Date    Chronic pain     COVID-19     Depression     Diabetes mellitus     Diabetes mellitus, type 2     Hyperlipidemia     Hypertension     Neuropathy     Osteoarthritis     Spinal stenosis        Family History   Problem Relation Age of Onset    Diabetes Mellitus Mother     Hypertension Mother     Pulmonary fibrosis Father     Hypertension Sister     Diabetes Sister     Pulmonary fibrosis Brother        Social History     Socioeconomic History    Marital status:    Tobacco Use    Smoking status: Never    Smokeless tobacco: Never   Substance and Sexual Activity    Alcohol use: Never    Drug use: Never    Sexual activity: Yes     Partners: Male     Social Determinants of Health     Financial Resource Strain: Low Risk  (10/29/2021)    Overall Financial Resource Strain (CARDIA)     Difficulty of Paying Living Expenses: Not very hard   Food Insecurity: No Food Insecurity (10/29/2021)    Hunger Vital Sign     Worried About Running Out of Food in the Last Year: Never true     Ran Out of Food in the Last Year: Never true   Transportation Needs: No Transportation Needs (10/29/2021)    PRAPARE - Transportation     Lack of Transportation (Medical): No     Lack of Transportation  "(Non-Medical): No   Physical Activity: Inactive (10/29/2021)    Exercise Vital Sign     Days of Exercise per Week: 0 days     Minutes of Exercise per Session: 0 min   Stress: No Stress Concern Present (10/29/2021)    Citizen of [place] [employer] [employer] - Occupational Stress Questionnaire     Feeling of Stress : Not at all   Social Connections: Unknown (10/29/2021)    Social Connection and Isolation Panel [NHANES]     Frequency of Communication with Friends and Family: More than three times a week     Frequency of Social Gatherings with Friends and Family: Three times a week     Active Member of Clubs or Organizations: No     Attends Club or Organization Meetings: Never     Marital Status:    Housing Stability: Unknown (10/29/2021)    Housing Stability Vital Sign     Unable to Pay for Housing in the Last Year: No     Unstable Housing in the Last Year: No       Past Surgical History:   Procedure Laterality Date    HYSTERECTOMY      INCISION AND DRAINAGE FOOT Left 5/9/2021    Procedure: INCISION AND DRAINAGE, FOOT;  Surgeon: Shelby Stephen DPM;  Location: [facility] OR;  Service: Podiatry;  Laterality: Left;  GREAT TOE OF LEFT FOOT    SPINAL CORD STIMULATOR IMPLANT      TOE AMPUTATION Left 5/9/2021    Procedure: AMPUTATION, TOE;  Surgeon: Shelby Stephen DPM;  Location: [facility] OR;  Service: Podiatry;  Laterality: Left;  LEFT GREAT     TOE AMPUTATION Left 6/8/2023    Procedure: AMPUTATION, TOE;  Surgeon: Shelby Stephen DPM;  Location: [facility] OR;  Service: Podiatry;  Laterality: Left;    TONSILLECTOMY      TOTAL ABDOMINAL HYSTERECTOMY W/ BILATERAL SALPINGOOPHORECTOMY         Review of Systems       Objective:   Ht 5' 7" (1.702 m)   Wt 75.3 kg (166 lb)   BMI 26.00 kg/m²     X-Ray Foot 2 View Left  Narrative: EXAMINATION:  XR FOOT 2 VIEW LEFT    CLINICAL HISTORY:  s/p toe amputation;    TECHNIQUE:  Two images of the left foot    COMPARISON:  05/29/2023    FINDINGS:  Left 2nd digit amputation.  Incompletely " healed fracture of the 2nd distal metatarsal neck again noted.  Prior 1st digit amputation again noted.  Degenerative changes midfoot.  Impression: No acute abnormality.    Electronically signed by: Karl Odell  Date:    06/08/2023  Time:    15:26       Physical Exam   LOWER EXTREMITY PHYSICAL EXAMINATION    Vascular:  The right dorsalis pedis pulse 2/4 and the right posterior tibial pulse 2/4.   Capillary refill is intact.  Pedal hair growth intact    Dermatological:  Skin is supple and moist.  There is an ulceration present to the plantar aspect of the right 1st metatarsophalangeal joint with overlying callus tissue.  Upon removal of the callusing tissue, the wound does measure 0.4 cm x 0.75 cm.wound depth of 0.2 cm.  No evidence of probe to bone. No acute signs of infection.     Ulceration present to the plantar aspect the left 2nd metatarsophalangeal joint.  Overlying soft callus formation present.  Upon removal of the blister, there is a 0.4 x 0.4 mm wound.  Does not probe to bone.  Does not probe to subcutaneous deep tissue.     Imaging:         Results for orders placed during the hospital encounter of 07/14/21    X-Ray Foot Complete Left    Narrative  EXAMINATION:  XR FOOT COMPLETE 3 VIEW LEFT    CLINICAL HISTORY:  . Cellulitis of left lower limb    TECHNIQUE:  AP, lateral, and oblique views of the left foot were performed.    COMPARISON:  05/09/2021.    FINDINGS:  Prior amputation of the 1st toe.  Mild soft tissue swelling within the amputation bed.  No soft tissue emphysema.  No focus of cortical erosion or periostitis within the distal 1st metatarsal head to suggest underlying changes of osteomyelitis.    There is an acute to subacute minimally displaced comminuted oblique fracture involving the proximal head of the proximal phalanx of the 3rd toe.  The fracture lucency extends to the 3rd MTP joint.  There is adjacent soft tissue swelling.  There is hammertoe formation.    Tarsal bones are intact with  mild to moderate degenerative osteoarthrosis.  Normal tarsometatarsal alignment.  Mild widening of the Lisfranc interval may represent ligamentous injury.  Loss of the normal plantar arch on the lateral view suggesting pes planus.    Small dorsal and plantar calcaneal spurs.    Impression  1. Prior amputation of the 1st toe.  2. Acute to subacute minimally displaced comminuted oblique articular fracture of the proximal phalanx of the 3rd toe.  3. Hammertoe formation.  4. Mild widening of the Lisfranc interval may represent ligamentous injury.  5. Suspected pes planus.  This report was flagged in Epic as abnormal.      Electronically signed by: Joselo Ward  Date:    07/14/2021  Time:    15:53       No results found for this or any previous visit.    Assessment:     1. Ulcer of right foot with fat layer exposed    2. Ulcer of left foot with fat layer exposed    3. Diabetes mellitus type 2 with neurological manifestations    4. H/O amputation of 2nd toe, left    5. History of amputation of left great toe        Plan:     Ulcer of right foot with fat layer exposed    Ulcer of left foot with fat layer exposed    Diabetes mellitus type 2 with neurological manifestations    H/O amputation of 2nd toe, left    History of amputation of left great toe      Thorough discussion is had with the patient today, concerning the diagnosis, its etiology, and the treatment algorithm at present.    The wound to the plantar aspect of the right 1st metatarsophalangeal joint was surgically debrided after adequate prep with alcohol and/or betadine paint. Excisional wound debridement was performed using sharp #10/#15 blade/rounded scalpel and tissue nipper, with removal of all non-viable skin and soft tissues; necrotic skin/tissue formation. The woundbase/wound bed was also debrided to encourage bleeding as to promote/stimulate healing. Debridement was excisional and included epidermal, dermal and subcutaneous tissues. Post debridement  measurements are as above. Hemostasis was achieved. Patient tolerated procedure well and without complications. Local woundcare with Medihoney dressings and bandage thereafter.     Foot counseling and education is provided at this visit. Patient is advised to wear socks and shoes at all times.  Do not walk barefoot, or with just socks, even when indoors.  Be sure to check and inspect the inside of the shoe before putting them on her feet.  Protect your feet at all times.  Walking shoes and/or athletic shoes are the best types of shoe gear. Do not wear vinyl or plastic type shoe gear, as they do not stretch and/or breathe.  Protect your feet from hot and/or cold. Elevate the extremities when sitting.  Do not wear excessively tight socks and/or shoe gear. Wiggle your toes for a few minutes throughout the day. Move your ankles up and down, in and out, to help blood flow in your lower extremity.     1/4 inch felt padding with centralize offloading was placed in the patient's but diabetic shoes with inserts to further offload the area in reduce pressure associated to callus location.    Did discuss potential need for allograft skin substitutes with offloading to the area to prevent subsequent ulcerations.    Future Appointments   Date Time Provider Department Center   12/14/2023  2:00 PM Shelby Stephen DPM [facility]

## 2023-12-14 ENCOUNTER — OFFICE VISIT (OUTPATIENT)
Dept: PODIATRY | Facility: CLINIC | Age: 63
End: 2023-12-14
Payer: COMMERCIAL

## 2023-12-14 DIAGNOSIS — E11.49 DIABETES MELLITUS TYPE 2 WITH NEUROLOGICAL MANIFESTATIONS: ICD-10-CM

## 2023-12-14 DIAGNOSIS — Z89.422 H/O AMPUTATION OF LESSER TOE, LEFT: ICD-10-CM

## 2023-12-14 DIAGNOSIS — Z89.412 HISTORY OF AMPUTATION OF LEFT GREAT TOE: ICD-10-CM

## 2023-12-14 DIAGNOSIS — L97.512 ULCER OF RIGHT FOOT WITH FAT LAYER EXPOSED: Primary | ICD-10-CM

## 2023-12-14 DIAGNOSIS — L97.522 ULCER OF LEFT FOOT WITH FAT LAYER EXPOSED: ICD-10-CM

## 2023-12-14 PROCEDURE — 99499 NO LOS: ICD-10-PCS | Mod: 25,S$GLB,, | Performed by: PODIATRIST

## 2023-12-14 PROCEDURE — 99499 UNLISTED E&M SERVICE: CPT | Mod: 25,S$GLB,, | Performed by: PODIATRIST

## 2023-12-14 PROCEDURE — 11042 DBRDMT SUBQ TIS 1ST 20SQCM/<: CPT | Mod: S$GLB,,, | Performed by: PODIATRIST

## 2023-12-14 PROCEDURE — 99999 PR PBB SHADOW E&M-EST. PATIENT-LVL II: ICD-10-PCS | Mod: PBBFAC,,, | Performed by: PODIATRIST

## 2023-12-14 PROCEDURE — 11042 PR DEBRIDEMENT, SKIN, SUB-Q TISSUE,=<20 SQ CM: ICD-10-PCS | Mod: S$GLB,,, | Performed by: PODIATRIST

## 2023-12-14 PROCEDURE — 99999 PR PBB SHADOW E&M-EST. PATIENT-LVL II: CPT | Mod: PBBFAC,,, | Performed by: PODIATRIST

## 2023-12-14 NOTE — PROGRESS NOTES
Subjective:       Patient ID: Suellen Campos is a 63 y.o. female.    Chief Complaint: Ulcer (Pt presents with red toes and callouses, Pain 0/10, Diabetic wearing closed toe shoes and socks, last seen 09/27/23)    HPI: Suellen Campos presents to the office today to with  ulceration present to the plantar aspect the right 1st metatarsophalangeal joint and small wound to the plantar aspect of the 2nd metatarsophalangeal joint of the left foot.  Currently wearing her custom diabetic shoes with padding that shifted. She is currently doing dressing changes at home with iodine and Medihoney.  Reports no acute infection or increase in redness.  Not currently on antibiotics.    Review of patient's allergies indicates:  No Known Allergies    Past Medical History:   Diagnosis Date    Chronic pain     COVID-19     Depression     Diabetes mellitus     Diabetes mellitus, type 2     Hyperlipidemia     Hypertension     Neuropathy     Osteoarthritis     Spinal stenosis        Family History   Problem Relation Age of Onset    Diabetes Mellitus Mother     Hypertension Mother     Pulmonary fibrosis Father     Hypertension Sister     Diabetes Sister     Pulmonary fibrosis Brother        Social History     Socioeconomic History    Marital status:    Tobacco Use    Smoking status: Never    Smokeless tobacco: Never   Substance and Sexual Activity    Alcohol use: Never    Drug use: Never    Sexual activity: Yes     Partners: Male     Social Determinants of Health     Financial Resource Strain: Low Risk  (10/29/2021)    Overall Financial Resource Strain (CARDIA)     Difficulty of Paying Living Expenses: Not very hard   Food Insecurity: No Food Insecurity (10/29/2021)    Hunger Vital Sign     Worried About Running Out of Food in the Last Year: Never true     Ran Out of Food in the Last Year: Never true   Transportation Needs: No Transportation Needs (10/29/2021)    PRAPARE - Transportation     Lack of Transportation (Medical): No      Lack of Transportation (Non-Medical): No   Physical Activity: Inactive (10/29/2021)    Exercise Vital Sign     Days of Exercise per Week: 0 days     Minutes of Exercise per Session: 0 min   Stress: No Stress Concern Present (10/29/2021)    [place] [employer] - Occupational Stress Questionnaire     Feeling of Stress : Not at all   Social Connections: Unknown (10/29/2021)    Social Connection and Isolation Panel [NHANES]     Frequency of Communication with Friends and Family: More than three times a week     Frequency of Social Gatherings with Friends and Family: Three times a week     Active Member of Clubs or Organizations: No     Attends Club or Organization Meetings: Never     Marital Status:    Housing Stability: Unknown (10/29/2021)    Housing Stability Vital Sign     Unable to Pay for Housing in the Last Year: No     Unstable Housing in the Last Year: No       Past Surgical History:   Procedure Laterality Date    HYSTERECTOMY      INCISION AND DRAINAGE FOOT Left 5/9/2021    Procedure: INCISION AND DRAINAGE, FOOT;  Surgeon: Shelby Stephen DPM;  Location: [facility] OR;  Service: Podiatry;  Laterality: Left;  GREAT TOE OF LEFT FOOT    SPINAL CORD STIMULATOR IMPLANT      TOE AMPUTATION Left 5/9/2021    Procedure: AMPUTATION, TOE;  Surgeon: Shelby Stephen DPM;  Location: [facility] OR;  Service: Podiatry;  Laterality: Left;  LEFT GREAT     TOE AMPUTATION Left 6/8/2023    Procedure: AMPUTATION, TOE;  Surgeon: Shelby Stephen DPM;  Location: [facility] OR;  Service: Podiatry;  Laterality: Left;    TONSILLECTOMY      TOTAL ABDOMINAL HYSTERECTOMY W/ BILATERAL SALPINGOOPHORECTOMY         Review of Systems       Objective:   There were no vitals taken for this visit.    X-Ray Foot 2 View Left  Narrative: EXAMINATION:  XR FOOT 2 VIEW LEFT    CLINICAL HISTORY:  s/p toe amputation;    TECHNIQUE:  Two images of the left foot    COMPARISON:  05/29/2023    FINDINGS:  Left 2nd digit amputation.  Incompletely  healed fracture of the 2nd distal metatarsal neck again noted.  Prior 1st digit amputation again noted.  Degenerative changes midfoot.  Impression: No acute abnormality.    Electronically signed by: Karl Odell  Date:    06/08/2023  Time:    15:26       Physical Exam   LOWER EXTREMITY PHYSICAL EXAMINATION    Vascular:  The right dorsalis pedis pulse 2/4 and the right posterior tibial pulse 2/4.   Capillary refill is intact.  Pedal hair growth intact    Dermatological:  Skin is supple and moist.  There is an ulceration present to the plantar aspect of the right 1st metatarsophalangeal joint with overlying callus tissue and hypergranular tissue.  Upon removal of the callusing tissue, the wound does measure 0.6 cm x 0.75 cm.wound depth of 0.2 cm.  No evidence of probe to bone. No acute signs of infection.     Ulceration present to the plantar aspect the left 2nd metatarsophalangeal joint.  Overlying soft callus formation present.  Upon removal of the blister, there is a 0.4 x 0.4 mm wound.  Does not probe to bone.  Does not probe to subcutaneous deep tissue.     Imaging:         Results for orders placed during the hospital encounter of 07/14/21    X-Ray Foot Complete Left    Narrative  EXAMINATION:  XR FOOT COMPLETE 3 VIEW LEFT    CLINICAL HISTORY:  . Cellulitis of left lower limb    TECHNIQUE:  AP, lateral, and oblique views of the left foot were performed.    COMPARISON:  05/09/2021.    FINDINGS:  Prior amputation of the 1st toe.  Mild soft tissue swelling within the amputation bed.  No soft tissue emphysema.  No focus of cortical erosion or periostitis within the distal 1st metatarsal head to suggest underlying changes of osteomyelitis.    There is an acute to subacute minimally displaced comminuted oblique fracture involving the proximal head of the proximal phalanx of the 3rd toe.  The fracture lucency extends to the 3rd MTP joint.  There is adjacent soft tissue swelling.  There is hammertoe formation.    Tarsal  bones are intact with mild to moderate degenerative osteoarthrosis.  Normal tarsometatarsal alignment.  Mild widening of the Lisfranc interval may represent ligamentous injury.  Loss of the normal plantar arch on the lateral view suggesting pes planus.    Small dorsal and plantar calcaneal spurs.    Impression  1. Prior amputation of the 1st toe.  2. Acute to subacute minimally displaced comminuted oblique articular fracture of the proximal phalanx of the 3rd toe.  3. Hammertoe formation.  4. Mild widening of the Lisfranc interval may represent ligamentous injury.  5. Suspected pes planus.  This report was flagged in Epic as abnormal.      Electronically signed by: Joselo Ward  Date:    07/14/2021  Time:    15:53       No results found for this or any previous visit.    Assessment:     1. Ulcer of right foot with fat layer exposed    2. Ulcer of left foot with fat layer exposed    3. Diabetes mellitus type 2 with neurological manifestations    4. H/O amputation of 2nd toe, left    5. History of amputation of left great toe        Plan:     Ulcer of right foot with fat layer exposed    Ulcer of left foot with fat layer exposed    Diabetes mellitus type 2 with neurological manifestations    H/O amputation of 2nd toe, left    History of amputation of left great toe      Thorough discussion is had with the patient today, concerning the diagnosis, its etiology, and the treatment algorithm at present.    The wound to the plantar aspect of the right 1st metatarsophalangeal joint was surgically debrided after adequate prep with alcohol and/or betadine paint. Excisional wound debridement was performed using sharp #10/#15 blade/rounded scalpel and tissue nipper, with removal of all non-viable skin and soft tissues; necrotic skin/tissue formation. The woundbase/wound bed was also debrided to encourage bleeding as to promote/stimulate healing. Debridement was excisional and included epidermal, dermal and subcutaneous  tissues. Post debridement measurements are as above. Hemostasis was achieved. Patient tolerated procedure well and without complications. Local woundcare with  Annie collagen  dressings and bandage thereafter.     Foot counseling and education is provided at this visit. Patient is advised to wear socks and shoes at all times.  Do not walk barefoot, or with just socks, even when indoors.  Be sure to check and inspect the inside of the shoe before putting them on her feet.  Protect your feet at all times.  Walking shoes and/or athletic shoes are the best types of shoe gear. Do not wear vinyl or plastic type shoe gear, as they do not stretch and/or breathe.  Protect your feet from hot and/or cold. Elevate the extremities when sitting.  Do not wear excessively tight socks and/or shoe gear. Wiggle your toes for a few minutes throughout the day. Move your ankles up and down, in and out, to help blood flow in your lower extremity.     1/4 inch felt padding with centralize offloading was placed in the patient's but diabetic shoes with inserts to further offload the area in reduce pressure associated to callus location.    Did discuss potential need for allograft skin substitutes with offloading to the area to prevent subsequent ulcerations.    Future Appointments   Date Time Provider Department Center   1/11/2024  2:30 PM Shelby Stephen DPM [facility]

## 2024-01-04 ENCOUNTER — PATIENT MESSAGE (OUTPATIENT)
Dept: PODIATRY | Facility: CLINIC | Age: 64
End: 2024-01-04
Payer: COMMERCIAL

## 2024-01-11 ENCOUNTER — OFFICE VISIT (OUTPATIENT)
Dept: PODIATRY | Facility: CLINIC | Age: 64
End: 2024-01-11
Payer: COMMERCIAL

## 2024-01-11 VITALS — BODY MASS INDEX: 26.06 KG/M2 | HEIGHT: 67 IN | WEIGHT: 166 LBS

## 2024-01-11 DIAGNOSIS — L97.522 ULCER OF LEFT FOOT WITH FAT LAYER EXPOSED: ICD-10-CM

## 2024-01-11 DIAGNOSIS — E11.49 DIABETES MELLITUS TYPE 2 WITH NEUROLOGICAL MANIFESTATIONS: ICD-10-CM

## 2024-01-11 DIAGNOSIS — Z89.412 HISTORY OF AMPUTATION OF LEFT GREAT TOE: ICD-10-CM

## 2024-01-11 DIAGNOSIS — L97.512 ULCER OF RIGHT FOOT WITH FAT LAYER EXPOSED: Primary | ICD-10-CM

## 2024-01-11 DIAGNOSIS — Z89.422 H/O AMPUTATION OF LESSER TOE, LEFT: ICD-10-CM

## 2024-01-11 PROCEDURE — 99214 OFFICE O/P EST MOD 30 MIN: CPT | Mod: 25,S$GLB,, | Performed by: PODIATRIST

## 2024-01-11 PROCEDURE — 1159F MED LIST DOCD IN RCRD: CPT | Mod: CPTII,S$GLB,, | Performed by: PODIATRIST

## 2024-01-11 PROCEDURE — 11042 DBRDMT SUBQ TIS 1ST 20SQCM/<: CPT | Mod: S$GLB,,, | Performed by: PODIATRIST

## 2024-01-11 PROCEDURE — 1160F RVW MEDS BY RX/DR IN RCRD: CPT | Mod: CPTII,S$GLB,, | Performed by: PODIATRIST

## 2024-01-11 PROCEDURE — 3008F BODY MASS INDEX DOCD: CPT | Mod: CPTII,S$GLB,, | Performed by: PODIATRIST

## 2024-01-11 PROCEDURE — 99999 PR PBB SHADOW E&M-EST. PATIENT-LVL III: CPT | Mod: PBBFAC,,, | Performed by: PODIATRIST

## 2024-01-11 RX ORDER — TRAMADOL HYDROCHLORIDE 50 MG/1
50 TABLET ORAL 3 TIMES DAILY
COMMUNITY
Start: 2024-01-05

## 2024-01-11 NOTE — PROGRESS NOTES
Subjective:       Patient ID: Suellen Campos is a 63 y.o. female.    Chief Complaint: Ulcer (Pt presents with red toes and callouses, Pain 0/10, Diabetic wearing closed toe shoes and socks, last seen 09/27/23)    HPI: Suellen Campos presents to the office today to with  ulceration present to the plantar aspect the right 1st metatarsophalangeal joint and smaller wound to the plantar aspect of the 2nd metatarsophalangeal joint of the left foot.  Currently wearing her custom diabetic shoes with inserts. She is currently doing dressing changes at home with Annie collagen. Wound have been present since 9/2023.  Reports no acute infection or increase in redness.  Not currently on antibiotics.    Review of patient's allergies indicates:  No Known Allergies    Past Medical History:   Diagnosis Date    Chronic pain     COVID-19     Depression     Diabetes mellitus     Diabetes mellitus, type 2     Hyperlipidemia     Hypertension     Neuropathy     Osteoarthritis     Spinal stenosis        Family History   Problem Relation Age of Onset    Diabetes Mellitus Mother     Hypertension Mother     Pulmonary fibrosis Father     Hypertension Sister     Diabetes Sister     Pulmonary fibrosis Brother        Social History     Socioeconomic History    Marital status:    Tobacco Use    Smoking status: Never    Smokeless tobacco: Never   Substance and Sexual Activity    Alcohol use: Never    Drug use: Never    Sexual activity: Yes     Partners: Male     Social Determinants of Health     Financial Resource Strain: Low Risk  (10/29/2021)    Overall Financial Resource Strain (CARDIA)     Difficulty of Paying Living Expenses: Not very hard   Food Insecurity: No Food Insecurity (10/29/2021)    Hunger Vital Sign     Worried About Running Out of Food in the Last Year: Never true     Ran Out of Food in the Last Year: Never true   Transportation Needs: No Transportation Needs (10/29/2021)    PRAPARE - Transportation     Lack of  "Transportation (Medical): No     Lack of Transportation (Non-Medical): No   Physical Activity: Inactive (10/29/2021)    Exercise Vital Sign     Days of Exercise per Week: 0 days     Minutes of Exercise per Session: 0 min   Stress: No Stress Concern Present (10/29/2021)    [employer] [place] [employer] - Occupational Stress Questionnaire     Feeling of Stress : Not at all   Social Connections: Unknown (10/29/2021)    Social Connection and Isolation Panel [NHANES]     Frequency of Communication with Friends and Family: More than three times a week     Frequency of Social Gatherings with Friends and Family: Three times a week     Active Member of Clubs or Organizations: No     Attends Club or Organization Meetings: Never     Marital Status:    Housing Stability: Unknown (10/29/2021)    Housing Stability Vital Sign     Unable to Pay for Housing in the Last Year: No     Unstable Housing in the Last Year: No       Past Surgical History:   Procedure Laterality Date    HYSTERECTOMY      INCISION AND DRAINAGE FOOT Left 5/9/2021    Procedure: INCISION AND DRAINAGE, FOOT;  Surgeon: Shelby Stephen DPM;  Location: [facility] OR;  Service: Podiatry;  Laterality: Left;  GREAT TOE OF LEFT FOOT    SPINAL CORD STIMULATOR IMPLANT      TOE AMPUTATION Left 5/9/2021    Procedure: AMPUTATION, TOE;  Surgeon: Shelby Stpehen DPM;  Location: [facility] OR;  Service: Podiatry;  Laterality: Left;  LEFT GREAT     TOE AMPUTATION Left 6/8/2023    Procedure: AMPUTATION, TOE;  Surgeon: Shelby Stephen DPM;  Location: [facility] OR;  Service: Podiatry;  Laterality: Left;    TONSILLECTOMY      TOTAL ABDOMINAL HYSTERECTOMY W/ BILATERAL SALPINGOOPHORECTOMY         Review of Systems       Objective:   Ht 5' 7" (1.702 m)   Wt 75.3 kg (166 lb)   BMI 26.00 kg/m²     X-Ray Foot 2 View Left  Narrative: EXAMINATION:  XR FOOT 2 VIEW LEFT    CLINICAL HISTORY:  s/p toe amputation;    TECHNIQUE:  Two images of the left " foot    COMPARISON:  05/29/2023    FINDINGS:  Left 2nd digit amputation.  Incompletely healed fracture of the 2nd distal metatarsal neck again noted.  Prior 1st digit amputation again noted.  Degenerative changes midfoot.  Impression: No acute abnormality.    Electronically signed by: Karl Odell  Date:    06/08/2023  Time:    15:26       Physical Exam   LOWER EXTREMITY PHYSICAL EXAMINATION    Vascular:  The right dorsalis pedis pulse 2/4 and the right posterior tibial pulse 2/4.   Capillary refill is intact.  Pedal hair growth intact    Dermatological:  Skin is supple and moist.    Ulcer location:  Right sub 1st metatarsal head ulceration  Pre debridement Measurements:  Superficial callus with centralize wound 1.3 cm x 0.7cm  Post debridement Measurements :  1.7cm x 0.8cm. depth 0.2cm  Signs of infection: none  Erythema: none  Undermining: none  Tunneling: none  Drainage:none  Periwound skin: healthy. Slightly hyperkeratotic  Wound Base:  granular    Ulcer location:  left sub 2nd metatarsal head  Pre debridement Measurements: callus with wound 0.3cm x 0.3cm  Post debridement Measurements :  0.7cm x 1.0cm depth 0.1cm  Signs of infection: none  Erythema: none  Undermining: none  Tunneling: none  Drainage:none  Periwound skin: healthy  Wound Base:  granular    Imaging:         Results for orders placed during the hospital encounter of 07/14/21    X-Ray Foot Complete Left    Narrative  EXAMINATION:  XR FOOT COMPLETE 3 VIEW LEFT    CLINICAL HISTORY:  . Cellulitis of left lower limb    TECHNIQUE:  AP, lateral, and oblique views of the left foot were performed.    COMPARISON:  05/09/2021.    FINDINGS:  Prior amputation of the 1st toe.  Mild soft tissue swelling within the amputation bed.  No soft tissue emphysema.  No focus of cortical erosion or periostitis within the distal 1st metatarsal head to suggest underlying changes of osteomyelitis.    There is an acute to subacute minimally displaced comminuted oblique fracture  involving the proximal head of the proximal phalanx of the 3rd toe.  The fracture lucency extends to the 3rd MTP joint.  There is adjacent soft tissue swelling.  There is hammertoe formation.    Tarsal bones are intact with mild to moderate degenerative osteoarthrosis.  Normal tarsometatarsal alignment.  Mild widening of the Lisfranc interval may represent ligamentous injury.  Loss of the normal plantar arch on the lateral view suggesting pes planus.    Small dorsal and plantar calcaneal spurs.    Impression  1. Prior amputation of the 1st toe.  2. Acute to subacute minimally displaced comminuted oblique articular fracture of the proximal phalanx of the 3rd toe.  3. Hammertoe formation.  4. Mild widening of the Lisfranc interval may represent ligamentous injury.  5. Suspected pes planus.  This report was flagged in Epic as abnormal.      Electronically signed by: Joselo Ward  Date:    07/14/2021  Time:    15:53       No results found for this or any previous visit.    Assessment:     1. Ulcer of right foot with fat layer exposed    2. Ulcer of left foot with fat layer exposed    3. Diabetes mellitus type 2 with neurological manifestations    4. H/O amputation of 2nd toe, left    5. History of amputation of left great toe          Plan:     Ulcer of right foot with fat layer exposed    Ulcer of left foot with fat layer exposed    Diabetes mellitus type 2 with neurological manifestations    H/O amputation of 2nd toe, left    History of amputation of left great toe        Thorough discussion is had with the patient today, concerning the diagnosis, its etiology, and the treatment algorithm at present.    The wound to the plantar aspect of the right 1st metatarsophalangeal joint was surgically debrided after adequate prep with alcohol and/or betadine paint. Excisional wound debridement was performed using sharp #10/#15 blade/rounded scalpel and tissue nipper, with removal of all non-viable skin and soft tissues;  necrotic skin/tissue formation. The woundbase/wound bed was also debrided to encourage bleeding as to promote/stimulate healing. Debridement was excisional and included epidermal, dermal and subcutaneous tissues. Post debridement measurements are as above. Hemostasis was achieved. Patient tolerated procedure well and without complications. Local woundcare with  Annie collagen dressings and bandage thereafter.     Has been using annie for several months. Other modalities attempted, offload, total contact casting, Medihony, other topicals    Will send request for skin substitute on the right foot initially to try and get the wound to heal.   Previous x-rays negative for osseous infection.     Foot counseling and education is provided at this visit. Patient is advised to wear socks and shoes at all times.  Do not walk barefoot, or with just socks, even when indoors.  Be sure to check and inspect the inside of the shoe before putting them on her feet.  Protect your feet at all times.  Walking shoes and/or athletic shoes are the best types of shoe gear. Do not wear vinyl or plastic type shoe gear, as they do not stretch and/or breathe.  Protect your feet from hot and/or cold. Elevate the extremities when sitting.  Do not wear excessively tight socks and/or shoe gear. Wiggle your toes for a few minutes throughout the day. Move your ankles up and down, in and out, to help blood flow in your lower extremity.       Future Appointments   Date Time Provider Department Center   1/25/2024  2:00 PM Shelby Stephen DPM [facility]

## 2024-01-28 ENCOUNTER — PATIENT MESSAGE (OUTPATIENT)
Dept: PODIATRY | Facility: CLINIC | Age: 64
End: 2024-01-28
Payer: COMMERCIAL

## 2024-01-29 RX ORDER — SULFAMETHOXAZOLE AND TRIMETHOPRIM 800; 160 MG/1; MG/1
1 TABLET ORAL 2 TIMES DAILY
Qty: 20 TABLET | Refills: 0 | Status: SHIPPED | OUTPATIENT
Start: 2024-01-29 | End: 2024-02-08

## 2024-02-06 ENCOUNTER — OFFICE VISIT (OUTPATIENT)
Dept: PODIATRY | Facility: CLINIC | Age: 64
End: 2024-02-06
Payer: COMMERCIAL

## 2024-02-06 DIAGNOSIS — L97.512 ULCER OF RIGHT FOOT WITH FAT LAYER EXPOSED: ICD-10-CM

## 2024-02-06 DIAGNOSIS — L97.522 ULCER OF LEFT FOOT WITH FAT LAYER EXPOSED: ICD-10-CM

## 2024-02-06 DIAGNOSIS — L03.116 CELLULITIS OF FOOT, LEFT: Primary | ICD-10-CM

## 2024-02-06 DIAGNOSIS — Z89.412 HISTORY OF AMPUTATION OF LEFT GREAT TOE: ICD-10-CM

## 2024-02-06 DIAGNOSIS — E11.49 DIABETES MELLITUS TYPE 2 WITH NEUROLOGICAL MANIFESTATIONS: ICD-10-CM

## 2024-02-06 PROCEDURE — 99214 OFFICE O/P EST MOD 30 MIN: CPT | Mod: 25,S$GLB,, | Performed by: PODIATRIST

## 2024-02-06 PROCEDURE — 99999 PR PBB SHADOW E&M-EST. PATIENT-LVL III: CPT | Mod: PBBFAC,,, | Performed by: PODIATRIST

## 2024-02-06 PROCEDURE — 1160F RVW MEDS BY RX/DR IN RCRD: CPT | Mod: CPTII,S$GLB,, | Performed by: PODIATRIST

## 2024-02-06 PROCEDURE — 11042 DBRDMT SUBQ TIS 1ST 20SQCM/<: CPT | Mod: S$GLB,,, | Performed by: PODIATRIST

## 2024-02-06 PROCEDURE — 1159F MED LIST DOCD IN RCRD: CPT | Mod: CPTII,S$GLB,, | Performed by: PODIATRIST

## 2024-02-06 NOTE — PROGRESS NOTES
Subjective:       Patient ID: Suellen Campos is a 63 y.o. female.    Chief Complaint: Ulcer (Pt presents with red toes and callouses, Pain 0/10, Diabetic wearing closed toe shoes and socks, last seen 09/27/23)    HPI: Suellen Campos presents to the office today to with  ulceration present to the plantar aspect the right 1st metatarsophalangeal joint and smaller wound to the plantar aspect of the 2nd metatarsophalangeal joint of the left foot.  Currently wearing her custom diabetic shoes with inserts. She is currently doing dressing changes at home with Annie collagen has noticed significant improvement in the left foot but continues have ulceration on the right.  She is concerned as she is now having some erythema on the left foot which has not significantly improved with the use of oral antibiotics.. Wound have been present since 9/2023.     Review of patient's allergies indicates:  No Known Allergies    Past Medical History:   Diagnosis Date    Chronic pain     COVID-19     Depression     Diabetes mellitus     Diabetes mellitus, type 2     Hyperlipidemia     Hypertension     Neuropathy     Osteoarthritis     Spinal stenosis        Family History   Problem Relation Age of Onset    Diabetes Mellitus Mother     Hypertension Mother     Pulmonary fibrosis Father     Hypertension Sister     Diabetes Sister     Pulmonary fibrosis Brother        Social History     Socioeconomic History    Marital status:    Tobacco Use    Smoking status: Never    Smokeless tobacco: Never   Substance and Sexual Activity    Alcohol use: Never    Drug use: Never    Sexual activity: Yes     Partners: Male     Social Determinants of Health     Financial Resource Strain: Low Risk  (10/29/2021)    Overall Financial Resource Strain (CARDIA)     Difficulty of Paying Living Expenses: Not very hard   Food Insecurity: No Food Insecurity (10/29/2021)    Hunger Vital Sign     Worried About Running Out of Food in the Last Year: Never true      Ran Out of Food in the Last Year: Never true   Transportation Needs: No Transportation Needs (10/29/2021)    PRAPARE - Transportation     Lack of Transportation (Medical): No     Lack of Transportation (Non-Medical): No   Physical Activity: Inactive (10/29/2021)    Exercise Vital Sign     Days of Exercise per Week: 0 days     Minutes of Exercise per Session: 0 min   Stress: No Stress Concern Present (10/29/2021)    [place] [employer] - Occupational Stress Questionnaire     Feeling of Stress : Not at all   Social Connections: Unknown (10/29/2021)    Social Connection and Isolation Panel [NHANES]     Frequency of Communication with Friends and Family: More than three times a week     Frequency of Social Gatherings with Friends and Family: Three times a week     Active Member of Clubs or Organizations: No     Attends Club or Organization Meetings: Never     Marital Status:    Housing Stability: Unknown (10/29/2021)    Housing Stability Vital Sign     Unable to Pay for Housing in the Last Year: No     Unstable Housing in the Last Year: No       Past Surgical History:   Procedure Laterality Date    HYSTERECTOMY      INCISION AND DRAINAGE FOOT Left 5/9/2021    Procedure: INCISION AND DRAINAGE, FOOT;  Surgeon: Shelby Stephen DPM;  Location: [facility] OR;  Service: Podiatry;  Laterality: Left;  GREAT TOE OF LEFT FOOT    SPINAL CORD STIMULATOR IMPLANT      TOE AMPUTATION Left 5/9/2021    Procedure: AMPUTATION, TOE;  Surgeon: Shelby Stephen DPM;  Location: [facility] OR;  Service: Podiatry;  Laterality: Left;  LEFT GREAT     TOE AMPUTATION Left 6/8/2023    Procedure: AMPUTATION, TOE;  Surgeon: Shelby Stephen DPM;  Location: [facility] OR;  Service: Podiatry;  Laterality: Left;    TONSILLECTOMY      TOTAL ABDOMINAL HYSTERECTOMY W/ BILATERAL SALPINGOOPHORECTOMY         Review of Systems       Objective:   There were no vitals taken for this visit.    X-Ray Foot 2 View Left  Narrative: EXAMINATION:  XR FOOT  2 VIEW LEFT    CLINICAL HISTORY:  s/p toe amputation;    TECHNIQUE:  Two images of the left foot    COMPARISON:  05/29/2023    FINDINGS:  Left 2nd digit amputation.  Incompletely healed fracture of the 2nd distal metatarsal neck again noted.  Prior 1st digit amputation again noted.  Degenerative changes midfoot.  Impression: No acute abnormality.    Electronically signed by: Karl Odell  Date:    06/08/2023  Time:    15:26       Physical Exam   LOWER EXTREMITY PHYSICAL EXAMINATION    Vascular:  The right dorsalis pedis pulse 2/4 and the right posterior tibial pulse 2/4.   Capillary refill is intact.  Pedal hair growth intact    Dermatological:  Skin is supple and moist.    Ulcer location:  Right sub 1st metatarsal head ulceration  Pre debridement Measurements:  Superficial callus with centralize wound   Post debridement Measurements :  1.5cm x 0.7cm. depth 0.2cm  Signs of infection: none  Erythema: none  Undermining: none  Tunneling: none  Drainage:none  Periwound skin: healthy. Slightly hyperkeratotic  Wound Base:  granular    Ulcer location:  left sub 2nd metatarsal head  Pre debridement Measurements: callus   Post debridement Measurements :  0.5cm x 1.0cm depth 0.2cm  Erythema:  Erythema noted to the left forefoot  Undermining: none  Tunneling: none  Drainage:none  Periwound skin: healthy  Wound Base:  granular    Imaging:         Results for orders placed during the hospital encounter of 07/14/21    X-Ray Foot Complete Left    Narrative  EXAMINATION:  XR FOOT COMPLETE 3 VIEW LEFT    CLINICAL HISTORY:  . Cellulitis of left lower limb    TECHNIQUE:  AP, lateral, and oblique views of the left foot were performed.    COMPARISON:  05/09/2021.    FINDINGS:  Prior amputation of the 1st toe.  Mild soft tissue swelling within the amputation bed.  No soft tissue emphysema.  No focus of cortical erosion or periostitis within the distal 1st metatarsal head to suggest underlying changes of osteomyelitis.    There is an acute  to subacute minimally displaced comminuted oblique fracture involving the proximal head of the proximal phalanx of the 3rd toe.  The fracture lucency extends to the 3rd MTP joint.  There is adjacent soft tissue swelling.  There is hammertoe formation.    Tarsal bones are intact with mild to moderate degenerative osteoarthrosis.  Normal tarsometatarsal alignment.  Mild widening of the Lisfranc interval may represent ligamentous injury.  Loss of the normal plantar arch on the lateral view suggesting pes planus.    Small dorsal and plantar calcaneal spurs.    Impression  1. Prior amputation of the 1st toe.  2. Acute to subacute minimally displaced comminuted oblique articular fracture of the proximal phalanx of the 3rd toe.  3. Hammertoe formation.  4. Mild widening of the Lisfranc interval may represent ligamentous injury.  5. Suspected pes planus.  This report was flagged in Epic as abnormal.      Electronically signed by: Joselo Ward  Date:    07/14/2021  Time:    15:53       No results found for this or any previous visit.    Assessment:     1. Cellulitis of foot, left    2. Ulcer of right foot with fat layer exposed    3. Ulcer of left foot with fat layer exposed    4. Diabetes mellitus type 2 with neurological manifestations    5. History of amputation of left great toe            Plan:     Cellulitis of foot, left    Ulcer of right foot with fat layer exposed    Ulcer of left foot with fat layer exposed    Diabetes mellitus type 2 with neurological manifestations    History of amputation of left great toe          Thorough discussion is had with the patient today, concerning the diagnosis, its etiology, and the treatment algorithm at present.    The wound to the plantar aspect of the right 1st metatarsophalangeal joint was surgically debrided after adequate prep with alcohol and/or betadine paint. Excisional wound debridement was performed using sharp #10/#15 blade/rounded scalpel and tissue nipper, with  removal of all non-viable skin and soft tissues; necrotic skin/tissue formation. The woundbase/wound bed was also debrided to encourage bleeding as to promote/stimulate healing. Debridement was excisional and included epidermal, dermal and subcutaneous tissues. Post debridement measurements are as above. Hemostasis was achieved. Patient tolerated procedure well and without complications. Local woundcare with  Annie collagen dressings and bandage thereafter.     Has been using annie for several months. Other modalities attempted, offload, total contact casting, Medihony, other topicals    Unfortunately, skin substitute request was denied.  Will consider surgical wound debridement with application of graft in the operating room in the future if needed    As patient is having continued cellulitis of the left forefoot which is not resolved with oral antibiotics, will send referral to home infusion pharmacy for patient to receive single dose of dalbavancin IV.       Foot counseling and education is provided at this visit. Patient is advised to wear socks and shoes at all times.  Do not walk barefoot, or with just socks, even when indoors.  Be sure to check and inspect the inside of the shoe before putting them on her feet.  Protect your feet at all times.  Walking shoes and/or athletic shoes are the best types of shoe gear. Do not wear vinyl or plastic type shoe gear, as they do not stretch and/or breathe.  Protect your feet from hot and/or cold. Elevate the extremities when sitting.  Do not wear excessively tight socks and/or shoe gear. Wiggle your toes for a few minutes throughout the day. Move your ankles up and down, in and out, to help blood flow in your lower extremity.       No future appointments.

## 2024-03-04 ENCOUNTER — OFFICE VISIT (OUTPATIENT)
Dept: PODIATRY | Facility: CLINIC | Age: 64
End: 2024-03-04
Payer: COMMERCIAL

## 2024-03-04 DIAGNOSIS — Z89.422 H/O AMPUTATION OF LESSER TOE, LEFT: ICD-10-CM

## 2024-03-04 DIAGNOSIS — Z89.412 HISTORY OF AMPUTATION OF LEFT GREAT TOE: ICD-10-CM

## 2024-03-04 DIAGNOSIS — E11.49 DIABETES MELLITUS TYPE 2 WITH NEUROLOGICAL MANIFESTATIONS: ICD-10-CM

## 2024-03-04 DIAGNOSIS — L97.512 ULCER OF RIGHT FOOT WITH FAT LAYER EXPOSED: Primary | ICD-10-CM

## 2024-03-04 DIAGNOSIS — L97.912 NONHEALING ULCER OF RIGHT LOWER EXTREMITY WITH FAT LAYER EXPOSED: ICD-10-CM

## 2024-03-04 PROCEDURE — 99214 OFFICE O/P EST MOD 30 MIN: CPT | Mod: 25,S$GLB,, | Performed by: PODIATRIST

## 2024-03-04 PROCEDURE — 99999 PR PBB SHADOW E&M-EST. PATIENT-LVL III: CPT | Mod: PBBFAC,,, | Performed by: PODIATRIST

## 2024-03-04 PROCEDURE — 1159F MED LIST DOCD IN RCRD: CPT | Mod: CPTII,S$GLB,, | Performed by: PODIATRIST

## 2024-03-04 PROCEDURE — 11042 DBRDMT SUBQ TIS 1ST 20SQCM/<: CPT | Mod: S$GLB,,, | Performed by: PODIATRIST

## 2024-03-04 PROCEDURE — 1160F RVW MEDS BY RX/DR IN RCRD: CPT | Mod: CPTII,S$GLB,, | Performed by: PODIATRIST

## 2024-03-05 NOTE — PROGRESS NOTES
Subjective:       Patient ID: Suellen Campos is a 63 y.o. female.    Chief Complaint: Ulcer (Pt presents with red toes and callouses, Pain 0/10, Diabetic wearing closed toe shoes and socks, last seen 09/27/23)    HPI: Suellen Campos presents to the office today to with  ulceration present to the plantar aspect the right 1st metatarsophalangeal joint. Currently wearing her custom diabetic shoes with inserts. She is currently doing dressing changes at home with Annie collagen. Wound have been present since September 2023 without complete resolution.  Reports no acute infection or increase in redness.  Not currently on antibiotics.  Did tolerate the use of Dalvance infusion well.     Review of patient's allergies indicates:  No Known Allergies    Past Medical History:   Diagnosis Date    Chronic pain     COVID-19     Depression     Diabetes mellitus     Diabetes mellitus, type 2     Hyperlipidemia     Hypertension     Neuropathy     Osteoarthritis     Spinal stenosis        Family History   Problem Relation Age of Onset    Diabetes Mellitus Mother     Hypertension Mother     Pulmonary fibrosis Father     Hypertension Sister     Diabetes Sister     Pulmonary fibrosis Brother        Social History     Socioeconomic History    Marital status:    Tobacco Use    Smoking status: Never    Smokeless tobacco: Never   Substance and Sexual Activity    Alcohol use: Never    Drug use: Never    Sexual activity: Yes     Partners: Male     Social Determinants of Health     Financial Resource Strain: Low Risk  (10/29/2021)    Overall Financial Resource Strain (CARDIA)     Difficulty of Paying Living Expenses: Not very hard   Food Insecurity: No Food Insecurity (10/29/2021)    Hunger Vital Sign     Worried About Running Out of Food in the Last Year: Never true     Ran Out of Food in the Last Year: Never true   Transportation Needs: No Transportation Needs (10/29/2021)    PRAPARE - Transportation     Lack of Transportation  (Medical): No     Lack of Transportation (Non-Medical): No   Physical Activity: Inactive (10/29/2021)    Exercise Vital Sign     Days of Exercise per Week: 0 days     Minutes of Exercise per Session: 0 min   Stress: No Stress Concern Present (10/29/2021)    [employer] [place] [employer] - Occupational Stress Questionnaire     Feeling of Stress : Not at all   Social Connections: Unknown (10/29/2021)    Social Connection and Isolation Panel [NHANES]     Frequency of Communication with Friends and Family: More than three times a week     Frequency of Social Gatherings with Friends and Family: Three times a week     Active Member of Clubs or Organizations: No     Attends Club or Organization Meetings: Never     Marital Status:    Housing Stability: Unknown (10/29/2021)    Housing Stability Vital Sign     Unable to Pay for Housing in the Last Year: No     Unstable Housing in the Last Year: No       Past Surgical History:   Procedure Laterality Date    HYSTERECTOMY      INCISION AND DRAINAGE FOOT Left 5/9/2021    Procedure: INCISION AND DRAINAGE, FOOT;  Surgeon: Shelby Stephen DPM;  Location: [facility] OR;  Service: Podiatry;  Laterality: Left;  GREAT TOE OF LEFT FOOT    SPINAL CORD STIMULATOR IMPLANT      TOE AMPUTATION Left 5/9/2021    Procedure: AMPUTATION, TOE;  Surgeon: Shelby Stephen DPM;  Location: [facility] OR;  Service: Podiatry;  Laterality: Left;  LEFT GREAT     TOE AMPUTATION Left 6/8/2023    Procedure: AMPUTATION, TOE;  Surgeon: Shelby Stephen DPM;  Location: [facility] OR;  Service: Podiatry;  Laterality: Left;    TONSILLECTOMY      TOTAL ABDOMINAL HYSTERECTOMY W/ BILATERAL SALPINGOOPHORECTOMY         Review of Systems       Objective:   There were no vitals taken for this visit.    X-Ray Foot 2 View Left  Narrative: EXAMINATION:  XR FOOT 2 VIEW LEFT    CLINICAL HISTORY:  s/p toe amputation;    TECHNIQUE:  Two images of the left foot    COMPARISON:  05/29/2023    FINDINGS:  Left 2nd digit  amputation.  Incompletely healed fracture of the 2nd distal metatarsal neck again noted.  Prior 1st digit amputation again noted.  Degenerative changes midfoot.  Impression: No acute abnormality.    Electronically signed by: Karl Odell  Date:    06/08/2023  Time:    15:26       Physical Exam   LOWER EXTREMITY PHYSICAL EXAMINATION    Vascular:  The right dorsalis pedis pulse 2/4 and the right posterior tibial pulse 2/4.   Capillary refill is intact.  Pedal hair growth intact    Dermatological:  Skin is supple and moist.  Previous ulcer to left foot is subsequently healed without acute signs of infection.  Continues to have ulceration sub right 1st metatarsophalangeal joint which is been chronically open without significant healing.    Ulcer location:  Right sub 1st metatarsal head ulceration  Pre debridement Measurements:  Superficial callus with centralize wound 1.3 cm x 0.7cm  Post debridement Measurements :  1.1cm x 0.5cm. depth 0.1cm  Signs of infection: none  Erythema: none  Undermining: none  Tunneling: none  Drainage:none  Periwound skin: healthy. Slightly hyperkeratotic  Wound Base:  granular      Imaging:         Results for orders placed during the hospital encounter of 07/14/21    X-Ray Foot Complete Left    Narrative  EXAMINATION:  XR FOOT COMPLETE 3 VIEW LEFT    CLINICAL HISTORY:  . Cellulitis of left lower limb    TECHNIQUE:  AP, lateral, and oblique views of the left foot were performed.    COMPARISON:  05/09/2021.    FINDINGS:  Prior amputation of the 1st toe.  Mild soft tissue swelling within the amputation bed.  No soft tissue emphysema.  No focus of cortical erosion or periostitis within the distal 1st metatarsal head to suggest underlying changes of osteomyelitis.    There is an acute to subacute minimally displaced comminuted oblique fracture involving the proximal head of the proximal phalanx of the 3rd toe.  The fracture lucency extends to the 3rd MTP joint.  There is adjacent soft tissue  swelling.  There is hammertoe formation.    Tarsal bones are intact with mild to moderate degenerative osteoarthrosis.  Normal tarsometatarsal alignment.  Mild widening of the Lisfranc interval may represent ligamentous injury.  Loss of the normal plantar arch on the lateral view suggesting pes planus.    Small dorsal and plantar calcaneal spurs.    Impression  1. Prior amputation of the 1st toe.  2. Acute to subacute minimally displaced comminuted oblique articular fracture of the proximal phalanx of the 3rd toe.  3. Hammertoe formation.  4. Mild widening of the Lisfranc interval may represent ligamentous injury.  5. Suspected pes planus.  This report was flagged in Epic as abnormal.      Electronically signed by: Joselo Ward  Date:    07/14/2021  Time:    15:53       No results found for this or any previous visit.    Assessment:     1. Ulcer of right foot with fat layer exposed    2. Nonhealing ulcer of right lower extremity with fat layer exposed    3. Diabetes mellitus type 2 with neurological manifestations    4. History of amputation of left great toe    5. H/O amputation of 2nd toe, left          Plan:     Ulcer of right foot with fat layer exposed  -     Skin Substitute Authorization    Nonhealing ulcer of right lower extremity with fat layer exposed    Diabetes mellitus type 2 with neurological manifestations    History of amputation of left great toe    H/O amputation of 2nd toe, left        Thorough discussion is had with the patient today, concerning the diagnosis, its etiology, and the treatment algorithm at present.    The wound to the plantar aspect of the right 1st metatarsophalangeal joint was surgically debrided after adequate prep with alcohol and/or betadine paint. Excisional wound debridement was performed using sharp #10/#15 blade/rounded scalpel and tissue nipper, with removal of all non-viable skin and soft tissues; necrotic skin/tissue formation. The woundbase/wound bed was also debrided  to encourage bleeding as to promote/stimulate healing. Debridement was excisional and included epidermal, dermal and subcutaneous tissues. Post debridement measurements are as above. Hemostasis was achieved. Patient tolerated procedure well and without complications. Local woundcare with  Annie collagen dressings and bandage thereafter.     Has been using annie for several months. Other modalities attempted, offload, total contact casting, Medihony, other topicals    Will send request for skin substitute on the right foot as she has having no considerable improvement in the wound size.  She has been denied for other amnion grafts.  Would send request for Omnigraft.     As discussed with patient if this is denied, would likely require surgical OR debridement with application of OR graft such as stravix.    Foot counseling and education is provided at this visit. Patient is advised to wear socks and shoes at all times.  Do not walk barefoot, or with just socks, even when indoors.  Be sure to check and inspect the inside of the shoe before putting them on her feet.  Protect your feet at all times.  Walking shoes and/or athletic shoes are the best types of shoe gear. Do not wear vinyl or plastic type shoe gear, as they do not stretch and/or breathe.  Protect your feet from hot and/or cold. Elevate the extremities when sitting.  Do not wear excessively tight socks and/or shoe gear. Wiggle your toes for a few minutes throughout the day. Move your ankles up and down, in and out, to help blood flow in your lower extremity.       No future appointments.

## 2024-04-04 ENCOUNTER — OFFICE VISIT (OUTPATIENT)
Dept: PODIATRY | Facility: CLINIC | Age: 64
End: 2024-04-04
Payer: COMMERCIAL

## 2024-04-04 VITALS — BODY MASS INDEX: 26.06 KG/M2 | WEIGHT: 166 LBS | HEIGHT: 67 IN

## 2024-04-04 DIAGNOSIS — E11.49 DIABETES MELLITUS TYPE 2 WITH NEUROLOGICAL MANIFESTATIONS: ICD-10-CM

## 2024-04-04 DIAGNOSIS — Z89.412 HISTORY OF AMPUTATION OF LEFT GREAT TOE: ICD-10-CM

## 2024-04-04 DIAGNOSIS — L97.512 ULCER OF RIGHT FOOT WITH FAT LAYER EXPOSED: Primary | ICD-10-CM

## 2024-04-04 DIAGNOSIS — L97.912 NONHEALING ULCER OF RIGHT LOWER EXTREMITY WITH FAT LAYER EXPOSED: ICD-10-CM

## 2024-04-04 PROCEDURE — 99499 UNLISTED E&M SERVICE: CPT | Mod: S$GLB,,, | Performed by: PODIATRIST

## 2024-04-04 PROCEDURE — 99999 PR PBB SHADOW E&M-EST. PATIENT-LVL III: CPT | Mod: PBBFAC,,, | Performed by: PODIATRIST

## 2024-04-04 PROCEDURE — 11042 DBRDMT SUBQ TIS 1ST 20SQCM/<: CPT | Mod: S$GLB,,, | Performed by: PODIATRIST

## 2024-04-04 NOTE — PROGRESS NOTES
Subjective:       Patient ID: Suellen Campos is a 63 y.o. female.    Chief Complaint: Ulcer (Pt presents with red toes and callouses, Pain 0/10, Diabetic wearing closed toe shoes and socks, last seen 09/27/23)    HPI: Suellen Campos presents to the office today to with  ulceration present to the plantar aspect the right 1st metatarsophalangeal joint. Currently wearing her custom diabetic shoes with inserts. She is currently doing dressing changes at home with Annie collagen. Wound have been present since September 2023 without complete resolution. No infection or  redness.  Not currently on antibiotics.  Did tolerate the use of Dalvance infusion well.     Review of patient's allergies indicates:  No Known Allergies    Past Medical History:   Diagnosis Date    Chronic pain     COVID-19     Depression     Diabetes mellitus     Diabetes mellitus, type 2     Hyperlipidemia     Hypertension     Neuropathy     Osteoarthritis     Spinal stenosis        Family History   Problem Relation Age of Onset    Diabetes Mellitus Mother     Hypertension Mother     Pulmonary fibrosis Father     Hypertension Sister     Diabetes Sister     Pulmonary fibrosis Brother        Social History     Socioeconomic History    Marital status:    Tobacco Use    Smoking status: Never    Smokeless tobacco: Never   Substance and Sexual Activity    Alcohol use: Never    Drug use: Never    Sexual activity: Yes     Partners: Male     Social Determinants of Health     Financial Resource Strain: Low Risk  (10/29/2021)    Overall Financial Resource Strain (CARDIA)     Difficulty of Paying Living Expenses: Not very hard   Food Insecurity: No Food Insecurity (10/29/2021)    Hunger Vital Sign     Worried About Running Out of Food in the Last Year: Never true     Ran Out of Food in the Last Year: Never true   Transportation Needs: No Transportation Needs (10/29/2021)    PRAPARE - Transportation     Lack of Transportation (Medical): No     Lack of  "Transportation (Non-Medical): No   Physical Activity: Inactive (10/29/2021)    Exercise Vital Sign     Days of Exercise per Week: 0 days     Minutes of Exercise per Session: 0 min   Stress: No Stress Concern Present (10/29/2021)    [employer] [place] [employer] - Occupational Stress Questionnaire     Feeling of Stress : Not at all   Social Connections: Unknown (10/29/2021)    Social Connection and Isolation Panel [NHANES]     Frequency of Communication with Friends and Family: More than three times a week     Frequency of Social Gatherings with Friends and Family: Three times a week     Active Member of Clubs or Organizations: No     Attends Club or Organization Meetings: Never     Marital Status:    Housing Stability: Unknown (10/29/2021)    Housing Stability Vital Sign     Unable to Pay for Housing in the Last Year: No     Unstable Housing in the Last Year: No       Past Surgical History:   Procedure Laterality Date    HYSTERECTOMY      INCISION AND DRAINAGE FOOT Left 5/9/2021    Procedure: INCISION AND DRAINAGE, FOOT;  Surgeon: Shelby Stephen DPM;  Location: [facility] OR;  Service: Podiatry;  Laterality: Left;  GREAT TOE OF LEFT FOOT    SPINAL CORD STIMULATOR IMPLANT      TOE AMPUTATION Left 5/9/2021    Procedure: AMPUTATION, TOE;  Surgeon: Shelby Stephen DPM;  Location: [facility] OR;  Service: Podiatry;  Laterality: Left;  LEFT GREAT     TOE AMPUTATION Left 6/8/2023    Procedure: AMPUTATION, TOE;  Surgeon: Shelby Stephen DPM;  Location: [facility] OR;  Service: Podiatry;  Laterality: Left;    TONSILLECTOMY      TOTAL ABDOMINAL HYSTERECTOMY W/ BILATERAL SALPINGOOPHORECTOMY         Review of Systems       Objective:   Ht 5' 7" (1.702 m)   Wt 75.3 kg (166 lb 0.1 oz)   BMI 26.00 kg/m²     X-Ray Foot 2 View Left  Narrative: EXAMINATION:  XR FOOT 2 VIEW LEFT    CLINICAL HISTORY:  s/p toe amputation;    TECHNIQUE:  Two images of the left foot    COMPARISON:  05/29/2023    FINDINGS:  Left 2nd digit " amputation.  Incompletely healed fracture of the 2nd distal metatarsal neck again noted.  Prior 1st digit amputation again noted.  Degenerative changes midfoot.  Impression: No acute abnormality.    Electronically signed by: Karl Odell  Date:    06/08/2023  Time:    15:26       Physical Exam   LOWER EXTREMITY PHYSICAL EXAMINATION    Vascular:  The right dorsalis pedis pulse 2/4 and the right posterior tibial pulse 2/4.   Capillary refill is intact.  Pedal hair growth intact    Dermatological:  Skin is supple and moist.  Previous ulcer to left foot is subsequently healed without acute signs of infection.  Continues to have ulceration sub right 1st metatarsophalangeal joint which is been chronically open without significant healing.    Ulcer location:  Right sub 1st metatarsal head ulceration  Pre debridement Measurements:  Superficial callus with centralize wound 0.8 cm x 0.4cm  Post debridement Measurements :  1 cm x 0.5cm. depth 0.2cm  Signs of infection: none  Erythema: none  Undermining: none  Tunneling: none  Drainage:none  Periwound skin: healthy. Slightly hyperkeratotic  Wound Base:  granular      Imaging:         Results for orders placed during the hospital encounter of 07/14/21    X-Ray Foot Complete Left    Narrative  EXAMINATION:  XR FOOT COMPLETE 3 VIEW LEFT    CLINICAL HISTORY:  . Cellulitis of left lower limb    TECHNIQUE:  AP, lateral, and oblique views of the left foot were performed.    COMPARISON:  05/09/2021.    FINDINGS:  Prior amputation of the 1st toe.  Mild soft tissue swelling within the amputation bed.  No soft tissue emphysema.  No focus of cortical erosion or periostitis within the distal 1st metatarsal head to suggest underlying changes of osteomyelitis.    There is an acute to subacute minimally displaced comminuted oblique fracture involving the proximal head of the proximal phalanx of the 3rd toe.  The fracture lucency extends to the 3rd MTP joint.  There is adjacent soft tissue  swelling.  There is hammertoe formation.    Tarsal bones are intact with mild to moderate degenerative osteoarthrosis.  Normal tarsometatarsal alignment.  Mild widening of the Lisfranc interval may represent ligamentous injury.  Loss of the normal plantar arch on the lateral view suggesting pes planus.    Small dorsal and plantar calcaneal spurs.    Impression  1. Prior amputation of the 1st toe.  2. Acute to subacute minimally displaced comminuted oblique articular fracture of the proximal phalanx of the 3rd toe.  3. Hammertoe formation.  4. Mild widening of the Lisfranc interval may represent ligamentous injury.  5. Suspected pes planus.  This report was flagged in Epic as abnormal.      Electronically signed by: Joselo Ward  Date:    07/14/2021  Time:    15:53       No results found for this or any previous visit.    Assessment:     1. Ulcer of right foot with fat layer exposed    2. Nonhealing ulcer of right lower extremity with fat layer exposed    3. Diabetes mellitus type 2 with neurological manifestations    4. History of amputation of left great toe            Plan:     Ulcer of right foot with fat layer exposed    Nonhealing ulcer of right lower extremity with fat layer exposed    Diabetes mellitus type 2 with neurological manifestations    History of amputation of left great toe          Thorough discussion is had with the patient today, concerning the diagnosis, its etiology, and the treatment algorithm at present.    The wound to the plantar aspect of the right 1st metatarsophalangeal joint was surgically debrided after adequate prep with alcohol and/or betadine paint. Excisional wound debridement was performed using sharp #10/#15 blade/rounded scalpel and tissue nipper, with removal of all non-viable skin and soft tissues; necrotic skin/tissue formation. The woundbase/wound bed was also debrided to encourage bleeding as to promote/stimulate healing. Debridement was excisional and included epidermal,  dermal and subcutaneous tissues. Post debridement measurements are as above. Hemostasis was achieved. Patient tolerated procedure well and without complications. Local woundcare with  Annie collagen dressings and bandage thereafter.     Patient approved for Omni graft.  Patient will follow-up in 1 week for application upon receiving the graft.    Foot counseling and education is provided at this visit. Patient is advised to wear socks and shoes at all times.  Do not walk barefoot, or with just socks, even when indoors.  Be sure to check and inspect the inside of the shoe before putting them on her feet.  Protect your feet at all times.  Walking shoes and/or athletic shoes are the best types of shoe gear. Do not wear vinyl or plastic type shoe gear, as they do not stretch and/or breathe.  Protect your feet from hot and/or cold. Elevate the extremities when sitting.  Do not wear excessively tight socks and/or shoe gear. Wiggle your toes for a few minutes throughout the day. Move your ankles up and down, in and out, to help blood flow in your lower extremity.       Future Appointments   Date Time Provider Department Center   4/11/2024  2:30 PM Shelby Stephen DPM [facility]

## 2024-04-11 ENCOUNTER — OFFICE VISIT (OUTPATIENT)
Dept: PODIATRY | Facility: CLINIC | Age: 64
End: 2024-04-11
Payer: COMMERCIAL

## 2024-04-11 DIAGNOSIS — E11.49 DIABETES MELLITUS TYPE 2 WITH NEUROLOGICAL MANIFESTATIONS: ICD-10-CM

## 2024-04-11 DIAGNOSIS — Z89.412 HISTORY OF AMPUTATION OF LEFT GREAT TOE: ICD-10-CM

## 2024-04-11 DIAGNOSIS — L97.512 ULCER OF RIGHT FOOT WITH FAT LAYER EXPOSED: Primary | ICD-10-CM

## 2024-04-11 DIAGNOSIS — L97.912 NONHEALING ULCER OF RIGHT LOWER EXTREMITY WITH FAT LAYER EXPOSED: ICD-10-CM

## 2024-04-11 DIAGNOSIS — Z89.422 H/O AMPUTATION OF LESSER TOE, LEFT: ICD-10-CM

## 2024-04-11 PROCEDURE — 15275 SKIN SUB GRAFT FACE/NK/HF/G: CPT | Mod: S$GLB,,, | Performed by: PODIATRIST

## 2024-04-11 PROCEDURE — 99999 PR PBB SHADOW E&M-EST. PATIENT-LVL II: CPT | Mod: PBBFAC,,, | Performed by: PODIATRIST

## 2024-04-11 PROCEDURE — 99499 UNLISTED E&M SERVICE: CPT | Mod: S$GLB,,, | Performed by: PODIATRIST

## 2024-04-11 NOTE — PROGRESS NOTES
Subjective:       Patient ID: Suellen Campos is a 64 y.o. female.    Chief Complaint: Diabetic Foot Ulcer (Continues with dfu To right plantar foot. Denies pain at present. )      HPI: Patient presents to the clinic today, for application of Integra Omnigraft allograft to an ulceration located at the plantar aspect of the right 1st metatarsophalangeal joint. Patient's Primary Care Provider is Amelia Wheatley MD. The PMHx. does include type 2 diabetes mellitus with peripheral polyneuropathy. To this juncture, patient has had no allograft applications.    Hemoglobin A1C   Date Value Ref Range Status   10/29/2021 6.3 (H) 4.0 - 5.6 % Final     Comment:     ADA Screening Guidelines:  5.7-6.4%  Consistent with prediabetes  >or=6.5%  Consistent with diabetes    High levels of fetal hemoglobin interfere with the HbA1C  assay. Heterozygous hemoglobin variants (HbS, HgC, etc)do  not significantly interfere with this assay.   However, presence of multiple variants may affect accuracy.     05/09/2021 6.8 (H) 4.0 - 5.6 % Final     Comment:     ADA Screening Guidelines:  5.7-6.4%  Consistent with prediabetes  >or=6.5%  Consistent with diabetes    High levels of fetal hemoglobin interfere with the HbA1C  assay. Heterozygous hemoglobin variants (HbS, HgC, etc)do  not significantly interfere with this assay.   However, presence of multiple variants may affect accuracy.     10/29/2010 6.0 4.0 - 6.2 % Final       Review of patient's allergies indicates:  No Known Allergies    Past Medical History:   Diagnosis Date    Chronic pain     COVID-19     Depression     Diabetes mellitus     Diabetes mellitus, type 2     Hyperlipidemia     Hypertension     Neuropathy     Osteoarthritis     Spinal stenosis        Family History   Problem Relation Age of Onset    Diabetes Mellitus Mother     Hypertension Mother     Pulmonary fibrosis Father     Hypertension Sister     Diabetes Sister     Pulmonary fibrosis Brother        Social History      Socioeconomic History    Marital status:    Tobacco Use    Smoking status: Never    Smokeless tobacco: Never   Substance and Sexual Activity    Alcohol use: Never    Drug use: Never    Sexual activity: Yes     Partners: Male     Social Determinants of Health     Financial Resource Strain: Low Risk  (10/29/2021)    Overall Financial Resource Strain (CARDIA)     Difficulty of Paying Living Expenses: Not very hard   Food Insecurity: No Food Insecurity (10/29/2021)    Hunger Vital Sign     Worried About Running Out of Food in the Last Year: Never true     Ran Out of Food in the Last Year: Never true   Transportation Needs: No Transportation Needs (10/29/2021)    PRAPARE - Transportation     Lack of Transportation (Medical): No     Lack of Transportation (Non-Medical): No   Physical Activity: Inactive (10/29/2021)    Exercise Vital Sign     Days of Exercise per Week: 0 days     Minutes of Exercise per Session: 0 min   Stress: No Stress Concern Present (10/29/2021)    [employer] [place] [employer] - Occupational Stress Questionnaire     Feeling of Stress : Not at all   Social Connections: Unknown (10/29/2021)    Social Connection and Isolation Panel [NHANES]     Frequency of Communication with Friends and Family: More than three times a week     Frequency of Social Gatherings with Friends and Family: Three times a week     Active Member of Clubs or Organizations: No     Attends Club or Organization Meetings: Never     Marital Status:    Housing Stability: Unknown (10/29/2021)    Housing Stability Vital Sign     Unable to Pay for Housing in the Last Year: No     Unstable Housing in the Last Year: No       Past Surgical History:   Procedure Laterality Date    HYSTERECTOMY      INCISION AND DRAINAGE FOOT Left 5/9/2021    Procedure: INCISION AND DRAINAGE, FOOT;  Surgeon: Shelby Stephen DPM;  Location: [facility];  Service: Podiatry;  Laterality: Left;  GREAT TOE OF LEFT FOOT    SPINAL CORD  STIMULATOR IMPLANT      TOE AMPUTATION Left 5/9/2021    Procedure: AMPUTATION, TOE;  Surgeon: Shelby Stephen DPM;  Location: [facility] OR;  Service: Podiatry;  Laterality: Left;  LEFT GREAT     TOE AMPUTATION Left 6/8/2023    Procedure: AMPUTATION, TOE;  Surgeon: Shelby Stephen DPM;  Location: [facility] OR;  Service: Podiatry;  Laterality: Left;    TONSILLECTOMY      TOTAL ABDOMINAL HYSTERECTOMY W/ BILATERAL SALPINGOOPHORECTOMY         Review of Systems       Objective:   There were no vitals taken for this visit.    Physical Exam  LOWER EXTREMITY PHYSICAL EXAMINATION    DERMATOLOGY:  Ulceration present to the plantar aspect of the right 1st metatarsophalangeal joint.  Prior to debridement, the wound measures 1.5 cm x 1.25 cm.  Post debridement, the wound measures 1.8 cm x 1.75 cm.  Healthy granular tissue is noted.  Wound depth 0.4cm centrally no signs of acute infection    Assessment:     1. Ulcer of right foot with fat layer exposed    2. Nonhealing ulcer of right lower extremity with fat layer exposed    3. Diabetes mellitus type 2 with neurological manifestations    4. H/O amputation of 2nd toe, left    5. History of amputation of left great toe        Plan:     Ulcer of right foot with fat layer exposed    Nonhealing ulcer of right lower extremity with fat layer exposed    Diabetes mellitus type 2 with neurological manifestations    H/O amputation of 2nd toe, left    History of amputation of left great toe          The wound was surgically debrided after adequate prep with alcohol and/or betadine paint. Excisional wound debridement was performed using sharp #10/#15 blade/rounded scalpel and tissue nipper, with removal of all non-viable skin and soft tissues; necrotic skin/tissue formation. The woundbase/wound bed was also debrided to encourage bleeding as to promote/stimulate healing. Debridement was excisional and included epidermal, dermal and subcutaneous tissues. Post debridement measurements are as above.  Hemostasis was achieved. Patient tolerated procedure well and without complications. Local woundcare with Integra Omnigraft allograft 7 x 7cm graft with nonadherent dressings and bandage thereafter. All units of the graft were applied. The graft was packed deed into and around the wound as well as to the wound periphery as to stimulate cell migration and proliferation, deep to superficial, and to fill the defect. The graft is then secured in standard fashion. Today's graft application is # 1    Graft information:  Date:  04/11/2024  Time:  2:30 p.m.  Location:  [facility]  Graft product:  Integra Omni graft ref [id number]  Amount used:  2 cm x 2 cm  Amount discarded:  Remaining 5cm x 5cm   Reason for waste:  National back order for the 2 cm x 2 cm graft.  Lot:  [id number]  Expiration date:  2026-03 -31  [id number] REV 1 2016/09      No future appointments.

## 2024-04-24 ENCOUNTER — OFFICE VISIT (OUTPATIENT)
Dept: PODIATRY | Facility: CLINIC | Age: 64
End: 2024-04-24
Payer: COMMERCIAL

## 2024-04-24 DIAGNOSIS — L97.512 ULCER OF RIGHT FOOT WITH FAT LAYER EXPOSED: Primary | ICD-10-CM

## 2024-04-24 DIAGNOSIS — E11.49 DIABETES MELLITUS TYPE 2 WITH NEUROLOGICAL MANIFESTATIONS: ICD-10-CM

## 2024-04-24 DIAGNOSIS — Z89.422 H/O AMPUTATION OF LESSER TOE, LEFT: ICD-10-CM

## 2024-04-24 DIAGNOSIS — L97.912 NONHEALING ULCER OF RIGHT LOWER EXTREMITY WITH FAT LAYER EXPOSED: ICD-10-CM

## 2024-04-24 DIAGNOSIS — L03.116 CELLULITIS OF FOOT, LEFT: ICD-10-CM

## 2024-04-24 DIAGNOSIS — L97.521 SKIN ULCER OF SMALL TOE OF LEFT FOOT, LIMITED TO BREAKDOWN OF SKIN: ICD-10-CM

## 2024-04-24 DIAGNOSIS — Z89.412 HISTORY OF AMPUTATION OF LEFT GREAT TOE: ICD-10-CM

## 2024-04-24 DIAGNOSIS — L03.031 CELLULITIS OF FIFTH TOE, RIGHT: ICD-10-CM

## 2024-04-24 PROCEDURE — 1159F MED LIST DOCD IN RCRD: CPT | Mod: CPTII,S$GLB,, | Performed by: PODIATRIST

## 2024-04-24 PROCEDURE — 99999 PR PBB SHADOW E&M-EST. PATIENT-LVL II: CPT | Mod: PBBFAC,,, | Performed by: PODIATRIST

## 2024-04-24 PROCEDURE — 99214 OFFICE O/P EST MOD 30 MIN: CPT | Mod: 25,S$GLB,, | Performed by: PODIATRIST

## 2024-04-24 PROCEDURE — 1160F RVW MEDS BY RX/DR IN RCRD: CPT | Mod: CPTII,S$GLB,, | Performed by: PODIATRIST

## 2024-04-24 PROCEDURE — 11042 DBRDMT SUBQ TIS 1ST 20SQCM/<: CPT | Mod: S$GLB,,, | Performed by: PODIATRIST

## 2024-04-24 RX ORDER — DOXYCYCLINE HYCLATE 100 MG
100 TABLET ORAL 2 TIMES DAILY
Qty: 28 TABLET | Refills: 0 | Status: SHIPPED | OUTPATIENT
Start: 2024-04-24 | End: 2024-05-08

## 2024-04-24 RX ORDER — LEVOFLOXACIN 750 MG/1
750 TABLET ORAL DAILY
Qty: 10 TABLET | Refills: 0 | Status: SHIPPED | OUTPATIENT
Start: 2024-04-24 | End: 2024-05-04

## 2024-04-24 NOTE — PROGRESS NOTES
Subjective:       Patient ID: Suellen Campos is a 64 y.o. female.    Chief Complaint:  Diabetic Foot ulcer Ulcer     HPI: Suellen Campos presents to the office today to with history of ulceration ulceration present to the plantar aspect the right 1st metatarsophalangeal joint.  Status post application of Integra Omnigraft.  Does notice new onset of redness to the lateral aspect of the 5th toe with macerated tissue and a superficial wound.  Not currently on antibiotics.  Has remain compliant with graft on the plantar aspect of the foot.  Currently wearing her custom diabetic shoes with inserts.    Review of patient's allergies indicates:  No Known Allergies    Past Medical History:   Diagnosis Date    Chronic pain     COVID-19     Depression     Diabetes mellitus     Diabetes mellitus, type 2     Hyperlipidemia     Hypertension     Neuropathy     Osteoarthritis     Spinal stenosis        Family History   Problem Relation Name Age of Onset    Diabetes Mellitus Mother      Hypertension Mother      Pulmonary fibrosis Father      Hypertension Sister      Diabetes Sister      Pulmonary fibrosis Brother         Social History     Socioeconomic History    Marital status:    Tobacco Use    Smoking status: Never    Smokeless tobacco: Never   Substance and Sexual Activity    Alcohol use: Never    Drug use: Never    Sexual activity: Yes     Partners: Male     Social Determinants of Health     Financial Resource Strain: Low Risk  (10/29/2021)    Overall Financial Resource Strain (CARDIA)     Difficulty of Paying Living Expenses: Not very hard   Food Insecurity: No Food Insecurity (10/29/2021)    Hunger Vital Sign     Worried About Running Out of Food in the Last Year: Never true     Ran Out of Food in the Last Year: Never true   Transportation Needs: No Transportation Needs (10/29/2021)    PRAPARE - Transportation     Lack of Transportation (Medical): No     Lack of Transportation (Non-Medical): No   Physical Activity:  Inactive (10/29/2021)    Exercise Vital Sign     Days of Exercise per Week: 0 days     Minutes of Exercise per Session: 0 min   Stress: No Stress Concern Present (10/29/2021)    [employer] [place] [employer] - Occupational Stress Questionnaire     Feeling of Stress : Not at all   Social Connections: Unknown (10/29/2021)    Social Connection and Isolation Panel [NHANES]     Frequency of Communication with Friends and Family: More than three times a week     Frequency of Social Gatherings with Friends and Family: Three times a week     Active Member of Clubs or Organizations: No     Attends Club or Organization Meetings: Never     Marital Status:    Housing Stability: Unknown (10/29/2021)    Housing Stability Vital Sign     Unable to Pay for Housing in the Last Year: No     Unstable Housing in the Last Year: No       Past Surgical History:   Procedure Laterality Date    HYSTERECTOMY      INCISION AND DRAINAGE FOOT Left 5/9/2021    Procedure: INCISION AND DRAINAGE, FOOT;  Surgeon: Shelby Stephen DPM;  Location: [facility] OR;  Service: Podiatry;  Laterality: Left;  GREAT TOE OF LEFT FOOT    SPINAL CORD STIMULATOR IMPLANT      TOE AMPUTATION Left 5/9/2021    Procedure: AMPUTATION, TOE;  Surgeon: Shelby Stephen DPM;  Location: [facility] OR;  Service: Podiatry;  Laterality: Left;  LEFT GREAT     TOE AMPUTATION Left 6/8/2023    Procedure: AMPUTATION, TOE;  Surgeon: Shelby Stephen DPM;  Location: [facility] OR;  Service: Podiatry;  Laterality: Left;    TONSILLECTOMY      TOTAL ABDOMINAL HYSTERECTOMY W/ BILATERAL SALPINGOOPHORECTOMY         Review of Systems       Objective:   There were no vitals taken for this visit.    X-Ray Foot 2 View Left  Narrative: EXAMINATION:  XR FOOT 2 VIEW LEFT    CLINICAL HISTORY:  s/p toe amputation;    TECHNIQUE:  Two images of the left foot    COMPARISON:  05/29/2023    FINDINGS:  Left 2nd digit amputation.  Incompletely healed fracture of the 2nd distal metatarsal neck again noted.   Prior 1st digit amputation again noted.  Degenerative changes midfoot.  Impression: No acute abnormality.    Electronically signed by: Karl Odell  Date:    06/08/2023  Time:    15:26       Physical Exam   LOWER EXTREMITY PHYSICAL EXAMINATION    Vascular:  The right dorsalis pedis pulse 2/4 and the right posterior tibial pulse 2/4.   Capillary refill is intact.  Pedal hair growth intact    Dermatological:  Skin is supple and moist.  Previous ulcer to left foot is subsequently healed without acute signs of infection.  Continues to have ulceration sub right 1st metatarsophalangeal joint which is been chronically open without significant healing.    Ulcer location:  Right sub 1st metatarsal head ulceration  Pre debridement Measurements:  Superficial callus  Post debridement Measurements :  0.8 cm x 0.3cm. depth 0.2cm  Signs of infection: none  Erythema: none  Undermining: none  Tunneling: none  Drainage:none  Periwound skin: healthy. Slightly hyperkeratotic  Wound Base:  granular    Superficial sloughing present to the lateral aspect of the right 5th toe with erythema.    Imaging:       Results for orders placed during the hospital encounter of 07/14/21    X-Ray Foot Complete Left    Narrative  EXAMINATION:  XR FOOT COMPLETE 3 VIEW LEFT    CLINICAL HISTORY:  . Cellulitis of left lower limb    TECHNIQUE:  AP, lateral, and oblique views of the left foot were performed.    COMPARISON:  05/09/2021.    FINDINGS:  Prior amputation of the 1st toe.  Mild soft tissue swelling within the amputation bed.  No soft tissue emphysema.  No focus of cortical erosion or periostitis within the distal 1st metatarsal head to suggest underlying changes of osteomyelitis.    There is an acute to subacute minimally displaced comminuted oblique fracture involving the proximal head of the proximal phalanx of the 3rd toe.  The fracture lucency extends to the 3rd MTP joint.  There is adjacent soft tissue swelling.  There is hammertoe  formation.    Tarsal bones are intact with mild to moderate degenerative osteoarthrosis.  Normal tarsometatarsal alignment.  Mild widening of the Lisfranc interval may represent ligamentous injury.  Loss of the normal plantar arch on the lateral view suggesting pes planus.    Small dorsal and plantar calcaneal spurs.    Impression  1. Prior amputation of the 1st toe.  2. Acute to subacute minimally displaced comminuted oblique articular fracture of the proximal phalanx of the 3rd toe.  3. Hammertoe formation.  4. Mild widening of the Lisfranc interval may represent ligamentous injury.  5. Suspected pes planus.  This report was flagged in Epic as abnormal.      Electronically signed by: Joselo Ward  Date:    07/14/2021  Time:    15:53       No results found for this or any previous visit.    Assessment:     1. Ulcer of right foot with fat layer exposed    2. Nonhealing ulcer of right lower extremity with fat layer exposed    3. Diabetes mellitus type 2 with neurological manifestations    4. H/O amputation of 2nd toe, left    5. History of amputation of left great toe    6. Cellulitis of foot, left    7. Skin ulcer of small toe of left foot, limited to breakdown of skin    8. Cellulitis of fifth toe, right              Plan:     Ulcer of right foot with fat layer exposed    Nonhealing ulcer of right lower extremity with fat layer exposed    Diabetes mellitus type 2 with neurological manifestations    H/O amputation of 2nd toe, left    History of amputation of left great toe    Cellulitis of foot, left    Skin ulcer of small toe of left foot, limited to breakdown of skin    Cellulitis of fifth toe, right    Other orders  -     doxycycline (VIBRA-TABS) 100 MG tablet; Take 1 tablet (100 mg total) by mouth 2 (two) times daily. for 14 days  Dispense: 28 tablet; Refill: 0  -     levoFLOXacin (LEVAQUIN) 750 MG tablet; Take 1 tablet (750 mg total) by mouth once daily. for 10 days  Dispense: 10 tablet; Refill:  0            Thorough discussion is had with the patient today, concerning the diagnosis, its etiology, and the treatment algorithm at present.    The wound to the plantar aspect of the right 1st metatarsophalangeal joint was surgically debrided after adequate prep with alcohol and/or betadine paint. Excisional wound debridement was performed using sharp #10/#15 blade/rounded scalpel and tissue nipper, with removal of all non-viable skin and soft tissues; necrotic skin/tissue formation. The woundbase/wound bed was also debrided to encourage bleeding as to promote/stimulate healing. Debridement was excisional and included epidermal, dermal and subcutaneous tissues. Post debridement measurements are as above. Hemostasis was achieved. Patient tolerated procedure well and without complications. Local woundcare with  Hydrogel dressings and bandage thereafter.     Previously approved for Omnigraft.   confirm the authorized referral (ID# [id number]) is linked to the visit.     Start oral antibiotics  Follow-up in 1 week  Foot counseling and education is provided at this visit. Patient is advised to wear socks and shoes at all times.  Do not walk barefoot, or with just socks, even when indoors.  Be sure to check and inspect the inside of the shoe before putting them on her feet.  Protect your feet at all times.  Walking shoes and/or athletic shoes are the best types of shoe gear. Do not wear vinyl or plastic type shoe gear, as they do not stretch and/or breathe.  Protect your feet from hot and/or cold. Elevate the extremities when sitting.  Do not wear excessively tight socks and/or shoe gear. Wiggle your toes for a few minutes throughout the day. Move your ankles up and down, in and out, to help blood flow in your lower extremity.       Future Appointments   Date Time Provider Department Center   5/2/2024  2:00 PM Shelby Stephen DPM [facility]

## 2024-05-02 ENCOUNTER — OFFICE VISIT (OUTPATIENT)
Dept: PODIATRY | Facility: CLINIC | Age: 64
End: 2024-05-02
Payer: COMMERCIAL

## 2024-05-02 DIAGNOSIS — Z89.422 H/O AMPUTATION OF LESSER TOE, LEFT: ICD-10-CM

## 2024-05-02 DIAGNOSIS — Z89.412 HISTORY OF AMPUTATION OF LEFT GREAT TOE: ICD-10-CM

## 2024-05-02 DIAGNOSIS — L97.912 NONHEALING ULCER OF RIGHT LOWER EXTREMITY WITH FAT LAYER EXPOSED: ICD-10-CM

## 2024-05-02 DIAGNOSIS — L97.521 SKIN ULCER OF SMALL TOE OF LEFT FOOT, LIMITED TO BREAKDOWN OF SKIN: ICD-10-CM

## 2024-05-02 DIAGNOSIS — L97.512 ULCER OF RIGHT FOOT WITH FAT LAYER EXPOSED: Primary | ICD-10-CM

## 2024-05-02 DIAGNOSIS — E11.49 DIABETES MELLITUS TYPE 2 WITH NEUROLOGICAL MANIFESTATIONS: ICD-10-CM

## 2024-05-02 PROCEDURE — 99999 PR PBB SHADOW E&M-EST. PATIENT-LVL III: CPT | Mod: PBBFAC,,, | Performed by: PODIATRIST

## 2024-05-02 PROCEDURE — 99499 UNLISTED E&M SERVICE: CPT | Mod: 25,S$GLB,, | Performed by: PODIATRIST

## 2024-05-02 PROCEDURE — 15275 SKIN SUB GRAFT FACE/NK/HF/G: CPT | Mod: S$GLB,,, | Performed by: PODIATRIST

## 2024-05-02 NOTE — PROGRESS NOTES
Subjective:       Patient ID: Suellen Campos is a 64 y.o. female.    Chief Complaint: Diabetic Foot Ulcer (Patient continues with DFU to right foot. She denies pain at present. )    HPI: Patient presents to the clinic today, for application of Integra Omnigraft allograft to an ulceration located at the plantar aspect of the right 1st metatarsophalangeal joint. Patient's Primary Care Provider is Amelia Wheatley MD. The PMHx. does include type 2 diabetes mellitus with peripheral polyneuropathy. To this juncture, patient has had no allograft applications.    Hemoglobin A1C   Date Value Ref Range Status   10/29/2021 6.3 (H) 4.0 - 5.6 % Final     Comment:     ADA Screening Guidelines:  5.7-6.4%  Consistent with prediabetes  >or=6.5%  Consistent with diabetes    High levels of fetal hemoglobin interfere with the HbA1C  assay. Heterozygous hemoglobin variants (HbS, HgC, etc)do  not significantly interfere with this assay.   However, presence of multiple variants may affect accuracy.     05/09/2021 6.8 (H) 4.0 - 5.6 % Final     Comment:     ADA Screening Guidelines:  5.7-6.4%  Consistent with prediabetes  >or=6.5%  Consistent with diabetes    High levels of fetal hemoglobin interfere with the HbA1C  assay. Heterozygous hemoglobin variants (HbS, HgC, etc)do  not significantly interfere with this assay.   However, presence of multiple variants may affect accuracy.     10/29/2010 6.0 4.0 - 6.2 % Final       Review of patient's allergies indicates:  No Known Allergies    Past Medical History:   Diagnosis Date    Chronic pain     COVID-19     Depression     Diabetes mellitus     Diabetes mellitus, type 2     Hyperlipidemia     Hypertension     Neuropathy     Osteoarthritis     Spinal stenosis        Family History   Problem Relation Name Age of Onset    Diabetes Mellitus Mother      Hypertension Mother      Pulmonary fibrosis Father      Hypertension Sister      Diabetes Sister      Pulmonary fibrosis Brother         Social  History     Socioeconomic History    Marital status:    Tobacco Use    Smoking status: Never    Smokeless tobacco: Never   Substance and Sexual Activity    Alcohol use: Never    Drug use: Never    Sexual activity: Yes     Partners: Male     Social Determinants of Health     Financial Resource Strain: Low Risk  (10/29/2021)    Overall Financial Resource Strain (CARDIA)     Difficulty of Paying Living Expenses: Not very hard   Food Insecurity: No Food Insecurity (10/29/2021)    Hunger Vital Sign     Worried About Running Out of Food in the Last Year: Never true     Ran Out of Food in the Last Year: Never true   Transportation Needs: No Transportation Needs (10/29/2021)    PRAPARE - Transportation     Lack of Transportation (Medical): No     Lack of Transportation (Non-Medical): No   Physical Activity: Inactive (10/29/2021)    Exercise Vital Sign     Days of Exercise per Week: 0 days     Minutes of Exercise per Session: 0 min   Stress: No Stress Concern Present (10/29/2021)    [employer] [place] [employer] - Occupational Stress Questionnaire     Feeling of Stress : Not at all   Housing Stability: Unknown (10/29/2021)    Housing Stability Vital Sign     Unable to Pay for Housing in the Last Year: No     Unstable Housing in the Last Year: No       Past Surgical History:   Procedure Laterality Date    HYSTERECTOMY      INCISION AND DRAINAGE FOOT Left 5/9/2021    Procedure: INCISION AND DRAINAGE, FOOT;  Surgeon: Shelby Stephen DPM;  Location: [facility] OR;  Service: Podiatry;  Laterality: Left;  GREAT TOE OF LEFT FOOT    SPINAL CORD STIMULATOR IMPLANT      TOE AMPUTATION Left 5/9/2021    Procedure: AMPUTATION, TOE;  Surgeon: Shelby Stephen DPM;  Location: [facility] OR;  Service: Podiatry;  Laterality: Left;  LEFT GREAT     TOE AMPUTATION Left 6/8/2023    Procedure: AMPUTATION, TOE;  Surgeon: Shelby Stephen DPM;  Location: [facility] OR;  Service: Podiatry;  Laterality: Left;    TONSILLECTOMY      TOTAL ABDOMINAL  HYSTERECTOMY W/ BILATERAL SALPINGOOPHORECTOMY         Review of Systems       Objective:   There were no vitals taken for this visit.    Physical Exam  LOWER EXTREMITY PHYSICAL EXAMINATION    DERMATOLOGY:  Ulceration present to the plantar aspect of the right 1st metatarsophalangeal joint.  Prior to debridement, the wound measures 0.3 cm x 0.6 cm  Post debridement, the wound measures 0.4 cm x 0.65 cm.  Healthy granular tissue is noted.  Wound depth 0.2cm centrally no signs of acute infection    Ulcer to the lateral aspect of the right 5th digit with centralize area of concern.  No signs of abscess formation.  Does probe to tendon.  Does not probe to bone.  Erythema much improved    Sub 1st metatarsophalangeal joint previous wound measurements:  4/24/24- 0.8 x 0.3 x 0.2  4/11/24-1.8 x 1.75 x 0.4cm    Assessment:     1. Ulcer of right foot with fat layer exposed    2. Nonhealing ulcer of right lower extremity with fat layer exposed    3. Skin ulcer of small toe of left foot, limited to breakdown of skin    4. Diabetes mellitus type 2 with neurological manifestations    5. H/O amputation of 2nd toe, left    6. History of amputation of left great toe          Plan:     Ulcer of right foot with fat layer exposed    Nonhealing ulcer of right lower extremity with fat layer exposed    Skin ulcer of small toe of left foot, limited to breakdown of skin    Diabetes mellitus type 2 with neurological manifestations    H/O amputation of 2nd toe, left    History of amputation of left great toe        Thorough discussion is had with the patient today, concerning the diagnosis, its etiology, and the treatment algorithm at present.    Continue oral antibiotic  Dressings to remain clean, dry, intact  Follow-up in 2 weeks    The wound was surgically debrided after adequate prep with alcohol and/or betadine paint. Excisional wound debridement was performed using sharp #10/#15 blade/rounded scalpel and tissue nipper, with removal of all  non-viable skin and soft tissues; necrotic skin/tissue formation. The woundbase/wound bed was also debrided to encourage bleeding as to promote/stimulate healing. Debridement was excisional and included epidermal, dermal and subcutaneous tissues. Post debridement measurements are as above. Hemostasis was achieved. Patient tolerated procedure well and without complications. Local woundcare with Integra Omnigraft allograft 7 x 7cm graft with nonadherent dressings and bandage thereafter. All units of the graft were applied. The graft was packed deed into and around the wound as well as to the wound periphery as to stimulate cell migration and proliferation, deep to superficial, and to fill the defect. The graft is then secured in standard fashion. Today's graft application is # 2    Graft information:  Date:  05/02/2024  Time:  2:15 p.m.  Location:  [facility] pod  Graft product:  Integra Omni graft ref [id number]  Amount used:  2 cm x 2 cm  Amount discarded:  Remaining 5cm x 5cm   Reason for waste:  National back order for the 2 cm x 2 cm graft.  Lot:  [id number]  Expiration date:  2026-03 -31  [id number] REV 1 2016/09      Future Appointments   Date Time Provider Department Center   5/16/2024  2:00 PM Shelby Stephen DPM [facility]

## 2024-05-06 ENCOUNTER — PATIENT MESSAGE (OUTPATIENT)
Dept: PODIATRY | Facility: CLINIC | Age: 64
End: 2024-05-06
Payer: COMMERCIAL

## 2024-05-06 RX ORDER — PROMETHAZINE HYDROCHLORIDE 25 MG/1
25 TABLET ORAL EVERY 4 HOURS
Qty: 20 TABLET | Refills: 0 | Status: SHIPPED | OUTPATIENT
Start: 2024-05-06 | End: 2024-05-10

## 2024-05-09 ENCOUNTER — OFFICE VISIT (OUTPATIENT)
Dept: PODIATRY | Facility: CLINIC | Age: 64
End: 2024-05-09
Payer: COMMERCIAL

## 2024-05-09 DIAGNOSIS — Z89.412 HISTORY OF AMPUTATION OF LEFT GREAT TOE: ICD-10-CM

## 2024-05-09 DIAGNOSIS — E11.49 DIABETES MELLITUS TYPE 2 WITH NEUROLOGICAL MANIFESTATIONS: ICD-10-CM

## 2024-05-09 DIAGNOSIS — L97.512 ULCER OF RIGHT FOOT WITH FAT LAYER EXPOSED: ICD-10-CM

## 2024-05-09 DIAGNOSIS — Z89.422 H/O AMPUTATION OF LESSER TOE, LEFT: ICD-10-CM

## 2024-05-09 DIAGNOSIS — L97.523: ICD-10-CM

## 2024-05-09 DIAGNOSIS — L97.912 NONHEALING ULCER OF RIGHT LOWER EXTREMITY WITH FAT LAYER EXPOSED: Primary | ICD-10-CM

## 2024-05-09 PROCEDURE — 15275 SKIN SUB GRAFT FACE/NK/HF/G: CPT | Mod: S$GLB,,, | Performed by: PODIATRIST

## 2024-05-09 PROCEDURE — 99999 PR PBB SHADOW E&M-EST. PATIENT-LVL III: CPT | Mod: PBBFAC,,, | Performed by: PODIATRIST

## 2024-05-09 PROCEDURE — 99499 UNLISTED E&M SERVICE: CPT | Mod: 25,S$GLB,, | Performed by: PODIATRIST

## 2024-05-09 NOTE — PROGRESS NOTES
Subjective:       Patient ID: Suellen Campos is a 64 y.o. female.    Chief Complaint: F/U ABT NAUSEA AND GRAFT    HPI: Patient presents to the clinic today, for application of Integra Omnigraft allograft to an ulceration located at the plantar aspect of the right 1st metatarsophalangeal joint.  Has new onset wound to the lateral aspect of the right 5th toe.  States that she is currently on doxycycline.  Nausea improved with Phenergan.   Patient's Primary Care Provider is Amelia Wheatley MD. The PMHx. does include type 2 diabetes mellitus with peripheral polyneuropathy. To this juncture, patient has had 2 allograft applications.    Hemoglobin A1C   Date Value Ref Range Status   10/29/2021 6.3 (H) 4.0 - 5.6 % Final     Comment:     ADA Screening Guidelines:  5.7-6.4%  Consistent with prediabetes  >or=6.5%  Consistent with diabetes    High levels of fetal hemoglobin interfere with the HbA1C  assay. Heterozygous hemoglobin variants (HbS, HgC, etc)do  not significantly interfere with this assay.   However, presence of multiple variants may affect accuracy.     05/09/2021 6.8 (H) 4.0 - 5.6 % Final     Comment:     ADA Screening Guidelines:  5.7-6.4%  Consistent with prediabetes  >or=6.5%  Consistent with diabetes    High levels of fetal hemoglobin interfere with the HbA1C  assay. Heterozygous hemoglobin variants (HbS, HgC, etc)do  not significantly interfere with this assay.   However, presence of multiple variants may affect accuracy.     10/29/2010 6.0 4.0 - 6.2 % Final       Review of patient's allergies indicates:  No Known Allergies    Past Medical History:   Diagnosis Date    Chronic pain     COVID-19     Depression     Diabetes mellitus     Diabetes mellitus, type 2     Hyperlipidemia     Hypertension     Neuropathy     Osteoarthritis     Spinal stenosis        Family History   Problem Relation Name Age of Onset    Diabetes Mellitus Mother      Hypertension Mother      Pulmonary fibrosis Father       Hypertension Sister      Diabetes Sister      Pulmonary fibrosis Brother         Social History     Socioeconomic History    Marital status:    Tobacco Use    Smoking status: Never    Smokeless tobacco: Never   Substance and Sexual Activity    Alcohol use: Never    Drug use: Never    Sexual activity: Yes     Partners: Male     Social Determinants of Health     Financial Resource Strain: Low Risk  (10/29/2021)    Overall Financial Resource Strain (CARDIA)     Difficulty of Paying Living Expenses: Not very hard   Food Insecurity: No Food Insecurity (10/29/2021)    Hunger Vital Sign     Worried About Running Out of Food in the Last Year: Never true     Ran Out of Food in the Last Year: Never true   Transportation Needs: No Transportation Needs (10/29/2021)    PRAPARE - Transportation     Lack of Transportation (Medical): No     Lack of Transportation (Non-Medical): No   Physical Activity: Inactive (10/29/2021)    Exercise Vital Sign     Days of Exercise per Week: 0 days     Minutes of Exercise per Session: 0 min   Stress: No Stress Concern Present (10/29/2021)    [place] [employer] - Occupational Stress Questionnaire     Feeling of Stress : Not at all   Housing Stability: Unknown (10/29/2021)    Housing Stability Vital Sign     Unable to Pay for Housing in the Last Year: No     Unstable Housing in the Last Year: No       Past Surgical History:   Procedure Laterality Date    HYSTERECTOMY      INCISION AND DRAINAGE FOOT Left 5/9/2021    Procedure: INCISION AND DRAINAGE, FOOT;  Surgeon: Shelby Stephen DPM;  Location: [facility] OR;  Service: Podiatry;  Laterality: Left;  GREAT TOE OF LEFT FOOT    SPINAL CORD STIMULATOR IMPLANT      TOE AMPUTATION Left 5/9/2021    Procedure: AMPUTATION, TOE;  Surgeon: Shelby Stephen DPM;  Location: [facility] OR;  Service: Podiatry;  Laterality: Left;  LEFT GREAT     TOE AMPUTATION Left 6/8/2023    Procedure: AMPUTATION, TOE;  Surgeon: Shelby Stephen DPM;   Location: [facility] OR;  Service: Podiatry;  Laterality: Left;    TONSILLECTOMY      TOTAL ABDOMINAL HYSTERECTOMY W/ BILATERAL SALPINGOOPHORECTOMY         Review of Systems       Objective:   There were no vitals taken for this visit.    Physical Exam  LOWER EXTREMITY PHYSICAL EXAMINATION    DERMATOLOGY:  Ulceration present to the plantar aspect of the right 1st metatarsophalangeal joint.  Prior to debridement, the wound measures 0.2 cm x 0.5 cm  Post debridement, the wound measures 0.3 cm x 0.6 cm.  Healthy granular tissue is noted.  Wound depth 0.2cm centrally no signs of acute infection    Ulcer to the lateral aspect of the right 5th digit with centralize area of concern.  Palpable bone is present.  No signs of abscess formation.  Does probe to tendon.  Erythema much improved    Sub 1st metatarsophalangeal joint previous wound measurements:  5/2/24- 0.65 x 0.4 cm   4/24/24- 0.8 x 0.3 x 0.2  4/11/24-1.8 x 1.75 x 0.4cm    Assessment:     1. Nonhealing ulcer of right lower extremity with fat layer exposed    2. Ulcer of right foot with fat layer exposed    3. Skin ulcer of small toe of left foot with necrosis of muscle    4. Diabetes mellitus type 2 with neurological manifestations    5. H/O amputation of 2nd toe, left    6. History of amputation of left great toe            Plan:     Nonhealing ulcer of right lower extremity with fat layer exposed    Ulcer of right foot with fat layer exposed    Skin ulcer of small toe of left foot with necrosis of muscle    Diabetes mellitus type 2 with neurological manifestations    H/O amputation of 2nd toe, left    History of amputation of left great toe          Thorough discussion is had with the patient today, concerning the diagnosis, its etiology, and the treatment algorithm at present.    Continue oral antibiotic  Dressings to remain clean, dry, intact  Follow-up in 1 weeks    The wound was surgically debrided after adequate prep with alcohol and/or betadine paint. Excisional  wound debridement was performed using sharp #10/#15 blade/rounded scalpel and tissue nipper, with removal of all non-viable skin and soft tissues; necrotic skin/tissue formation. The woundbase/wound bed was also debrided to encourage bleeding as to promote/stimulate healing. Debridement was excisional and included epidermal, dermal and subcutaneous tissues. Post debridement measurements are as above. Hemostasis was achieved. Patient tolerated procedure well and without complications. Local woundcare with Integra Omnigraft allograft 7 x 7cm graft with nonadherent dressings and bandage thereafter. All units of the graft were applied. The graft was packed deed into and around the wound as well as to the wound periphery as to stimulate cell migration and proliferation, deep to superficial, and to fill the defect. The graft is then secured in standard fashion. Today's graft application is # 3    Graft information:  Date:  05/09/2024  Time:  2:45 p.m.  Location:  [facility] pod  Graft product:  Integra Omni graft ref [id number]  Amount used:  2 cm x 2 cm  Amount discarded:  Remaining 5cm x 5cm   Reason for waste:  National back order for the 2 cm x 2 cm graft.  Lot:  [id number]  Expiration date:  2026-03 -31  [id number] REV 1 2016/09      Future Appointments   Date Time Provider Department Center   5/16/2024  2:00 PM Shelby Stephen DPM [facility]   5/16/2024  2:30 PM Shelby Stephen DPM [facility]

## 2024-05-16 ENCOUNTER — OFFICE VISIT (OUTPATIENT)
Dept: PODIATRY | Facility: CLINIC | Age: 64
End: 2024-05-16
Payer: COMMERCIAL

## 2024-05-16 DIAGNOSIS — Z89.422 H/O AMPUTATION OF LESSER TOE, LEFT: ICD-10-CM

## 2024-05-16 DIAGNOSIS — L97.512 ULCER OF RIGHT FOOT WITH FAT LAYER EXPOSED: ICD-10-CM

## 2024-05-16 DIAGNOSIS — Z89.412 HISTORY OF AMPUTATION OF LEFT GREAT TOE: ICD-10-CM

## 2024-05-16 DIAGNOSIS — L97.912 NONHEALING ULCER OF RIGHT LOWER EXTREMITY WITH FAT LAYER EXPOSED: Primary | ICD-10-CM

## 2024-05-16 DIAGNOSIS — E11.49 DIABETES MELLITUS TYPE 2 WITH NEUROLOGICAL MANIFESTATIONS: ICD-10-CM

## 2024-05-16 PROCEDURE — 11042 DBRDMT SUBQ TIS 1ST 20SQCM/<: CPT | Mod: S$GLB,,, | Performed by: PODIATRIST

## 2024-05-16 PROCEDURE — 99999 PR PBB SHADOW E&M-EST. PATIENT-LVL III: CPT | Mod: PBBFAC,,, | Performed by: PODIATRIST

## 2024-05-16 PROCEDURE — 99499 UNLISTED E&M SERVICE: CPT | Mod: 25,S$GLB,, | Performed by: PODIATRIST

## 2024-05-16 NOTE — PROGRESS NOTES
Subjective:       Patient ID: Suellen Campos is a 64 y.o. female.    Chief Complaint: Wound Check (Patient denies pain at present. )    HPI: Patient presents to the clinic today, wound check to the right foot Has Integra Omnigraft allograft to an ulceration located at the plantar aspect of the right 1st metatarsophalangeal joint.  Has wound to the lateral aspect of the right 5th toe.  States that she is currently on doxycycline.  Nausea improved with Phenergan.   Patient's Primary Care Provider is Amelia Wheatley MD. The PMHx. does include type 2 diabetes mellitus with peripheral polyneuropathy. To this juncture, patient has had 3 allograft applications.    Hemoglobin A1C   Date Value Ref Range Status   10/29/2021 6.3 (H) 4.0 - 5.6 % Final     Comment:     ADA Screening Guidelines:  5.7-6.4%  Consistent with prediabetes  >or=6.5%  Consistent with diabetes    High levels of fetal hemoglobin interfere with the HbA1C  assay. Heterozygous hemoglobin variants (HbS, HgC, etc)do  not significantly interfere with this assay.   However, presence of multiple variants may affect accuracy.     05/09/2021 6.8 (H) 4.0 - 5.6 % Final     Comment:     ADA Screening Guidelines:  5.7-6.4%  Consistent with prediabetes  >or=6.5%  Consistent with diabetes    High levels of fetal hemoglobin interfere with the HbA1C  assay. Heterozygous hemoglobin variants (HbS, HgC, etc)do  not significantly interfere with this assay.   However, presence of multiple variants may affect accuracy.     10/29/2010 6.0 4.0 - 6.2 % Final       Review of patient's allergies indicates:  No Known Allergies    Past Medical History:   Diagnosis Date    Chronic pain     COVID-19     Depression     Diabetes mellitus     Diabetes mellitus, type 2     Hyperlipidemia     Hypertension     Neuropathy     Osteoarthritis     Spinal stenosis        Family History   Problem Relation Name Age of Onset    Diabetes Mellitus Mother      Hypertension Mother      Pulmonary  fibrosis Father      Hypertension Sister      Diabetes Sister      Pulmonary fibrosis Brother         Social History     Socioeconomic History    Marital status:    Tobacco Use    Smoking status: Never    Smokeless tobacco: Never   Substance and Sexual Activity    Alcohol use: Never    Drug use: Never    Sexual activity: Yes     Partners: Male     Social Determinants of Health     Financial Resource Strain: Low Risk  (10/29/2021)    Overall Financial Resource Strain (CARDIA)     Difficulty of Paying Living Expenses: Not very hard   Food Insecurity: No Food Insecurity (10/29/2021)    Hunger Vital Sign     Worried About Running Out of Food in the Last Year: Never true     Ran Out of Food in the Last Year: Never true   Transportation Needs: No Transportation Needs (10/29/2021)    PRAPARE - Transportation     Lack of Transportation (Medical): No     Lack of Transportation (Non-Medical): No   Physical Activity: Inactive (10/29/2021)    Exercise Vital Sign     Days of Exercise per Week: 0 days     Minutes of Exercise per Session: 0 min   Stress: No Stress Concern Present (10/29/2021)    [place] [employer] - Occupational Stress Questionnaire     Feeling of Stress : Not at all   Housing Stability: Unknown (10/29/2021)    Housing Stability Vital Sign     Unable to Pay for Housing in the Last Year: No     Unstable Housing in the Last Year: No       Past Surgical History:   Procedure Laterality Date    HYSTERECTOMY      INCISION AND DRAINAGE FOOT Left 5/9/2021    Procedure: INCISION AND DRAINAGE, FOOT;  Surgeon: Shelby Stephen DPM;  Location: [facility] OR;  Service: Podiatry;  Laterality: Left;  GREAT TOE OF LEFT FOOT    SPINAL CORD STIMULATOR IMPLANT      TOE AMPUTATION Left 5/9/2021    Procedure: AMPUTATION, TOE;  Surgeon: Shelby Stephen DPM;  Location: [facility] OR;  Service: Podiatry;  Laterality: Left;  LEFT GREAT     TOE AMPUTATION Left 6/8/2023    Procedure: AMPUTATION, TOE;  Surgeon: Shelby REAL  SUSIE Stephen;  Location: [facility];  Service: Podiatry;  Laterality: Left;    TONSILLECTOMY      TOTAL ABDOMINAL HYSTERECTOMY W/ BILATERAL SALPINGOOPHORECTOMY         Review of Systems       Objective:   There were no vitals taken for this visit.    Physical Exam  LOWER EXTREMITY PHYSICAL EXAMINATION    DERMATOLOGY:  Ulceration present to the plantar aspect of the right 1st metatarsophalangeal joint.  Prior to debridement, the wound measures 0.2 cm x 0.5 cm  Post debridement, the wound measures 0.3 cm x 0.6 cm.  Healthy granular tissue is noted.  Wound depth 0.2cm centrally no signs of acute infection    Ulcer to the lateral aspect of the right 5th digit. Wound measures 1.5cm x 0.6cm. No PTB.  No signs of abscess formation.  Does probe to tendon.  Erythema much improved    Sub 1st metatarsophalangeal joint previous wound measurements:  5/16/24- 0.5 x 0.2cm  5/2/24- 0.65 x 0.4 cm   4/24/24- 0.8 x 0.3 x 0.2  4/11/24-1.8 x 1.75 x 0.4cm    Assessment:     1. Nonhealing ulcer of right lower extremity with fat layer exposed    2. Ulcer of right foot with fat layer exposed    3. Diabetes mellitus type 2 with neurological manifestations    4. H/O amputation of 2nd toe, left    5. History of amputation of left great toe              Plan:     Nonhealing ulcer of right lower extremity with fat layer exposed    Ulcer of right foot with fat layer exposed    Diabetes mellitus type 2 with neurological manifestations    H/O amputation of 2nd toe, left    History of amputation of left great toe            Thorough discussion is had with the patient today, concerning the diagnosis, its etiology, and the treatment algorithm at present.    The wound was surgically debrided after adequate prep with alcohol and/or betadine paint. Excisional wound debridement was performed using sharp #10/#15 blade/rounded scalpel and tissue nipper, with removal of all non-viable skin and soft tissues; necrotic skin/tissue formation. The woundbase/wound  bed was also debrided to encourage bleeding as to promote/stimulate healing. Debridement was excisional and included epidermal, dermal and subcutaneous tissues. Post debridement measurements are as above. Hemostasis was achieved. Patient tolerated procedure well and without complications. Local woundcare with Betadine dressings and bandage thereafter.     Will request additional graft to be placed  Discussed the importance of continue to monitoring the right 5th toe as there is increased risk that patient may require amputation given her history of wound development and ulceration.\  Keep clean and dry.  Do not shower soak to reduce risk of contaminating infection    No future appointments.

## 2024-06-13 ENCOUNTER — OFFICE VISIT (OUTPATIENT)
Dept: PODIATRY | Facility: CLINIC | Age: 64
End: 2024-06-13
Payer: COMMERCIAL

## 2024-06-13 DIAGNOSIS — L97.512 ULCER OF RIGHT FOOT WITH FAT LAYER EXPOSED: Primary | ICD-10-CM

## 2024-06-13 DIAGNOSIS — L97.511 SKIN ULCER OF SMALL TOE OF RIGHT FOOT, LIMITED TO BREAKDOWN OF SKIN: ICD-10-CM

## 2024-06-13 DIAGNOSIS — Z89.412 HISTORY OF AMPUTATION OF LEFT GREAT TOE: ICD-10-CM

## 2024-06-13 DIAGNOSIS — Z89.422 H/O AMPUTATION OF LESSER TOE, LEFT: ICD-10-CM

## 2024-06-13 DIAGNOSIS — E11.49 DIABETES MELLITUS TYPE 2 WITH NEUROLOGICAL MANIFESTATIONS: ICD-10-CM

## 2024-06-13 PROCEDURE — 99999 PR PBB SHADOW E&M-EST. PATIENT-LVL III: CPT | Mod: PBBFAC,,, | Performed by: PODIATRIST

## 2024-06-13 NOTE — PROGRESS NOTES
Subjective:       Patient ID: Suellen Campos is a 64 y.o. female.    Chief Complaint: Diabetic Foot Ulcer (Patient continues with DFU to right foot. She denies pain at present. )    HPI: Patient presents to the clinic today, for application of Integra Omnigraft allograft to an ulceration located at the plantar aspect of the right 1st metatarsophalangeal joint. Patient's Primary Care Provider is Amelia Wheatley MD. The PMHx. does include type 2 diabetes mellitus with peripheral polyneuropathy. To this juncture, patient has had 3 allograft applications.    Hemoglobin A1C   Date Value Ref Range Status   10/29/2021 6.3 (H) 4.0 - 5.6 % Final     Comment:     ADA Screening Guidelines:  5.7-6.4%  Consistent with prediabetes  >or=6.5%  Consistent with diabetes    High levels of fetal hemoglobin interfere with the HbA1C  assay. Heterozygous hemoglobin variants (HbS, HgC, etc)do  not significantly interfere with this assay.   However, presence of multiple variants may affect accuracy.     05/09/2021 6.8 (H) 4.0 - 5.6 % Final     Comment:     ADA Screening Guidelines:  5.7-6.4%  Consistent with prediabetes  >or=6.5%  Consistent with diabetes    High levels of fetal hemoglobin interfere with the HbA1C  assay. Heterozygous hemoglobin variants (HbS, HgC, etc)do  not significantly interfere with this assay.   However, presence of multiple variants may affect accuracy.     10/29/2010 6.0 4.0 - 6.2 % Final       Review of patient's allergies indicates:  No Known Allergies    Past Medical History:   Diagnosis Date    Chronic pain     COVID-19     Depression     Diabetes mellitus     Diabetes mellitus, type 2     Hyperlipidemia     Hypertension     Neuropathy     Osteoarthritis     Spinal stenosis        Family History   Problem Relation Name Age of Onset    Diabetes Mellitus Mother      Hypertension Mother      Pulmonary fibrosis Father      Hypertension Sister      Diabetes Sister      Pulmonary fibrosis Brother         Social  History     Socioeconomic History    Marital status:    Tobacco Use    Smoking status: Never    Smokeless tobacco: Never   Substance and Sexual Activity    Alcohol use: Never    Drug use: Never    Sexual activity: Yes     Partners: Male     Social Determinants of Health     Financial Resource Strain: Low Risk  (10/29/2021)    Overall Financial Resource Strain (CARDIA)     Difficulty of Paying Living Expenses: Not very hard   Food Insecurity: No Food Insecurity (10/29/2021)    Hunger Vital Sign     Worried About Running Out of Food in the Last Year: Never true     Ran Out of Food in the Last Year: Never true   Transportation Needs: No Transportation Needs (10/29/2021)    PRAPARE - Transportation     Lack of Transportation (Medical): No     Lack of Transportation (Non-Medical): No   Physical Activity: Inactive (10/29/2021)    Exercise Vital Sign     Days of Exercise per Week: 0 days     Minutes of Exercise per Session: 0 min   Stress: No Stress Concern Present (10/29/2021)    [employer]izen [place] of [employer]ational Health - Occupational Stress Questionnaire     Feeling of Stress : Not at all   Housing Stability: Unknown (10/29/2021)    Housing Stability Vital Sign     Unable to Pay for Housing in the Last Year: No     Unstable Housing in the Last Year: No       Past Surgical History:   Procedure Laterality Date    HYSTERECTOMY      INCISION AND DRAINAGE FOOT Left 5/9/2021    Procedure: INCISION AND DRAINAGE, FOOT;  Surgeon: Shelby Stephen DPM;  Location: [facility] OR;  Service: Podiatry;  Laterality: Left;  GREAT TOE OF LEFT FOOT    SPINAL CORD STIMULATOR IMPLANT      TOE AMPUTATION Left 5/9/2021    Procedure: AMPUTATION, TOE;  Surgeon: Shelby Stephen DPM;  Location: [facility] OR;  Service: Podiatry;  Laterality: Left;  LEFT GREAT     TOE AMPUTATION Left 6/8/2023    Procedure: AMPUTATION, TOE;  Surgeon: Shelby Stephen DPM;  Location: [facility] OR;  Service: Podiatry;  Laterality: Left;    TONSILLECTOMY      TOTAL ABDOMINAL  HYSTERECTOMY W/ BILATERAL SALPINGOOPHORECTOMY         Review of Systems       Objective:   There were no vitals taken for this visit.    Physical Exam  LOWER EXTREMITY PHYSICAL EXAMINATION    DERMATOLOGY:  Ulceration present to the plantar aspect of the right 1st metatarsophalangeal joint.  Prior to debridement, the wound measures 0.25 cm x 0.3 cm  Post debridement, the wound measures 0.4 cm x 0.65 cm.  Healthy granular tissue is noted.  Wound depth 0.1cm centrally no signs of acute infection    Ulcer to the lateral aspect of the right 5th digit appears to be improving and now limited to the skin.    Sub 1st metatarsophalangeal joint previous wound measurements:  6/13/24- 0.3 x 0.6 cm  5/16/24- -0.5 x 0.2 cm  5/2/24- 0.65 cm x 0.4 cm  4/24/24- 0.8 x 0.3 x 0.2  4/11/24-1.8 x 1.75 x 0.4cm    Assessment:     1. Ulcer of right foot with fat layer exposed    2. Diabetes mellitus type 2 with neurological manifestations    3. H/O amputation of 2nd toe, left    4. History of amputation of left great toe    5. Skin ulcer of small toe of right foot, limited to breakdown of skin            Plan:     Ulcer of right foot with fat layer exposed    Diabetes mellitus type 2 with neurological manifestations    H/O amputation of 2nd toe, left    History of amputation of left great toe    Skin ulcer of small toe of right foot, limited to breakdown of skin          Thorough discussion is had with the patient today, concerning the diagnosis, its etiology, and the treatment algorithm at present.    Continue oral antibiotic  Dressings to remain clean, dry, intact  Follow-up in 1 weeks    The wound was surgically debrided after adequate prep with alcohol and/or betadine paint. Excisional wound debridement was performed using sharp #10/#15 blade/rounded scalpel and tissue nipper, with removal of all non-viable skin and soft tissues; necrotic skin/tissue formation. The woundbase/wound bed was also debrided to encourage bleeding as to  promote/stimulate healing. Debridement was excisional and included epidermal, dermal and subcutaneous tissues. Post debridement measurements are as above. Hemostasis was achieved. Patient tolerated procedure well and without complications. Local woundcare with Integra Omnigraft allograft 7 x 7cm graft with nonadherent dressings and bandage thereafter. All units of the graft were applied. The graft was packed deed into and around the wound as well as to the wound periphery as to stimulate cell migration and proliferation, deep to superficial, and to fill the defect. The graft is then secured in standard fashion. Today's graft application is # 4    Graft information:  Date:  06/13/2024  Time:  11:15 a.m.  Location:  [facility] pod  Graft product:  Integra Omni graft ref [id number]  Amount used:  2 cm x 2 cm  Amount discarded:  Remaining 5cm x 5cm   Reason for waste:  National back order for the 2 cm x 2 cm graft.  Lot:  [id number]  Expiration date:  2026-03 -31  [id number] REV 1 2016/09      Future Appointments   Date Time Provider Department Center   6/20/2024  2:30 PM Shelby Stephen DPM [facility]

## 2024-06-20 ENCOUNTER — OFFICE VISIT (OUTPATIENT)
Dept: PODIATRY | Facility: CLINIC | Age: 64
End: 2024-06-20
Payer: COMMERCIAL

## 2024-06-20 DIAGNOSIS — Z89.422 H/O AMPUTATION OF LESSER TOE, LEFT: ICD-10-CM

## 2024-06-20 DIAGNOSIS — L97.511 SKIN ULCER OF SMALL TOE OF RIGHT FOOT, LIMITED TO BREAKDOWN OF SKIN: ICD-10-CM

## 2024-06-20 DIAGNOSIS — E11.49 DIABETES MELLITUS TYPE 2 WITH NEUROLOGICAL MANIFESTATIONS: Primary | ICD-10-CM

## 2024-06-20 DIAGNOSIS — Z89.412 HISTORY OF AMPUTATION OF LEFT GREAT TOE: ICD-10-CM

## 2024-06-20 PROCEDURE — 99999 PR PBB SHADOW E&M-EST. PATIENT-LVL III: CPT | Mod: PBBFAC,,, | Performed by: PODIATRIST

## 2024-06-20 PROCEDURE — 1160F RVW MEDS BY RX/DR IN RCRD: CPT | Mod: CPTII,S$GLB,, | Performed by: PODIATRIST

## 2024-06-20 PROCEDURE — 1159F MED LIST DOCD IN RCRD: CPT | Mod: CPTII,S$GLB,, | Performed by: PODIATRIST

## 2024-06-20 PROCEDURE — 99213 OFFICE O/P EST LOW 20 MIN: CPT | Mod: S$GLB,,, | Performed by: PODIATRIST

## 2024-06-20 NOTE — PROGRESS NOTES
Subjective:       Patient ID: Suellen Campos is a 64 y.o. female.    Chief Complaint: Follow-up (Patient denies pain at present to right foot ulcer. Patient is diabetic. )    HPI: Patient presents to the clinic today, for evalutation of ulcer treated with Integra Omnigraft allograft  at the plantar aspect of the right 1st metatarsophalangeal joint. Patient's Primary Care Provider is Amelia Wheatley MD. The PMHx. does include type 2 diabetes mellitus with peripheral polyneuropathy. To this juncture, patient has had 4 allograft applications.    Hemoglobin A1C   Date Value Ref Range Status   10/29/2021 6.3 (H) 4.0 - 5.6 % Final     Comment:     ADA Screening Guidelines:  5.7-6.4%  Consistent with prediabetes  >or=6.5%  Consistent with diabetes    High levels of fetal hemoglobin interfere with the HbA1C  assay. Heterozygous hemoglobin variants (HbS, HgC, etc)do  not significantly interfere with this assay.   However, presence of multiple variants may affect accuracy.     05/09/2021 6.8 (H) 4.0 - 5.6 % Final     Comment:     ADA Screening Guidelines:  5.7-6.4%  Consistent with prediabetes  >or=6.5%  Consistent with diabetes    High levels of fetal hemoglobin interfere with the HbA1C  assay. Heterozygous hemoglobin variants (HbS, HgC, etc)do  not significantly interfere with this assay.   However, presence of multiple variants may affect accuracy.     10/29/2010 6.0 4.0 - 6.2 % Final       Review of patient's allergies indicates:  No Known Allergies    Past Medical History:   Diagnosis Date    Chronic pain     COVID-19     Depression     Diabetes mellitus     Diabetes mellitus, type 2     Hyperlipidemia     Hypertension     Neuropathy     Osteoarthritis     Spinal stenosis        Family History   Problem Relation Name Age of Onset    Diabetes Mellitus Mother      Hypertension Mother      Pulmonary fibrosis Father      Hypertension Sister      Diabetes Sister      Pulmonary fibrosis Brother         Social History      Socioeconomic History    Marital status:    Tobacco Use    Smoking status: Never    Smokeless tobacco: Never   Substance and Sexual Activity    Alcohol use: Never    Drug use: Never    Sexual activity: Yes     Partners: Male     Social Determinants of Health     Financial Resource Strain: Low Risk  (10/29/2021)    Overall Financial Resource Strain (CARDIA)     Difficulty of Paying Living Expenses: Not very hard   Food Insecurity: No Food Insecurity (10/29/2021)    Hunger Vital Sign     Worried About Running Out of Food in the Last Year: Never true     Ran Out of Food in the Last Year: Never true   Transportation Needs: No Transportation Needs (10/29/2021)    PRAPARE - Transportation     Lack of Transportation (Medical): No     Lack of Transportation (Non-Medical): No   Physical Activity: Inactive (10/29/2021)    Exercise Vital Sign     Days of Exercise per Week: 0 days     Minutes of Exercise per Session: 0 min   Stress: No Stress Concern Present (10/29/2021)    [employer] - Occupational Stress Questionnaire     Feeling of Stress : Not at all   Housing Stability: Unknown (10/29/2021)    Housing Stability Vital Sign     Unable to Pay for Housing in the Last Year: No     Unstable Housing in the Last Year: No       Past Surgical History:   Procedure Laterality Date    HYSTERECTOMY      INCISION AND DRAINAGE FOOT Left 5/9/2021    Procedure: INCISION AND DRAINAGE, FOOT;  Surgeon: Shelby Stephen DPM;  Location: [facility] OR;  Service: Podiatry;  Laterality: Left;  GREAT TOE OF LEFT FOOT    SPINAL CORD STIMULATOR IMPLANT      TOE AMPUTATION Left 5/9/2021    Procedure: AMPUTATION, TOE;  Surgeon: Shelby Stephen DPM;  Location: [facility] OR;  Service: Podiatry;  Laterality: Left;  LEFT GREAT     TOE AMPUTATION Left 6/8/2023    Procedure: AMPUTATION, TOE;  Surgeon: Shelby Stephen DPM;  Location: [facility] OR;  Service: Podiatry;  Laterality: Left;    TONSILLECTOMY      TOTAL ABDOMINAL  HYSTERECTOMY W/ BILATERAL SALPINGOOPHORECTOMY         Review of Systems       Objective:   There were no vitals taken for this visit.    Physical Exam  LOWER EXTREMITY PHYSICAL EXAMINATION    DERMATOLOGY:  Previous ulcer to the plantar aspect of the right 1st metatarsophalangeal joint appears to be healed without evidence of skin breakdown.  No edema or erythema.  Small 0.05 by 0.3 cm  wound is present to the lateral aspect of the 5th digit.        Assessment:     1. Diabetes mellitus type 2 with neurological manifestations    2. H/O amputation of 2nd toe, left    3. History of amputation of left great toe    4. Skin ulcer of small toe of right foot, limited to breakdown of skin              Plan:     Diabetes mellitus type 2 with neurological manifestations    H/O amputation of 2nd toe, left    History of amputation of left great toe    Skin ulcer of small toe of right foot, limited to breakdown of skin            Thorough discussion is had with the patient today, concerning the diagnosis, its etiology, and the treatment algorithm at present.    Foot counseling and education is provided at this visit. Patient is advised to wear socks and shoes at all times.  Do not walk barefoot, or with just socks, even when indoors.  Be sure to check and inspect the inside of the shoe before putting them on her feet.  Protect your feet at all times.  Walking shoes and/or athletic shoes are the best types of shoe gear. Do not wear vinyl or plastic type shoe gear, as they do not stretch and/or breathe.  Protect your feet from hot and/or cold. Elevate the extremities when sitting.  Do not wear excessively tight socks and/or shoe gear. Wiggle your toes for a few minutes throughout the day. Move your ankles up and down, in and out, to help blood flow in your lower extremity.    Discussed applying Betadine to the lateral aspect of the 5th digit appears to be mostly healed.  Discussed the importance of continue to monitor and look for  signs and symptoms associated with recurrence, infection, ulcerations, skin breakdown.    Follow-up in 4-6 weeks    No future appointments.

## 2024-07-14 ENCOUNTER — HOSPITAL ENCOUNTER (INPATIENT)
Facility: HOSPITAL | Age: 64
LOS: 1 days | Discharge: HOME | DRG: 571 | End: 2024-07-14
Attending: EMERGENCY MEDICINE | Admitting: STUDENT IN AN ORGANIZED HEALTH CARE EDUCATION/TRAINING PROGRAM
Payer: COMMERCIAL

## 2024-07-14 VITALS
TEMPERATURE: 98 F | BODY MASS INDEX: 29.57 KG/M2 | HEART RATE: 89 BPM | DIASTOLIC BLOOD PRESSURE: 64 MMHG | WEIGHT: 188.38 LBS | SYSTOLIC BLOOD PRESSURE: 121 MMHG | RESPIRATION RATE: 13 BRPM | HEIGHT: 67 IN | OXYGEN SATURATION: 99 %

## 2024-07-14 DIAGNOSIS — Z89.422 H/O AMPUTATION OF LESSER TOE, LEFT: ICD-10-CM

## 2024-07-14 DIAGNOSIS — E11.621 DIABETIC ULCER OF TOE OF RIGHT FOOT ASSOCIATED WITH TYPE 2 DIABETES MELLITUS, WITH FAT LAYER EXPOSED: Primary | ICD-10-CM

## 2024-07-14 DIAGNOSIS — L97.512 DIABETIC ULCER OF TOE OF RIGHT FOOT ASSOCIATED WITH TYPE 2 DIABETES MELLITUS, WITH FAT LAYER EXPOSED: Primary | ICD-10-CM

## 2024-07-14 DIAGNOSIS — M79.673 FOOT PAIN: ICD-10-CM

## 2024-07-14 DIAGNOSIS — R53.1 WEAKNESS: ICD-10-CM

## 2024-07-14 DIAGNOSIS — L02.611 ABSCESS OF SECOND TOE, RIGHT: ICD-10-CM

## 2024-07-14 DIAGNOSIS — E11.621 DIABETIC ULCER OF TOE OF RIGHT FOOT ASSOCIATED WITH TYPE 2 DIABETES MELLITUS, WITH FAT LAYER EXPOSED: ICD-10-CM

## 2024-07-14 DIAGNOSIS — E11.49 DIABETES MELLITUS TYPE 2 WITH NEUROLOGICAL MANIFESTATIONS: ICD-10-CM

## 2024-07-14 DIAGNOSIS — M86.171 OTHER ACUTE OSTEOMYELITIS OF RIGHT FOOT: Primary | ICD-10-CM

## 2024-07-14 DIAGNOSIS — L03.031 CELLULITIS OF SECOND TOE OF RIGHT FOOT: ICD-10-CM

## 2024-07-14 DIAGNOSIS — L03.115 CELLULITIS OF RIGHT FOOT: ICD-10-CM

## 2024-07-14 DIAGNOSIS — Z89.412 HISTORY OF AMPUTATION OF LEFT GREAT TOE: ICD-10-CM

## 2024-07-14 DIAGNOSIS — L97.512 DIABETIC ULCER OF TOE OF RIGHT FOOT ASSOCIATED WITH TYPE 2 DIABETES MELLITUS, WITH FAT LAYER EXPOSED: ICD-10-CM

## 2024-07-14 LAB
ALBUMIN SERPL BCP-MCNC: 3.6 G/DL (ref 3.5–5.2)
ALP SERPL-CCNC: 84 U/L (ref 55–135)
ALT SERPL W/O P-5'-P-CCNC: 17 U/L (ref 10–44)
ANION GAP SERPL CALC-SCNC: 11 MMOL/L (ref 8–16)
AST SERPL-CCNC: 16 U/L (ref 10–40)
BACTERIA #/AREA URNS HPF: ABNORMAL /HPF
BASOPHILS # BLD AUTO: 0.05 K/UL (ref 0–0.2)
BASOPHILS NFR BLD: 0.5 % (ref 0–1.9)
BILIRUB SERPL-MCNC: 0.5 MG/DL (ref 0.1–1)
BILIRUB UR QL STRIP: NEGATIVE
BUN SERPL-MCNC: 40 MG/DL (ref 8–23)
CALCIUM SERPL-MCNC: 9.7 MG/DL (ref 8.7–10.5)
CHLORIDE SERPL-SCNC: 97 MMOL/L (ref 95–110)
CLARITY UR: CLEAR
CO2 SERPL-SCNC: 27 MMOL/L (ref 23–29)
COLOR UR: YELLOW
CREAT SERPL-MCNC: 1 MG/DL (ref 0.5–1.4)
DIFFERENTIAL METHOD BLD: ABNORMAL
EOSINOPHIL # BLD AUTO: 0.1 K/UL (ref 0–0.5)
EOSINOPHIL NFR BLD: 0.8 % (ref 0–8)
ERYTHROCYTE [DISTWIDTH] IN BLOOD BY AUTOMATED COUNT: 14.4 % (ref 11.5–14.5)
EST. GFR  (NO RACE VARIABLE): >60 ML/MIN/1.73 M^2
GLUCOSE SERPL-MCNC: 113 MG/DL (ref 70–110)
GLUCOSE UR QL STRIP: ABNORMAL
HCT VFR BLD AUTO: 34.6 % (ref 37–48.5)
HGB BLD-MCNC: 11.3 G/DL (ref 12–16)
HGB UR QL STRIP: NEGATIVE
IMM GRANULOCYTES # BLD AUTO: 0.04 K/UL (ref 0–0.04)
IMM GRANULOCYTES NFR BLD AUTO: 0.4 % (ref 0–0.5)
KETONES UR QL STRIP: NEGATIVE
LACTATE SERPL-SCNC: 0.6 MMOL/L (ref 0.5–2.2)
LACTATE SERPL-SCNC: 1.1 MMOL/L (ref 0.5–2.2)
LEUKOCYTE ESTERASE UR QL STRIP: ABNORMAL
LYMPHOCYTES # BLD AUTO: 1.3 K/UL (ref 1–4.8)
LYMPHOCYTES NFR BLD: 12.1 % (ref 18–48)
MCH RBC QN AUTO: 28.2 PG (ref 27–31)
MCHC RBC AUTO-ENTMCNC: 32.7 G/DL (ref 32–36)
MCV RBC AUTO: 86 FL (ref 82–98)
MICROSCOPIC COMMENT: ABNORMAL
MONOCYTES # BLD AUTO: 0.9 K/UL (ref 0.3–1)
MONOCYTES NFR BLD: 8.2 % (ref 4–15)
NEUTROPHILS # BLD AUTO: 8.3 K/UL (ref 1.8–7.7)
NEUTROPHILS NFR BLD: 78 % (ref 38–73)
NITRITE UR QL STRIP: NEGATIVE
NRBC BLD-RTO: 0 /100 WBC
OHS QRS DURATION: 94 MS
OHS QTC CALCULATION: 440 MS
PH UR STRIP: 6 [PH] (ref 5–8)
PLATELET # BLD AUTO: 207 K/UL (ref 150–450)
PMV BLD AUTO: 10 FL (ref 9.2–12.9)
POCT GLUCOSE: 143 MG/DL (ref 70–110)
POTASSIUM SERPL-SCNC: 3.9 MMOL/L (ref 3.5–5.1)
PROCALCITONIN SERPL IA-MCNC: 0.15 NG/ML
PROT SERPL-MCNC: 7.3 G/DL (ref 6–8.4)
PROT UR QL STRIP: NEGATIVE
RBC # BLD AUTO: 4.01 M/UL (ref 4–5.4)
RBC #/AREA URNS HPF: 2 /HPF (ref 0–4)
SODIUM SERPL-SCNC: 135 MMOL/L (ref 136–145)
SP GR UR STRIP: 1.02 (ref 1–1.03)
URN SPEC COLLECT METH UR: ABNORMAL
UROBILINOGEN UR STRIP-ACNC: NEGATIVE EU/DL
WBC # BLD AUTO: 10.62 K/UL (ref 3.9–12.7)
WBC #/AREA URNS HPF: 8 /HPF (ref 0–5)
YEAST URNS QL MICRO: ABNORMAL

## 2024-07-14 PROCEDURE — 81000 URINALYSIS NONAUTO W/SCOPE: CPT | Performed by: EMERGENCY MEDICINE

## 2024-07-14 PROCEDURE — 80053 COMPREHEN METABOLIC PANEL: CPT | Performed by: EMERGENCY MEDICINE

## 2024-07-14 PROCEDURE — 84145 PROCALCITONIN (PCT): CPT | Performed by: EMERGENCY MEDICINE

## 2024-07-14 PROCEDURE — 85025 COMPLETE CBC W/AUTO DIFF WBC: CPT | Performed by: EMERGENCY MEDICINE

## 2024-07-14 PROCEDURE — 99284 EMERGENCY DEPT VISIT MOD MDM: CPT | Mod: 25

## 2024-07-14 PROCEDURE — 96365 THER/PROPH/DIAG IV INF INIT: CPT

## 2024-07-14 PROCEDURE — 83605 ASSAY OF LACTIC ACID: CPT | Performed by: EMERGENCY MEDICINE

## 2024-07-14 PROCEDURE — 87075 CULTR BACTERIA EXCEPT BLOOD: CPT | Performed by: STUDENT IN AN ORGANIZED HEALTH CARE EDUCATION/TRAINING PROGRAM

## 2024-07-14 PROCEDURE — 11000001 HC ACUTE MED/SURG PRIVATE ROOM

## 2024-07-14 PROCEDURE — 63600175 PHARM REV CODE 636 W HCPCS: Performed by: STUDENT IN AN ORGANIZED HEALTH CARE EDUCATION/TRAINING PROGRAM

## 2024-07-14 PROCEDURE — 93010 ELECTROCARDIOGRAM REPORT: CPT | Mod: ,,, | Performed by: INTERNAL MEDICINE

## 2024-07-14 PROCEDURE — 87070 CULTURE OTHR SPECIMN AEROBIC: CPT | Performed by: STUDENT IN AN ORGANIZED HEALTH CARE EDUCATION/TRAINING PROGRAM

## 2024-07-14 PROCEDURE — 63600175 PHARM REV CODE 636 W HCPCS: Performed by: EMERGENCY MEDICINE

## 2024-07-14 PROCEDURE — 0JBQ0ZZ EXCISION OF RIGHT FOOT SUBCUTANEOUS TISSUE AND FASCIA, OPEN APPROACH: ICD-10-PCS | Performed by: PODIATRIST

## 2024-07-14 PROCEDURE — 93005 ELECTROCARDIOGRAM TRACING: CPT

## 2024-07-14 PROCEDURE — 96367 TX/PROPH/DG ADDL SEQ IV INF: CPT

## 2024-07-14 PROCEDURE — 96366 THER/PROPH/DIAG IV INF ADDON: CPT

## 2024-07-14 PROCEDURE — 25000003 PHARM REV CODE 250: Performed by: EMERGENCY MEDICINE

## 2024-07-14 PROCEDURE — 87186 SC STD MICRODIL/AGAR DIL: CPT | Performed by: STUDENT IN AN ORGANIZED HEALTH CARE EDUCATION/TRAINING PROGRAM

## 2024-07-14 PROCEDURE — 87077 CULTURE AEROBIC IDENTIFY: CPT | Performed by: STUDENT IN AN ORGANIZED HEALTH CARE EDUCATION/TRAINING PROGRAM

## 2024-07-14 PROCEDURE — 87040 BLOOD CULTURE FOR BACTERIA: CPT | Performed by: EMERGENCY MEDICINE

## 2024-07-14 PROCEDURE — 25000003 PHARM REV CODE 250: Performed by: STUDENT IN AN ORGANIZED HEALTH CARE EDUCATION/TRAINING PROGRAM

## 2024-07-14 RX ORDER — ONDANSETRON HYDROCHLORIDE 2 MG/ML
4 INJECTION, SOLUTION INTRAVENOUS EVERY 8 HOURS PRN
Status: DISCONTINUED | OUTPATIENT
Start: 2024-07-14 | End: 2024-07-14 | Stop reason: HOSPADM

## 2024-07-14 RX ORDER — METRONIDAZOLE 500 MG/1
500 TABLET ORAL EVERY 8 HOURS
Qty: 30 TABLET | Refills: 0 | Status: SHIPPED | OUTPATIENT
Start: 2024-07-14 | End: 2024-07-24

## 2024-07-14 RX ORDER — ATORVASTATIN CALCIUM 10 MG/1
10 TABLET, FILM COATED ORAL NIGHTLY
Status: DISCONTINUED | OUTPATIENT
Start: 2024-07-14 | End: 2024-07-14 | Stop reason: HOSPADM

## 2024-07-14 RX ORDER — ACETAMINOPHEN 500 MG
500 TABLET ORAL EVERY 6 HOURS PRN
Status: DISCONTINUED | OUTPATIENT
Start: 2024-07-14 | End: 2024-07-14 | Stop reason: HOSPADM

## 2024-07-14 RX ORDER — EMPAGLIFLOZIN 25 MG/1
25 TABLET, FILM COATED ORAL DAILY
COMMUNITY

## 2024-07-14 RX ORDER — VENLAFAXINE HYDROCHLORIDE 75 MG/1
150 CAPSULE, EXTENDED RELEASE ORAL NIGHTLY
Status: DISCONTINUED | OUTPATIENT
Start: 2024-07-14 | End: 2024-07-14 | Stop reason: HOSPADM

## 2024-07-14 RX ORDER — SODIUM CHLORIDE 0.9 % (FLUSH) 0.9 %
10 SYRINGE (ML) INJECTION EVERY 12 HOURS PRN
Status: DISCONTINUED | OUTPATIENT
Start: 2024-07-14 | End: 2024-07-14 | Stop reason: HOSPADM

## 2024-07-14 RX ORDER — METRONIDAZOLE 500 MG/1
500 TABLET ORAL EVERY 8 HOURS
Qty: 30 TABLET | Refills: 0 | Status: SHIPPED | OUTPATIENT
Start: 2024-07-14 | End: 2024-07-14

## 2024-07-14 RX ORDER — SODIUM CHLORIDE 9 MG/ML
INJECTION, SOLUTION INTRAVENOUS CONTINUOUS
Status: DISCONTINUED | OUTPATIENT
Start: 2024-07-14 | End: 2024-07-14 | Stop reason: HOSPADM

## 2024-07-14 RX ORDER — MORPHINE SULFATE 60 MG/1
60 TABLET, FILM COATED, EXTENDED RELEASE ORAL 2 TIMES DAILY
COMMUNITY
Start: 2024-06-14

## 2024-07-14 RX ADMIN — VANCOMYCIN HYDROCHLORIDE 2250 MG: 500 INJECTION, POWDER, LYOPHILIZED, FOR SOLUTION INTRAVENOUS at 11:07

## 2024-07-14 RX ADMIN — SODIUM CHLORIDE: 9 INJECTION, SOLUTION INTRAVENOUS at 10:07

## 2024-07-14 RX ADMIN — CEFEPIME 1 G: 1 INJECTION, POWDER, FOR SOLUTION INTRAMUSCULAR; INTRAVENOUS at 10:07

## 2024-07-14 NOTE — PROGRESS NOTES
Pharmacist Renal Dose Adjustment Note    Suellen Campos is a 64 y.o. female being treated with the medication cefepime    Patient Data:    Vital Signs (Most Recent):  Temp: 98.4 °F (36.9 °C) (07/14/24 0537)  Pulse: 92 (07/14/24 0757)  Resp: (!) 23 (07/14/24 0757)  BP: 121/71 (07/14/24 0757)  SpO2: 98 % (07/14/24 0757) Vital Signs (72h Range):  Temp:  [98.4 °F (36.9 °C)]   Pulse:  []   Resp:  [18-23]   BP: (121-151)/(71-74)   SpO2:  [98 %]      Recent Labs   Lab 07/14/24  0823   CREATININE 1.0     Serum creatinine: 1 mg/dL 07/14/24 0823  Estimated creatinine clearance: 63.9 mL/min    Medication:Cefepime 1 gm iv q12hrs will be changed to medication:Cefepime 1 gm iv q8hrs per renal dosing protocol.   Pharmacist's Name: Marina Koenig [facility]  Pharmacist's Extension: [phone]

## 2024-07-14 NOTE — CONSULTS
[facility] - Emergency Dept.  Podiatry  Consult Note    Patient Name: Suellen Campos  MRN: [id number]  Admission Date: 7/14/2024  Hospital Length of Stay: 0 days  Attending Physician: Stewart Parrish,*  Primary Care Provider: Amelia Wheatley MD     Consults  Subjective:     History of Present Illness: 64F, w/ a PMHx of  DMII with Neuropathy, HTN, HLD and etc..., presents to the ED due to substantial erythema/cellulitis, likely abscess of the RLE 2nd toe. Patient does typically follow with Dr. Shelby Stephen. States fever over the past few days. Denies other systemic signs of infection.  is present.     Scheduled Meds:   ceFEPime IV (PEDS and ADULTS)  1 g Intravenous Q8H     Continuous Infusions:   0.9% NaCl   Intravenous Continuous 75 mL/hr at 07/14/24 1014 New Bag at 07/14/24 1014     PRN Meds:  Current Facility-Administered Medications:     acetaminophen, 500 mg, Oral, Q6H PRN    ondansetron, 4 mg, Intravenous, Q8H PRN    sodium chloride 0.9%, 10 mL, Intravenous, Q12H PRN    Pharmacy to dose Vancomycin consult, , , Once **AND** vancomycin - pharmacy to dose, , Intravenous, pharmacy to manage frequency    Review of patient's allergies indicates:  No Known Allergies     Past Medical History:   Diagnosis Date    Chronic pain     COVID-19     Depression     Diabetes mellitus     Diabetes mellitus, type 2     Hyperlipidemia     Hypertension     Neuropathy     Osteoarthritis     Spinal stenosis      Past Surgical History:   Procedure Laterality Date    HYSTERECTOMY      INCISION AND DRAINAGE FOOT Left 5/9/2021    Procedure: INCISION AND DRAINAGE, FOOT;  Surgeon: Shelby Stephen DPM;  Location: [facility] OR;  Service: Podiatry;  Laterality: Left;  GREAT TOE OF LEFT FOOT    SPINAL CORD STIMULATOR IMPLANT      TOE AMPUTATION Left 5/9/2021    Procedure: AMPUTATION, TOE;  Surgeon: Shelby Stephen DPM;  Location: [facility] OR;  Service: Podiatry;  Laterality: Left;  LEFT GREAT     TOE AMPUTATION Left 6/8/2023    Procedure:  AMPUTATION, TOE;  Surgeon: Shelby Stephen DPM;  Location: [facility];  Service: Podiatry;  Laterality: Left;    TONSILLECTOMY      TOTAL ABDOMINAL HYSTERECTOMY W/ BILATERAL SALPINGOOPHORECTOMY         Family History       Problem Relation (Age of Onset)    Diabetes Sister    Diabetes Mellitus Mother    Hypertension Mother, Sister    Pulmonary fibrosis Father, Brother          Tobacco Use    Smoking status: Never    Smokeless tobacco: Never   Substance and Sexual Activity    Alcohol use: Never    Drug use: Never    Sexual activity: Yes     Partners: Male     Review of Systems   Constitutional:  Positive for activity change and fever. Negative for chills and fatigue.   HENT:  Negative for hearing loss.    Eyes:  Negative for photophobia and visual disturbance.   Respiratory:  Negative for cough, chest tightness, shortness of breath and wheezing.    Cardiovascular:  Positive for leg swelling. Negative for chest pain and palpitations.   Gastrointestinal:  Negative for constipation, diarrhea, nausea and vomiting.   Endocrine: Negative for cold intolerance and heat intolerance.   Genitourinary:  Negative for flank pain.   Musculoskeletal:  Negative for neck pain and neck stiffness.   Skin:  Positive for color change and wound.   Neurological:  Positive for numbness. Negative for light-headedness and headaches.   Psychiatric/Behavioral:  Negative for sleep disturbance.      Objective:     Vital Signs (Most Recent):  Temp: 98.4 °F (36.9 °C) (07/14/24 1302)  Pulse: 101 (07/14/24 1302)  Resp: 16 (07/14/24 1302)  BP: 134/60 (07/14/24 1302)  SpO2: 100 % (07/14/24 1302) Vital Signs (24h Range):  Temp:  [98.4 °F (36.9 °C)] 98.4 °F (36.9 °C)  Pulse:  [] 101  Resp:  [15-23] 16  SpO2:  [93 %-100 %] 100 %  BP: (104-151)/(55-74) 134/60     Weight: 85.5 kg (188 lb 6.4 oz)  Body mass index is 29.51 kg/m².    Foot Exam    Physical Exam  Constitutional:       Appearance: She is obese.   Eyes:      Conjunctiva/sclera: Conjunctivae  normal.   Cardiovascular:      Pulses: Normal pulses.   Pulmonary:      Effort: Pulmonary effort is normal. No respiratory distress.      Breath sounds: No wheezing.   Musculoskeletal:         General: Signs of injury present.      Right lower leg: Edema present.   Skin:     Capillary Refill: Capillary refill takes less than 2 seconds.      Findings: Erythema and lesion present.   Neurological:      General: No focal deficit present.      Mental Status: She is alert and oriented to person, place, and time. Mental status is at baseline.      Sensory: Sensory deficit present.      Motor: Weakness present.      Gait: Gait abnormal.   Psychiatric:         Mood and Affect: Mood normal.         Behavior: Behavior normal.         Thought Content: Thought content normal.         Judgment: Judgment normal.         Laboratory:  All pertinent labs reviewed within the last 24 hours.    Diagnostic Results:  I have reviewed all pertinent imaging results/findings within the past 24 hours.    Clinical Findings:  DP and PT on the RLE and the LLE. CFT is WNL on the RLE. Ulceration is noted, RLE, 2nd toe. The area measures 2.0cm in diameter with a depth of approximately 0.30cm. There is no bone noted. No bone is palpable. Moderate cellulitis is noted. No fluctuance or crepitus is noted. Clinical neuropathy is noted. No appreciable abscess is noted.                              Assessment/Plan:     Active Diagnoses:    Diagnosis Date Noted POA    Cellulitis of right foot [L03.115] 07/14/2024 Unknown    Abscess of second toe, right [L02.611] 07/14/2024 Unknown    Cellulitis of second toe of right foot [L03.031] 06/08/2023 Yes    Diabetic ulcer of toe of right foot associated with type 2 diabetes mellitus, with fat layer exposed [E11.621, L97.512] 05/10/2021 Yes    Diabetes mellitus type 2 with neurological manifestations [E11.49] 05/09/2021 Yes      Problems Resolved During this Admission:       Thorough discussion is had with the  patient today, concerning the diagnosis, its etiology, and the treatment algorithm at present.     XRAY does not suggest OM.    Clinical/Timeframe does not suggest OM.    Getting PVR at present, but prior evaluation is noted and clinically, DP and PT on the B/L LE are 2/4.    Local infiltrative block proximal to the area of pathology with 1% Lidocaine + Epinephrine and Marcaine plain; for anesthesia and hemostasis.    A TIME-OUT was completed. The area of pathology was incised and drained to the level of subcutaneous tissues, after adequate prep with alcohol and/or betadine paint. The procedure was performed using sharp #10/#15 blade, followed by either a tissue nipper or a hemostat to probe the wound deep, to allow for further evacuation of abscess/hematoma/fluid/foreign body. Excisional wound debridement was performed using sharp #10/#15 blade/rounded scalpel and tissue nipper, with removal of all non-viable skin and soft tissues; necrotic skin/tissue formation. The woundbase/wound bed was also debrided to encourage bleeding as to promote/stimulate healing. Debridement was excisional and included epidermal, dermal and subcutaneous tissues. Post debridement measurements are as above. Hemostasis was achieved. Patient tolerated procedure well and without complications. Local woundcare with wet-2-dr dressings and bandage thereafter.     WB with Sx. Shoe and [facility] DME.     Stable to D/C to home.    Orders are eFaxed to [facility] to start on Tuesday.    Rx for IV Dalvance sent to [facility].    Rx for PO Flagyl sent to local pharmacy.    F/U appt. is in with me for next week.     Will follow C&S.       Thank you for your consult. I will follow-up with patient. Please contact us if you have any additional questions.    Denilson Li DPM  Podiatry  [facility] - Emergency Dept.

## 2024-07-14 NOTE — ED PROVIDER NOTES
SCRIBE #1 NOTE: I, Ese Mejia, am scribing for, and in the presence of, Jean Marie Grajeda MD. I have scribed the entire note.       History     Chief Complaint   Patient presents with    Wound Check     Pt has active infection to right foot and toe. Pt sees podiatrist Dr. Stephen. Pt also reports some dysuria, chronic back pain     Review of patient's allergies indicates:  No Known Allergies      History of Present Illness     HPI    7/14/2024, 6:43 AM  History obtained from the patient. Male family member at bedside.      History of Present Illness: Suellen Campos is a 64 y.o. female patient with a PMHx of DM, HTN, HLD, and neuropathy who presents to the Emergency Department for evaluation of a wound check due to a sudden increase in swelling of the right foot and toes onset yesterday. Pt's podiatry is managed by Dr. Stephen. Symptoms are constant and moderate in severity. No mitigating or exacerbating factors reported. Associated sxs include a reported fever of 99.8 within the past 24 hours. Patient denies any foot pain, HA, nausea, cough, congestion, CP, and all other sxs at this time. No prior tx.Pt denies any recent trauma to the right foot. No further complaints or concerns at this time.       Arrival mode: Personal vehicle    PCP: Amelia Wheatley MD        Past Medical History:  Past Medical History:   Diagnosis Date    Chronic pain     COVID-19     Depression     Diabetes mellitus     Diabetes mellitus, type 2     Hyperlipidemia     Hypertension     Neuropathy     Osteoarthritis     Spinal stenosis        Past Surgical History:  Past Surgical History:   Procedure Laterality Date    HYSTERECTOMY      INCISION AND DRAINAGE FOOT Left 5/9/2021    Procedure: INCISION AND DRAINAGE, FOOT;  Surgeon: Shelby Stephen DPM;  Location: [facility];  Service: Podiatry;  Laterality: Left;  GREAT TOE OF LEFT FOOT    SPINAL CORD STIMULATOR IMPLANT      TOE AMPUTATION Left 5/9/2021    Procedure: AMPUTATION, TOE;  Surgeon:  Shelby Stephen DPM;  Location: [facility] OR;  Service: Podiatry;  Laterality: Left;  LEFT GREAT     TOE AMPUTATION Left 6/8/2023    Procedure: AMPUTATION, TOE;  Surgeon: Shelby Stephen DPM;  Location: [facility] OR;  Service: Podiatry;  Laterality: Left;    TONSILLECTOMY      TOTAL ABDOMINAL HYSTERECTOMY W/ BILATERAL SALPINGOOPHORECTOMY           Family History:  Family History   Problem Relation Name Age of Onset    Diabetes Mellitus Mother      Hypertension Mother      Pulmonary fibrosis Father      Hypertension Sister      Diabetes Sister      Pulmonary fibrosis Brother         Social History:  Social History     Tobacco Use    Smoking status: Never    Smokeless tobacco: Never   Substance and Sexual Activity    Alcohol use: Never    Drug use: Never    Sexual activity: Yes     Partners: Male        Review of Systems     Review of Systems   Constitutional:  Positive for fever.   HENT:  Negative for congestion and sore throat.    Respiratory:  Negative for cough and shortness of breath.    Cardiovascular:  Negative for chest pain.   Gastrointestinal:  Negative for nausea.   Genitourinary:  Negative for dysuria.   Musculoskeletal:  Negative for back pain and myalgias (right foot).   Skin:  Positive for wound. Negative for rash.   Neurological:  Negative for weakness and headaches.   Hematological:  Does not bruise/bleed easily.   All other systems reviewed and are negative.       Physical Exam     Initial Vitals [07/14/24 0537]   BP Pulse Resp Temp SpO2   (!) 151/74 104 18 98.4 °F (36.9 °C) 98 %      MAP       --          Physical Exam  Nursing Notes and Vital Signs Reviewed.  Constitutional: Patient is in no acute distress. Well-developed and well-nourished.  Head: Atraumatic. Normocephalic.  Eyes: PERRL. EOM intact. Conjunctivae are not pale. No scleral icterus.  ENT: Mucous membranes are moist. Oropharynx is clear and symmetric.    Neck: Supple. Full ROM. No lymphadenopathy.  Cardiovascular: Regular rate. Regular  "rhythm. No murmurs, rubs, or gallops. Distal pulses are 2+ and symmetric.  Pulmonary/Chest: No respiratory distress. Clear to auscultation bilaterally. No wheezing or rales.  Abdominal: Soft and non-distended.  There is no tenderness.  No rebound, guarding, or rigidity.   Genitourinary: No CVA tenderness  Musculoskeletal: Swelling with erythema from 2nd to 5th toe of right foot .  Skin: Warm and dry.  Neurological:  Alert, awake, and appropriate.  Normal speech.  No acute focal neurological deficits are appreciated.  Psychiatric: Normal affect. Good eye contact. Appropriate in content.           ED Course   Procedures  ED Vital Signs:  Vitals:    07/14/24 0537 07/14/24 0757 07/14/24 0830 07/14/24 1018   BP: (!) 151/74 121/71  132/65   Pulse: 104 92 92 89   Resp: 18 (!) 23  (!) 21   Temp: 98.4 °F (36.9 °C)      TempSrc: Oral      SpO2: 98% 98%  95%   Weight: 85.5 kg (188 lb 6.4 oz)      Height: 5' 7" (1.702 m)          Abnormal Lab Results:  Labs Reviewed   URINALYSIS, REFLEX TO URINE CULTURE - Abnormal; Notable for the following components:       Result Value    Glucose, UA 4+ (*)     Leukocytes, UA 2+ (*)     All other components within normal limits    Narrative:     Specimen Source->Urine   CBC W/ AUTO DIFFERENTIAL - Abnormal; Notable for the following components:    Hemoglobin 11.3 (*)     Hematocrit 34.6 (*)     Gran # (ANC) 8.3 (*)     Gran % 78.0 (*)     Lymph % 12.1 (*)     All other components within normal limits   COMPREHENSIVE METABOLIC PANEL - Abnormal; Notable for the following components:    Sodium 135 (*)     Glucose 113 (*)     BUN 40 (*)     All other components within normal limits   URINALYSIS MICROSCOPIC - Abnormal; Notable for the following components:    WBC, UA 8 (*)     All other components within normal limits    Narrative:     Specimen Source->Urine   CULTURE, BLOOD   CULTURE, BLOOD   LACTIC ACID, PLASMA   PROCALCITONIN   LACTIC ACID, PLASMA        All Lab Results:  Results for orders " placed or performed during the hospital encounter of 07/14/24   Urinalysis, Reflex to Urine Culture Urine, Clean Catch    Specimen: Urine   Result Value Ref Range    Specimen UA Urine, Clean Catch     Color, UA Yellow Yellow, Straw, Verna    Appearance, UA Clear Clear    pH, UA 6.0 5.0 - 8.0    Specific Gravity, UA 1.025 1.005 - 1.030    Protein, UA Negative Negative    Glucose, UA 4+ (A) Negative    Ketones, UA Negative Negative    Bilirubin (UA) Negative Negative    Occult Blood UA Negative Negative    Nitrite, UA Negative Negative    Urobilinogen, UA Negative <2.0 EU/dL    Leukocytes, UA 2+ (A) Negative   CBC auto differential   Result Value Ref Range    WBC 10.62 3.90 - 12.70 K/uL    RBC 4.01 4.00 - 5.40 M/uL    Hemoglobin 11.3 (L) 12.0 - 16.0 g/dL    Hematocrit 34.6 (L) 37.0 - 48.5 %    MCV 86 82 - 98 fL    MCH 28.2 27.0 - 31.0 pg    MCHC 32.7 32.0 - 36.0 g/dL    RDW 14.4 11.5 - 14.5 %    Platelets 207 150 - 450 K/uL    MPV 10.0 9.2 - 12.9 fL    Immature Granulocytes 0.4 0.0 - 0.5 %    Gran # (ANC) 8.3 (H) 1.8 - 7.7 K/uL    Immature Grans (Abs) 0.04 0.00 - 0.04 K/uL    Lymph # 1.3 1.0 - 4.8 K/uL    Mono # 0.9 0.3 - 1.0 K/uL    Eos # 0.1 0.0 - 0.5 K/uL    Baso # 0.05 0.00 - 0.20 K/uL    nRBC 0 0 /100 WBC    Gran % 78.0 (H) 38.0 - 73.0 %    Lymph % 12.1 (L) 18.0 - 48.0 %    Mono % 8.2 4.0 - 15.0 %    Eosinophil % 0.8 0.0 - 8.0 %    Basophil % 0.5 0.0 - 1.9 %    Differential Method Automated    Comprehensive metabolic panel   Result Value Ref Range    Sodium 135 (L) 136 - 145 mmol/L    Potassium 3.9 3.5 - 5.1 mmol/L    Chloride 97 95 - 110 mmol/L    CO2 27 23 - 29 mmol/L    Glucose 113 (H) 70 - 110 mg/dL    BUN 40 (H) 8 - 23 mg/dL    Creatinine 1.0 0.5 - 1.4 mg/dL    Calcium 9.7 8.7 - 10.5 mg/dL    Total Protein 7.3 6.0 - 8.4 g/dL    Albumin 3.6 3.5 - 5.2 g/dL    Total Bilirubin 0.5 0.1 - 1.0 mg/dL    Alkaline Phosphatase 84 55 - 135 U/L    AST 16 10 - 40 U/L    ALT 17 10 - 44 U/L    eGFR >60 >60 mL/min/1.73 m^2     Anion Gap 11 8 - 16 mmol/L   Lactic acid, plasma #1   Result Value Ref Range    Lactate (Lactic Acid) 1.1 0.5 - 2.2 mmol/L   Procalcitonin   Result Value Ref Range    Procalcitonin 0.15 <0.25 ng/mL   Urinalysis Microscopic   Result Value Ref Range    RBC, UA 2 0 - 4 /hpf    WBC, UA 8 (H) 0 - 5 /hpf    Bacteria None None-Occ /hpf    Yeast, UA None None    Microscopic Comment SEE COMMENT          Imaging Results:  Imaging Results              X-Ray Foot Complete Right (Final result)  Result time 07/14/24 07:01:55      Final result by Sarmad Gerber MD (07/14/24 07:01:55)                   Impression:      1.  Abnormality of the 5th proximal phalanx concerning for a either healing fracture or osteomyelitis.  Clinical correlation is advised.    2.  Soft tissue swelling of the foot and ankle as detailed above, without air in the soft tissues or radiopaque foreign bodies.    3.  Incidental findings as noted above.  Negative for acute process otherwise.      Electronically signed by: Sarmad Gerber MD  Date:    07/14/2024  Time:    07:01               Narrative:    EXAMINATION:  XR FOOT COMPLETE 3 VIEW RIGHT    CLINICAL HISTORY:  . Pain in unspecified foot    TECHNIQUE:  AP, lateral, and oblique views of the right foot were performed.    COMPARISON:  None    FINDINGS:  There is abnormality to the distal portions of the 5th proximal phalanx with focal osteopenia and lucency present.    Plantar and Achilles calcaneal spurs.  Degenerative spurring of the dorsal surface of the tarsal bones.  Mild soft tissue swelling of the dorsum of the foot an overlying the medial head of the 1st metatarsal bone with mild hallux valgus deformity.  There is soft tissue swelling surrounding the ankle.    Negative for fracture or dislocation.                                       X-Ray Chest AP Portable (Final result)  Result time 07/14/24 07:03:48      Final result by Sarmad Gerber MD (07/14/24 07:03:48)                   Impression:       1.  Negative for acute process involving the chest.    2.  Stable findings as noted above.      Electronically signed by: Sarmad Gerber MD  Date:    07/14/2024  Time:    07:03               Narrative:    EXAMINATION:  XR CHEST AP PORTABLE    CLINICAL HISTORY:  Sepsis;    COMPARISON:  May 9, 2021    FINDINGS:  The lungs are clear. The cardiac silhouette size is normal. The trachea is midline and the mediastinal width is normal. Negative for focal infiltrate, effusion or pneumothorax. Pulmonary vasculature is normal. Negative for osseous abnormalities. Marginal spondylosis.  Ectatic and tortuous aorta.  Thoracic spine stimulator in place.  Eventration of the hemidiaphragms.  Cardiophrenic fat pads.                                       The EKG was ordered, reviewed, and independently interpreted by the ED provider.  Interpretation time: 07:00  Rate: 93 BPM  Rhythm: normal sinus rhythm  Interpretation: No acute ST changes. No STEMI.  Suspect unspecified pacemaker failure.           The Emergency Provider reviewed the vital signs and test results, which are outlined above.     ED Discussion       9:17 AM: Discussed case with Dr. Parrish ([facility]). Dr. Parrish agrees with current care and management of pt and accepts admission.   Admitting Service: Hospital Medicine  Admitting Physician: Dr. Parrish  Admit to:     9:17 AM: Re-evaluated pt. I have discussed test results, shared treatment plan, and the need for admission with patient and family at bedside. Pt and family express understanding at this time and agree with all information. All questions answered. Pt and family have no further questions or concerns at this time. Pt is ready for admit.         Medical Decision Making  DDx: toe pain, abscess, osteomyelitis    Amount and/or Complexity of Data Reviewed  Labs: ordered. Decision-making details documented in ED Course.  Radiology: ordered. Decision-making details documented in ED Course.  ECG/medicine tests:  ordered and independent interpretation performed. Decision-making details documented in ED Course.    Risk  Decision regarding hospitalization.                ED Medication(s):  Medications   vancomycin - pharmacy to dose (has no administration in time range)   vancomycin (VANCOCIN) 2,250 mg in D5W 500 mL IVPB (has no administration in time range)   ceFEPIme (MAXIPIME) 1 g in D5W 100 mL IVPB (MB+) (1 g Intravenous New Bag 7/14/24 1013)   sodium chloride 0.9% flush 10 mL (has no administration in time range)   acetaminophen tablet 500 mg (has no administration in time range)   ondansetron injection 4 mg (has no administration in time range)   0.9%  NaCl infusion ( Intravenous New Bag 7/14/24 1014)       New Prescriptions    No medications on file               Scribe Attestation:   Scribe #1: I performed the above scribed service and the documentation accurately describes the services I performed. I attest to the accuracy of the note.     Attending:   Physician Attestation Statement for Scribe #1: I, Jean Marie Grajeda MD, personally performed the services described in this documentation, as scribed by Ese Mejia, in my presence, and it is both accurate and complete.           Clinical Impression       ICD-10-CM ICD-9-CM   1. Other acute osteomyelitis of right foot  M86.171 730.07   2. Weakness  R53.1 780.79   3. Foot pain  M79.673 729.5       Disposition:   Disposition: Admitted  Condition: Stable        Jean Marie Grajeda MD  07/14/24 1051     No

## 2024-07-14 NOTE — DISCHARGE INSTRUCTIONS
Recommend compliance with medications, outpatient follow up with PCP, Podiatry within 1 week upon discharge

## 2024-07-14 NOTE — H&P
[facility] Emergency Dept[facility]  [facility] Medicine  History & Physical    Patient Name: Suellen Campos  MRN: [id number]  Patient Class: IP- Inpatient  Admission Date: 7/14/2024  Attending Physician: Stewart Parrish,*   Primary Care Provider: Amelia Wheatley MD         Patient information was obtained from patient and ER records.     Subjective:     Principal Problem:Cellulitis of second toe of right foot    Chief Complaint:   Chief Complaint   Patient presents with    Wound Check     Pt has active infection to right foot and toe. Pt sees podiatrist Dr. Stephen. Pt also reports some dysuria, chronic back pain        HPI: Suellen Campos is a 64 y.o. female patient with a PMHx of DM, HTN, HLD, and neuropathy who presents to the Emergency Department for evaluation of a wound check due to a sudden increase in swelling of the right foot and toes onset yesterday. Pt's podiatry is managed by Dr. Stephen. Symptoms are constant and moderate in severity. No mitigating or exacerbating factors reported. Associated sxs include a reported fever of 99.8 within the past 24 hours. Patient denies any foot pain, HA, nausea, cough, congestion, CP, and all other sxs at this time. No prior tx.Pt denies any recent trauma to the right foot. No further complaints or concerns at this time.     Past Medical History:   Diagnosis Date    Chronic pain     COVID-19     Depression     Diabetes mellitus     Diabetes mellitus, type 2     Hyperlipidemia     Hypertension     Neuropathy     Osteoarthritis     Spinal stenosis        Past Surgical History:   Procedure Laterality Date    HYSTERECTOMY      INCISION AND DRAINAGE FOOT Left 5/9/2021    Procedure: INCISION AND DRAINAGE, FOOT;  Surgeon: Shelby Stephen DPM;  Location: [facility] OR;  Service: Podiatry;  Laterality: Left;  GREAT TOE OF LEFT FOOT    SPINAL CORD STIMULATOR IMPLANT      TOE AMPUTATION Left 5/9/2021    Procedure: AMPUTATION, TOE;  Surgeon: Shelby Stephen DPM;  Location: [facility] OR;   Service: Podiatry;  Laterality: Left;  LEFT GREAT     TOE AMPUTATION Left 6/8/2023    Procedure: AMPUTATION, TOE;  Surgeon: Shelby Stephen DPM;  Location: [facility];  Service: Podiatry;  Laterality: Left;    TONSILLECTOMY      TOTAL ABDOMINAL HYSTERECTOMY W/ BILATERAL SALPINGOOPHORECTOMY         Review of patient's allergies indicates:  No Known Allergies    No current facility-administered medications on file prior to encounter.     Current Outpatient Medications on File Prior to Encounter   Medication Sig    atorvastatin (LIPITOR) 10 MG tablet TAKE 1 TABLET BY MOUTH EVERY DAY AT NIGHT    fish oil-omega-3 fatty acids 300-1,000 mg capsule Take 2 g by mouth once daily.    JARDIANCE 25 mg tablet Take 25 mg by mouth once daily.    Lactobacillus rhamnosus GG (CULTURELLE) 10 billion cell capsule Take 1 capsule by mouth once daily.    metformin (GLUMETZA) 500 MG (MOD) 24 hr tablet Take 1,000 mg by mouth 2 (two) times daily with meals.     morphine (MS CONTIN) 60 MG 12 hr tablet Take 60 mg by mouth 2 (two) times daily.    multivitamin capsule Take 1 capsule by mouth once daily.    oxycodone (ROXICODONE) 15 MG Tab Take 15 mg by mouth every 8 (eight) hours as needed.     traMADoL (ULTRAM) 50 mg tablet Take 50 mg by mouth 3 (three) times daily.    valsartan-hydrochlorothiazide (DIOVAN-HCT) 320-25 mg per tablet Take 1 tablet by mouth every evening.     venlafaxine (EFFEXOR-XR) 150 MG Cp24 Take 150 mg by mouth every evening.     zinc gluconate 50 mg tablet Take 50 mg by mouth.    metaxalone (SKELAXIN) 800 MG tablet Take 800 mg by mouth 3 (three) times daily as needed.    [DISCONTINUED] ibuprofen (ADVIL,MOTRIN) 200 MG tablet Take 200 mg by mouth.    [DISCONTINUED] JARDIANCE 10 mg tablet Take 10 mg by mouth once daily.    [DISCONTINUED] morphine (MSIR) 30 MG tablet Take 60 mg by mouth every 12 (twelve) hours.    [DISCONTINUED] promethazine-dextromethorphan (PROMETHAZINE-DM) 6.25-15 mg/5 mL Syrp promethazine-DM Take 5 ml (oral)  every 4 hours PRN - Cough for 5 days will cause drowsiness [id number] syrup every 4 hours oral 5 days active 6.25-15 mg/5 mL     Family History       Problem Relation (Age of Onset)    Diabetes Sister    Diabetes Mellitus Mother    Hypertension Mother, Sister    Pulmonary fibrosis Father, Brother          Tobacco Use    Smoking status: Never    Smokeless tobacco: Never   Substance and Sexual Activity    Alcohol use: Never    Drug use: Never    Sexual activity: Yes     Partners: Male     Review of Systems        HENT:  Negative for congestion and sore throat.    Respiratory:  Negative for cough and shortness of breath.    Cardiovascular:  Negative for chest pain.   Gastrointestinal:  Negative for nausea.   Genitourinary:  Negative for dysuria.   Musculoskeletal:  Negative for back pain and myalgias (right foot).   Skin:  Positive for wound. Negative for rash.   Neurological:  Negative for weakness and headaches.   Hematological:  Does not bruise/bleed easily.     Objective:     Vital Signs (Most Recent):  Temp: 98.4 °F (36.9 °C) (07/14/24 1302)  Pulse: 101 (07/14/24 1302)  Resp: 16 (07/14/24 1302)  BP: 134/60 (07/14/24 1302)  SpO2: 100 % (07/14/24 1302) Vital Signs (24h Range):  Temp:  [98.4 °F (36.9 °C)] 98.4 °F (36.9 °C)  Pulse:  [] 101  Resp:  [15-23] 16  SpO2:  [93 %-100 %] 100 %  BP: (104-151)/(55-74) 134/60     Weight: 85.5 kg (188 lb 6.4 oz)  Body mass index is 29.51 kg/m².     Physical Exam      Constitutional: Patient is in no acute distress. Well-developed and well-nourished.  Head: Atraumatic. Normocephalic.  Eyes: PERRL. EOM intact. Conjunctivae are not pale. No scleral icterus.  ENT: Mucous membranes are moist. Oropharynx is clear and symmetric.    Neck: Supple. Full ROM. No lymphadenopathy.  Cardiovascular: Regular rate. Regular rhythm. No murmurs, rubs, or gallops. Distal pulses are 2+ and symmetric.  Pulmonary/Chest: No respiratory distress. Clear to auscultation bilaterally. No wheezing or  rales.  Abdominal: Soft and non-distended.  There is no tenderness.  No rebound, guarding, or rigidity.   Genitourinary: No CVA tenderness  Musculoskeletal: Swelling with erythema from 2nd to 5th toe of right foot .  Skin: Warm and dry.  Neurological:  Alert, awake, and appropriate.  Normal speech.  No acute focal neurological deficits are appreciated.  Psychiatric: Normal affect. Good eye contact. Appropriate in content.            Significant Labs: All pertinent labs within the past 24 hours have been reviewed.  CBC:   Recent Labs   Lab 07/14/24  0823   WBC 10.62   HGB 11.3*   HCT 34.6*        CMP:   Recent Labs   Lab 07/14/24  0823   *   K 3.9   CL 97   CO2 27   *   BUN 40*   CREATININE 1.0   CALCIUM 9.7   PROT 7.3   ALBUMIN 3.6   BILITOT 0.5   ALKPHOS 84   AST 16   ALT 17   ANIONGAP 11       Significant Imaging:     Imaging Results              US Lower Extremity Arteries Bilateral (In process)  Result time 07/14/24 14:22:17   Procedure changed from US Lower Extrem Arteries Bilat with LEO (xpd)                    X-Ray Foot Complete Right (Final result)  Result time 07/14/24 07:01:55      Final result by Sarmad Gerber MD (07/14/24 07:01:55)                   Impression:      1.  Abnormality of the 5th proximal phalanx concerning for a either healing fracture or osteomyelitis.  Clinical correlation is advised.    2.  Soft tissue swelling of the foot and ankle as detailed above, without air in the soft tissues or radiopaque foreign bodies.    3.  Incidental findings as noted above.  Negative for acute process otherwise.      Electronically signed by: Sarmad Gerber MD  Date:    07/14/2024  Time:    07:01               Narrative:    EXAMINATION:  XR FOOT COMPLETE 3 VIEW RIGHT    CLINICAL HISTORY:  . Pain in unspecified foot    TECHNIQUE:  AP, lateral, and oblique views of the right foot were performed.    COMPARISON:  None    FINDINGS:  There is abnormality to the distal portions of the 5th  proximal phalanx with focal osteopenia and lucency present.    Plantar and Achilles calcaneal spurs.  Degenerative spurring of the dorsal surface of the tarsal bones.  Mild soft tissue swelling of the dorsum of the foot an overlying the medial head of the 1st metatarsal bone with mild hallux valgus deformity.  There is soft tissue swelling surrounding the ankle.    Negative for fracture or dislocation.                                       X-Ray Chest AP Portable (Final result)  Result time 07/14/24 07:03:48      Final result by Sarmad Gerber MD (07/14/24 07:03:48)                   Impression:      1.  Negative for acute process involving the chest.    2.  Stable findings as noted above.      Electronically signed by: Sarmad Gerber MD  Date:    07/14/2024  Time:    07:03               Narrative:    EXAMINATION:  XR CHEST AP PORTABLE    CLINICAL HISTORY:  Sepsis;    COMPARISON:  May 9, 2021    FINDINGS:  The lungs are clear. The cardiac silhouette size is normal. The trachea is midline and the mediastinal width is normal. Negative for focal infiltrate, effusion or pneumothorax. Pulmonary vasculature is normal. Negative for osseous abnormalities. Marginal spondylosis.  Ectatic and tortuous aorta.  Thoracic spine stimulator in place.  Eventration of the hemidiaphragms.  Cardiophrenic fat pads.                                     Assessment/Plan:     * Cellulitis of second toe of right foot  Empiric antibiotics   Cultures   Pain medications   Podiatric consult         Diabetes mellitus type 2 with neurological manifestations  On insulin, glucose POC, hypoglycemia protocol        VTE Risk Mitigation (From admission, onward)           Ordered     IP VTE HIGH RISK PATIENT  Once         07/14/24 0930     Place sequential compression device  Until discontinued         07/14/24 0930                               Pharmacist Renal Dose Adjustment Note    Suellen Campos is a 64 y.o. female being treated with the medication  cefepime    Patient Data:    Vital Signs (Most Recent):  Temp: 98.4 °F (36.9 °C) (07/14/24 0537)  Pulse: 92 (07/14/24 0757)  Resp: (!) 23 (07/14/24 0757)  BP: 121/71 (07/14/24 0757)  SpO2: 98 % (07/14/24 0757) Vital Signs (72h Range):  Temp:  [98.4 °F (36.9 °C)]   Pulse:  []   Resp:  [18-23]   BP: (121-151)/(71-74)   SpO2:  [98 %]      Recent Labs   Lab 07/14/24 0823   CREATININE 1.0     Serum creatinine: 1 mg/dL 07/14/24 0823  Estimated creatinine clearance: 63.9 mL/min    Medication:Cefepime 1 gm iv q12hrs will be changed to medication:Cefepime 1 gm iv q8hrs per renal dosing protocol.   Pharmacist's Name: Marina Koenig [facility]  Pharmacist's Extension: [phone]      Stewart Parrish MD  Department of Hospital Medicine  [facility] [facility] - Emergency Dept.

## 2024-07-14 NOTE — DISCHARGE SUMMARY
[facility] - Emergency Dept.  Hospital Medicine  Discharge Summary      Patient Name: Suellen Campos  MRN: [id number]  GEORGETTE: [id number]  Patient Class: IP- Inpatient  Admission Date: 7/14/2024  Hospital Length of Stay: 0 days  Discharge Date and Time: 7/14/24  Attending Physician: Stewart Parrish,*   Discharging Provider: Stewart Parrish MD  Primary Care Provider: Amelia Wheatley MD    Primary Care Team: [facility] MEDICINE D    HPI:   Suellen Campos is a 64 y.o. female patient with a PMHx of DM, HTN, HLD, and neuropathy who presents to the Emergency Department for evaluation of a wound check due to a sudden increase in swelling of the right foot and toes onset yesterday. Pt's podiatry is managed by Dr. Stephen. Symptoms are constant and moderate in severity. No mitigating or exacerbating factors reported. Associated sxs include a reported fever of 99.8 within the past 24 hours. Patient denies any foot pain, HA, nausea, cough, congestion, CP, and all other sxs at this time. No prior tx.Pt denies any recent trauma to the right foot. No further complaints or concerns at this time.     * No surgery found *      Hospital Course:   64-year-old male admitted under Hospital Medicine for right foot cellulitis/ x-ray did not show findings suggestive of osteomyelitis   Podiatric consulted   Patient has been started on empiric antibiotics   Status post I&D per podiatric today;  Discussed with podiatric Dr. Li-- stated okay for discharge from podiatric standpoint, he has faxed orders for home health to [facility] to begin on Tuesday, made arrangements for outpatient IV Dalvance, ordered p.o. antibiotics, scheduled outpatient appointment with podiatric within 1 week, plans to follow up with cultures and sensitivities accordingly, ordered surgical shoe and UE DME;  Patient remained afebrile, no leukocytosis, hemodynamically stable   Alert and oriented x3, denied fever, chills, chest pain, shortness O  "breath, nausea, vomiting bowel or bladder issues   Considering clinical and hemodynamic stability, planning to discharge patient today, emphasized on compliance with medications, outpatient follow up with PCP, podiatric, patient expressed understanding, agreed with the plan, discharge accordingly today              Goals of Care Treatment Preferences:  Code Status: Full Code      Consults:   Consults (From admission, onward)          Status Ordering Provider     Pharmacy to dose Vancomycin consult  Once        Provider:  (Not yet assigned)   Placed in "And" Linked Group    Acknowledged RITCHIE CHAVEZ            No new Assessment & Plan notes have been filed under this hospital service since the last note was generated.  Service: Hospital Medicine    Final Active Diagnoses:    Diagnosis Date Noted POA    PRINCIPAL PROBLEM:  Cellulitis of second toe of right foot [L03.031] 06/08/2023 Yes    Cellulitis of right foot [L03.115] 07/14/2024 Unknown    Abscess of second toe, right [L02.611] 07/14/2024 Unknown    Diabetic ulcer of toe of right foot associated with type 2 diabetes mellitus, with fat layer exposed [E11.621, L97.512] 05/10/2021 Yes    Diabetes mellitus type 2 with neurological manifestations [E11.49] 05/09/2021 Yes      Problems Resolved During this Admission:       Discharged Condition: fair    Disposition: Home or Self Care    Follow Up:   Follow-up Information       Amelia Wheatley MD Follow up in 1 week(s).    Specialty: Family Medicine  Contact information:  [address]  [address]  [facility] [address] [phone]               Denilson Li DPYOSELIN Follow up in 1 week(s).    Specialty: Podiatry  Contact information:  [id number] [place] [address] Dr Beena BLANCO [address] [phone]                           Patient Instructions:   No discharge procedures on file.    Significant Diagnostic Studies:     Results for orders placed or performed during the hospital encounter of 07/14/24   Urinalysis, " Reflex to Urine Culture Urine, Clean Catch    Specimen: Urine   Result Value Ref Range    Specimen UA Urine, Clean Catch     Color, UA Yellow Yellow, Straw, Verna    Appearance, UA Clear Clear    pH, UA 6.0 5.0 - 8.0    Specific Gravity, UA 1.025 1.005 - 1.030    Protein, UA Negative Negative    Glucose, UA 4+ (A) Negative    Ketones, UA Negative Negative    Bilirubin (UA) Negative Negative    Occult Blood UA Negative Negative    Nitrite, UA Negative Negative    Urobilinogen, UA Negative <2.0 EU/dL    Leukocytes, UA 2+ (A) Negative   CBC auto differential   Result Value Ref Range    WBC 10.62 3.90 - 12.70 K/uL    RBC 4.01 4.00 - 5.40 M/uL    Hemoglobin 11.3 (L) 12.0 - 16.0 g/dL    Hematocrit 34.6 (L) 37.0 - 48.5 %    MCV 86 82 - 98 fL    MCH 28.2 27.0 - 31.0 pg    MCHC 32.7 32.0 - 36.0 g/dL    RDW 14.4 11.5 - 14.5 %    Platelets 207 150 - 450 K/uL    MPV 10.0 9.2 - 12.9 fL    Immature Granulocytes 0.4 0.0 - 0.5 %    Gran # (ANC) 8.3 (H) 1.8 - 7.7 K/uL    Immature Grans (Abs) 0.04 0.00 - 0.04 K/uL    Lymph # 1.3 1.0 - 4.8 K/uL    Mono # 0.9 0.3 - 1.0 K/uL    Eos # 0.1 0.0 - 0.5 K/uL    Baso # 0.05 0.00 - 0.20 K/uL    nRBC 0 0 /100 WBC    Gran % 78.0 (H) 38.0 - 73.0 %    Lymph % 12.1 (L) 18.0 - 48.0 %    Mono % 8.2 4.0 - 15.0 %    Eosinophil % 0.8 0.0 - 8.0 %    Basophil % 0.5 0.0 - 1.9 %    Differential Method Automated    Comprehensive metabolic panel   Result Value Ref Range    Sodium 135 (L) 136 - 145 mmol/L    Potassium 3.9 3.5 - 5.1 mmol/L    Chloride 97 95 - 110 mmol/L    CO2 27 23 - 29 mmol/L    Glucose 113 (H) 70 - 110 mg/dL    BUN 40 (H) 8 - 23 mg/dL    Creatinine 1.0 0.5 - 1.4 mg/dL    Calcium 9.7 8.7 - 10.5 mg/dL    Total Protein 7.3 6.0 - 8.4 g/dL    Albumin 3.6 3.5 - 5.2 g/dL    Total Bilirubin 0.5 0.1 - 1.0 mg/dL    Alkaline Phosphatase 84 55 - 135 U/L    AST 16 10 - 40 U/L    ALT 17 10 - 44 U/L    eGFR >60 >60 mL/min/1.73 m^2    Anion Gap 11 8 - 16 mmol/L   Lactic acid, plasma #1   Result Value Ref  Range    Lactate (Lactic Acid) 1.1 0.5 - 2.2 mmol/L   Procalcitonin   Result Value Ref Range    Procalcitonin 0.15 <0.25 ng/mL   Urinalysis Microscopic   Result Value Ref Range    RBC, UA 2 0 - 4 /hpf    WBC, UA 8 (H) 0 - 5 /hpf    Bacteria None None-Occ /hpf    Yeast, UA None None    Microscopic Comment SEE COMMENT    Lactic acid, plasma #2   Result Value Ref Range    Lactate (Lactic Acid) 0.6 0.5 - 2.2 mmol/L   EKG 12-lead   Result Value Ref Range    QRS Duration 94 ms    [facility] QTC Calculation 440 ms   POCT glucose   Result Value Ref Range    POCT Glucose 143 (H) 70 - 110 mg/dL        Imaging Results              US Lower Extremity Arteries Bilateral (Final result)  Result time 07/14/24 14:27:43   Procedure changed from US Lower Extrem Arteries Bilat with LEO (xpd)     Final result by Shelby Hagan MD (07/14/24 14:27:43)                   Impression:      No findings to suggest high-grade or greater than 50% arterial stenosis      Electronically signed by: Shelby Hagan  Date:    07/14/2024  Time:    14:27               Narrative:    EXAMINATION:  US LOWER EXTREMITY ARTERIES BILATERAL    CLINICAL HISTORY:  r/o PVD;    TECHNIQUE:  bilateral spectral, color and grayscale images of the large arteries of both lower extremities were performed.  Imaging groin to ankle bilateral    COMPARISON:  July 2024    Waveforms are multiphasic throughout both lower extremities and no hemodynamically significant velocity alteration identified                                       X-Ray Foot Complete Right (Final result)  Result time 07/14/24 07:01:55      Final result by Sarmad Gerber MD (07/14/24 07:01:55)                   Impression:      1.  Abnormality of the 5th proximal phalanx concerning for a either healing fracture or osteomyelitis.  Clinical correlation is advised.    2.  Soft tissue swelling of the foot and ankle as detailed above, without air in the soft tissues or radiopaque foreign bodies.    3.   Incidental findings as noted above.  Negative for acute process otherwise.      Electronically signed by: Sarmad Gerber MD  Date:    07/14/2024  Time:    07:01               Narrative:    EXAMINATION:  XR FOOT COMPLETE 3 VIEW RIGHT    CLINICAL HISTORY:  . Pain in unspecified foot    TECHNIQUE:  AP, lateral, and oblique views of the right foot were performed.    COMPARISON:  None    FINDINGS:  There is abnormality to the distal portions of the 5th proximal phalanx with focal osteopenia and lucency present.    Plantar and Achilles calcaneal spurs.  Degenerative spurring of the dorsal surface of the tarsal bones.  Mild soft tissue swelling of the dorsum of the foot an overlying the medial head of the 1st metatarsal bone with mild hallux valgus deformity.  There is soft tissue swelling surrounding the ankle.    Negative for fracture or dislocation.                                       X-Ray Chest AP Portable (Final result)  Result time 07/14/24 07:03:48      Final result by Sarmad Gerber MD (07/14/24 07:03:48)                   Impression:      1.  Negative for acute process involving the chest.    2.  Stable findings as noted above.      Electronically signed by: Sarmad Gerber MD  Date:    07/14/2024  Time:    07:03               Narrative:    EXAMINATION:  XR CHEST AP PORTABLE    CLINICAL HISTORY:  Sepsis;    COMPARISON:  May 9, 2021    FINDINGS:  The lungs are clear. The cardiac silhouette size is normal. The trachea is midline and the mediastinal width is normal. Negative for focal infiltrate, effusion or pneumothorax. Pulmonary vasculature is normal. Negative for osseous abnormalities. Marginal spondylosis.  Ectatic and tortuous aorta.  Thoracic spine stimulator in place.  Eventration of the hemidiaphragms.  Cardiophrenic fat pads.                                       Pending Diagnostic Studies:       None           Medications:       Medication List        START taking these medications      metroNIDAZOLE 500  MG tablet  Commonly known as: FLAGYL  Take 1 tablet (500 mg total) by mouth every 8 (eight) hours. for 10 days            CONTINUE taking these medications      atorvastatin 10 MG tablet  Commonly known as: LIPITOR     fish oil-omega-3 fatty acids 300-1,000 mg capsule     JARDIANCE 25 mg tablet  Generic drug: empagliflozin     Lactobacillus rhamnosus GG 10 billion cell capsule  Commonly known as: CULTURELLE     metaxalone 800 MG tablet  Commonly known as: SKELAXIN     metFORMIN 500 MG (MOD) 24hr tablet  Commonly known as: GLUMETZA     morphine 60 MG 12 hr tablet  Commonly known as: MS CONTIN     multivitamin capsule     oxyCODONE 15 MG Tab  Commonly known as: ROXICODONE     traMADoL 50 mg tablet  Commonly known as: ULTRAM     valsartan-hydrochlorothiazide 320-25 mg per tablet  Commonly known as: DIOVAN-HCT     venlafaxine 150 MG Cp24  Commonly known as: EFFEXOR-XR     zinc gluconate 50 mg tablet               Where to Get Your Medications        These medications were [id number] - [place], [place] - [address] [facility] [address]      Phone: [phone]   metroNIDAZOLE 500 MG tablet          Indwelling Lines/Drains at time of discharge:   Lines/Drains/Airways       None                   Time spent on the discharge of patient: 89 minutes         Stewart Parrish MD  Department of Hospital Medicine  [facility] - Emergency Dept.

## 2024-07-14 NOTE — SUBJECTIVE & OBJECTIVE
Past Medical History:   Diagnosis Date    Chronic pain     COVID-19     Depression     Diabetes mellitus     Diabetes mellitus, type 2     Hyperlipidemia     Hypertension     Neuropathy     Osteoarthritis     Spinal stenosis        Past Surgical History:   Procedure Laterality Date    HYSTERECTOMY      INCISION AND DRAINAGE FOOT Left 5/9/2021    Procedure: INCISION AND DRAINAGE, FOOT;  Surgeon: Shelby Stephen DPM;  Location: [facility];  Service: Podiatry;  Laterality: Left;  GREAT TOE OF LEFT FOOT    SPINAL CORD STIMULATOR IMPLANT      TOE AMPUTATION Left 5/9/2021    Procedure: AMPUTATION, TOE;  Surgeon: Shelby Stephen DPM;  Location: [facility];  Service: Podiatry;  Laterality: Left;  LEFT GREAT     TOE AMPUTATION Left 6/8/2023    Procedure: AMPUTATION, TOE;  Surgeon: Shelby Stephen DPM;  Location: [facility];  Service: Podiatry;  Laterality: Left;    TONSILLECTOMY      TOTAL ABDOMINAL HYSTERECTOMY W/ BILATERAL SALPINGOOPHORECTOMY         Review of patient's allergies indicates:  No Known Allergies    No current facility-administered medications on file prior to encounter.     Current Outpatient Medications on File Prior to Encounter   Medication Sig    atorvastatin (LIPITOR) 10 MG tablet TAKE 1 TABLET BY MOUTH EVERY DAY AT NIGHT    fish oil-omega-3 fatty acids 300-1,000 mg capsule Take 2 g by mouth once daily.    JARDIANCE 25 mg tablet Take 25 mg by mouth once daily.    Lactobacillus rhamnosus GG (CULTURELLE) 10 billion cell capsule Take 1 capsule by mouth once daily.    metformin (GLUMETZA) 500 MG (MOD) 24 hr tablet Take 1,000 mg by mouth 2 (two) times daily with meals.     morphine (MS CONTIN) 60 MG 12 hr tablet Take 60 mg by mouth 2 (two) times daily.    multivitamin capsule Take 1 capsule by mouth once daily.    oxycodone (ROXICODONE) 15 MG Tab Take 15 mg by mouth every 8 (eight) hours as needed.     traMADoL (ULTRAM) 50 mg tablet Take 50 mg by mouth 3 (three) times daily.    valsartan-hydrochlorothiazide  (DIOVAN-HCT) 320-25 mg per tablet Take 1 tablet by mouth every evening.     venlafaxine (EFFEXOR-XR) 150 MG Cp24 Take 150 mg by mouth every evening.     zinc gluconate 50 mg tablet Take 50 mg by mouth.    metaxalone (SKELAXIN) 800 MG tablet Take 800 mg by mouth 3 (three) times daily as needed.    [DISCONTINUED] ibuprofen (ADVIL,MOTRIN) 200 MG tablet Take 200 mg by mouth.    [DISCONTINUED] JARDIANCE 10 mg tablet Take 10 mg by mouth once daily.    [DISCONTINUED] morphine (MSIR) 30 MG tablet Take 60 mg by mouth every 12 (twelve) hours.    [DISCONTINUED] promethazine-dextromethorphan (PROMETHAZINE-DM) 6.25-15 mg/5 mL Syrp promethazine-DM Take 5 ml (oral) every 4 hours PRN - Cough for 5 days will cause drowsiness 20211024 syrup every 4 hours oral 5 days active 6.25-15 mg/5 mL     Family History       Problem Relation (Age of Onset)    Diabetes Sister    Diabetes Mellitus Mother    Hypertension Mother, Sister    Pulmonary fibrosis Father, Brother          Tobacco Use    Smoking status: Never    Smokeless tobacco: Never   Substance and Sexual Activity    Alcohol use: Never    Drug use: Never    Sexual activity: Yes     Partners: Male     Review of Systems        HENT:  Negative for congestion and sore throat.    Respiratory:  Negative for cough and shortness of breath.    Cardiovascular:  Negative for chest pain.   Gastrointestinal:  Negative for nausea.   Genitourinary:  Negative for dysuria.   Musculoskeletal:  Negative for back pain and myalgias (right foot).   Skin:  Positive for wound. Negative for rash.   Neurological:  Negative for weakness and headaches.   Hematological:  Does not bruise/bleed easily.     Objective:     Vital Signs (Most Recent):  Temp: 98.4 °F (36.9 °C) (07/14/24 1302)  Pulse: 101 (07/14/24 1302)  Resp: 16 (07/14/24 1302)  BP: 134/60 (07/14/24 1302)  SpO2: 100 % (07/14/24 1302) Vital Signs (24h Range):  Temp:  [98.4 °F (36.9 °C)] 98.4 °F (36.9 °C)  Pulse:  [] 101  Resp:  [15-23] 16  SpO2:   [93 %-100 %] 100 %  BP: (104-151)/(55-74) 134/60     Weight: 85.5 kg (188 lb 6.4 oz)  Body mass index is 29.51 kg/m².     Physical Exam      Constitutional: Patient is in no acute distress. Well-developed and well-nourished.  Head: Atraumatic. Normocephalic.  Eyes: PERRL. EOM intact. Conjunctivae are not pale. No scleral icterus.  ENT: Mucous membranes are moist. Oropharynx is clear and symmetric.    Neck: Supple. Full ROM. No lymphadenopathy.  Cardiovascular: Regular rate. Regular rhythm. No murmurs, rubs, or gallops. Distal pulses are 2+ and symmetric.  Pulmonary/Chest: No respiratory distress. Clear to auscultation bilaterally. No wheezing or rales.  Abdominal: Soft and non-distended.  There is no tenderness.  No rebound, guarding, or rigidity.   Genitourinary: No CVA tenderness  Musculoskeletal: Swelling with erythema from 2nd to 5th toe of right foot .  Skin: Warm and dry.  Neurological:  Alert, awake, and appropriate.  Normal speech.  No acute focal neurological deficits are appreciated.  Psychiatric: Normal affect. Good eye contact. Appropriate in content.            Significant Labs: All pertinent labs within the past 24 hours have been reviewed.  CBC:   Recent Labs   Lab 07/14/24  0823   WBC 10.62   HGB 11.3*   HCT 34.6*        CMP:   Recent Labs   Lab 07/14/24  0823   *   K 3.9   CL 97   CO2 27   *   BUN 40*   CREATININE 1.0   CALCIUM 9.7   PROT 7.3   ALBUMIN 3.6   BILITOT 0.5   ALKPHOS 84   AST 16   ALT 17   ANIONGAP 11       Significant Imaging:     Imaging Results              US Lower Extremity Arteries Bilateral (In process)  Result time 07/14/24 14:22:17   Procedure changed from US Lower Extrem Arteries Bilat with LEO (xpd)                    X-Ray Foot Complete Right (Final result)  Result time 07/14/24 07:01:55      Final result by Sarmad Gerber MD (07/14/24 07:01:55)                   Impression:      1.  Abnormality of the 5th proximal phalanx concerning for a either  healing fracture or osteomyelitis.  Clinical correlation is advised.    2.  Soft tissue swelling of the foot and ankle as detailed above, without air in the soft tissues or radiopaque foreign bodies.    3.  Incidental findings as noted above.  Negative for acute process otherwise.      Electronically signed by: Sarmad Gerber MD  Date:    07/14/2024  Time:    07:01               Narrative:    EXAMINATION:  XR FOOT COMPLETE 3 VIEW RIGHT    CLINICAL HISTORY:  . Pain in unspecified foot    TECHNIQUE:  AP, lateral, and oblique views of the right foot were performed.    COMPARISON:  None    FINDINGS:  There is abnormality to the distal portions of the 5th proximal phalanx with focal osteopenia and lucency present.    Plantar and Achilles calcaneal spurs.  Degenerative spurring of the dorsal surface of the tarsal bones.  Mild soft tissue swelling of the dorsum of the foot an overlying the medial head of the 1st metatarsal bone with mild hallux valgus deformity.  There is soft tissue swelling surrounding the ankle.    Negative for fracture or dislocation.                                       X-Ray Chest AP Portable (Final result)  Result time 07/14/24 07:03:48      Final result by Sarmad Gerber MD (07/14/24 07:03:48)                   Impression:      1.  Negative for acute process involving the chest.    2.  Stable findings as noted above.      Electronically signed by: Sarmad Gerber MD  Date:    07/14/2024  Time:    07:03               Narrative:    EXAMINATION:  XR CHEST AP PORTABLE    CLINICAL HISTORY:  Sepsis;    COMPARISON:  May 9, 2021    FINDINGS:  The lungs are clear. The cardiac silhouette size is normal. The trachea is midline and the mediastinal width is normal. Negative for focal infiltrate, effusion or pneumothorax. Pulmonary vasculature is normal. Negative for osseous abnormalities. Marginal spondylosis.  Ectatic and tortuous aorta.  Thoracic spine stimulator in place.  Eventration of the hemidiaphragms.   Cardiophrenic fat pads.

## 2024-07-14 NOTE — HOSPITAL COURSE
64-year-old male admitted under Hospital Medicine for right foot cellulitis/ x-ray did not show findings suggestive of osteomyelitis   Podiatric consulted   Patient has been started on empiric antibiotics   Status post I&D per podiatric today;  Discussed with podiatric Dr. Li-- stated okay for discharge from podiatric standpoint, he has faxed orders for home health to [facility] to begin on Tuesday, made arrangements for outpatient IV Dalvance, ordered p.o. antibiotics, scheduled outpatient appointment with podiatric within 1 week, plans to follow up with cultures and sensitivities accordingly, ordered surgical shoe and UE DME;  Patient remained afebrile, no leukocytosis, hemodynamically stable   Alert and oriented x3, denied fever, chills, chest pain, shortness O breath, nausea, vomiting bowel or bladder issues   Considering clinical and hemodynamic stability, planning to discharge patient today, emphasized on compliance with medications, outpatient follow up with PCP, podiatric, patient expressed understanding, agreed with the plan, discharge accordingly today

## 2024-07-14 NOTE — PHARMACY MED REC
"Admission Medication History     The home medication history was taken by Aurora Martin.    You may go to "Admission" then "Reconcile Home Medications" tabs to review and/or act upon these items.     The home medication list has been updated by the Pharmacy department.   Please read ALL comments highlighted in yellow.   Please address this information as you see fit.    Feel free to contact us if you have any questions or require assistance.      The medications listed below were removed from the home medication list. Please reorder if appropriate:  Patient reports no longer taking the following medication(s):  Promethazine-DM 6.25-15 mg/5 mL  Ibuprofen 200 mg        Medications listed below were obtained from: Patient/family and Analytic software- [employer]      LAST MED REC COMPLETED:         Auroramaria elena Martin  [id number]      Current Outpatient Medications on File Prior to Encounter   Medication Sig Dispense Refill Last Dose    atorvastatin (LIPITOR) 10 MG tablet TAKE 1 TABLET BY MOUTH EVERY DAY AT NIGHT   7/13/2024    fish oil-omega-3 fatty acids 300-1,000 mg capsule Take 2 g by mouth once daily.   7/13/2024    JARDIANCE 25 mg tablet Take 25 mg by mouth once daily.   7/13/2024    Lactobacillus rhamnosus GG (CULTURELLE) 10 billion cell capsule Take 1 capsule by mouth once daily.   7/13/2024    metformin (GLUMETZA) 500 MG (MOD) 24 hr tablet Take 1,000 mg by mouth 2 (two) times daily with meals.    7/13/2024    morphine (MS CONTIN) 60 MG 12 hr tablet Take 60 mg by mouth 2 (two) times daily.   7/13/2024 at 8:00pm    multivitamin capsule Take 1 capsule by mouth once daily.   7/13/2024    oxycodone (ROXICODONE) 15 MG Tab Take 15 mg by mouth every 8 (eight) hours as needed.    7/13/2024 at 8:30pm    traMADoL (ULTRAM) 50 mg tablet Take 50 mg by mouth 3 (three) times daily.   7/13/2024 at 9:00pm    valsartan-hydrochlorothiazide (DIOVAN-HCT) 320-25 mg per tablet Take 1 tablet by mouth every evening.    " 7/13/2024    venlafaxine (EFFEXOR-XR) 150 MG Cp24 Take 150 mg by mouth every evening.    7/13/2024    zinc gluconate 50 mg tablet Take 50 mg by mouth.   7/13/2024    metaxalone (SKELAXIN) 800 MG tablet Take 800 mg by mouth 3 (three) times daily as needed.   Unknown                           .

## 2024-07-14 NOTE — HPI
Suellen Campos is a 64 y.o. female patient with a PMHx of DM, HTN, HLD, and neuropathy who presents to the Emergency Department for evaluation of a wound check due to a sudden increase in swelling of the right foot and toes onset yesterday. Pt's podiatry is managed by Dr. Stephen. Symptoms are constant and moderate in severity. No mitigating or exacerbating factors reported. Associated sxs include a reported fever of 99.8 within the past 24 hours. Patient denies any foot pain, HA, nausea, cough, congestion, CP, and all other sxs at this time. No prior tx.Pt denies any recent trauma to the right foot. No further complaints or concerns at this time.

## 2024-07-15 ENCOUNTER — PATIENT MESSAGE (OUTPATIENT)
Dept: PODIATRY | Facility: CLINIC | Age: 64
End: 2024-07-15
Payer: COMMERCIAL

## 2024-07-15 DIAGNOSIS — E11.621 DIABETIC ULCER OF TOE OF RIGHT FOOT ASSOCIATED WITH TYPE 2 DIABETES MELLITUS, WITH FAT LAYER EXPOSED: Primary | ICD-10-CM

## 2024-07-15 DIAGNOSIS — L97.512 DIABETIC ULCER OF TOE OF RIGHT FOOT ASSOCIATED WITH TYPE 2 DIABETES MELLITUS, WITH FAT LAYER EXPOSED: Primary | ICD-10-CM

## 2024-07-15 RX ORDER — SULFAMETHOXAZOLE AND TRIMETHOPRIM 800; 160 MG/1; MG/1
1 TABLET ORAL 2 TIMES DAILY
Qty: 28 TABLET | Refills: 0 | Status: SHIPPED | OUTPATIENT
Start: 2024-07-15 | End: 2024-07-29

## 2024-07-17 LAB — BACTERIA SPEC AEROBE CULT: ABNORMAL

## 2024-07-18 ENCOUNTER — OFFICE VISIT (OUTPATIENT)
Dept: PODIATRY | Facility: CLINIC | Age: 64
End: 2024-07-18
Payer: COMMERCIAL

## 2024-07-18 VITALS — BODY MASS INDEX: 29.58 KG/M2 | WEIGHT: 188.5 LBS | HEIGHT: 67 IN

## 2024-07-18 DIAGNOSIS — Z89.412 HISTORY OF AMPUTATION OF LEFT GREAT TOE: ICD-10-CM

## 2024-07-18 DIAGNOSIS — L03.031 CELLULITIS OF SECOND TOE OF RIGHT FOOT: ICD-10-CM

## 2024-07-18 DIAGNOSIS — Z89.422 H/O AMPUTATION OF LESSER TOE, LEFT: ICD-10-CM

## 2024-07-18 DIAGNOSIS — E11.49 DIABETES MELLITUS TYPE 2 WITH NEUROLOGICAL MANIFESTATIONS: ICD-10-CM

## 2024-07-18 DIAGNOSIS — L97.512 DIABETIC ULCER OF TOE OF RIGHT FOOT ASSOCIATED WITH TYPE 2 DIABETES MELLITUS, WITH FAT LAYER EXPOSED: Primary | ICD-10-CM

## 2024-07-18 DIAGNOSIS — E11.621 DIABETIC ULCER OF TOE OF RIGHT FOOT ASSOCIATED WITH TYPE 2 DIABETES MELLITUS, WITH FAT LAYER EXPOSED: Primary | ICD-10-CM

## 2024-07-18 LAB — BACTERIA SPEC ANAEROBE CULT: NORMAL

## 2024-07-18 PROCEDURE — 99999 PR PBB SHADOW E&M-EST. PATIENT-LVL III: CPT | Mod: PBBFAC,,, | Performed by: PODIATRIST

## 2024-07-18 NOTE — PROGRESS NOTES
Subjective:       Patient ID: Suellen Campos is a 64 y.o. female.    Chief Complaint: Diabetic Foot Ulcer (Diabetic foot ulcer, no pain, pt is wearing post op shoe on right foot and tennis on left )      HPI: Suellen Campos presents to the clinic today, for evaluation and treatment concerning an ulceration/wound/bulla(e) to the RLE 2nd toe. Patient's Primary Care Provider is Amelia Wheatley MD. The PMHx. does include DM w/ peripheral neuropathy, Hx. of amputation(s), and acquired foot/ankle deformity/deformities. The wound(s) have/has been present for a week or so. Most recent local wound care w/ [facility]. Patient does have home health services. Home Health Agency: [facility]. WB with Sx. Shoe. Was not approved for Dalvance IV.     Hemoglobin A1C   Date Value Ref Range Status   10/29/2021 6.3 (H) 4.0 - 5.6 % Final     Comment:     ADA Screening Guidelines:  5.7-6.4%  Consistent with prediabetes  >or=6.5%  Consistent with diabetes    High levels of fetal hemoglobin interfere with the HbA1C  assay. Heterozygous hemoglobin variants (HbS, HgC, etc)do  not significantly interfere with this assay.   However, presence of multiple variants may affect accuracy.     05/09/2021 6.8 (H) 4.0 - 5.6 % Final     Comment:     ADA Screening Guidelines:  5.7-6.4%  Consistent with prediabetes  >or=6.5%  Consistent with diabetes    High levels of fetal hemoglobin interfere with the HbA1C  assay. Heterozygous hemoglobin variants (HbS, HgC, etc)do  not significantly interfere with this assay.   However, presence of multiple variants may affect accuracy.     10/29/2010 6.0 4.0 - 6.2 % Final       Review of patient's allergies indicates:  No Known Allergies    Past Medical History:   Diagnosis Date    Chronic pain     COVID-19     Depression     Diabetes mellitus     Diabetes mellitus, type 2     Hyperlipidemia     Hypertension     Neuropathy     Osteoarthritis     Spinal stenosis        Family History   Problem Relation Name Age of Onset     Diabetes Mellitus Mother      Hypertension Mother      Pulmonary fibrosis Father      Hypertension Sister      Diabetes Sister      Pulmonary fibrosis Brother         Social History     Socioeconomic History    Marital status:    Tobacco Use    Smoking status: Never    Smokeless tobacco: Never   Substance and Sexual Activity    Alcohol use: Never    Drug use: Never    Sexual activity: Yes     Partners: Male     Social Determinants of Health     Financial Resource Strain: Low Risk  (10/29/2021)    Overall Financial Resource Strain (CARDIA)     Difficulty of Paying Living Expenses: Not very hard   Food Insecurity: No Food Insecurity (10/29/2021)    Hunger Vital Sign     Worried About Running Out of Food in the Last Year: Never true     Ran Out of Food in the Last Year: Never true   Transportation Needs: No Transportation Needs (10/29/2021)    PRAPARE - Transportation     Lack of Transportation (Medical): No     Lack of Transportation (Non-Medical): No   Physical Activity: Inactive (10/29/2021)    Exercise Vital Sign     Days of Exercise per Week: 0 days     Minutes of Exercise per Session: 0 min   Stress: No Stress Concern Present (10/29/2021)    [place] [employer] - Occupational Stress Questionnaire     Feeling of Stress : Not at all   Housing Stability: Unknown (10/29/2021)    Housing Stability Vital Sign     Unable to Pay for Housing in the Last Year: No     Unstable Housing in the Last Year: No       Past Surgical History:   Procedure Laterality Date    HYSTERECTOMY      INCISION AND DRAINAGE FOOT Left 5/9/2021    Procedure: INCISION AND DRAINAGE, FOOT;  Surgeon: Shelby Stephen DPM;  Location: [facility];  Service: Podiatry;  Laterality: Left;  GREAT TOE OF LEFT FOOT    SPINAL CORD STIMULATOR IMPLANT      TOE AMPUTATION Left 5/9/2021    Procedure: AMPUTATION, TOE;  Surgeon: Shelby Stephen DPM;  Location: [facility];  Service: Podiatry;  Laterality: Left;  LEFT GREAT     TOE AMPUTATION  "Left 6/8/2023    Procedure: AMPUTATION, TOE;  Surgeon: Shelby Stephen DPM;  Location: [facility];  Service: Podiatry;  Laterality: Left;    TONSILLECTOMY      TOTAL ABDOMINAL HYSTERECTOMY W/ BILATERAL SALPINGOOPHORECTOMY         Review of Systems   Constitutional:  Negative for chills, fatigue and fever.   HENT:  Negative for hearing loss.    Eyes:  Negative for photophobia and visual disturbance.   Respiratory:  Negative for cough, chest tightness, shortness of breath and wheezing.    Cardiovascular:  Negative for chest pain and palpitations.   Gastrointestinal:  Negative for constipation, diarrhea, nausea and vomiting.   Endocrine: Negative for cold intolerance and heat intolerance.   Genitourinary:  Negative for flank pain.   Musculoskeletal:  Negative for neck pain and neck stiffness.   Skin:  Positive for wound. Negative for color change.   Neurological:  Negative for light-headedness and headaches.   Psychiatric/Behavioral:  Negative for sleep disturbance.           Objective:   Ht 5' 7" (1.702 m)   Wt 85.5 kg (188 lb 7.9 oz)   BMI 29.52 kg/m²     Physical Exam  LOWER EXTREMITY PHYSICAL EXAMINATION    DERMATOLOGY:  The ulceration at the distal aspect of the right 2nd toe is healed and resolved.  There is some minor desquamation of the skin to the right 2nd digit in its entirety.  The area measures approximately 3.0 cm x 1.50 cm without depth.  No overt ulceration noted.  No malodor.  No drainage.  There is maceration noted, moderate.  No cellulitis noted.  No fluctuance.    ORTHOPEDIC: LLE 1st and 2nd digit resection. RLE hammer toes are noted. Equinus is noted, RLE.    NEUROLOGY: Sensation to light touch is intact. Proprioception is intact, bilateral. Sensation to pin prick is reduced to absent. Vibratory sensation is diminished to the left and right lower extremity. Examination with 5.07 [place] Meredith monofilament reveals that protective sensation is not intact to the left and right plantar surfaces of " the foot and digits, as the patient has no sensation/detection at greater than 4 distinct points of contact.     Assessment:     1. Diabetic ulcer of toe of right foot associated with type 2 diabetes mellitus, with fat layer exposed    2. Cellulitis of second toe of right foot    3. History of amputation of left great toe    4. H/O amputation of lesser toe, left    5. Diabetes mellitus type 2 with neurological manifestations        Plan:     Diabetic ulcer of toe of right foot associated with type 2 diabetes mellitus, with fat layer exposed  -     SUBSEQUENT HOME HEALTH ORDERS    Cellulitis of second toe of right foot    History of amputation of left great toe    H/O amputation of lesser toe, left    Diabetes mellitus type 2 with neurological manifestations      Thorough discussion is had with the patient today, concerning the diagnosis, its etiology, and the treatment algorithm at present.    XRAYS are reviewed in detail with the patient. All questions and concerns regarding findings and its/their implications are outlined and discussed.    Patient will continue oral Bactrim.      Most recent wound culture is reviewed in detail with the patient. All questions and concerns regarding findings and its/their implications are outlined and discussed.      D/C Flagyl.    Sx Shoe for gait.    Continue [employer].    The wound was surgically debrided after adequate prep with alcohol and/or betadine paint. Selective wound debridement was performed using sharp #10/#15 blade/rounded scalpel and tissue nipper, with removal of all non-viable skin and soft tissues; necrotic skin/tissue formation. Post debridement measurements are as above. Hemostasis was achieved. No viable tissues were debrided. Patient tolerated procedure well and without complications. Local woundcare with alginate applied thereafter.           Future Appointments   Date Time Provider Department Center   8/8/2024  1:30 PM Denilson Li DPM [facility]  [facility]

## 2024-07-19 LAB
BACTERIA BLD CULT: NORMAL
BACTERIA BLD CULT: NORMAL

## 2024-08-08 ENCOUNTER — OFFICE VISIT (OUTPATIENT)
Dept: PODIATRY | Facility: CLINIC | Age: 64
End: 2024-08-08
Payer: COMMERCIAL

## 2024-08-08 ENCOUNTER — HOSPITAL ENCOUNTER (OUTPATIENT)
Dept: RADIOLOGY | Facility: HOSPITAL | Age: 64
Discharge: HOME | End: 2024-08-08
Attending: PODIATRIST
Payer: COMMERCIAL

## 2024-08-08 DIAGNOSIS — E11.621 DIABETIC ULCER OF TOE OF RIGHT FOOT ASSOCIATED WITH TYPE 2 DIABETES MELLITUS, WITH FAT LAYER EXPOSED: ICD-10-CM

## 2024-08-08 DIAGNOSIS — M86.171 ACUTE OSTEOMYELITIS OF TOE, RIGHT: ICD-10-CM

## 2024-08-08 DIAGNOSIS — E11.621 DIABETIC ULCER OF TOE OF RIGHT FOOT ASSOCIATED WITH TYPE 2 DIABETES MELLITUS, WITH FAT LAYER EXPOSED: Primary | ICD-10-CM

## 2024-08-08 DIAGNOSIS — M20.41 HAMMER TOE OF RIGHT FOOT: ICD-10-CM

## 2024-08-08 DIAGNOSIS — M79.674 PAIN IN RIGHT TOE(S): ICD-10-CM

## 2024-08-08 DIAGNOSIS — L97.512 DIABETIC ULCER OF TOE OF RIGHT FOOT ASSOCIATED WITH TYPE 2 DIABETES MELLITUS, WITH FAT LAYER EXPOSED: Primary | ICD-10-CM

## 2024-08-08 DIAGNOSIS — L97.512 DIABETIC ULCER OF TOE OF RIGHT FOOT ASSOCIATED WITH TYPE 2 DIABETES MELLITUS, WITH FAT LAYER EXPOSED: ICD-10-CM

## 2024-08-08 DIAGNOSIS — E11.49 DIABETES MELLITUS TYPE 2 WITH NEUROLOGICAL MANIFESTATIONS: ICD-10-CM

## 2024-08-08 PROCEDURE — 73630 X-RAY EXAM OF FOOT: CPT | Mod: TC,RT

## 2024-08-08 PROCEDURE — 73630 X-RAY EXAM OF FOOT: CPT | Mod: 26,RT,, | Performed by: RADIOLOGY

## 2024-08-08 PROCEDURE — 87070 CULTURE OTHR SPECIMN AEROBIC: CPT | Performed by: PODIATRIST

## 2024-08-08 PROCEDURE — 87075 CULTR BACTERIA EXCEPT BLOOD: CPT | Performed by: PODIATRIST

## 2024-08-08 PROCEDURE — 99999 PR PBB SHADOW E&M-EST. PATIENT-LVL III: CPT | Mod: PBBFAC,,, | Performed by: PODIATRIST

## 2024-08-10 LAB
BACTERIA SPEC AEROBE CULT: NORMAL
BACTERIA SPEC ANAEROBE CULT: NORMAL

## 2024-08-12 ENCOUNTER — TELEPHONE (OUTPATIENT)
Dept: INFECTIOUS DISEASES | Facility: CLINIC | Age: 64
End: 2024-08-12
Payer: COMMERCIAL

## 2024-08-12 ENCOUNTER — DOCUMENTATION ONLY (OUTPATIENT)
Dept: INFECTIOUS DISEASES | Facility: HOSPITAL | Age: 64
End: 2024-08-12
Payer: COMMERCIAL

## 2024-08-12 RX ORDER — AMOXICILLIN AND CLAVULANATE POTASSIUM 875; 125 MG/1; MG/1
1 TABLET, FILM COATED ORAL EVERY 12 HOURS
Qty: 28 TABLET | Refills: 0 | Status: SHIPPED | OUTPATIENT
Start: 2024-08-12 | End: 2024-08-26

## 2024-08-12 NOTE — TELEPHONE ENCOUNTER
Pt r/s for sooner appt from referral placed to ID from POD. Pt verbalized understanding and agreed to appt date, time, and location.

## 2024-08-12 NOTE — TELEPHONE ENCOUNTER
----- Message from Arsh Locke MD, YANNA sent at 8/12/2024  9:04 AM CDT -----  Regarding: FW: Infectious Disease Referral Placed    ----- Message -----  From: Debby Ramon LPN  Sent: 8/9/2024   8:55 AM CDT  To: Arsh Locke MD, FIDSA  Subject: FW: Infectious Disease Referral Placed             ----- Message -----  From: Denilson Li DPM  Sent: 8/8/2024   9:33 PM CDT  To: Denilson Li DPM; Kathleen Mckeon Staff; #  Subject: Infectious Disease Referral Placed

## 2024-08-12 NOTE — TELEPHONE ENCOUNTER
Pt notified of MD message of med. Augmentin sent to pharmacy for by mouth treatment for a week. The pt has an appt in place for nest week to follow up with Dr. Locke with a plan of care.

## 2024-08-12 NOTE — PROGRESS NOTES
Chart reviewed -  Toe osteomyelitis   Will start PO Augmentin and will plan to start parenteral regime from next week when seen in clinic .

## 2024-08-19 ENCOUNTER — OFFICE VISIT (OUTPATIENT)
Dept: INFECTIOUS DISEASES | Facility: CLINIC | Age: 64
End: 2024-08-19
Payer: COMMERCIAL

## 2024-08-19 VITALS
OXYGEN SATURATION: 96 % | DIASTOLIC BLOOD PRESSURE: 87 MMHG | WEIGHT: 187 LBS | TEMPERATURE: 98 F | SYSTOLIC BLOOD PRESSURE: 148 MMHG | BODY MASS INDEX: 29.29 KG/M2 | HEART RATE: 56 BPM

## 2024-08-19 DIAGNOSIS — L97.512 DIABETIC ULCER OF TOE OF RIGHT FOOT ASSOCIATED WITH TYPE 2 DIABETES MELLITUS, WITH FAT LAYER EXPOSED: ICD-10-CM

## 2024-08-19 DIAGNOSIS — B35.3 TINEA PEDIS OF RIGHT FOOT: ICD-10-CM

## 2024-08-19 DIAGNOSIS — E11.49 DIABETES MELLITUS TYPE 2 WITH NEUROLOGICAL MANIFESTATIONS: ICD-10-CM

## 2024-08-19 DIAGNOSIS — E11.621 DIABETIC ULCER OF TOE OF RIGHT FOOT ASSOCIATED WITH TYPE 2 DIABETES MELLITUS, WITH FAT LAYER EXPOSED: ICD-10-CM

## 2024-08-19 DIAGNOSIS — M86.9 OSTEOMYELITIS OF ANKLE AND FOOT: Primary | ICD-10-CM

## 2024-08-19 DIAGNOSIS — M86.171 ACUTE OSTEOMYELITIS OF TOE, RIGHT: ICD-10-CM

## 2024-08-19 PROCEDURE — 99999 PR PBB SHADOW E&M-EST. PATIENT-LVL IV: CPT | Mod: PBBFAC,,, | Performed by: INTERNAL MEDICINE

## 2024-08-19 PROCEDURE — 3077F SYST BP >= 140 MM HG: CPT | Mod: CPTII,S$GLB,, | Performed by: INTERNAL MEDICINE

## 2024-08-19 PROCEDURE — 99205 OFFICE O/P NEW HI 60 MIN: CPT | Mod: S$GLB,,, | Performed by: INTERNAL MEDICINE

## 2024-08-19 PROCEDURE — 3008F BODY MASS INDEX DOCD: CPT | Mod: CPTII,S$GLB,, | Performed by: INTERNAL MEDICINE

## 2024-08-19 PROCEDURE — 1159F MED LIST DOCD IN RCRD: CPT | Mod: CPTII,S$GLB,, | Performed by: INTERNAL MEDICINE

## 2024-08-19 PROCEDURE — 3079F DIAST BP 80-89 MM HG: CPT | Mod: CPTII,S$GLB,, | Performed by: INTERNAL MEDICINE

## 2024-08-19 NOTE — ASSESSMENT & PLAN NOTE
She has TENS unit and cannot have MRI,  X ray of the foot is suggestive  of osteomyelitis.  Wound cultures- 07/14- [facility]  IV Cefazolin for 6 weeks is preferred.  She does not want prolonged IV regime  Plan B -   Outpatient Antibiotic Therapy Plan:     Please send referral to.     1) Infection:  Right diabetic foot infection      2) Discharge Antibiotics:     Intravenous antibiotics:  IV Dalvance 1000mg day 0  IV dalvance 500mg day 10  PO Augmentin 875mg po bid for 42 days     3) Therapy Duration:       Estimated end date of IV antibiotics:      4) Outpatient Weekly Labs:     Order the following labs to be drawn on Mondays:   CBC  CMP   CPK (when on Daptomycin)  ESR  CRP     Please send all labs to [facility] .

## 2024-08-19 NOTE — PROGRESS NOTES
Subjective:       Patient ID: Suellen Campos is a 64 y.o. female.    Chief Complaint: right toe infection   HPI  64 year old  female patient with a PMHx of DM, HTN, HLD, and neuropathy . She was recently managed at the hospital 07/14 for right foot cellulitis .  She also had prior left toe amputation.She comes for follow up.  Labs and imaging test   Foot x-ray -08/08-    There is bandaging material surrounding the toes with what appears to represent disruption of the cortex of the distal phalanx of the 2nd digit and possibly the 3rd digit as well.  Bandaging material makes it difficult to evaluate bony detail.  Osteomyelitis is suspected.  Hammertoe deformities of the 2nd and 3rd digits also noted and possibly the 4th digit as well.  A plantar calcaneal enthesophyte is present.  Mild-to-moderate degenerative changes noted in the region of the midfoot.   Foot cultures- 07/14/24- [facility]  Past Medical History:   Diagnosis Date    Chronic pain     COVID-19     Depression     Diabetes mellitus     Diabetes mellitus, type 2     Hyperlipidemia     Hypertension     Neuropathy     Osteoarthritis     Spinal stenosis      Past Surgical History:   Procedure Laterality Date    HYSTERECTOMY      INCISION AND DRAINAGE FOOT Left 5/9/2021    Procedure: INCISION AND DRAINAGE, FOOT;  Surgeon: Shelby Stephen DPM;  Location: [facility] OR;  Service: Podiatry;  Laterality: Left;  GREAT TOE OF LEFT FOOT    SPINAL CORD STIMULATOR IMPLANT      TOE AMPUTATION Left 5/9/2021    Procedure: AMPUTATION, TOE;  Surgeon: Shelby Stephen DPM;  Location: [facility] OR;  Service: Podiatry;  Laterality: Left;  LEFT GREAT     TOE AMPUTATION Left 6/8/2023    Procedure: AMPUTATION, TOE;  Surgeon: Shelby Stephen DPM;  Location: [facility] OR;  Service: Podiatry;  Laterality: Left;    TONSILLECTOMY      TOTAL ABDOMINAL HYSTERECTOMY W/ BILATERAL SALPINGOOPHORECTOMY       Family History   Problem Relation Name Age of Onset    Diabetes Mellitus Mother      Hypertension  Mother      Pulmonary fibrosis Father      Hypertension Sister      Diabetes Sister      Pulmonary fibrosis Brother       Social History     Socioeconomic History    Marital status:    Tobacco Use    Smoking status: Never    Smokeless tobacco: Never   Substance and Sexual Activity    Alcohol use: Never    Drug use: Never    Sexual activity: Yes     Partners: Male     Social Determinants of Health     Financial Resource Strain: Low Risk  (10/29/2021)    Overall Financial Resource Strain (CARDIA)     Difficulty of Paying Living Expenses: Not very hard   Food Insecurity: No Food Insecurity (10/29/2021)    Hunger Vital Sign     Worried About Running Out of Food in the Last Year: Never true     Ran Out of Food in the Last Year: Never true   Transportation Needs: No Transportation Needs (10/29/2021)    PRAPARE - Transportation     Lack of Transportation (Medical): No     Lack of Transportation (Non-Medical): No   Physical Activity: Inactive (10/29/2021)    Exercise Vital Sign     Days of Exercise per Week: 0 days     Minutes of Exercise per Session: 0 min   Stress: No Stress Concern Present (10/29/2021)    [employer] - Occupational Stress Questionnaire     Feeling of Stress : Not at all   Housing Stability: Unknown (10/29/2021)    Housing Stability Vital Sign     Unable to Pay for Housing in the Last Year: No     Unstable Housing in the Last Year: No     Review of Systems   Constitutional:  Negative for activity change, appetite change, chills, diaphoresis and fever.   Musculoskeletal:  Negative for arthralgias.   Neurological:  Negative for dizziness, facial asymmetry and headaches.   Psychiatric/Behavioral:  Negative for agitation.        Objective:      Physical Exam  Vitals and nursing note reviewed.   Constitutional:       Appearance: She is well-developed.   HENT:      Head: Normocephalic and atraumatic.   Eyes:      Conjunctiva/sclera: Conjunctivae normal.      Pupils: Pupils  are equal, round, and reactive to light.   Neck:      Thyroid: No thyroid mass or thyromegaly.   Cardiovascular:      Rate and Rhythm: Normal rate.      Heart sounds: Normal heart sounds.   Pulmonary:      Effort: Pulmonary effort is normal. No accessory muscle usage or respiratory distress.      Breath sounds: Normal breath sounds.   Abdominal:      General: Bowel sounds are normal.      Palpations: Abdomen is soft. There is no mass.      Tenderness: There is no abdominal tenderness.   Musculoskeletal:         General: Normal range of motion.      Cervical back: Normal range of motion and neck supple.   Skin:     Findings: No rash.      Comments: See pics   Neurological:      Mental Status: She is alert and oriented to person, place, and time.             Assessment:       1. Osteomyelitis of ankle and foot        2. Diabetic ulcer of toe of right foot associated with type 2 diabetes mellitus, with fat layer exposed  Ambulatory referral/consult to Infectious Disease      3. Acute osteomyelitis of toe, right  Ambulatory referral/consult to Infectious Disease      4. Diabetes mellitus type 2 with neurological manifestations        5. Tinea pedis of right foot                Plan:       Problem List Items Addressed This Visit       Diabetes mellitus type 2 with neurological manifestations     Follow PCP         Diabetic ulcer of toe of right foot associated with type 2 diabetes mellitus, with fat layer exposed    Osteomyelitis of ankle and foot - Primary     She has TENS unit and cannot have MRI,  X ray of the foot is suggestive  of osteomyelitis.  Wound cultures- 07/14- [facility]  IV Cefazolin for 6 weeks is preferred.  She does not want prolonged IV regime  Plan B -   Outpatient Antibiotic Therapy Plan:     Please send referral to.     1) Infection:  Right diabetic foot infection      2) Discharge Antibiotics:     Intravenous antibiotics:  IV Dalvance 1000mg day 0  IV dalvance 500mg day 10  PO Augmentin 875mg po bid for  42 days     3) Therapy Duration:       Estimated end date of IV antibiotics:      4) Outpatient Weekly Labs:     Order the following labs to be drawn on Mondays:   CBC  CMP   CPK (when on Daptomycin)  ESR  CRP     Please send all labs to [facility] .         Tinea pedis     Will use Topical lamisil          Other Visit Diagnoses       Acute osteomyelitis of toe, right

## 2024-08-22 ENCOUNTER — OFFICE VISIT (OUTPATIENT)
Dept: PODIATRY | Facility: CLINIC | Age: 64
End: 2024-08-22
Payer: COMMERCIAL

## 2024-08-22 DIAGNOSIS — E11.621 DIABETIC ULCER OF TOE OF RIGHT FOOT ASSOCIATED WITH TYPE 2 DIABETES MELLITUS, WITH FAT LAYER EXPOSED: Primary | ICD-10-CM

## 2024-08-22 DIAGNOSIS — Z87.2 HEALED ULCER OF RIGHT FOOT: ICD-10-CM

## 2024-08-22 DIAGNOSIS — M86.171 ACUTE OSTEOMYELITIS OF TOE, RIGHT: ICD-10-CM

## 2024-08-22 DIAGNOSIS — E11.49 DIABETES MELLITUS TYPE 2 WITH NEUROLOGICAL MANIFESTATIONS: ICD-10-CM

## 2024-08-22 DIAGNOSIS — L97.512 DIABETIC ULCER OF TOE OF RIGHT FOOT ASSOCIATED WITH TYPE 2 DIABETES MELLITUS, WITH FAT LAYER EXPOSED: Primary | ICD-10-CM

## 2024-08-22 PROCEDURE — 99999 PR PBB SHADOW E&M-EST. PATIENT-LVL II: CPT | Mod: PBBFAC,,, | Performed by: PODIATRIST

## 2024-08-22 NOTE — PROGRESS NOTES
Subjective:       Patient ID: Suellen Campos is a 64 y.o. female.    Chief Complaint: Diabetic Foot Ulcer (Diabetic foot ulcer, no pain, pt wears surgical shoe )      HPI: Suellen Campos presents to the clinic today, for evaluation and treatment concerning an ulceration/wound/bulla(e) to the RLE 2nd toe. Patient's Primary Care Provider is Amelia Wheatley MD. The PMHx. does include DM w/ peripheral neuropathy, Hx. of amputation(s), and acquired foot/ankle deformity/deformities. The wound(s) have/has been present for several weeks (7/14/2024 was initial ED consultation). Most recent local wound care w/ collagen. Patient does have home health services. Home Health Agency: [facility]. WB with Sx. Shoe. Did complete PO Abx. Was evaluated by Dr. Locke a few days ago, and is pending IV Dalvance in good Madam fusion, x2 with the addition of oral Augmentin for 42 days.  Patient is also status post 08/08/2024 flexor tenotomy, right 2nd toe.    Hemoglobin A1C   Date Value Ref Range Status   10/29/2021 6.3 (H) 4.0 - 5.6 % Final     Comment:     ADA Screening Guidelines:  5.7-6.4%  Consistent with prediabetes  >or=6.5%  Consistent with diabetes    High levels of fetal hemoglobin interfere with the HbA1C  assay. Heterozygous hemoglobin variants (HbS, HgC, etc)do  not significantly interfere with this assay.   However, presence of multiple variants may affect accuracy.     05/09/2021 6.8 (H) 4.0 - 5.6 % Final     Comment:     ADA Screening Guidelines:  5.7-6.4%  Consistent with prediabetes  >or=6.5%  Consistent with diabetes    High levels of fetal hemoglobin interfere with the HbA1C  assay. Heterozygous hemoglobin variants (HbS, HgC, etc)do  not significantly interfere with this assay.   However, presence of multiple variants may affect accuracy.     10/29/2010 6.0 4.0 - 6.2 % Final       Review of patient's allergies indicates:  No Known Allergies    Past Medical History:   Diagnosis Date    Chronic pain     COVID-19      Depression     Diabetes mellitus     Diabetes mellitus, type 2     Hyperlipidemia     Hypertension     Neuropathy     Osteoarthritis     Spinal stenosis        Family History   Problem Relation Name Age of Onset    Diabetes Mellitus Mother      Hypertension Mother      Pulmonary fibrosis Father      Hypertension Sister      Diabetes Sister      Pulmonary fibrosis Brother         Social History     Socioeconomic History    Marital status:    Tobacco Use    Smoking status: Never    Smokeless tobacco: Never   Substance and Sexual Activity    Alcohol use: Never    Drug use: Never    Sexual activity: Yes     Partners: Male     Social Determinants of Health     Financial Resource Strain: Low Risk  (10/29/2021)    Overall Financial Resource Strain (CARDIA)     Difficulty of Paying Living Expenses: Not very hard   Food Insecurity: No Food Insecurity (10/29/2021)    Hunger Vital Sign     Worried About Running Out of Food in the Last Year: Never true     Ran Out of Food in the Last Year: Never true   Transportation Needs: No Transportation Needs (10/29/2021)    PRAPARE - Transportation     Lack of Transportation (Medical): No     Lack of Transportation (Non-Medical): No   Physical Activity: Inactive (10/29/2021)    Exercise Vital Sign     Days of Exercise per Week: 0 days     Minutes of Exercise per Session: 0 min   Stress: No Stress Concern Present (10/29/2021)    [employer] - Occupational Stress Questionnaire     Feeling of Stress : Not at all   Housing Stability: Unknown (10/29/2021)    Housing Stability Vital Sign     Unable to Pay for Housing in the Last Year: No     Unstable Housing in the Last Year: No       Past Surgical History:   Procedure Laterality Date    HYSTERECTOMY      INCISION AND DRAINAGE FOOT Left 5/9/2021    Procedure: INCISION AND DRAINAGE, FOOT;  Surgeon: Shelby Stephen DPM;  Location: [facility];  Service: Podiatry;  Laterality: Left;  GREAT TOE OF LEFT FOOT     SPINAL CORD STIMULATOR IMPLANT      TOE AMPUTATION Left 5/9/2021    Procedure: AMPUTATION, TOE;  Surgeon: Shelby Stephen DPM;  Location: [facility] OR;  Service: Podiatry;  Laterality: Left;  LEFT GREAT     TOE AMPUTATION Left 6/8/2023    Procedure: AMPUTATION, TOE;  Surgeon: Shelby Stephen DPM;  Location: [facility] OR;  Service: Podiatry;  Laterality: Left;    TONSILLECTOMY      TOTAL ABDOMINAL HYSTERECTOMY W/ BILATERAL SALPINGOOPHORECTOMY         Review of Systems   Constitutional:  Negative for chills, fatigue and fever.   HENT:  Negative for hearing loss.    Eyes:  Negative for photophobia and visual disturbance.   Respiratory:  Negative for cough, chest tightness, shortness of breath and wheezing.    Cardiovascular:  Negative for chest pain and palpitations.   Gastrointestinal:  Negative for constipation, diarrhea, nausea and vomiting.   Endocrine: Negative for cold intolerance and heat intolerance.   Genitourinary:  Negative for flank pain.   Musculoskeletal:  Negative for neck pain and neck stiffness.   Skin:  Positive for wound. Negative for color change.   Neurological:  Negative for light-headedness and headaches.   Psychiatric/Behavioral:  Negative for sleep disturbance.             Objective:   There were no vitals taken for this visit.    Physical Exam  LOWER EXTREMITY PHYSICAL EXAMINATION    DERMATOLOGY:  The ulceration at the distal aspect of the right 2nd toe is healed and resolved. The percutaneous flexor site is healed and resolved as well.  Substantial xerosis and hyperkeratosis is noted.    ORTHOPEDIC: LLE 1st and 2nd digit resection. RLE hammer toes are noted. Equinus is noted, RLE.    NEUROLOGY: Sensation to light touch is intact. Proprioception is intact, bilateral. Sensation to pin prick is reduced to absent. Vibratory sensation is diminished to the left and right lower extremity. Examination with 5.07 [place] M[place] monofilament reveals that protective sensation is not intact to the left and  right plantar surfaces of the foot and digits, as the patient has no sensation/detection at greater than 4 distinct points of contact.     Assessment:     1. Diabetic ulcer of toe of right foot associated with type 2 diabetes mellitus, with fat layer exposed    2. Healed ulcer of right foot    3. Acute osteomyelitis of toe, right    4. Diabetes mellitus type 2 with neurological manifestations            Plan:     Diabetic ulcer of toe of right foot associated with type 2 diabetes mellitus, with fat layer exposed    Healed ulcer of right foot    Acute osteomyelitis of toe, right    Diabetes mellitus type 2 with neurological manifestations        Thorough discussion is had with the patient today, concerning the diagnosis, its etiology, and the treatment algorithm at present.    Healed ulceration of the RLE, 2nd toe.    No debridement is performed this afternoon.    D/C Home Health.    Will start Dalvance IV infusion x2 as per Dr. Locke with PO Augmentin.    Normal shoe gear for gait.    Neuropathic foot counseling and education is provided at this visit. Patient is advised to wear socks and shoes at all times.  Do not walk barefoot, or with just socks, even when indoors.  Be sure to check and inspect the inside of the shoe before putting them on the foot.  Protect your feet at all times.  Walking shoes and/or athletic shoes are the best types of shoe gear. Do not wear vinyl or plastic type shoe gear, as they do not stretch and/or breathe.  Protect your feet from hot and/or cold. Elevate the extremities when sitting.  Do not wear excessively tight socks and/or shoe gear. Wiggle your toes for a few minutes throughout the day. Move your ankles up and down, in and out, to help blood flow in your lower extremity.             Future Appointments   Date Time Provider Department Center   9/5/2024 11:30 AM Arsh Locke MD[facility]

## 2024-08-26 ENCOUNTER — PATIENT MESSAGE (OUTPATIENT)
Dept: INFECTIOUS DISEASES | Facility: CLINIC | Age: 64
End: 2024-08-26
Payer: COMMERCIAL

## 2024-09-09 ENCOUNTER — OFFICE VISIT (OUTPATIENT)
Dept: PODIATRY | Facility: CLINIC | Age: 64
End: 2024-09-09
Payer: COMMERCIAL

## 2024-09-09 DIAGNOSIS — L60.3 ONYCHODYSTROPHY: ICD-10-CM

## 2024-09-09 DIAGNOSIS — L97.512 DIABETIC ULCER OF TOE OF RIGHT FOOT ASSOCIATED WITH TYPE 2 DIABETES MELLITUS, WITH FAT LAYER EXPOSED: ICD-10-CM

## 2024-09-09 DIAGNOSIS — E11.49 DIABETES MELLITUS TYPE 2 WITH NEUROLOGICAL MANIFESTATIONS: Primary | ICD-10-CM

## 2024-09-09 DIAGNOSIS — Z89.412 HISTORY OF AMPUTATION OF LEFT GREAT TOE: ICD-10-CM

## 2024-09-09 DIAGNOSIS — E11.621 DIABETIC ULCER OF TOE OF RIGHT FOOT ASSOCIATED WITH TYPE 2 DIABETES MELLITUS, WITH FAT LAYER EXPOSED: ICD-10-CM

## 2024-09-09 DIAGNOSIS — Z89.422 H/O AMPUTATION OF LESSER TOE, LEFT: ICD-10-CM

## 2024-09-09 PROCEDURE — 99213 OFFICE O/P EST LOW 20 MIN: CPT | Mod: 24,25,S$GLB, | Performed by: PODIATRIST

## 2024-09-09 PROCEDURE — 1159F MED LIST DOCD IN RCRD: CPT | Mod: CPTII,S$GLB,, | Performed by: PODIATRIST

## 2024-09-09 PROCEDURE — 11721 DEBRIDE NAIL 6 OR MORE: CPT | Mod: 79,59,S$GLB, | Performed by: PODIATRIST

## 2024-09-09 PROCEDURE — 99999 PR PBB SHADOW E&M-EST. PATIENT-LVL III: CPT | Mod: PBBFAC,,, | Performed by: PODIATRIST

## 2024-09-09 PROCEDURE — 11042 DBRDMT SUBQ TIS 1ST 20SQCM/<: CPT | Mod: 79,S$GLB,, | Performed by: PODIATRIST

## 2024-09-09 NOTE — PROGRESS NOTES
Subjective:       Patient ID: Suellen Campos is a 64 y.o. female.    Chief Complaint: Follow-up (Patient denies pain at present, patient is diabetic, no draining or swelling noted at present. )    HPI: Patient presents to the office today with the chief complaint of elongated, thickened and dystrophic nail plates to the B/L foot.  This has small open wound to plantar aspect of the right 1st metatarsophalangeal joint.  No active drainage.  States that she recently completed antibiotics due to cellulitis and potential bone infection to a lesser digit.  This patient is a Diabetic Type II, complicated with Peripheral Neuropathy. Patient does follow with Primary Care and/or Endocrinology for management of Diabetes Mellitus. This patient's PMD is Amelia Wheatley MD. This patient last saw his/her primary care provider on 04/16/2024.     Hemoglobin A1C   Date Value Ref Range Status   10/29/2021 6.3 (H) 4.0 - 5.6 % Final     Comment:     ADA Screening Guidelines:  5.7-6.4%  Consistent with prediabetes  >or=6.5%  Consistent with diabetes    High levels of fetal hemoglobin interfere with the HbA1C  assay. Heterozygous hemoglobin variants (HbS, HgC, etc)do  not significantly interfere with this assay.   However, presence of multiple variants may affect accuracy.     05/09/2021 6.8 (H) 4.0 - 5.6 % Final     Comment:     ADA Screening Guidelines:  5.7-6.4%  Consistent with prediabetes  >or=6.5%  Consistent with diabetes    High levels of fetal hemoglobin interfere with the HbA1C  assay. Heterozygous hemoglobin variants (HbS, HgC, etc)do  not significantly interfere with this assay.   However, presence of multiple variants may affect accuracy.     10/29/2010 6.0 4.0 - 6.2 % Final   .     Review of patient's allergies indicates:  No Known Allergies    Past Medical History:   Diagnosis Date    Chronic pain     COVID-19     Depression     Diabetes mellitus     Diabetes mellitus, type 2     Hyperlipidemia     Hypertension      Neuropathy     Osteoarthritis     Spinal stenosis        Family History   Problem Relation Name Age of Onset    Diabetes Mellitus Mother      Hypertension Mother      Pulmonary fibrosis Father      Hypertension Sister      Diabetes Sister      Pulmonary fibrosis Brother         Social History     Socioeconomic History    Marital status:    Tobacco Use    Smoking status: Never    Smokeless tobacco: Never   Substance and Sexual Activity    Alcohol use: Never    Drug use: Never    Sexual activity: Yes     Partners: Male     Social Determinants of Health     Financial Resource Strain: Low Risk  (10/29/2021)    Overall Financial Resource Strain (CARDIA)     Difficulty of Paying Living Expenses: Not very hard   Food Insecurity: No Food Insecurity (10/29/2021)    Hunger Vital Sign     Worried About Running Out of Food in the Last Year: Never true     Ran Out of Food in the Last Year: Never true   Transportation Needs: No Transportation Needs (10/29/2021)    PRAPARE - Transportation     Lack of Transportation (Medical): No     Lack of Transportation (Non-Medical): No   Physical Activity: Inactive (10/29/2021)    Exercise Vital Sign     Days of Exercise per Week: 0 days     Minutes of Exercise per Session: 0 min   Stress: No Stress Concern Present (10/29/2021)    [employer] - Occupational Stress Questionnaire     Feeling of Stress : Not at all   Housing Stability: Unknown (10/29/2021)    Housing Stability Vital Sign     Unable to Pay for Housing in the Last Year: No     Unstable Housing in the Last Year: No       Past Surgical History:   Procedure Laterality Date    HYSTERECTOMY      INCISION AND DRAINAGE FOOT Left 5/9/2021    Procedure: INCISION AND DRAINAGE, FOOT;  Surgeon: Shelby Stephen DPM;  Location: [facility];  Service: Podiatry;  Laterality: Left;  GREAT TOE OF LEFT FOOT    SPINAL CORD STIMULATOR IMPLANT      TOE AMPUTATION Left 5/9/2021    Procedure: AMPUTATION, TOE;  Surgeon:  Shelby Stephen DPM;  Location: [facility];  Service: Podiatry;  Laterality: Left;  LEFT GREAT     TOE AMPUTATION Left 6/8/2023    Procedure: AMPUTATION, TOE;  Surgeon: Shelby Stephen DPM;  Location: [facility] OR;  Service: Podiatry;  Laterality: Left;    TONSILLECTOMY      TOTAL ABDOMINAL HYSTERECTOMY W/ BILATERAL SALPINGOOPHORECTOMY         Review of Systems       Objective:   There were no vitals taken for this visit.    Physical Exam  LOWER EXTREMITY PHYSICAL EXAMINATION    VASCULAR:  The right dorsalis pedis pulse 2/4 and the right posterior tibial pulse 2/4.  The left dorsalis pedis pulse 2/4 and posterior tibial pulse on the left is 2/4.  Capillary refill is intact.  Pedal hair growth intact    NEUROLOGY: Protective sensation is not intact to the right left foot.  Vibratory sensation is diminished.  Proprioception is intact.  Sharp/dull is reduced.    DERMATOLOGY:  Superficial ulceration present to the right foot sub 1st metatarsophalangeal joint.  Wound , post debridement measures 0.4 cm x 0.1 cm with a depth of 0.1 cm.  Fat is noted.  The wound bed is granular.  No signs of acute infection.  Circumferential hyperkeratosis is noted.  Skin is supple, moist, intact.  The R1, 2, 5 and left L 3,4 5 are thickened, discolored dystrophic.  There is subungual debris.  Nail plates have area of dark discoloration.      ORTHOPEDIC: Manual Muscle Testing is 5/5 in all planes on the left and right, without pains, with and without resistance.  History left great toe and 2nd toe amputation    Assessment:     1. Diabetes mellitus type 2 with neurological manifestations    2. Diabetic ulcer of toe of right foot associated with type 2 diabetes mellitus, with fat layer exposed    3. History of amputation of left great toe    4. H/O amputation of lesser toe, left    5. Onychodystrophy        Plan:     Diabetes mellitus type 2 with neurological manifestations    Diabetic ulcer of toe of right foot associated with type 2 diabetes  mellitus, with fat layer exposed    History of amputation of left great toe    H/O amputation of lesser toe, left    Onychodystrophy        Thorough discussion is had with the patient concerning the diagnosis, its etiology, and the treatment algorithm at present. Shoe inspection. Foot Education. Patient reminded of the importance of good nutrition and blood sugar control to help prevent podiatric complications of diabetes. Patient instructed on proper foot hygeine. We discussed wearing proper and supportive shoe gear, daily foot inspections, never walking barefooted or sock footed, never putting sharp instruments to feet which can cause major complications associated with infection, ulcers, lacerations.    The wound was surgically debrided after adequate prep with alcohol and/or betadine paint. Excisional wound debridement was performed using sharp #10/#15 blade/rounded scalpel and tissue nipper, with removal of all non-viable skin and soft tissues; necrotic skin/tissue formation. The woundbase/wound bed was also debrided to encourage bleeding as to promote/stimulate healing. Debridement was excisional and included epidermal, dermal and subcutaneous tissues. Post debridement measurements are as above. Hemostasis was achieved. Patient tolerated procedure well and without complications. Local woundcare with collagen dressings and bandage thereafter.       Dystrophic nail plates, as outlined above (R#1,2,5  ; L#3,4, 5 ), are sharply debrided with double action nail nipper, and/or with the assistance of a mechanical rotary ssuan, with removal of all offending nail and nail border(s), for reduction of pains. Nails are reduced in terms of length, width and girth with removal of subungual debris to facilitate pain free weight bearing and ambulation. The elongated nails as outlined in the objective portion of this note, were trimmed to appropriate length, with a double action nail nipper, for alleviation/reduction of pains  as well. Follow up in approx. 3-4 months.      Future Appointments   Date Time Provider Department Center   12/9/2024  2:30 PM Shelby Stephen DPM [facility]

## 2024-12-09 ENCOUNTER — OFFICE VISIT (OUTPATIENT)
Dept: PODIATRY | Facility: CLINIC | Age: 64
End: 2024-12-09
Payer: COMMERCIAL

## 2024-12-09 DIAGNOSIS — Z89.412 HISTORY OF AMPUTATION OF LEFT GREAT TOE: ICD-10-CM

## 2024-12-09 DIAGNOSIS — E11.621 DIABETIC ULCER OF TOE OF RIGHT FOOT ASSOCIATED WITH TYPE 2 DIABETES MELLITUS, WITH FAT LAYER EXPOSED: Primary | ICD-10-CM

## 2024-12-09 DIAGNOSIS — L97.512 DIABETIC ULCER OF TOE OF RIGHT FOOT ASSOCIATED WITH TYPE 2 DIABETES MELLITUS, WITH FAT LAYER EXPOSED: Primary | ICD-10-CM

## 2024-12-09 DIAGNOSIS — Z89.422 H/O AMPUTATION OF LESSER TOE, LEFT: ICD-10-CM

## 2024-12-09 DIAGNOSIS — E11.49 DIABETES MELLITUS TYPE 2 WITH NEUROLOGICAL MANIFESTATIONS: ICD-10-CM

## 2024-12-09 PROCEDURE — 99999 PR PBB SHADOW E&M-EST. PATIENT-LVL III: CPT | Mod: PBBFAC,,, | Performed by: PODIATRIST

## 2024-12-09 PROCEDURE — 1160F RVW MEDS BY RX/DR IN RCRD: CPT | Mod: CPTII,S$GLB,, | Performed by: PODIATRIST

## 2024-12-09 PROCEDURE — 1159F MED LIST DOCD IN RCRD: CPT | Mod: CPTII,S$GLB,, | Performed by: PODIATRIST

## 2024-12-09 PROCEDURE — 99213 OFFICE O/P EST LOW 20 MIN: CPT | Mod: 25,S$GLB,, | Performed by: PODIATRIST

## 2024-12-09 PROCEDURE — 11042 DBRDMT SUBQ TIS 1ST 20SQCM/<: CPT | Mod: S$GLB,,, | Performed by: PODIATRIST

## 2024-12-09 PROCEDURE — 3044F HG A1C LEVEL LT 7.0%: CPT | Mod: CPTII,S$GLB,, | Performed by: PODIATRIST

## 2024-12-09 NOTE — PROGRESS NOTES
Subjective:       Patient ID: Suellen Campos is a 64 y.o. female.    Chief Complaint: Diabetic Foot Ulcer (Right DFU: pt denies pain, pt is diabetic, no draining or swelling at present.)    HPI: Suellen Campos presents to the office today, with an open wound to the plantar aspect of the right great toe.  She states she has been compliant with utilization of collagen to this wound and is quite small in nature.  Relates new blister to the medial aspect of the right great toe.  Applying iodine to this area.  States no draining wounds.  No erythema.      Review of patient's allergies indicates:  No Known Allergies    Past Medical History:   Diagnosis Date    Chronic pain     COVID-19     Depression     Diabetes mellitus     Diabetes mellitus, type 2     Hyperlipidemia     Hypertension     Neuropathy     Osteoarthritis     Spinal stenosis        Family History   Problem Relation Name Age of Onset    Diabetes Mellitus Mother      Hypertension Mother      Pulmonary fibrosis Father      Hypertension Sister      Diabetes Sister      Pulmonary fibrosis Brother         Social History     Socioeconomic History    Marital status:    Tobacco Use    Smoking status: Never    Smokeless tobacco: Never   Substance and Sexual Activity    Alcohol use: Never    Drug use: Never    Sexual activity: Yes     Partners: Male     Social Drivers of Health     Financial Resource Strain: Low Risk  (10/29/2021)    Overall Financial Resource Strain (CARDIA)     Difficulty of Paying Living Expenses: Not very hard   Food Insecurity: No Food Insecurity (10/29/2021)    Hunger Vital Sign     Worried About Running Out of Food in the Last Year: Never true     Ran Out of Food in the Last Year: Never true   Transportation Needs: No Transportation Needs (10/29/2021)    PRAPARE - Transportation     Lack of Transportation (Medical): No     Lack of Transportation (Non-Medical): No   Physical Activity: Inactive (10/29/2021)    Exercise Vital Sign     Days  of Exercise per Week: 0 days     Minutes of Exercise per Session: 0 min   Stress: No Stress Concern Present (10/29/2021)    [employer] - Occupational Stress Questionnaire     Feeling of Stress : Not at all   Housing Stability: Unknown (10/29/2021)    Housing Stability Vital Sign     Unable to Pay for Housing in the Last Year: No     Unstable Housing in the Last Year: No       Past Surgical History:   Procedure Laterality Date    HYSTERECTOMY      INCISION AND DRAINAGE FOOT Left 5/9/2021    Procedure: INCISION AND DRAINAGE, FOOT;  Surgeon: Shelby Stephen DPM;  Location: [facility] OR;  Service: Podiatry;  Laterality: Left;  GREAT TOE OF LEFT FOOT    SPINAL CORD STIMULATOR IMPLANT      TOE AMPUTATION Left 5/9/2021    Procedure: AMPUTATION, TOE;  Surgeon: hSelby Stephen DPM;  Location: [facility] OR;  Service: Podiatry;  Laterality: Left;  LEFT GREAT     TOE AMPUTATION Left 6/8/2023    Procedure: AMPUTATION, TOE;  Surgeon: Shelby Stephen DPM;  Location: [facility] OR;  Service: Podiatry;  Laterality: Left;    TONSILLECTOMY      TOTAL ABDOMINAL HYSTERECTOMY W/ BILATERAL SALPINGOOPHORECTOMY         Review of Systems       Objective:   There were no vitals taken for this visit.    X-Ray Foot Complete Right  Narrative: EXAMINATION:  XR FOOT COMPLETE 3 VIEW RIGHT    CLINICAL HISTORY:  . Type 2 diabetes mellitus with foot ulcer    TECHNIQUE:  AP, lateral, and oblique views of the foot were performed.    COMPARISON:  07/14/2024    FINDINGS:  There is bandaging material surrounding the toes with what appears to represent disruption of the cortex of the distal phalanx of the 2nd digit and possibly the 3rd digit as well.  Bandaging material makes it difficult to evaluate bony detail.  Osteomyelitis is suspected.  Hammertoe deformities of the 2nd and 3rd digits also noted and possibly the 4th digit as well.  A plantar calcaneal enthesophyte is present.  Mild-to-moderate degenerative changes noted in the  region of the midfoot.  Impression: As above    Electronically signed by: Jacob Man DO  Date:    08/08/2024  Time:    14:39       Physical Exam   LOWER EXTREMITY PHYSICAL EXAMINATION    Vascular:  The right dorsalis pedis pulse 2/4 and the right posterior tibial pulse 2/4.  The left dorsalis pedis pulse 2/4 and posterior tibial pulse on the left is 2/4.  Capillary refill is intact.  Pedal hair growth intact    Neurological:  Protective sensation is not intact to the right left foot.  Vibratory sensation is diminished.  Proprioception is intact.  Sharp/dull is reduced.    Musculoskeletal: Manual Muscle Testing is 5/5 with dorsiflexion, plantar flexion, abduction, and adduction.   There is normal range of motion in the forefoot, hindfoot, and Ankle joint.  No history of amputation right foot.  Previous of the left great toe amputation.    Dermatological:  Skin is supple.  Ulceration present to the plantar aspect of the great toe measuring 0.6 cm x 0.2 cm with a depth of 0.1 cm.  No signs of acute infection.  Fat noted to the central aspect the ulceration.  No probe to bone.  No deep tendon exposure.  Superficial blister/wound present to the medial aspect the great toe new of the distal phalanx.  No acute signs of infection.    Imaging:       Results for orders placed during the hospital encounter of 05/29/23    X-Ray Foot Complete Left    Narrative  EXAMINATION:  XR FOOT COMPLETE 3 VIEW LEFT    CLINICAL HISTORY:  .  Pain in left foot    TECHNIQUE:  AP, lateral and oblique views of the left foot were performed.    COMPARISON:  07/14/2021    FINDINGS:  Amputation changes noted at the 1st MTP joint.  Chronic nonunited fracture deformity at the 2nd metatarsal.  Similar chronic fracture deformity with erosion changes of the 3rd proximal phalanx.  Similar midfoot arthritic findings present including subcortical cystic change.  Early Charcot joint not excluded.  Pes planus.  No focal soft tissue  abnormality.    Impression  As above      Electronically signed by: Tristen Vincent MD  Date:    05/29/2023  Time:    10:42       Results for orders placed during the hospital encounter of 08/08/24    X-Ray Foot Complete Right    Narrative  EXAMINATION:  XR FOOT COMPLETE 3 VIEW RIGHT    CLINICAL HISTORY:  . Type 2 diabetes mellitus with foot ulcer    TECHNIQUE:  AP, lateral, and oblique views of the foot were performed.    COMPARISON:  07/14/2024    FINDINGS:  There is bandaging material surrounding the toes with what appears to represent disruption of the cortex of the distal phalanx of the 2nd digit and possibly the 3rd digit as well.  Bandaging material makes it difficult to evaluate bony detail.  Osteomyelitis is suspected.  Hammertoe deformities of the 2nd and 3rd digits also noted and possibly the 4th digit as well.  A plantar calcaneal enthesophyte is present.  Mild-to-moderate degenerative changes noted in the region of the midfoot.    Impression  As above      Electronically signed by: Jacob Man DO  Date:    08/08/2024  Time:    14:39          Assessment:     1. Diabetic ulcer of toe of right foot associated with type 2 diabetes mellitus, with fat layer exposed    2. H/O amputation of lesser toe, left    3. History of amputation of left great toe    4. Diabetes mellitus type 2 with neurological manifestations        Plan:     Diabetic ulcer of toe of right foot associated with type 2 diabetes mellitus, with fat layer exposed    H/O amputation of lesser toe, left    History of amputation of left great toe    Diabetes mellitus type 2 with neurological manifestations      The wound was surgically debrided after adequate prep with alcohol and/or betadine paint. Excisional wound debridement was performed using sharp #10/#15 blade/rounded scalpel and tissue nipper, with removal of all non-viable skin and soft tissues; necrotic skin/tissue formation. The woundbase/wound bed was also debrided to encourage  bleeding as to promote/stimulate healing. Debridement was excisional and included epidermal, dermal and subcutaneous tissues. Post debridement measurements are as above. Hemostasis was achieved. Patient tolerated procedure well and without complications. Local woundcare with saline moist collagen dressings and bandage thereafter.     Betadine applied to the wound to the medial aspect the great toe    Thorough discussion is had with the patient concerning the diagnosis, its etiology, and the treatment algorithm at present. Shoe inspection. Foot Education. Patient reminded of the importance of good nutrition and blood sugar control to help prevent podiatric complications of diabetes. Patient instructed on proper foot hygeine. We discussed wearing proper and supportive shoe gear, daily foot inspections, never walking barefooted or sock footed, never putting sharp instruments to feet which can cause major complications associated with infection, ulcers, lacerations.      No future appointments.

## 2025-01-15 ENCOUNTER — OFFICE VISIT (OUTPATIENT)
Dept: PODIATRY | Facility: CLINIC | Age: 65
End: 2025-01-15
Payer: COMMERCIAL

## 2025-01-15 DIAGNOSIS — Z89.412 HISTORY OF AMPUTATION OF LEFT GREAT TOE: ICD-10-CM

## 2025-01-15 DIAGNOSIS — Z89.422 H/O AMPUTATION OF LESSER TOE, LEFT: ICD-10-CM

## 2025-01-15 DIAGNOSIS — L97.512 DIABETIC ULCER OF TOE OF RIGHT FOOT ASSOCIATED WITH TYPE 2 DIABETES MELLITUS, WITH FAT LAYER EXPOSED: Primary | ICD-10-CM

## 2025-01-15 DIAGNOSIS — M20.42 HAMMER TOE OF LEFT FOOT: ICD-10-CM

## 2025-01-15 DIAGNOSIS — L97.522 SKIN ULCER OF THIRD TOE, LEFT, WITH FAT LAYER EXPOSED: ICD-10-CM

## 2025-01-15 DIAGNOSIS — E11.621 DIABETIC ULCER OF TOE OF RIGHT FOOT ASSOCIATED WITH TYPE 2 DIABETES MELLITUS, WITH FAT LAYER EXPOSED: Primary | ICD-10-CM

## 2025-01-15 PROCEDURE — 99213 OFFICE O/P EST LOW 20 MIN: CPT | Mod: 25,S$GLB,, | Performed by: PODIATRIST

## 2025-01-15 PROCEDURE — 1160F RVW MEDS BY RX/DR IN RCRD: CPT | Mod: CPTII,S$GLB,, | Performed by: PODIATRIST

## 2025-01-15 PROCEDURE — 99999 PR PBB SHADOW E&M-EST. PATIENT-LVL III: CPT | Mod: PBBFAC,,, | Performed by: PODIATRIST

## 2025-01-15 PROCEDURE — 11042 DBRDMT SUBQ TIS 1ST 20SQCM/<: CPT | Mod: S$GLB,,, | Performed by: PODIATRIST

## 2025-01-15 PROCEDURE — 1159F MED LIST DOCD IN RCRD: CPT | Mod: CPTII,S$GLB,, | Performed by: PODIATRIST

## 2025-01-15 NOTE — PROGRESS NOTES
Subjective:       Patient ID: Suellen Campos is a 64 y.o. female.    Chief Complaint: Diabetic Foot Ulcer (Right 1st toe/Left 3rd toe: c/o denies pain, pt is diabetic, pt states of swelling, swelling and discoloration at present.)    HPI: Suellen Campos presents to the office today, with an open wound to the plantar aspect of the right great toe x2.  She states she has been compliant with utilization of collagen to this wound and is quite small in nature. .  Applying collagen to this area..  States no draining wounds.  No erythema.  Also has new wound present to the distal aspect the left 3rd toe.  Mild swelling.  No erythema.  Likely contributing factors associated with hammertoe deformity.    Review of patient's allergies indicates:  No Known Allergies    Past Medical History:   Diagnosis Date    Chronic pain     COVID-19     Depression     Diabetes mellitus     Diabetes mellitus, type 2     Hyperlipidemia     Hypertension     Neuropathy     Osteoarthritis     Spinal stenosis        Family History   Problem Relation Name Age of Onset    Diabetes Mellitus Mother      Hypertension Mother      Pulmonary fibrosis Father      Hypertension Sister      Diabetes Sister      Pulmonary fibrosis Brother         Social History     Socioeconomic History    Marital status:    Tobacco Use    Smoking status: Never    Smokeless tobacco: Never   Substance and Sexual Activity    Alcohol use: Never    Drug use: Never    Sexual activity: Yes     Partners: Male     Social Drivers of Health     Financial Resource Strain: Low Risk  (10/29/2021)    Overall Financial Resource Strain (CARDIA)     Difficulty of Paying Living Expenses: Not very hard   Food Insecurity: No Food Insecurity (10/29/2021)    Hunger Vital Sign     Worried About Running Out of Food in the Last Year: Never true     Ran Out of Food in the Last Year: Never true   Transportation Needs: No Transportation Needs (10/29/2021)    PRAPARE - Transportation     Lack of  Transportation (Medical): No     Lack of Transportation (Non-Medical): No   Physical Activity: Inactive (10/29/2021)    Exercise Vital Sign     Days of Exercise per Week: 0 days     Minutes of Exercise per Session: 0 min   Stress: No Stress Concern Present (10/29/2021)    [employer] [place] [employer] - Occupational Stress Questionnaire     Feeling of Stress : Not at all   Housing Stability: Unknown (10/29/2021)    Housing Stability Vital Sign     Unable to Pay for Housing in the Last Year: No     Unstable Housing in the Last Year: No       Past Surgical History:   Procedure Laterality Date    HYSTERECTOMY      INCISION AND DRAINAGE FOOT Left 5/9/2021    Procedure: INCISION AND DRAINAGE, FOOT;  Surgeon: Shelby Stephen DPM;  Location: [facility] OR;  Service: Podiatry;  Laterality: Left;  GREAT TOE OF LEFT FOOT    SPINAL CORD STIMULATOR IMPLANT      TOE AMPUTATION Left 5/9/2021    Procedure: AMPUTATION, TOE;  Surgeon: Shelby Stephen DPM;  Location: [facility] OR;  Service: Podiatry;  Laterality: Left;  LEFT GREAT     TOE AMPUTATION Left 6/8/2023    Procedure: AMPUTATION, TOE;  Surgeon: Shelby Stephen DPM;  Location: [facility] OR;  Service: Podiatry;  Laterality: Left;    TONSILLECTOMY      TOTAL ABDOMINAL HYSTERECTOMY W/ BILATERAL SALPINGOOPHORECTOMY         Review of Systems       Objective:   There were no vitals taken for this visit.    X-Ray Foot Complete Right  Narrative: EXAMINATION:  XR FOOT COMPLETE 3 VIEW RIGHT    CLINICAL HISTORY:  . Type 2 diabetes mellitus with foot ulcer    TECHNIQUE:  AP, lateral, and oblique views of the foot were performed.    COMPARISON:  07/14/2024    FINDINGS:  There is bandaging material surrounding the toes with what appears to represent disruption of the cortex of the distal phalanx of the 2nd digit and possibly the 3rd digit as well.  Bandaging material makes it difficult to evaluate bony detail.  Osteomyelitis is suspected.  Hammertoe deformities of the 2nd and 3rd digits  also noted and possibly the 4th digit as well.  A plantar calcaneal enthesophyte is present.  Mild-to-moderate degenerative changes noted in the region of the midfoot.  Impression: As above    Electronically signed by: Jacob DO Donovan  Date:    08/08/2024  Time:    14:39       Physical Exam   LOWER EXTREMITY PHYSICAL EXAMINATION    Vascular:  The right dorsalis pedis pulse 2/4 and the right posterior tibial pulse 2/4.  The left dorsalis pedis pulse 2/4 and posterior tibial pulse on the left is 2/4.  Capillary refill is intact.  Pedal hair growth intact    Neurological:  Protective sensation is not intact to the right left foot.  Vibratory sensation is diminished.  Proprioception is intact.  Sharp/dull is reduced.    Musculoskeletal: Manual Muscle Testing is 5/5 with dorsiflexion, plantar flexion, abduction, and adduction.   There is normal range of motion in the forefoot, hindfoot, and Ankle joint.  No history of amputation right foot.  Previous of the left great toe amputation.  Hammertoe of the left 3rd digit.    Dermatological:  Skin is supple.  Ulceration present to the plantar aspect of the great toe with superficial callus.  Post debridement, the wound measures 0.4 cm x 0.1 cm with a depth of 0.1 cm.  No signs of acute infection.  Fat noted to the central aspect the ulceration.  No probe to bone.  No deep tendon exposure.      Ulceration to the distal aspect of the great toe measures 0.4 cm x 0.6 cm by 0.05 cm.  There is no deep tissue exposure.  Mild hyperkeratosis noted circumferentially.  No erythema.  No drainage    New ulceration present to the distal aspect of the left 3rd toe.  Ulcer does have a granular base without erythema.  Contracture noted..    Imaging:       Results for orders placed during the hospital encounter of 05/29/23    X-Ray Foot Complete Left    Narrative  EXAMINATION:  XR FOOT COMPLETE 3 VIEW LEFT    CLINICAL HISTORY:  .  Pain in left foot    TECHNIQUE:  AP, lateral and oblique views  of the left foot were performed.    COMPARISON:  07/14/2021    FINDINGS:  Amputation changes noted at the 1st MTP joint.  Chronic nonunited fracture deformity at the 2nd metatarsal.  Similar chronic fracture deformity with erosion changes of the 3rd proximal phalanx.  Similar midfoot arthritic findings present including subcortical cystic change.  Early Charcot joint not excluded.  Pes planus.  No focal soft tissue abnormality.    Impression  As above      Electronically signed by: Tristen Vincent MD  Date:    05/29/2023  Time:    10:42       Results for orders placed during the hospital encounter of 08/08/24    X-Ray Foot Complete Right    Narrative  EXAMINATION:  XR FOOT COMPLETE 3 VIEW RIGHT    CLINICAL HISTORY:  . Type 2 diabetes mellitus with foot ulcer    TECHNIQUE:  AP, lateral, and oblique views of the foot were performed.    COMPARISON:  07/14/2024    FINDINGS:  There is bandaging material surrounding the toes with what appears to represent disruption of the cortex of the distal phalanx of the 2nd digit and possibly the 3rd digit as well.  Bandaging material makes it difficult to evaluate bony detail.  Osteomyelitis is suspected.  Hammertoe deformities of the 2nd and 3rd digits also noted and possibly the 4th digit as well.  A plantar calcaneal enthesophyte is present.  Mild-to-moderate degenerative changes noted in the region of the midfoot.    Impression  As above      Electronically signed by: Jacob Man DO  Date:    08/08/2024  Time:    14:39          Assessment:     1. Diabetic ulcer of toe of right foot associated with type 2 diabetes mellitus, with fat layer exposed    2. H/O amputation of lesser toe, left    3. History of amputation of left great toe    4. Skin ulcer of third toe, left, with fat layer exposed    5. Hammer toe of left foot          Plan:     Diabetic ulcer of toe of right foot associated with type 2 diabetes mellitus, with fat layer exposed    H/O amputation of lesser toe,  left    History of amputation of left great toe    Skin ulcer of third toe, left, with fat layer exposed    Hammer toe of left foot        The wound was surgically debrided after adequate prep with alcohol and/or betadine paint. Excisional wound debridement was performed using sharp #10/#15 blade/rounded scalpel and tissue nipper, with removal of all non-viable skin and soft tissues; necrotic skin/tissue formation. The woundbase/wound bed was also debrided to encourage bleeding as to promote/stimulate healing. Debridement was excisional and included epidermal, dermal and subcutaneous tissues. Post debridement measurements are as above. Hemostasis was achieved. Patient tolerated procedure well and without complications. Local woundcare with saline moist collagen dressings and bandage thereafter.     Saline moist collagen applied to the wound bed.  Continue to change every 1-2 days.    Will plan to see patient back in 1-2 week and perform flexor tenotomy of the left 3rd toe.    This patient does have hammertoe (digital) contractures. I did advise the patient to ambulate with shoe gear that is high in the tox box to allow for extra room and depth in the sagittal plane, in order to alleviate and lessen the potential for dorsal digital break down at the IPJs. I do also recommended shoe gear that is soft and supple in the foot bed as to lessen the potential for plantar distal digital break down at the contracted digits. If the patient does not feel the aforementioned is necessary, he or she may also purchase OTC padding devices to be worn across the MTPJ, at the distal aspects of the digits, and/or at the dorsal aspects of the IPJs. The patient does acknowledge understanding and is said to be amenable to compliance.     Thorough discussion is had with the patient concerning the diagnosis, its etiology, and the treatment algorithm at present. Shoe inspection. Foot Education. Patient reminded of the importance of good  nutrition and blood sugar control to help prevent podiatric complications of diabetes. Patient instructed on proper foot hygeine. We discussed wearing proper and supportive shoe gear, daily foot inspections, never walking barefooted or sock footed, never putting sharp instruments to feet which can cause major complications associated with infection, ulcers, lacerations.      Future Appointments   Date Time Provider Department Center   2/5/2025  1:00 PM Shelby Stephen DPM [facility]

## 2025-01-16 RX ORDER — AMOXICILLIN AND CLAVULANATE POTASSIUM 875; 125 MG/1; MG/1
TABLET, FILM COATED ORAL
Qty: 28 TABLET | Refills: 0 | OUTPATIENT
Start: 2025-01-16

## 2025-01-16 NOTE — TELEPHONE ENCOUNTER
Pt states that she attempted to refill other medications and accidentally requested refill for Amoxicillin. Pt denies any issues at this time and states that she will f/u up with Dr. Stephen. Advised pt to contact our office if appt is needed. Pt verbalized understanding.

## 2025-02-05 ENCOUNTER — OFFICE VISIT (OUTPATIENT)
Dept: PODIATRY | Facility: CLINIC | Age: 65
End: 2025-02-05
Payer: COMMERCIAL

## 2025-02-05 DIAGNOSIS — E11.42 TYPE 2 DIABETES MELLITUS WITH PERIPHERAL NEUROPATHY: ICD-10-CM

## 2025-02-05 DIAGNOSIS — M20.42 HAMMER TOE OF LEFT FOOT: Primary | ICD-10-CM

## 2025-02-05 DIAGNOSIS — L97.522 SKIN ULCER OF THIRD TOE, LEFT, WITH FAT LAYER EXPOSED: ICD-10-CM

## 2025-02-05 PROCEDURE — 28010 INCISION OF TOE TENDON: CPT | Mod: S$GLB,,, | Performed by: PODIATRIST

## 2025-02-05 PROCEDURE — 99999 PR PBB SHADOW E&M-EST. PATIENT-LVL III: CPT | Mod: PBBFAC,,, | Performed by: PODIATRIST

## 2025-02-05 PROCEDURE — 99499 UNLISTED E&M SERVICE: CPT | Mod: S$GLB,,, | Performed by: PODIATRIST

## 2025-02-05 RX ORDER — CEPHALEXIN 500 MG/1
500 CAPSULE ORAL EVERY 6 HOURS
Qty: 20 CAPSULE | Refills: 0 | Status: SHIPPED | OUTPATIENT
Start: 2025-02-05 | End: 2025-02-10

## 2025-02-05 NOTE — PROGRESS NOTES
[facility]  Podiatric Medicine & Surgery  Clinical Operative Report    SUMMARY     Date of Procedure:     Surgeon(s) and Role: Dr. Shelby Stephen, DPM, FACFAS    Pre-Operative Diagnosis:   Hammertoe of left foot (M 20.42)      Skin ulcer of third toe, left, with fat layer exposed (L 97.522)    Post-Operative Diagnosis: Hammertoe of left foot (M 20.42)      Skin ulcer of third toe, left, with fat layer exposed (L 97.522)    Planned Procedure:  Left 3rd toe flexor tenotomy    Technical Procedures Used:   1. Left 3rd toe flexor tenotomy.    Anesthesia:  Local anesthesia    Hemostasis:  None    Estimated Blood Loss (EBL): Minimal    Drains:  None    Specimens: none    Implants: none    Complications: none      Description of the Findings of the Procedure: The patient was seen in the clinical procedure Room. The risks, benefits, complications, treatment options, and expected outcomes were discussed with the patient. The risks and potential complications of their problem and purposed treatment include but are not limited to infection, nerve injury, vascular injury, nonunion/malunion/delayed union of the surgical site, persistent pain, potential skin necrosis, deep vein thrombosis, possible pulmonary embolus, complications of the anesthetics and failure of the implant.  The patient concurred with the proposed plan, giving informed consent. The patient is aware that the procedure may be a part of a staged collection of procedures for definitive cure and/or alleviation of symptoms. The operative location(s) were marked.     A TIME-OUT is taken to identify the proper patient, procedure to be performed, and laterality.  The patient is properly positioned for ease of dissection and for any ancillary imaging.  Nursing and ancillary OR staff prepared the patient for the procedure.  Following this, a preoperative regional/local block was given to the left 3rd toe.  Next, the operative limb was rendered  sterile using betadine paint and scrub. Another TIME-OUT is taken as per protocol to identify the proper patient, correct extremity, and procedure to be performed. Once this coincided with all members of the team, the extremity was scrubbed, prepped, and draped in a sterile aseptic fashion.    Attention directed to the plantar aspect of the left 3rd toe.  An 11 blade scalpel was placed to the inferior aspect of the plantar digital sulcus.  The scalpel was advanced down to the level of the flexor digitorum tendon.  With gentle dorsiflexion of the digit, the flexor was released in full.  This did released appropriately.  No complications occurred.  The small incision was then closed with a 5-0 nylon suture.  Betadine, dry sterile dressing was placed to assist with dorsiflexion of the digit.  Capillary refill intact to the 5th digit.  Additional dry sterile dressing was placed overlying ulceration as well.    Patient tolerated the procedure without complications.  She ambulated well postoperatively following the procedure.    Condition: stable    Disposition:  Stable.-  Discharge home-ambulating in a postoperative shoe    Attestation: I performed the procedure.

## 2025-02-12 ENCOUNTER — OFFICE VISIT (OUTPATIENT)
Dept: PODIATRY | Facility: CLINIC | Age: 65
End: 2025-02-12
Payer: COMMERCIAL

## 2025-02-12 DIAGNOSIS — E11.42 TYPE 2 DIABETES MELLITUS WITH PERIPHERAL NEUROPATHY: ICD-10-CM

## 2025-02-12 DIAGNOSIS — Z48.02 VISIT FOR SUTURE REMOVAL: Primary | ICD-10-CM

## 2025-02-12 DIAGNOSIS — L97.522 SKIN ULCER OF THIRD TOE, LEFT, WITH FAT LAYER EXPOSED: ICD-10-CM

## 2025-02-12 DIAGNOSIS — M20.42 HAMMER TOE OF LEFT FOOT: ICD-10-CM

## 2025-02-12 PROCEDURE — 99024 POSTOP FOLLOW-UP VISIT: CPT | Mod: S$GLB,,, | Performed by: PODIATRIST

## 2025-02-12 PROCEDURE — 99999 PR PBB SHADOW E&M-EST. PATIENT-LVL III: CPT | Mod: PBBFAC,,, | Performed by: PODIATRIST

## 2025-02-12 PROCEDURE — 1159F MED LIST DOCD IN RCRD: CPT | Mod: CPTII,S$GLB,, | Performed by: PODIATRIST

## 2025-02-12 PROCEDURE — 1160F RVW MEDS BY RX/DR IN RCRD: CPT | Mod: CPTII,S$GLB,, | Performed by: PODIATRIST

## 2025-02-12 NOTE — PROGRESS NOTES
Subjective:       Patient ID: Suellen Campos is a 64 y.o. female.    Chief Complaint: Post-op Evaluation (C/o denies pain, pt is diabetic, little swelling at present)    HPI: Suellen Campos presents to the office today to follow-up left 3rd digit flexor tenotomy.  Has been compliant since the procedure.  Has noticed progressive improvement in the ulceration appears to be well healed    Review of patient's allergies indicates:  No Known Allergies    Past Medical History:   Diagnosis Date    Chronic pain     COVID-19     Depression     Diabetes mellitus     Diabetes mellitus, type 2     Hyperlipidemia     Hypertension     Neuropathy     Osteoarthritis     Spinal stenosis        Family History   Problem Relation Name Age of Onset    Diabetes Mellitus Mother      Hypertension Mother      Pulmonary fibrosis Father      Hypertension Sister      Diabetes Sister      Pulmonary fibrosis Brother         Social History     Socioeconomic History    Marital status:    Tobacco Use    Smoking status: Never    Smokeless tobacco: Never   Substance and Sexual Activity    Alcohol use: Never    Drug use: Never    Sexual activity: Yes     Partners: Male     Social Drivers of Health     Financial Resource Strain: Low Risk  (10/29/2021)    Overall Financial Resource Strain (CARDIA)     Difficulty of Paying Living Expenses: Not very hard   Food Insecurity: No Food Insecurity (10/29/2021)    Hunger Vital Sign     Worried About Running Out of Food in the Last Year: Never true     Ran Out of Food in the Last Year: Never true   Transportation Needs: No Transportation Needs (10/29/2021)    PRAPARE - Transportation     Lack of Transportation (Medical): No     Lack of Transportation (Non-Medical): No   Physical Activity: Inactive (10/29/2021)    Exercise Vital Sign     Days of Exercise per Week: 0 days     Minutes of Exercise per Session: 0 min   Stress: No Stress Concern Present (10/29/2021)    [employer] -  Occupational Stress Questionnaire     Feeling of Stress : Not at all   Housing Stability: Unknown (10/29/2021)    Housing Stability Vital Sign     Unable to Pay for Housing in the Last Year: No     Unstable Housing in the Last Year: No       Past Surgical History:   Procedure Laterality Date    HYSTERECTOMY      INCISION AND DRAINAGE FOOT Left 5/9/2021    Procedure: INCISION AND DRAINAGE, FOOT;  Surgeon: Shelby Stephen DPM;  Location: [facility] OR;  Service: Podiatry;  Laterality: Left;  GREAT TOE OF LEFT FOOT    SPINAL CORD STIMULATOR IMPLANT      TOE AMPUTATION Left 5/9/2021    Procedure: AMPUTATION, TOE;  Surgeon: Shelby Stephen DPM;  Location: [facility] OR;  Service: Podiatry;  Laterality: Left;  LEFT GREAT     TOE AMPUTATION Left 6/8/2023    Procedure: AMPUTATION, TOE;  Surgeon: Shelby Stephen DPM;  Location: [facility] OR;  Service: Podiatry;  Laterality: Left;    TONSILLECTOMY      TOTAL ABDOMINAL HYSTERECTOMY W/ BILATERAL SALPINGOOPHORECTOMY         Review of Systems       Objective:   There were no vitals taken for this visit.    X-Ray Foot Complete Right  Narrative: EXAMINATION:  XR FOOT COMPLETE 3 VIEW RIGHT    CLINICAL HISTORY:  . Type 2 diabetes mellitus with foot ulcer    TECHNIQUE:  AP, lateral, and oblique views of the foot were performed.    COMPARISON:  07/14/2024    FINDINGS:  There is bandaging material surrounding the toes with what appears to represent disruption of the cortex of the distal phalanx of the 2nd digit and possibly the 3rd digit as well.  Bandaging material makes it difficult to evaluate bony detail.  Osteomyelitis is suspected.  Hammertoe deformities of the 2nd and 3rd digits also noted and possibly the 4th digit as well.  A plantar calcaneal enthesophyte is present.  Mild-to-moderate degenerative changes noted in the region of the midfoot.  Impression: As above    Electronically signed by: Jacob Man DO  Date:    08/08/2024  Time:    14:39       Physical Exam   LOWER EXTREMITY  PHYSICAL EXAMINATION    Musculoskeletal:  Rectus position of the left 3rd toe.    Dermatological:  Skin is supple and moist.  Healed tenotomy site to the plantar aspect of the left 3rd toe.  Distal ulceration is appropriately healed without complications.  Continues have ulceration to the right great toe.      Imaging:       Results for orders placed during the hospital encounter of 05/29/23    X-Ray Foot Complete Left    Narrative  EXAMINATION:  XR FOOT COMPLETE 3 VIEW LEFT    CLINICAL HISTORY:  .  Pain in left foot    TECHNIQUE:  AP, lateral and oblique views of the left foot were performed.    COMPARISON:  07/14/2021    FINDINGS:  Amputation changes noted at the 1st MTP joint.  Chronic nonunited fracture deformity at the 2nd metatarsal.  Similar chronic fracture deformity with erosion changes of the 3rd proximal phalanx.  Similar midfoot arthritic findings present including subcortical cystic change.  Early Charcot joint not excluded.  Pes planus.  No focal soft tissue abnormality.    Impression  As above      Electronically signed by: Tristen Vincent MD  Date:    05/29/2023  Time:    10:42       Results for orders placed during the hospital encounter of 08/08/24    X-Ray Foot Complete Right    Narrative  EXAMINATION:  XR FOOT COMPLETE 3 VIEW RIGHT    CLINICAL HISTORY:  . Type 2 diabetes mellitus with foot ulcer    TECHNIQUE:  AP, lateral, and oblique views of the foot were performed.    COMPARISON:  07/14/2024    FINDINGS:  There is bandaging material surrounding the toes with what appears to represent disruption of the cortex of the distal phalanx of the 2nd digit and possibly the 3rd digit as well.  Bandaging material makes it difficult to evaluate bony detail.  Osteomyelitis is suspected.  Hammertoe deformities of the 2nd and 3rd digits also noted and possibly the 4th digit as well.  A plantar calcaneal enthesophyte is present.  Mild-to-moderate degenerative changes noted in the region of the  midfoot.    Impression  As above      Electronically signed by: Jacob Man DO  Date:    08/08/2024  Time:    14:39          Assessment:     1. Visit for suture removal    2. Hammer toe of left foot    3. Skin ulcer of third toe, left, with fat layer exposed    4. Type 2 diabetes mellitus with peripheral neuropathy        Plan:     Visit for suture removal    Hammer toe of left foot    Skin ulcer of third toe, left, with fat layer exposed    Type 2 diabetes mellitus with peripheral neuropathy        Thorough discussion is had with the patient today, concerning the diagnosis, its etiology, and the treatment algorithm at present.    Sutures removed today to the left 3rd toe from flexor tenotomy.  Ulceration to the distal aspect toe is appropriately healed without issues.    Okay to allow the left foot to get wet, bathe, shower.    Continue current management of right foot ulceration.  Will follow-up in proximally 3-4 weeks to evaluate right foot.  Continue with collagen dressing as previously discussed.        Future Appointments   Date Time Provider Department Center   3/5/2025  1:45 PM Shelby Stephen DPM [facility]

## 2025-03-05 ENCOUNTER — OFFICE VISIT (OUTPATIENT)
Dept: PODIATRY | Facility: CLINIC | Age: 65
End: 2025-03-05
Payer: COMMERCIAL

## 2025-03-05 DIAGNOSIS — E11.42 TYPE 2 DIABETES MELLITUS WITH PERIPHERAL NEUROPATHY: ICD-10-CM

## 2025-03-05 DIAGNOSIS — M20.31 HALLUX HAMMERTOE, RIGHT: Primary | ICD-10-CM

## 2025-03-05 DIAGNOSIS — Z89.412 HISTORY OF AMPUTATION OF LEFT GREAT TOE: ICD-10-CM

## 2025-03-05 DIAGNOSIS — Z89.422 H/O AMPUTATION OF LESSER TOE, LEFT: ICD-10-CM

## 2025-03-05 DIAGNOSIS — L97.512 DIABETIC ULCER OF TOE OF RIGHT FOOT ASSOCIATED WITH TYPE 2 DIABETES MELLITUS, WITH FAT LAYER EXPOSED: ICD-10-CM

## 2025-03-05 DIAGNOSIS — E11.621 DIABETIC ULCER OF TOE OF RIGHT FOOT ASSOCIATED WITH TYPE 2 DIABETES MELLITUS, WITH FAT LAYER EXPOSED: ICD-10-CM

## 2025-03-05 PROCEDURE — 99999 PR PBB SHADOW E&M-EST. PATIENT-LVL III: CPT | Mod: PBBFAC,,, | Performed by: PODIATRIST

## 2025-03-05 NOTE — PROGRESS NOTES
Hospitalist Progress Note-critical care note     Patient: Lanny Elliott MRN: [id number]  CSN: [id number]    YOB: 1943  Age: 81 y.o.  Sex: female    DOA: 8/24/2024 LOS:  LOS: 10 days            Chief complaint: septic joint hypokalemia , htn ,     Assessment/Plan         Active Hospital Problems    Diagnosis Date Noted    Constipation [K59.00] 08/28/2024    Septic joint (HCC) [M00.9] 08/25/2024    Temporal arteritis (HCC) [M31.6] 08/25/2024    Hypokalemia [E87.6] 08/25/2024    HTN (hypertension) [I10] 08/25/2024        Knee effusion, septic joint   washout on  8/27   ID consult already on Vanc/zosyn-change to ancef and vanc -change back vanc and zosyn-now on rocephin per id recommendation   Planning replacement on Thursday per dr. Jarrett     Positive bcx : 1/2 bottle ecoli  on  zosyn now   Wound cx ecoli will continue f/u with ID recommendation        Temporal arthritis last Actemra dose August 13  Resume prednisone 4 mg daily  Follow sed rate and CRP        Hypokalemia, replaced and will continue to monitoring -will repeat lab tomorrow      Hypertension aggravated by pain but not on regular medicines at home   Hydralazine as needed  Pain controlled bp still elevated lisinopril 20 and hctz 12.5 added yesterday, sbp 150s -will increase lisinopril to 40-check bmp tomorrow   Continue norvasc   echo done with normal pasp 37        Constipation : resolving continue colace and marilax and prune juice and increase physical activity and avoid narcotics if can     Subjective I had a wonderful night , last night RN gave me pain meds on time     TIME: E/M Time spent with patient and patient care issues: [] 31-40 mins  [x] 41-49 mins  [] 50 mins or more.      Disposition :per primary team   Review of systems:    General: No fevers or chills.  Cardiovascular: No chest pain or pressure. No palpitations.   Pulmonary: No shortness of breath.   Gastrointestinal: No nausea, vomiting.   Msk : knee pain better    Subjective:       Patient ID: Suellen Campos is a 64 y.o. female.    Chief Complaint: Diabetic Foot Ulcer (Patient  present with DFU to right hallux. Denies pain at present. )    HPI: Suellen Campos presents to the office today, with an open wound to the plantar aspect of the right great toe.  Previous ulcer to the distal aspect left has subsequently healed.  Continues to utilize collagen the right foot.     Review of patient's allergies indicates:  No Known Allergies    Past Medical History:   Diagnosis Date    Chronic pain     COVID-19     Depression     Diabetes mellitus     Diabetes mellitus, type 2     Hyperlipidemia     Hypertension     Neuropathy     Osteoarthritis     Spinal stenosis        Family History   Problem Relation Name Age of Onset    Diabetes Mellitus Mother      Hypertension Mother      Pulmonary fibrosis Father      Hypertension Sister      Diabetes Sister      Pulmonary fibrosis Brother         Social History     Socioeconomic History    Marital status:    Tobacco Use    Smoking status: Never    Smokeless tobacco: Never   Substance and Sexual Activity    Alcohol use: Never    Drug use: Never    Sexual activity: Yes     Partners: Male     Social Drivers of Health     Financial Resource Strain: Low Risk  (10/29/2021)    Overall Financial Resource Strain (CARDIA)     Difficulty of Paying Living Expenses: Not very hard   Food Insecurity: No Food Insecurity (10/29/2021)    Hunger Vital Sign     Worried About Running Out of Food in the Last Year: Never true     Ran Out of Food in the Last Year: Never true   Transportation Needs: No Transportation Needs (10/29/2021)    PRAPARE - Transportation     Lack of Transportation (Medical): No     Lack of Transportation (Non-Medical): No   Physical Activity: Inactive (10/29/2021)    Exercise Vital Sign     Days of Exercise per Week: 0 days     Minutes of Exercise per Session: 0 min   Stress: No Stress Concern Present (10/29/2021)    [employer] - Occupational Stress Questionnaire     Feeling of Stress : Not at all   Housing Stability: Unknown (10/29/2021)    Housing Stability Vital Sign     Unable to Pay for Housing in the Last Year: No     Unstable Housing in the Last Year: No       Past Surgical History:   Procedure Laterality Date    HYSTERECTOMY      INCISION AND DRAINAGE FOOT Left 5/9/2021    Procedure: INCISION AND DRAINAGE, FOOT;  Surgeon: Shelby Stephen DPM;  Location: [facility];  Service: Podiatry;  Laterality: Left;  GREAT TOE OF LEFT FOOT    SPINAL CORD STIMULATOR IMPLANT      TOE AMPUTATION Left 5/9/2021    Procedure: AMPUTATION, TOE;  Surgeon: Shelby Stephen DPM;  Location: [facility];  Service: Podiatry;  Laterality: Left;  LEFT GREAT     TOE AMPUTATION Left 6/8/2023    Procedure: AMPUTATION, TOE;  Surgeon: Shelby Stephen DPM;  Location: [facility];  Service: Podiatry;  Laterality: Left;    TONSILLECTOMY      TOTAL ABDOMINAL HYSTERECTOMY W/ BILATERAL SALPINGOOPHORECTOMY         Review of Systems       Objective:   There were no vitals taken for this visit.    X-Ray Foot Complete Right  Narrative: EXAMINATION:  XR FOOT COMPLETE 3 VIEW RIGHT    CLINICAL HISTORY:  . Type 2 diabetes mellitus with foot ulcer    TECHNIQUE:  AP, lateral, and oblique views of the foot were performed.    COMPARISON:  07/14/2024    FINDINGS:  There is bandaging material surrounding the toes with what appears to represent disruption of the cortex of the distal phalanx of the 2nd digit and possibly the 3rd digit as well.  Bandaging material makes it difficult to evaluate bony detail.  Osteomyelitis is suspected.  Hammertoe deformities of the 2nd and 3rd digits also noted and possibly the 4th digit as well.  A plantar calcaneal enthesophyte is present.  Mild-to-moderate degenerative changes noted in the region of the midfoot.  Impression: As above    Electronically signed by: Jacob Man DO  Date:    08/08/2024  Time:    14:39       Physical  Exam   LOWER EXTREMITY PHYSICAL EXAMINATION    Vascular:  The right dorsalis pedis pulse 2/4 and the right posterior tibial pulse 2/4.  The left dorsalis pedis pulse 2/4 and posterior tibial pulse on the left is 2/4.  Capillary refill is intact.  Pedal hair growth intact    Neurological:  Protective sensation is not intact to the right left foot.  Vibratory sensation is diminished.  Proprioception is intact.  Sharp/dull is reduced.    Musculoskeletal: Manual Muscle Testing is 5/5 with dorsiflexion, plantar flexion, abduction, and adduction.   There is normal range of motion in the forefoot, hindfoot, and Ankle joint.  No history of amputation right foot.  Previous of the left great toe amputation.  Hallux hammertoe to the right great    Dermatological:  Skin is supple.  Ulceration present to the plantar aspect of the great toe distally with measuring 0 6 cm by 7 cm.  Depth 0.05 cm.  Erythema.  No evidence purulence.     Imaging:       Results for orders placed during the hospital encounter of 05/29/23    X-Ray Foot Complete Left    Narrative  EXAMINATION:  XR FOOT COMPLETE 3 VIEW LEFT    CLINICAL HISTORY:  .  Pain in left foot    TECHNIQUE:  AP, lateral and oblique views of the left foot were performed.    COMPARISON:  07/14/2021    FINDINGS:  Amputation changes noted at the 1st MTP joint.  Chronic nonunited fracture deformity at the 2nd metatarsal.  Similar chronic fracture deformity with erosion changes of the 3rd proximal phalanx.  Similar midfoot arthritic findings present including subcortical cystic change.  Early Charcot joint not excluded.  Pes planus.  No focal soft tissue abnormality.    Impression  As above      Electronically signed by: Tristen Vincent MD  Date:    05/29/2023  Time:    10:42       Results for orders placed during the hospital encounter of 08/08/24    X-Ray Foot Complete Right    Narrative  EXAMINATION:  XR FOOT COMPLETE 3 VIEW RIGHT    CLINICAL HISTORY:  . Type 2 diabetes mellitus with  foot ulcer    TECHNIQUE:  AP, lateral, and oblique views of the foot were performed.    COMPARISON:  07/14/2024    FINDINGS:  There is bandaging material surrounding the toes with what appears to represent disruption of the cortex of the distal phalanx of the 2nd digit and possibly the 3rd digit as well.  Bandaging material makes it difficult to evaluate bony detail.  Osteomyelitis is suspected.  Hammertoe deformities of the 2nd and 3rd digits also noted and possibly the 4th digit as well.  A plantar calcaneal enthesophyte is present.  Mild-to-moderate degenerative changes noted in the region of the midfoot.    Impression  As above      Electronically signed by: Jacob Man DO  Date:    08/08/2024  Time:    14:39          Assessment:     1. Hallux hammertoe, right    2. Diabetic ulcer of toe of right foot associated with type 2 diabetes mellitus, with fat layer exposed    3. H/O amputation of lesser toe, left    4. History of amputation of left great toe    5. Type 2 diabetes mellitus with peripheral neuropathy            Plan:     Hallux hammertoe, right    Diabetic ulcer of toe of right foot associated with type 2 diabetes mellitus, with fat layer exposed    H/O amputation of lesser toe, left    History of amputation of left great toe    Type 2 diabetes mellitus with peripheral neuropathy          The wound was surgically debrided after adequate prep with alcohol and/or betadine paint. Excisional wound debridement was performed using sharp #10/#15 blade/rounded scalpel and tissue nipper, with removal of all non-viable skin and soft tissues; necrotic skin/tissue formation. The woundbase/wound bed was also debrided to encourage bleeding as to promote/stimulate healing. Debridement was excisional and included epidermal, dermal and subcutaneous tissues. Post debridement measurements are as above. Hemostasis was achieved. Patient tolerated procedure well and without complications. Local woundcare with saline moist  the previous study. There is no evidence of joint loosening or dislocation identified. A small suprapatellar joint effusion is identified.    IMPRESSION: Postsurgical changes. Reading Doctor: Yony Jensen Electronic Signature by: Yony Jensen MD     collagen dressings and bandage thereafter.     Saline moist collagen applied to the wound bed.  Continue to change every 1-2 days.    Will plan to see patient back in 1-2 week and perform flexor tenotomy of the left 3rd toe.    This patient does have hallux hammertoe contracture.  Discuss patient once wound is healed an MRI can confirm no underlying osteomyelitis, likely would need an interphalangeal joint arthrodesis to correct position of the toe versus partial hallux amputation.     Thorough discussion is had with the patient concerning the diagnosis, its etiology, and the treatment algorithm at present. Shoe inspection. Foot Education. Patient reminded of the importance of good nutrition and blood sugar control to help prevent podiatric complications of diabetes. Patient instructed on proper foot hygeine. We discussed wearing proper and supportive shoe gear, daily foot inspections, never walking barefooted or sock footed, never putting sharp instruments to feet which can cause major complications associated with infection, ulcers, lacerations.      No future appointments.

## 2025-04-14 ENCOUNTER — OFFICE VISIT (OUTPATIENT)
Dept: PODIATRY | Facility: CLINIC | Age: 65
End: 2025-04-14
Payer: COMMERCIAL

## 2025-04-14 DIAGNOSIS — L97.512 DIABETIC ULCER OF TOE OF RIGHT FOOT ASSOCIATED WITH TYPE 2 DIABETES MELLITUS, WITH FAT LAYER EXPOSED: ICD-10-CM

## 2025-04-14 DIAGNOSIS — E11.9 ENCOUNTER FOR COMPREHENSIVE DIABETIC FOOT EXAMINATION, TYPE 2 DIABETES MELLITUS: Primary | ICD-10-CM

## 2025-04-14 DIAGNOSIS — M20.31 HALLUX HAMMERTOE, RIGHT: ICD-10-CM

## 2025-04-14 DIAGNOSIS — Z89.422 H/O AMPUTATION OF LESSER TOE, LEFT: ICD-10-CM

## 2025-04-14 DIAGNOSIS — E11.42 TYPE 2 DIABETES MELLITUS WITH PERIPHERAL NEUROPATHY: ICD-10-CM

## 2025-04-14 DIAGNOSIS — E11.621 DIABETIC ULCER OF TOE OF RIGHT FOOT ASSOCIATED WITH TYPE 2 DIABETES MELLITUS, WITH FAT LAYER EXPOSED: ICD-10-CM

## 2025-04-14 DIAGNOSIS — Z89.412 HISTORY OF AMPUTATION OF LEFT GREAT TOE: ICD-10-CM

## 2025-04-14 PROCEDURE — 11042 DBRDMT SUBQ TIS 1ST 20SQCM/<: CPT | Mod: 79,S$GLB,, | Performed by: PODIATRIST

## 2025-04-14 PROCEDURE — 1101F PT FALLS ASSESS-DOCD LE1/YR: CPT | Mod: CPTII,S$GLB,, | Performed by: PODIATRIST

## 2025-04-14 PROCEDURE — 1159F MED LIST DOCD IN RCRD: CPT | Mod: CPTII,S$GLB,, | Performed by: PODIATRIST

## 2025-04-14 PROCEDURE — 99999 PR PBB SHADOW E&M-EST. PATIENT-LVL III: CPT | Mod: PBBFAC,,, | Performed by: PODIATRIST

## 2025-04-14 PROCEDURE — 3288F FALL RISK ASSESSMENT DOCD: CPT | Mod: CPTII,S$GLB,, | Performed by: PODIATRIST

## 2025-04-14 PROCEDURE — 99213 OFFICE O/P EST LOW 20 MIN: CPT | Mod: 24,25,S$GLB, | Performed by: PODIATRIST

## 2025-04-14 PROCEDURE — 1160F RVW MEDS BY RX/DR IN RCRD: CPT | Mod: CPTII,S$GLB,, | Performed by: PODIATRIST

## 2025-04-14 NOTE — PROGRESS NOTES
Subjective:       Patient ID: Suellen Campos is a 65 y.o. female.    Chief Complaint: Diabetic Foot Exam (DFE: C/o denies pain, pt is diabetic, pt states its been a while since seeing pcp, does have an appt on the 29th. )    HPI: Suellen Campos presents to the office today for yearly diabetic foot exam and risk evaluation.  She presents with 0/10 pain to the right foot and left foot.  She has been dealing with an open wound to the plantar aspect of the right great toe.  Previous ulcer to the distal aspect left has subsequently healed.  Continues to utilize collagen the right foot.  History of type 2 diabetes mellitus with peripheral neuropathy.  Has underwent an amputation of the left great toe and left lesser digit in the past.  No complications on left.  Continues to follow with Dr. Wheatley with next appointment on 04/29/2025.    Review of patient's allergies indicates:  No Known Allergies    Past Medical History:   Diagnosis Date    Chronic pain     COVID-19     Depression     Diabetes mellitus     Diabetes mellitus, type 2     Hyperlipidemia     Hypertension     Neuropathy     Osteoarthritis     Spinal stenosis        Family History   Problem Relation Name Age of Onset    Diabetes Mellitus Mother      Hypertension Mother      Pulmonary fibrosis Father      Hypertension Sister      Diabetes Sister      Pulmonary fibrosis Brother         Social History     Socioeconomic History    Marital status:    Tobacco Use    Smoking status: Never    Smokeless tobacco: Never   Substance and Sexual Activity    Alcohol use: Never    Drug use: Never    Sexual activity: Yes     Partners: Male     Social Drivers of Health     Financial Resource Strain: Low Risk  (10/29/2021)    Overall Financial Resource Strain (CARDIA)     Difficulty of Paying Living Expenses: Not very hard   Food Insecurity: No Food Insecurity (10/29/2021)    Hunger Vital Sign     Worried About Running Out of Food in the Last Year: Never true     Ran  Out of Food in the Last Year: Never true   Transportation Needs: No Transportation Needs (10/29/2021)    PRAPARE - Transportation     Lack of Transportation (Medical): No     Lack of Transportation (Non-Medical): No   Physical Activity: Inactive (10/29/2021)    Exercise Vital Sign     Days of Exercise per Week: 0 days     Minutes of Exercise per Session: 0 min   Stress: No Stress Concern Present (10/29/2021)    [employer] - Occupational Stress Questionnaire     Feeling of Stress : Not at all   Housing Stability: Unknown (10/29/2021)    Housing Stability Vital Sign     Unable to Pay for Housing in the Last Year: No     Unstable Housing in the Last Year: No       Past Surgical History:   Procedure Laterality Date    HYSTERECTOMY      INCISION AND DRAINAGE FOOT Left 5/9/2021    Procedure: INCISION AND DRAINAGE, FOOT;  Surgeon: Shelby Stephen DPM;  Location: [facility] OR;  Service: Podiatry;  Laterality: Left;  GREAT TOE OF LEFT FOOT    SPINAL CORD STIMULATOR IMPLANT      TOE AMPUTATION Left 5/9/2021    Procedure: AMPUTATION, TOE;  Surgeon: Shelby Stephen DPM;  Location: [facility] OR;  Service: Podiatry;  Laterality: Left;  LEFT GREAT     TOE AMPUTATION Left 6/8/2023    Procedure: AMPUTATION, TOE;  Surgeon: Shelby Stephen DPM;  Location: [facility] OR;  Service: Podiatry;  Laterality: Left;    TONSILLECTOMY      TOTAL ABDOMINAL HYSTERECTOMY W/ BILATERAL SALPINGOOPHORECTOMY         Review of Systems       Objective:   There were no vitals taken for this visit.    X-Ray Foot Complete Right  Narrative: EXAMINATION:  XR FOOT COMPLETE 3 VIEW RIGHT    CLINICAL HISTORY:  . Type 2 diabetes mellitus with foot ulcer    TECHNIQUE:  AP, lateral, and oblique views of the foot were performed.    COMPARISON:  07/14/2024    FINDINGS:  There is bandaging material surrounding the toes with what appears to represent disruption of the cortex of the distal phalanx of the 2nd digit and possibly the 3rd digit as well.   Bandaging material makes it difficult to evaluate bony detail.  Osteomyelitis is suspected.  Hammertoe deformities of the 2nd and 3rd digits also noted and possibly the 4th digit as well.  A plantar calcaneal enthesophyte is present.  Mild-to-moderate degenerative changes noted in the region of the midfoot.  Impression: As above    Electronically signed by: Jacob Man DO  Date:    08/08/2024  Time:    14:39       Physical Exam   LOWER EXTREMITY PHYSICAL EXAMINATION    Vascular:  The right dorsalis pedis pulse 2/4 and the right posterior tibial pulse 2/4.  The left dorsalis pedis pulse 2/4 and posterior tibial pulse on the left is 2/4.  Capillary refill is intact.  Pedal hair growth intact    Neurological:  Protective sensation is not intact to the right left foot.  Vibratory sensation is diminished.  Proprioception is intact.  Sharp/dull is reduced.    Musculoskeletal: Manual Muscle Testing is 5/5 with dorsiflexion, plantar flexion, abduction, and adduction.   There is normal range of motion in the forefoot, hindfoot, and Ankle joint.  No history of amputation right foot.  Previous of the left great toe amputation.  Hallux hammertoe to the right great    Dermatological:  Skin is supple.  Ulceration present to the plantar aspect of the great toe distally with measuring 0 6 cm by 7 cm.  Depth 0.05 cm.  Erythema.  No evidence purulence.     Imaging:       Results for orders placed during the hospital encounter of 05/29/23    X-Ray Foot Complete Left    Narrative  EXAMINATION:  XR FOOT COMPLETE 3 VIEW LEFT    CLINICAL HISTORY:  .  Pain in left foot    TECHNIQUE:  AP, lateral and oblique views of the left foot were performed.    COMPARISON:  07/14/2021    FINDINGS:  Amputation changes noted at the 1st MTP joint.  Chronic nonunited fracture deformity at the 2nd metatarsal.  Similar chronic fracture deformity with erosion changes of the 3rd proximal phalanx.  Similar midfoot arthritic findings present including  subcortical cystic change.  Early Charcot joint not excluded.  Pes planus.  No focal soft tissue abnormality.    Impression  As above      Electronically signed by: Tristen Vincent MD  Date:    05/29/2023  Time:    10:42       Results for orders placed during the hospital encounter of 08/08/24    X-Ray Foot Complete Right    Narrative  EXAMINATION:  XR FOOT COMPLETE 3 VIEW RIGHT    CLINICAL HISTORY:  . Type 2 diabetes mellitus with foot ulcer    TECHNIQUE:  AP, lateral, and oblique views of the foot were performed.    COMPARISON:  07/14/2024    FINDINGS:  There is bandaging material surrounding the toes with what appears to represent disruption of the cortex of the distal phalanx of the 2nd digit and possibly the 3rd digit as well.  Bandaging material makes it difficult to evaluate bony detail.  Osteomyelitis is suspected.  Hammertoe deformities of the 2nd and 3rd digits also noted and possibly the 4th digit as well.  A plantar calcaneal enthesophyte is present.  Mild-to-moderate degenerative changes noted in the region of the midfoot.    Impression  As above      Electronically signed by: Jacob Man DO  Date:    08/08/2024  Time:    14:39          Assessment:     1. Encounter for comprehensive diabetic foot examination, type 2 diabetes mellitus    2. Diabetic ulcer of toe of right foot associated with type 2 diabetes mellitus, with fat layer exposed    3. Hallux hammertoe, right    4. H/O amputation of lesser toe, left    5. History of amputation of left great toe    6. Type 2 diabetes mellitus with peripheral neuropathy              Plan:     Encounter for comprehensive diabetic foot examination, type 2 diabetes mellitus    Diabetic ulcer of toe of right foot associated with type 2 diabetes mellitus, with fat layer exposed    Hallux hammertoe, right    H/O amputation of lesser toe, left    History of amputation of left great toe    Type 2 diabetes mellitus with peripheral neuropathy        Thorough  discussion is had with the patient today, concerning the diagnosis, its etiology, and the treatment algorithm at present.    Thorough discussion is had with the patient concerning the diagnosis, its etiology, and the treatment algorithm at present. Shoe inspection. Foot Education. Patient reminded of the importance of good nutrition and blood sugar control to help prevent podiatric complications of diabetes. Patient instructed on proper foot hygeine. We discussed wearing proper and supportive shoe gear, daily foot inspections, never walking barefooted or sock footed, never putting sharp instruments to feet which can cause major complications associated with infection, ulcers, lacerations.      The wound was surgically debrided after adequate prep with alcohol and/or betadine paint. Excisional wound debridement was performed using sharp #10/#15 blade/rounded scalpel and tissue nipper, with removal of all non-viable skin and soft tissues; necrotic skin/tissue formation. The woundbase/wound bed was also debrided to encourage bleeding as to promote/stimulate healing. Debridement was excisional and included epidermal, dermal and subcutaneous tissues. Post debridement measurements are as above. Hemostasis was achieved. Patient tolerated procedure well and without complications. Local woundcare with saline moist collagen dressings and bandage thereafter.     Saline moist collagen applied to the wound bed.  Continue to change every 1-2 days.    Will plan to see patient back in 1-2 week and perform flexor tenotomy of the left 3rd toe.    This patient does have hallux hammertoe contracture.  Discuss patient once wound is healed an MRI can confirm no underlying osteomyelitis, likely would need an interphalangeal joint arthrodesis to correct position of the toe versus partial hallux amputation.  Patient does have a spinal cord stimulator which may not be compatible with the MRI technology.  In lieu of this, would consider a  bone scan if considering interphalangeal joint fusion.  In discussion about potential amputation, would likely do well as she has done well in the past with amputation.  Would not need additional testing if considering amputation.        No future appointments.

## 2025-05-14 ENCOUNTER — PATIENT MESSAGE (OUTPATIENT)
Dept: PODIATRY | Facility: CLINIC | Age: 65
End: 2025-05-14
Payer: COMMERCIAL

## 2025-05-14 RX ORDER — DOXYCYCLINE HYCLATE 100 MG
100 TABLET ORAL 2 TIMES DAILY
Qty: 20 TABLET | Refills: 0 | Status: SHIPPED | OUTPATIENT
Start: 2025-05-14 | End: 2025-05-24

## 2025-05-20 ENCOUNTER — TELEPHONE (OUTPATIENT)
Dept: PODIATRY | Facility: CLINIC | Age: 65
End: 2025-05-20
Payer: COMMERCIAL

## 2025-05-20 ENCOUNTER — OFFICE VISIT (OUTPATIENT)
Dept: PODIATRY | Facility: CLINIC | Age: 65
End: 2025-05-20
Payer: COMMERCIAL

## 2025-05-20 DIAGNOSIS — L97.516 DIABETIC ULCER OF TOE OF RIGHT FOOT ASSOCIATED WITH DIABETES MELLITUS DUE TO UNDERLYING CONDITION, WITH BONE INVOLVEMENT WITHOUT EVIDENCE OF NECROSIS: Primary | ICD-10-CM

## 2025-05-20 DIAGNOSIS — Z89.422 H/O AMPUTATION OF LESSER TOE, LEFT: ICD-10-CM

## 2025-05-20 DIAGNOSIS — Z89.412 HISTORY OF AMPUTATION OF LEFT GREAT TOE: ICD-10-CM

## 2025-05-20 DIAGNOSIS — E08.621 DIABETIC ULCER OF TOE OF RIGHT FOOT ASSOCIATED WITH DIABETES MELLITUS DUE TO UNDERLYING CONDITION, WITH BONE INVOLVEMENT WITHOUT EVIDENCE OF NECROSIS: Primary | ICD-10-CM

## 2025-05-20 DIAGNOSIS — E11.42 TYPE 2 DIABETES MELLITUS WITH PERIPHERAL NEUROPATHY: ICD-10-CM

## 2025-05-20 PROCEDURE — 87075 CULTR BACTERIA EXCEPT BLOOD: CPT | Performed by: PODIATRIST

## 2025-05-20 PROCEDURE — 3051F HG A1C>EQUAL 7.0%<8.0%: CPT | Mod: CPTII,S$GLB,, | Performed by: PODIATRIST

## 2025-05-20 PROCEDURE — 99999 PR PBB SHADOW E&M-EST. PATIENT-LVL V: CPT | Mod: PBBFAC,,, | Performed by: PODIATRIST

## 2025-05-20 PROCEDURE — 1101F PT FALLS ASSESS-DOCD LE1/YR: CPT | Mod: CPTII,S$GLB,, | Performed by: PODIATRIST

## 2025-05-20 PROCEDURE — 1159F MED LIST DOCD IN RCRD: CPT | Mod: CPTII,S$GLB,, | Performed by: PODIATRIST

## 2025-05-20 PROCEDURE — 1160F RVW MEDS BY RX/DR IN RCRD: CPT | Mod: CPTII,S$GLB,, | Performed by: PODIATRIST

## 2025-05-20 PROCEDURE — 99215 OFFICE O/P EST HI 40 MIN: CPT | Mod: S$GLB,,, | Performed by: PODIATRIST

## 2025-05-20 PROCEDURE — 87186 SC STD MICRODIL/AGAR DIL: CPT | Mod: 59 | Performed by: PODIATRIST

## 2025-05-20 PROCEDURE — 3288F FALL RISK ASSESSMENT DOCD: CPT | Mod: CPTII,S$GLB,, | Performed by: PODIATRIST

## 2025-05-20 NOTE — PROGRESS NOTES
Subjective:       Patient ID: Suellen Campos is a 65 y.o. female.    Chief Complaint: Diabetic Foot Ulcer (Right DFU: C/o pain rate 2/10 at present, pt is diabetic, pt states of infection but did take antibiotic )    HPI: Suellen Campos presents to the office today, with an open wound to the plantar aspect of the right great toe and evidence of worsening ulceration with the hallux nail elevated and bone to the distal phalanx exposed.  States 2/10 pain.  Recently started on oral doxycycline and did start the medication.  She has decided to proceed with toe amputation.  She denies fever, nausea, vomiting, shortness of breath.    Review of patient's allergies indicates:  No Known Allergies    Past Medical History:   Diagnosis Date    Chronic pain     COVID-19     Depression     Diabetes mellitus     Diabetes mellitus, type 2     Hyperlipidemia     Hypertension     Neuropathy     Osteoarthritis     Spinal stenosis        Family History   Problem Relation Name Age of Onset    Diabetes Mellitus Mother      Hypertension Mother      Pulmonary fibrosis Father      Hypertension Sister      Diabetes Sister      Pulmonary fibrosis Brother         Social History     Socioeconomic History    Marital status:    Tobacco Use    Smoking status: Never    Smokeless tobacco: Never   Substance and Sexual Activity    Alcohol use: Never    Drug use: Never    Sexual activity: Yes     Partners: Male     Social Drivers of Health     Financial Resource Strain: Low Risk  (10/29/2021)    Overall Financial Resource Strain (CARDIA)     Difficulty of Paying Living Expenses: Not very hard   Food Insecurity: No Food Insecurity (10/29/2021)    Hunger Vital Sign     Worried About Running Out of Food in the Last Year: Never true     Ran Out of Food in the Last Year: Never true   Transportation Needs: No Transportation Needs (10/29/2021)    PRAPARE - Transportation     Lack of Transportation (Medical): No     Lack of Transportation  (Non-Medical): No   Physical Activity: Inactive (10/29/2021)    Exercise Vital Sign     Days of Exercise per Week: 0 days     Minutes of Exercise per Session: 0 min   Stress: No Stress Concern Present (10/29/2021)    [employer] - Occupational Stress Questionnaire     Feeling of Stress : Not at all   Housing Stability: Unknown (10/29/2021)    Housing Stability Vital Sign     Unable to Pay for Housing in the Last Year: No     Unstable Housing in the Last Year: No       Past Surgical History:   Procedure Laterality Date    HYSTERECTOMY      INCISION AND DRAINAGE FOOT Left 5/9/2021    Procedure: INCISION AND DRAINAGE, FOOT;  Surgeon: Shelby Stephen DPM;  Location: [facility] OR;  Service: Podiatry;  Laterality: Left;  GREAT TOE OF LEFT FOOT    SPINAL CORD STIMULATOR IMPLANT      TOE AMPUTATION Left 5/9/2021    Procedure: AMPUTATION, TOE;  Surgeon: Shelby Stephen DPM;  Location: [facility] OR;  Service: Podiatry;  Laterality: Left;  LEFT GREAT     TOE AMPUTATION Left 6/8/2023    Procedure: AMPUTATION, TOE;  Surgeon: Shelby Stephen DPM;  Location: [facility] OR;  Service: Podiatry;  Laterality: Left;    TONSILLECTOMY      TOTAL ABDOMINAL HYSTERECTOMY W/ BILATERAL SALPINGOOPHORECTOMY         Review of Systems       Objective:   There were no vitals taken for this visit.    X-Ray Foot Complete Right  Narrative: EXAMINATION:  XR FOOT COMPLETE 3 VIEW RIGHT    CLINICAL HISTORY:  . Type 2 diabetes mellitus with foot ulcer    TECHNIQUE:  AP, lateral, and oblique views of the foot were performed.    COMPARISON:  07/14/2024    FINDINGS:  There is bandaging material surrounding the toes with what appears to represent disruption of the cortex of the distal phalanx of the 2nd digit and possibly the 3rd digit as well.  Bandaging material makes it difficult to evaluate bony detail.  Osteomyelitis is suspected.  Hammertoe deformities of the 2nd and 3rd digits also noted and possibly the 4th digit as well.  A plantar  calcaneal enthesophyte is present.  Mild-to-moderate degenerative changes noted in the region of the midfoot.  Impression: As above    Electronically signed by: Jacob Man DO  Date:    08/08/2024  Time:    14:39       Physical Exam   LOWER EXTREMITY PHYSICAL EXAMINATION    Vascular:  The right dorsalis pedis pulse 2/4 and the right posterior tibial pulse 2/4.  The left dorsalis pedis pulse 2/4 and posterior tibial pulse on the left is 2/4.  Capillary refill is intact.  Pedal hair growth intact    Neurological:  Protective sensation is not intact to the right left foot.  Vibratory sensation is diminished.  Proprioception is intact.  Sharp/dull is reduced.    Musculoskeletal: Manual Muscle Testing is 5/5 with dorsiflexion, plantar flexion, abduction, and adduction.   There is normal range of motion in the forefoot, hindfoot, and Ankle joint.  No history of amputation right foot.  Previous of the left great toe amputation.    Dermatological:  Ulceration present to the right great toe with the hallux nail elevated of the nail bed.  Distal phalanx exposed to the distal medial nail fold with underlying infection.  Serosanguineous drainage present.  Cellulitis of the great toe.        Imaging:       Results for orders placed during the hospital encounter of 05/29/23    X-Ray Foot Complete Left    Narrative  EXAMINATION:  XR FOOT COMPLETE 3 VIEW LEFT    CLINICAL HISTORY:  .  Pain in left foot    TECHNIQUE:  AP, lateral and oblique views of the left foot were performed.    COMPARISON:  07/14/2021    FINDINGS:  Amputation changes noted at the 1st MTP joint.  Chronic nonunited fracture deformity at the 2nd metatarsal.  Similar chronic fracture deformity with erosion changes of the 3rd proximal phalanx.  Similar midfoot arthritic findings present including subcortical cystic change.  Early Charcot joint not excluded.  Pes planus.  No focal soft tissue abnormality.    Impression  As above      Electronically signed  by: Tristen Vincent MD  Date:    05/29/2023  Time:    10:42       Results for orders placed during the hospital encounter of 08/08/24    X-Ray Foot Complete Right    Narrative  EXAMINATION:  XR FOOT COMPLETE 3 VIEW RIGHT    CLINICAL HISTORY:  . Type 2 diabetes mellitus with foot ulcer    TECHNIQUE:  AP, lateral, and oblique views of the foot were performed.    COMPARISON:  07/14/2024    FINDINGS:  There is bandaging material surrounding the toes with what appears to represent disruption of the cortex of the distal phalanx of the 2nd digit and possibly the 3rd digit as well.  Bandaging material makes it difficult to evaluate bony detail.  Osteomyelitis is suspected.  Hammertoe deformities of the 2nd and 3rd digits also noted and possibly the 4th digit as well.  A plantar calcaneal enthesophyte is present.  Mild-to-moderate degenerative changes noted in the region of the midfoot.    Impression  As above      Electronically signed by: Jacob Man DO  Date:    08/08/2024  Time:    14:39          Assessment:     1. Diabetic ulcer of toe of right foot associated with diabetes mellitus due to underlying condition, with bone involvement without evidence of necrosis    2. Type 2 diabetes mellitus with peripheral neuropathy    3. H/O amputation of lesser toe, left    4. History of amputation of left great toe        Plan:     Diabetic ulcer of toe of right foot associated with diabetes mellitus due to underlying condition, with bone involvement without evidence of necrosis  -     Case Request Operating Room: AMPUTATION, TOE  -     Ambulatory referral/consult to Pre-Admit; Future; Expected date: 05/27/2025  -     Aerobic culture (Specify Source)  -     CULTURE, ANAEROBE    Type 2 diabetes mellitus with peripheral neuropathy    H/O amputation of lesser toe, left    History of amputation of left great toe      Thorough discussion is had with the patient today, concerning the diagnosis, its etiology, and the treatment  algorithm at present.    Betadine soaked gauze applied to the right hallux.  Will leave clean and dry until day of surgery.    Given the extent of cellulitis with evidence of ulceration and bone exposed, likely has development of osteomyelitis to the distal phalanx.  Considering her history of healing complications and thorough discussions with the patient, she has decided to proceed with a 1st metatarsophalangeal joint disarticulation amputation of the great toe.    She will continue the current antibiotic therapy.  Wound culture taken today.  And will tailor antibiotics based on cultures and sensitivities    The procedure of (right great toe amputation) was thoroughly explained to the patient. Its necessity and/or purpose and the implications therein were outlined, including any pertinent advantages and/or disadvantages, and possible complications, if any. Possible complications include recurrence of pathology and/or deformity, infection (cellulitis, drainage, purulence, malodor, etc...), pain, numbness, neuritis, edema, burning, loss of function, need for further surgery, possible need for removal of any implanted hardware, soft tissue contracture and/or scarring, etc... No guarantees were given and/or implied. Post-operative expectations and weightbearing protocol is thoroughly explained the patient, who acknowledges understanding. Rx. is given for pre-operative labs and for medical clearance by patient's PMD/[facility] PAT staff.    Patient is scheduled for clinical evaluation per preoperative admission department tomorrow on 05/21/2025.  Patient is scheduled for outpatient great toe amputation on 05/22/2025 at 7:00 a.m..    Future Appointments   Date Time Provider Department Center   5/21/2025  9:30 AM [facility]IVE [facility]ZY, ON[facility]LA ON[facility] PREOPC  Medical C   5/21/2025 10:20 AM EKG, MARY CARDIO ONL SPECCPR  Medical C   5/21/2025 11:40 AM [facility] ON LAB [facility]

## 2025-05-21 ENCOUNTER — OFFICE VISIT (OUTPATIENT)
Dept: INTERNAL MEDICINE | Facility: CLINIC | Age: 65
End: 2025-05-21
Payer: COMMERCIAL

## 2025-05-21 ENCOUNTER — HOSPITAL ENCOUNTER (OUTPATIENT)
Dept: CARDIOLOGY | Facility: HOSPITAL | Age: 65
Discharge: HOME | End: 2025-05-21
Attending: ANESTHESIOLOGY
Payer: COMMERCIAL

## 2025-05-21 ENCOUNTER — ANESTHESIA EVENT (OUTPATIENT)
Dept: SURGERY | Facility: HOSPITAL | Age: 65
End: 2025-05-21
Payer: COMMERCIAL

## 2025-05-21 VITALS
SYSTOLIC BLOOD PRESSURE: 144 MMHG | OXYGEN SATURATION: 98 % | WEIGHT: 180.31 LBS | DIASTOLIC BLOOD PRESSURE: 66 MMHG | HEART RATE: 85 BPM | BODY MASS INDEX: 28.3 KG/M2 | TEMPERATURE: 98 F | HEIGHT: 67 IN

## 2025-05-21 DIAGNOSIS — I10 ESSENTIAL HYPERTENSION: ICD-10-CM

## 2025-05-21 DIAGNOSIS — E11.49 DIABETES MELLITUS TYPE 2 WITH NEUROLOGICAL MANIFESTATIONS: ICD-10-CM

## 2025-05-21 DIAGNOSIS — E78.5 HYPERLIPIDEMIA, UNSPECIFIED HYPERLIPIDEMIA TYPE: ICD-10-CM

## 2025-05-21 DIAGNOSIS — L97.516 DIABETIC ULCER OF TOE OF RIGHT FOOT ASSOCIATED WITH DIABETES MELLITUS DUE TO UNDERLYING CONDITION, WITH BONE INVOLVEMENT WITHOUT EVIDENCE OF NECROSIS: Primary | ICD-10-CM

## 2025-05-21 DIAGNOSIS — G89.4 CHRONIC PAIN SYNDROME: ICD-10-CM

## 2025-05-21 DIAGNOSIS — E08.621 DIABETIC ULCER OF TOE OF RIGHT FOOT ASSOCIATED WITH DIABETES MELLITUS DUE TO UNDERLYING CONDITION, WITH BONE INVOLVEMENT WITHOUT EVIDENCE OF NECROSIS: Primary | ICD-10-CM

## 2025-05-21 DIAGNOSIS — Z01.818 PREOP TESTING: ICD-10-CM

## 2025-05-21 DIAGNOSIS — F32.A DEPRESSION, UNSPECIFIED DEPRESSION TYPE: ICD-10-CM

## 2025-05-21 LAB
OHS QRS DURATION: 88 MS
OHS QTC CALCULATION: 420 MS

## 2025-05-21 PROCEDURE — 99999 PR PBB SHADOW E&M-EST. PATIENT-LVL IV: CPT | Mod: PBBFAC,,,

## 2025-05-21 PROCEDURE — 93005 ELECTROCARDIOGRAM TRACING: CPT

## 2025-05-21 PROCEDURE — 93010 ELECTROCARDIOGRAM REPORT: CPT | Mod: ,,, | Performed by: INTERNAL MEDICINE

## 2025-05-21 NOTE — H&P (VIEW-ONLY)
Preoperative History and Physical  [facility]                                                                   Chief Complaint: Preoperative evaluation     History of Present Illness:      Suellen Campos is a 65 y.o. female who presents to the office today for a preoperative consultation at the request of Dr. Stephen who plans on performing a AMPUTATION, TOE (Right) on May 22.     Functional Status:      The patient is able to climb a flight of stairs. The patient is able to ambulate without difficulty. The patient's functional status is affected by the surgical problem due to mobility/balance issues. The patient's functional status is not affected by shortness of breath, chest pain, dyspnea on exertion and fatigue.    MET score greater than 4    Patient Anesthesia History:      History of Malignant Hyperthermia: no  History of Pseudocholinesterase Deficiency: no  History PONV: no  History of difficult intubation: no  History of delayed emergence: no  History of high fever after anesthesia: no  Patient reports urinary retention post anesthesia (General and epidural)   Family Anesthesia History:      History of Malignant Hyperthermia: no  History of Pseudocholinesterase Deficiency: no    Past Medical History:      Past Medical History:   Diagnosis Date    Chronic pain     COVID-19     Depression     Diabetes mellitus     Diabetes mellitus, type 2     Hyperlipidemia     Hypertension     Neuropathy     Osteoarthritis     Spinal stenosis         Past Surgical History:      Past Surgical History:   Procedure Laterality Date    HYSTERECTOMY      INCISION AND DRAINAGE FOOT Left 5/9/2021    Procedure: INCISION AND DRAINAGE, FOOT;  Surgeon: Shelby Stephen DPM;  Location: [facility];  Service: Podiatry;  Laterality: Left;  GREAT TOE OF LEFT FOOT    SPINAL CORD STIMULATOR IMPLANT      TOE AMPUTATION Left 5/9/2021    Procedure: AMPUTATION, TOE;  Surgeon: Shelby Stephen DPM;   Location: [facility] OR;  Service: Podiatry;  Laterality: Left;  LEFT GREAT     TOE AMPUTATION Left 6/8/2023    Procedure: AMPUTATION, TOE;  Surgeon: Shelby Stephen DPM;  Location: [facility] OR;  Service: Podiatry;  Laterality: Left;    TONSILLECTOMY      TOTAL ABDOMINAL HYSTERECTOMY W/ BILATERAL SALPINGOOPHORECTOMY          Social History:      Social History     Socioeconomic History    Marital status:    Tobacco Use    Smoking status: Never    Smokeless tobacco: Never   Substance and Sexual Activity    Alcohol use: Never    Drug use: Never    Sexual activity: Yes     Partners: Male     Social Drivers of Health     Financial Resource Strain: Low Risk  (10/29/2021)    Overall Financial Resource Strain (CARDIA)     Difficulty of Paying Living Expenses: Not very hard   Food Insecurity: No Food Insecurity (10/29/2021)    Hunger Vital Sign     Worried About Running Out of Food in the Last Year: Never true     Ran Out of Food in the Last Year: Never true   Transportation Needs: No Transportation Needs (10/29/2021)    PRAPARE - Transportation     Lack of Transportation (Medical): No     Lack of Transportation (Non-Medical): No   Physical Activity: Inactive (10/29/2021)    Exercise Vital Sign     Days of Exercise per Week: 0 days     Minutes of Exercise per Session: 0 min   Stress: No Stress Concern Present (10/29/2021)    [employer] - Occupational Stress Questionnaire     Feeling of Stress : Not at all   Housing Stability: Unknown (10/29/2021)    Housing Stability Vital Sign     Unable to Pay for Housing in the Last Year: No     Unstable Housing in the Last Year: No        Family History:      Family History   Problem Relation Name Age of Onset    Diabetes Mellitus Mother      Hypertension Mother      Pulmonary fibrosis Father      Hypertension Sister      Diabetes Sister      Pulmonary fibrosis Brother         Allergies:      Review of patient's allergies indicates:  No Known  Allergies    Medications:      Current Outpatient Medications   Medication Sig    atorvastatin (LIPITOR) 10 MG tablet TAKE 1 TABLET BY MOUTH EVERY DAY AT NIGHT    doxycycline (VIBRA-TABS) 100 MG tablet Take 1 tablet (100 mg total) by mouth 2 (two) times daily. for 10 days    fish oil-omega-3 fatty acids 300-1,000 mg capsule Take 2 g by mouth once daily.    JARDIANCE 25 mg tablet Take 25 mg by mouth once daily.    Lactobacillus rhamnosus GG (CULTURELLE) 10 billion cell capsule Take 1 capsule by mouth once daily.    metaxalone (SKELAXIN) 800 MG tablet Take 800 mg by mouth 3 (three) times daily as needed.    metformin (GLUMETZA) 500 MG (MOD) 24 hr tablet Take 1,000 mg by mouth 2 (two) times daily with meals.     morphine (MS CONTIN) 60 MG 12 hr tablet Take 60 mg by mouth 2 (two) times daily.    multivitamin capsule Take 1 capsule by mouth once daily.    oxycodone (ROXICODONE) 15 MG Tab Take 15 mg by mouth every 8 (eight) hours as needed.     traMADoL (ULTRAM) 50 mg tablet Take 50 mg by mouth 3 (three) times daily.    valsartan-hydrochlorothiazide (DIOVAN-HCT) 320-25 mg per tablet Take 1 tablet by mouth every evening.     venlafaxine (EFFEXOR-XR) 150 MG Cp24 Take 150 mg by mouth every evening.     zinc gluconate 50 mg tablet Take 50 mg by mouth.     No current facility-administered medications for this visit.       Vitals:      Vitals:    05/21/25 0925   BP: (!) 144/66   Pulse: 85   Temp: 98.1 °F (36.7 °C)       Review of Systems:        Constitutional: Negative for fever, chills, weight loss, malaise/fatigue and diaphoresis.   HENT: Negative for hearing loss, ear pain, nosebleeds, congestion, sore throat, neck pain, tinnitus and ear discharge.    Eyes: Negative for blurred vision, double vision, photophobia, pain, discharge and redness.   Respiratory: Negative for cough, hemoptysis, sputum production, shortness of breath, wheezing and stridor.    Cardiovascular: Negative for chest pain, palpitations, orthopnea,  claudication, leg swelling and PND.   Gastrointestinal: Negative for heartburn, nausea, vomiting, abdominal pain, diarrhea, constipation, blood in stool and melena.   Genitourinary: Negative for dysuria, urgency, frequency, hematuria and flank pain.   Musculoskeletal: Negative for myalgias, and falls. + neck/back pain and + knee pain (bilateral).   Skin: Negative for itching and rash.   Neurological: Negative for dizziness, tingling, tremors, sensory change, speech change, focal weakness, seizures, loss of consciousness, weakness and headaches. + neuropathy to feet   Endo/Heme/Allergies: Negative for environmental allergies and polydipsia. Does not bruise/bleed easily.   Psychiatric/Behavioral: Negative for depression, suicidal ideas, hallucinations, memory loss and substance abuse. The patient is not nervous/anxious and does not have insomnia.    All 14 systems reviewed and negative except as noted above.    Physical Exam:      Constitutional: Appears well-developed, well-nourished and in no acute distress.  Patient is oriented to person, place, and time.   Head: Normocephalic and atraumatic. Mucous membranes moist.  Neck: Neck supple no mass.   Cardiovascular: Normal rate and regular rhythm.  S1 S2 appreciated by ascultation.  Pulmonary/Chest: Effort normal and clear to auscultation bilaterally. No respiratory distress.   Abdomen: Soft. Non-tender and non-distended. Bowel sounds are normal.   Neurological: Patient is alert and oriented to person, place and time. Moves all extremities.  Skin: Warm and dry. No lesions.  Extremities: No clubbing, cyanosis or edema. Bandage to R foot     Laboratory data:      Reviewed and noted in plan where applicable. Please see chart for full laboratory data.    Lab Results   Component Value Date    WBC 10.62 07/14/2024    HGB 11.3 (L) 07/14/2024    HCT 34.6 (L) 07/14/2024    MCV 86 07/14/2024     07/14/2024     HbA1c of 7.2 on 4/21/2025    COMPREHENSIVE METABOLIC  PANEL  Order: [id number]   Ref Range & Units 1 mo ago   Glucose 65 - 99 mg/dL 125 High    Comment:             Fasting reference interval    For someone without known diabetes, a glucose value  between 100 and 125 mg/dL is consistent with  prediabetes and should be confirmed with a  follow-up test.   BUN 7 - 25 mg/dL 20   Creatinine 0.50 - 1.05 mg/dL 0.81   eGFR > OR = 60 mL/min/1.73m2 81   BUN/Creatinine Ratio 6 - 22 (calc) SEE NOTE:   Comment:    Not Reported: BUN and Creatinine are within     reference range.         Sodium 135 - 146 mmol/L 140   Potassium 3.5 - 5.3 mmol/L 4.1   Chloride 98 - 110 mmol/L 97 Low    Carbon Dioxide 20 - 32 mmol/L 37 High    Calcium 8.6 - 10.4 mg/dL 9.7   Total Protein 6.1 - 8.1 g/dL 7.8   Albumin 3.6 - 5.1 g/dL 4.3   Globulin 1.9 - 3.7 g/dL (calc) 3.5   A/G Ratio 1.0 - 2.5 (calc) 1.2   Bilirubin, Total 0.2 - 1.2 mg/dL 0.4   Alkaline Phosphatase 37 - 153 U/L 93   AST 10 - 35 U/L 14   ALT 6 - 29 U/L 13   Resulting Agency  [facility]   Narrative  Performed by [employer]  FASTING: UNKNOWN    Specimen Collected: 04/21/25 12:05    Performed by: [employer] Last Resulted: 04/22/25 17:41   Received From: [facility]  Result Received: 05/20/25 14:20     CBC and CMP obtained on 5/21/2025 with results pending       Predictors of intubation difficulty:       Morbid obesity? no   Anatomically abnormal facies? no   Prominent incisors? no   Receding mandible? no   Short, thick neck? no   Neck range of motion: normal   Dentition: Chipped teeth present (R front tooth-small chip)  Based on the Modified Mallampati, patient is a mallampati score: II (hard and soft palate, upper portion of tonsils anduvula visible)    Cardiographics:      ECG: normal sinus rhythm  Echocardiogram: not indicated    Imaging:      Chest x-ray on 4/19/2025: No effusion infiltrate or CHF is seen.  The heart is not enlarged.  Granulomas suspected right lower lobe.  Neurostimulator wires project over the midthoracic spine.   Degenerative changes seen in the thoracic spine with slight curvature midthoracic spine to the right and lower thoracic spine to the left.  There are degenerative changes in the spine.     No pneumothorax is seen.     Assessment and Plan:      1. Diabetic ulcer of toe of right foot associated with diabetes mellitus due to underlying condition, with bone involvement without evidence of necrosis  Assessment & Plan:  AMPUTATION, TOE (Right) planned per Dr. Stephen on 5/22/2025    Known risk factors for perioperative complications: Diabetes mellitus    Difficulty with intubation is not anticipated.    Cardiac Risk Estimation: Based on the Revised Cardiac Risk index, patient has RCRI score of 0 with a 3.9 % risk of a major cardiac event in a low risk procedure.    1.) Preoperative workup as follows: ECG, hemoglobin, hematocrit, electrolytes, creatinine, glucose, liver function studies.  2.) Change in medication regimen before surgery: discontinue ASA 6 days before surgery, discontinue NSAIDs 5 days before surgery, hold Metformin 24 hours prior to surgery- last dose taken on morning of 5/21/2025. Hold all vitamins/supplements for 7 days prior to surgery, with the exception of Potassium and Iron supplementation. Hold semaglutide/tirzepatide for 7 days prior to surgery. Continue Doxycycline as prescribed by primary surgeon. Patient is to take Levorphanol morning of surgery per Anesthesia.   3.) Prophylaxis for cardiac events with perioperative beta-blockers: not indicated.  4.) Invasive hemodynamic monitoring perioperatively: at the discretion of anesthesiologist.  5.) Deep vein thrombosis prophylaxis postoperatively: regimen to be chosen by surgical team.  6.) Surveillance for postoperative MI with ECG immediately postoperatively and on postoperati ve days 1 and 2 AND troponin levels 24 hours postoperatively and on day 4 or hospital discharge (whichever comes first): at the discretion of anesthesiologist.  7.) Current  medications which may produce withdrawal symptoms if withheld perioperatively: MS Contin, Roxicodone, and Tramadol  8.) Other measures: Careful attention to perioperative glycemic control (POCT glucose monitoring).  Postoperative hypertension management with IV hydralazine until able to take oral medications.  Postoperative incentive spirometry to prevent pneumonia.      Orders:  -     Ambulatory referral/consult to Pre-Admit    2. Hyperlipidemia, unspecified hyperlipidemia type  Assessment & Plan:  - continue atorvastatin nightly       3. Essential hypertension  Assessment & Plan:  -moderate control - /66 upon assessment in PAT  -Pt to hold Valsartan/HCTZ on evening prior to surgery       4. Depression, unspecified depression type  Assessment & Plan:  - continue Effexor nightly       5. Chronic pain syndrome  Assessment & Plan:  - spinal stenosis, OA and chronic neck/back pain   - followed by pain mgt, Dr. Shaikh  - s/p spinal cord stimulator   - pt currently taking Levorphanol 4 mg TID and Roxicodone 15 mg every 8 hours as needed   - Tramadol 50 mg every 8 hours   -medications discussed with Anesthesia and patient is to take Levorphanol on morning of surgery -patient notified of update via phone  -careful attention to positioning during procedure     6. Diabetes Mellitus type 2 with neurological manifestations    -HbA1c 7.2 on 4/21/2025  -Metformin and Jardiance prescribed- last dose taken on morning of 5/21/2025- patient advised to hold medication 24 hours prior to procedure              Electronically signed by Daksha Bobo NP on 5/21/2025 at 9:38 AM.

## 2025-05-21 NOTE — ANESTHESIA PREPROCEDURE EVALUATION
05/21/2025  Suellen Campos is a 65 y.o., female.    Past Medical History:   Diagnosis Date    Chronic pain     COVID-19     Depression     Diabetes mellitus     Diabetes mellitus, type 2     Hyperlipidemia     Hypertension     Neuropathy     Osteoarthritis     Spinal stenosis      Past Surgical History:   Procedure Laterality Date    HYSTERECTOMY      INCISION AND DRAINAGE FOOT Left 5/9/2021    Procedure: INCISION AND DRAINAGE, FOOT;  Surgeon: Shelby Stephen DPM;  Location: [facility] OR;  Service: Podiatry;  Laterality: Left;  GREAT TOE OF LEFT FOOT    SPINAL CORD STIMULATOR IMPLANT      TOE AMPUTATION Left 5/9/2021    Procedure: AMPUTATION, TOE;  Surgeon: Shelby Stephen DPM;  Location: [facility] OR;  Service: Podiatry;  Laterality: Left;  LEFT GREAT     TOE AMPUTATION Left 6/8/2023    Procedure: AMPUTATION, TOE;  Surgeon: Shelby Stephen DPM;  Location: [facility] OR;  Service: Podiatry;  Laterality: Left;    TONSILLECTOMY      TOTAL ABDOMINAL HYSTERECTOMY W/ BILATERAL SALPINGOOPHORECTOMY         Pre-op Assessment    I have reviewed the Patient Summary Reports.    I have reviewed the NPO Status.   I have reviewed the Medications.     Review of Systems  Anesthesia Hx:    Urinary retention after previous general anesthetics; denies any  problems with MAC anesthesia  History of prior surgery of interest to airway management or planning:  Previous anesthesia: MAC         Personal Hx of Anesthesia complications                    Social:  Non-Smoker       Hematology/Oncology:       -- Anemia:                                  Cardiovascular:     Hypertension           hyperlipidemia   ECG has been reviewed. Normal sinus rhythm   Normal ECG   When compared with ECG of 14-Jul-2024 07:00,   No significant change was found   Confirmed by Jayme Albert ([id number]) on 5/21/2025 12:14:10 PM                               Pulmonary:  Pneumonia                      Renal/:  Chronic Renal Disease   SANTOSH             Hepatic/GI:  Hepatic/GI Normal                    Musculoskeletal:  Arthritis    Diabetic ulcer of toe  Chronic pain                Neurological:           Spinal stenosis      Peripheral Neuropathy                          Endocrine:  Diabetes, type 2   Bmi 28        Psych:    depression                Physical Exam  General: Well nourished, Cooperative, Alert and Oriented    Airway:  Mallampati: II   Mouth Opening: Normal  TM Distance: Normal  Tongue: Normal  Neck ROM: Normal ROM    Dental:  Intact  Patient denies any currently loose teeth; Patient denies any removable dental appliances        Anesthesia Plan  Type of Anesthesia, risks & benefits discussed:    Anesthesia Type: MAC  Intra-op Monitoring Plan: Standard ASA Monitors  Post Op Pain Control Plan: multimodal analgesia and IV/PO Opioids PRN  Induction:  IV  Informed Consent: Informed consent signed with the Patient and all parties understand the risks and agree with anesthesia plan.  All questions answered.   ASA Score: 3  Day of Surgery Review of History & Physical: H&P Update referred to the surgeon/provider.    Ready For Surgery From Anesthesia Perspective.     .      Chemistry        Component Value Date/Time     (L) 07/14/2024 0823    K 3.9 07/14/2024 0823    CL 97 07/14/2024 0823    CO2 27 07/14/2024 0823    BUN 40 (H) 07/14/2024 0823    CREATININE 1.0 07/14/2024 0823     (H) 07/14/2024 0823        Component Value Date/Time    CALCIUM 9.7 07/14/2024 0823    ALKPHOS 84 07/14/2024 0823    AST 16 07/14/2024 0823    ALT 17 07/14/2024 0823    BILITOT 0.5 07/14/2024 0823    ESTGFRAFRICA >60 10/30/2021 0731    EGFRNONAA >60 10/30/2021 0731        Lab Results   Component Value Date    WBC 10.62 07/14/2024    HGB 11.3 (L) 07/14/2024    HCT 34.6 (L) 07/14/2024    MCV 86 07/14/2024     07/14/2024       No results found for this or any previous  visit.

## 2025-05-21 NOTE — ASSESSMENT & PLAN NOTE
AMPUTATION, TOE (Right) planned per Dr. Stephen on 5/22/2025    Known risk factors for perioperative complications: Diabetes mellitus    Difficulty with intubation is not anticipated.    Cardiac Risk Estimation: Based on the Revised Cardiac Risk index, patient has RCRI score of 0 with a 3.9 % risk of a major cardiac event in a low risk procedure.    1.) Preoperative workup as follows: ECG, hemoglobin, hematocrit, electrolytes, creatinine, glucose, liver function studies.  2.) Change in medication regimen before surgery: discontinue ASA 6 days before surgery, discontinue NSAIDs 5 days before surgery, hold Metformin 24 hours prior to surgery- last dose taken on morning of 5/21/2025. Hold all vitamins/supplements for 7 days prior to surgery, with the exception of Potassium and Iron supplementation. Hold semaglutide/tirzepatide for 7 days prior to surgery. Continue Doxycycline as prescribed by primary surgeon. Patient is to take Levorphanol morning of surgery per Anesthesia.   3.) Prophylaxis for cardiac events with perioperative beta-blockers: not indicated.  4.) Invasive hemodynamic monitoring perioperatively: at the discretion of anesthesiologist.  5.) Deep vein thrombosis prophylaxis postoperatively: regimen to be chosen by surgical team.  6.) Surveillance for postoperative MI with ECG immediately postoperatively and on postoperati ve days 1 and 2 AND troponin levels 24 hours postoperatively and on day 4 or hospital discharge (whichever comes first): at the discretion of anesthesiologist.  7.) Current medications which may produce withdrawal symptoms if withheld perioperatively: MS Contin, Roxicodone, and Tramadol  8.) Other measures: Careful attention to perioperative glycemic control (POCT glucose monitoring).  Postoperative hypertension management with IV hydralazine until able to take oral medications.  Postoperative incentive spirometry to prevent pneumonia.

## 2025-05-21 NOTE — ASSESSMENT & PLAN NOTE
-moderate control - /66 upon assessment in PAT  -Pt to hold Valsartan/HCTZ on evening prior to surgery

## 2025-05-21 NOTE — PROGRESS NOTES
Preoperative History and Physical  [facility]                                                                   Chief Complaint: Preoperative evaluation     History of Present Illness:      Suellen Campos is a 65 y.o. female who presents to the office today for a preoperative consultation at the request of Dr. Stephen who plans on performing a AMPUTATION, TOE (Right) on May 22.     Functional Status:      The patient is able to climb a flight of stairs. The patient is able to ambulate without difficulty. The patient's functional status is affected by the surgical problem due to mobility/balance issues. The patient's functional status is not affected by shortness of breath, chest pain, dyspnea on exertion and fatigue.    MET score greater than 4    Patient Anesthesia History:      History of Malignant Hyperthermia: no  History of Pseudocholinesterase Deficiency: no  History PONV: no  History of difficult intubation: no  History of delayed emergence: no  History of high fever after anesthesia: no  Patient reports urinary retention post anesthesia (General and epidural)   Family Anesthesia History:      History of Malignant Hyperthermia: no  History of Pseudocholinesterase Deficiency: no    Past Medical History:      Past Medical History:   Diagnosis Date    Chronic pain     COVID-19     Depression     Diabetes mellitus     Diabetes mellitus, type 2     Hyperlipidemia     Hypertension     Neuropathy     Osteoarthritis     Spinal stenosis         Past Surgical History:      Past Surgical History:   Procedure Laterality Date    HYSTERECTOMY      INCISION AND DRAINAGE FOOT Left 5/9/2021    Procedure: INCISION AND DRAINAGE, FOOT;  Surgeon: Shelby Stephen DPM;  Location: [facility];  Service: Podiatry;  Laterality: Left;  GREAT TOE OF LEFT FOOT    SPINAL CORD STIMULATOR IMPLANT      TOE AMPUTATION Left 5/9/2021    Procedure: AMPUTATION, TOE;  Surgeon: Shelby Stephen DPM;   Location: [facility] OR;  Service: Podiatry;  Laterality: Left;  LEFT GREAT     TOE AMPUTATION Left 6/8/2023    Procedure: AMPUTATION, TOE;  Surgeon: Shelby Stephen DPM;  Location: [facility] OR;  Service: Podiatry;  Laterality: Left;    TONSILLECTOMY      TOTAL ABDOMINAL HYSTERECTOMY W/ BILATERAL SALPINGOOPHORECTOMY          Social History:      Social History     Socioeconomic History    Marital status:    Tobacco Use    Smoking status: Never    Smokeless tobacco: Never   Substance and Sexual Activity    Alcohol use: Never    Drug use: Never    Sexual activity: Yes     Partners: Male     Social Drivers of Health     Financial Resource Strain: Low Risk  (10/29/2021)    Overall Financial Resource Strain (CARDIA)     Difficulty of Paying Living Expenses: Not very hard   Food Insecurity: No Food Insecurity (10/29/2021)    Hunger Vital Sign     Worried About Running Out of Food in the Last Year: Never true     Ran Out of Food in the Last Year: Never true   Transportation Needs: No Transportation Needs (10/29/2021)    PRAPARE - Transportation     Lack of Transportation (Medical): No     Lack of Transportation (Non-Medical): No   Physical Activity: Inactive (10/29/2021)    Exercise Vital Sign     Days of Exercise per Week: 0 days     Minutes of Exercise per Session: 0 min   Stress: No Stress Concern Present (10/29/2021)    [employer] [place] [employer] - Occupational Stress Questionnaire     Feeling of Stress : Not at all   Housing Stability: Unknown (10/29/2021)    Housing Stability Vital Sign     Unable to Pay for Housing in the Last Year: No     Unstable Housing in the Last Year: No        Family History:      Family History   Problem Relation Name Age of Onset    Diabetes Mellitus Mother      Hypertension Mother      Pulmonary fibrosis Father      Hypertension Sister      Diabetes Sister      Pulmonary fibrosis Brother         Allergies:      Review of patient's allergies indicates:  No Known  Allergies    Medications:      Current Outpatient Medications   Medication Sig    atorvastatin (LIPITOR) 10 MG tablet TAKE 1 TABLET BY MOUTH EVERY DAY AT NIGHT    doxycycline (VIBRA-TABS) 100 MG tablet Take 1 tablet (100 mg total) by mouth 2 (two) times daily. for 10 days    fish oil-omega-3 fatty acids 300-1,000 mg capsule Take 2 g by mouth once daily.    JARDIANCE 25 mg tablet Take 25 mg by mouth once daily.    Lactobacillus rhamnosus GG (CULTURELLE) 10 billion cell capsule Take 1 capsule by mouth once daily.    metaxalone (SKELAXIN) 800 MG tablet Take 800 mg by mouth 3 (three) times daily as needed.    metformin (GLUMETZA) 500 MG (MOD) 24 hr tablet Take 1,000 mg by mouth 2 (two) times daily with meals.     morphine (MS CONTIN) 60 MG 12 hr tablet Take 60 mg by mouth 2 (two) times daily.    multivitamin capsule Take 1 capsule by mouth once daily.    oxycodone (ROXICODONE) 15 MG Tab Take 15 mg by mouth every 8 (eight) hours as needed.     traMADoL (ULTRAM) 50 mg tablet Take 50 mg by mouth 3 (three) times daily.    valsartan-hydrochlorothiazide (DIOVAN-HCT) 320-25 mg per tablet Take 1 tablet by mouth every evening.     venlafaxine (EFFEXOR-XR) 150 MG Cp24 Take 150 mg by mouth every evening.     zinc gluconate 50 mg tablet Take 50 mg by mouth.     No current facility-administered medications for this visit.       Vitals:      Vitals:    05/21/25 0925   BP: (!) 144/66   Pulse: 85   Temp: 98.1 °F (36.7 °C)       Review of Systems:        Constitutional: Negative for fever, chills, weight loss, malaise/fatigue and diaphoresis.   HENT: Negative for hearing loss, ear pain, nosebleeds, congestion, sore throat, neck pain, tinnitus and ear discharge.    Eyes: Negative for blurred vision, double vision, photophobia, pain, discharge and redness.   Respiratory: Negative for cough, hemoptysis, sputum production, shortness of breath, wheezing and stridor.    Cardiovascular: Negative for chest pain, palpitations, orthopnea,  claudication, leg swelling and PND.   Gastrointestinal: Negative for heartburn, nausea, vomiting, abdominal pain, diarrhea, constipation, blood in stool and melena.   Genitourinary: Negative for dysuria, urgency, frequency, hematuria and flank pain.   Musculoskeletal: Negative for myalgias, and falls. + neck/back pain and + knee pain (bilateral).   Skin: Negative for itching and rash.   Neurological: Negative for dizziness, tingling, tremors, sensory change, speech change, focal weakness, seizures, loss of consciousness, weakness and headaches. + neuropathy to feet   Endo/Heme/Allergies: Negative for environmental allergies and polydipsia. Does not bruise/bleed easily.   Psychiatric/Behavioral: Negative for depression, suicidal ideas, hallucinations, memory loss and substance abuse. The patient is not nervous/anxious and does not have insomnia.    All 14 systems reviewed and negative except as noted above.    Physical Exam:      Constitutional: Appears well-developed, well-nourished and in no acute distress.  Patient is oriented to person, place, and time.   Head: Normocephalic and atraumatic. Mucous membranes moist.  Neck: Neck supple no mass.   Cardiovascular: Normal rate and regular rhythm.  S1 S2 appreciated by ascultation.  Pulmonary/Chest: Effort normal and clear to auscultation bilaterally. No respiratory distress.   Abdomen: Soft. Non-tender and non-distended. Bowel sounds are normal.   Neurological: Patient is alert and oriented to person, place and time. Moves all extremities.  Skin: Warm and dry. No lesions.  Extremities: No clubbing, cyanosis or edema. Bandage to R foot     Laboratory data:      Reviewed and noted in plan where applicable. Please see chart for full laboratory data.    Lab Results   Component Value Date    WBC 10.62 07/14/2024    HGB 11.3 (L) 07/14/2024    HCT 34.6 (L) 07/14/2024    MCV 86 07/14/2024     07/14/2024     HbA1c of 7.2 on 4/21/2025    COMPREHENSIVE METABOLIC  PANEL  Order: [id number]   Ref Range & Units 1 mo ago   Glucose 65 - 99 mg/dL 125 High    Comment:             Fasting reference interval    For someone without known diabetes, a glucose value  between 100 and 125 mg/dL is consistent with  prediabetes and should be confirmed with a  follow-up test.   BUN 7 - 25 mg/dL 20   Creatinine 0.50 - 1.05 mg/dL 0.81   eGFR > OR = 60 mL/min/1.73m2 81   BUN/Creatinine Ratio 6 - 22 (calc) SEE NOTE:   Comment:    Not Reported: BUN and Creatinine are within     reference range.         Sodium 135 - 146 mmol/L 140   Potassium 3.5 - 5.3 mmol/L 4.1   Chloride 98 - 110 mmol/L 97 Low    Carbon Dioxide 20 - 32 mmol/L 37 High    Calcium 8.6 - 10.4 mg/dL 9.7   Total Protein 6.1 - 8.1 g/dL 7.8   Albumin 3.6 - 5.1 g/dL 4.3   Globulin 1.9 - 3.7 g/dL (calc) 3.5   A/G Ratio 1.0 - 2.5 (calc) 1.2   Bilirubin, Total 0.2 - 1.2 mg/dL 0.4   Alkaline Phosphatase 37 - 153 U/L 93   AST 10 - 35 U/L 14   ALT 6 - 29 U/L 13   Resulting Agency  [facility]   Narrative  Performed by [employer]  FASTING: UNKNOWN    Specimen Collected: 04/21/25 12:05    Performed by: [employer] Last Resulted: 04/22/25 17:41   Received From: [facility]  Result Received: 05/20/25 14:20     CBC and CMP obtained on 5/21/2025 with results pending       Predictors of intubation difficulty:       Morbid obesity? no   Anatomically abnormal facies? no   Prominent incisors? no   Receding mandible? no   Short, thick neck? no   Neck range of motion: normal   Dentition: Chipped teeth present (R front tooth-small chip)  Based on the Modified Mallampati, patient is a mallampati score: II (hard and soft palate, upper portion of tonsils anduvula visible)    Cardiographics:      ECG: normal sinus rhythm  Echocardiogram: not indicated    Imaging:      Chest x-ray on 4/19/2025: No effusion infiltrate or CHF is seen.  The heart is not enlarged.  Granulomas suspected right lower lobe.  Neurostimulator wires project over the midthoracic spine.   Degenerative changes seen in the thoracic spine with slight curvature midthoracic spine to the right and lower thoracic spine to the left.  There are degenerative changes in the spine.     No pneumothorax is seen.     Assessment and Plan:      1. Diabetic ulcer of toe of right foot associated with diabetes mellitus due to underlying condition, with bone involvement without evidence of necrosis  Assessment & Plan:  AMPUTATION, TOE (Right) planned per Dr. Stephen on 5/22/2025    Known risk factors for perioperative complications: Diabetes mellitus    Difficulty with intubation is not anticipated.    Cardiac Risk Estimation: Based on the Revised Cardiac Risk index, patient has RCRI score of 0 with a 3.9 % risk of a major cardiac event in a low risk procedure.    1.) Preoperative workup as follows: ECG, hemoglobin, hematocrit, electrolytes, creatinine, glucose, liver function studies.  2.) Change in medication regimen before surgery: discontinue ASA 6 days before surgery, discontinue NSAIDs 5 days before surgery, hold Metformin 24 hours prior to surgery- last dose taken on morning of 5/21/2025. Hold all vitamins/supplements for 7 days prior to surgery, with the exception of Potassium and Iron supplementation. Hold semaglutide/tirzepatide for 7 days prior to surgery. Continue Doxycycline as prescribed by primary surgeon. Patient is to take Levorphanol morning of surgery per Anesthesia.   3.) Prophylaxis for cardiac events with perioperative beta-blockers: not indicated.  4.) Invasive hemodynamic monitoring perioperatively: at the discretion of anesthesiologist.  5.) Deep vein thrombosis prophylaxis postoperatively: regimen to be chosen by surgical team.  6.) Surveillance for postoperative MI with ECG immediately postoperatively and on postoperati ve days 1 and 2 AND troponin levels 24 hours postoperatively and on day 4 or hospital discharge (whichever comes first): at the discretion of anesthesiologist.  7.) Current  medications which may produce withdrawal symptoms if withheld perioperatively: MS Contin, Roxicodone, and Tramadol  8.) Other measures: Careful attention to perioperative glycemic control (POCT glucose monitoring).  Postoperative hypertension management with IV hydralazine until able to take oral medications.  Postoperative incentive spirometry to prevent pneumonia.      Orders:  -     Ambulatory referral/consult to Pre-Admit    2. Hyperlipidemia, unspecified hyperlipidemia type  Assessment & Plan:  - continue atorvastatin nightly       3. Essential hypertension  Assessment & Plan:  -moderate control - /66 upon assessment in PAT  -Pt to hold Valsartan/HCTZ on evening prior to surgery       4. Depression, unspecified depression type  Assessment & Plan:  - continue Effexor nightly       5. Chronic pain syndrome  Assessment & Plan:  - spinal stenosis, OA and chronic neck/back pain   - followed by pain mgt, Dr. Shaikh  - s/p spinal cord stimulator   - pt currently taking Levorphanol 4 mg TID and Roxicodone 15 mg every 8 hours as needed   - Tramadol 50 mg every 8 hours   -medications discussed with Anesthesia and patient is to take Levorphanol on morning of surgery -patient notified of update via phone  -careful attention to positioning during procedure     6. Diabetes Mellitus type 2 with neurological manifestations    -HbA1c 7.2 on 4/21/2025  -Metformin and Jardiance prescribed- last dose taken on morning of 5/21/2025- patient advised to hold medication 24 hours prior to procedure              Electronically signed by Daksha Bobo NP on 5/21/2025 at 9:38 AM.

## 2025-05-21 NOTE — PRE ADMISSION SCREENING
Pre op instructions reviewed with patient during Clinic Visit with Provider.    To confirm, Surgery is scheduled on 5/22/25, PLEASE ARRIVE AT 5:30AM. We will call you late afternoon if your arrival time changes.    *Please report to the [facility] (1st Floor) located off of [facility] (2nd Entrance/Building on the left, in front of the flag pole).   Address: [address] [place] [address] [place]. [address]        INSTRUCTIONS IMPORTANT!!!  DO NOT Eat, Drink, or Smoke after 12 midnight unless instructed otherwise by your Surgeon. OK to brush teeth, no gum, candy or mints!      MORNING OF SURGERY, drink small sip of water with the following medications instructed by Pre-Admit Provider:  Doxycycline     *Additional Medication Instructions: PLEASE CONTINUE TO HOLD METFORMIN TODAY, PRIOR TO SURGERY    Stop taking *ADHD Medication:  48 hrs prior to surgery, as this can affect the anesthesia used. Bariatric surgeries must HOLD 7 Days prior to surgery!    Diabetic Patients: If you take diabetic or weight loss medication, Do NOT take morning of surgery unless instructed by Doctor. Metformin to be stopped 24 hrs prior to surgery.     DO NOT take long-acting insulin the evening before surgery. Blood sugars will be checked in pre-op by Nurse.    If you take Ozempic/ Mounjaro / Wegovy / Trulicity / Semaglutide, any weight loss injections OR PHENTERMINE --->>> PLEASE LET US KNOW IMMEDIATELY, as these medications need to be stopped 7 days prior to surgery!    *Patients should HOLD all vitamins, herbal supplements, weight loss medication, aspirin products & NSAIDS 7 days prior to surgery, as these can thin the blood. Ok to take Tylenol.    ____  Avoid Alcoholic beverages 3 days prior to surgery, as it can thin the blood.  ____  NO Acrylic/fake nails or nail polish worn day of surgery (specifically hand/arm & foot surgeries).  ____  NO powder, lotions, deodorants, oils or cream on body.  ____  Remove all jewelry,  piercings, & foreign objects prior to arrival and leave at home.  ____  Remove Dentures, Hearing Aids & Contact Lens prior to surgery.  ____  Bring photo ID and insurance information to hospital (Leave Valuables at Home).  ____  If going home the same day, arrange for a ride home. You will not be able to drive for 24 hrs if Anesthesia was used.   ____  Females (ages 11-60): may need to give a urine sample the morning of surgery; please see Pre op Nurse prior to using the restroom.  ____  Wear clean, loose fitting clothing to allow for dressings/ bandages.    Bathing Instructions:    -Shower with anti-bacterial Soap (ex: Hibiclens or Dial) the night before surgery and the morning of.   -Do not use Hibiclens on your face or genitals.   -Apply clean clothes after shower.  -Do not shave your face morning of surgery   -Do not shave your body 3 days prior to surgery unless instructed otherwise by your Surgeon.    [facility] Vis[facility]/Ride Policy:  Only 2 adults allowed in pre op/recovery area during your procedure. You MUST HAVE A RIDE HOME from a responsible adult that you know and trust. Medical Transport, Uber or Lyft can ONLY be used if patient has a responsible adult to accompany them during ride home.       *Signs and symptoms of Infection Before or After Surgery:               !!!If you experience any fever, chills, nausea/ vomiting, foul odor/ excessive drainage from surgical site, flu-like symptoms, new wounds or cuts, PLEASE CALL THE SURGEON OFFICE at [phone] or SEND MESSAGE THROUGH [employer] PORTAL!!!     *If you are running late day of surgery, please call the Surgery Dept @ [phone].    *Billing question, please call  [phone]     Thank you,  -[facility] Pre Admit Dept.  [phone]   or [phone]  M-F 7:30 am-4:00 pm (Closed Major Holidays)    Additional Tests Scheduled Today:  EKG (4TH Floor) & LABS (1ST Floor) Check in at the

## 2025-05-21 NOTE — ASSESSMENT & PLAN NOTE
-HbA1c 7.2 on 4/21/2025  -Metformin and Jardiance prescribed- last dose taken on morning of 5/21/2025- patient advised to hold medication 24 hours prior to procedure

## 2025-05-21 NOTE — ASSESSMENT & PLAN NOTE
- spinal stenosis, OA and chronic neck/back pain   - followed by pain mgt, Dr. Shaikh  - s/p spinal cord stimulator   - pt currently taking Levorphanol 4 mg TID and Roxicodone 15 mg every 8 prn   - Tramadol 50 mg every 8 hours   -medications discussed with Anesthesia and patient is to take Levorphanol on morning of surgery -patient notified of update via phone  -careful attention to positioning during procedure

## 2025-05-22 ENCOUNTER — HOSPITAL ENCOUNTER (OUTPATIENT)
Facility: HOSPITAL | Age: 65
Discharge: HOME | End: 2025-05-22
Attending: PODIATRIST | Admitting: PODIATRIST
Payer: COMMERCIAL

## 2025-05-22 ENCOUNTER — ANESTHESIA (OUTPATIENT)
Dept: SURGERY | Facility: HOSPITAL | Age: 65
End: 2025-05-22
Payer: COMMERCIAL

## 2025-05-22 VITALS
WEIGHT: 180 LBS | RESPIRATION RATE: 17 BRPM | SYSTOLIC BLOOD PRESSURE: 112 MMHG | BODY MASS INDEX: 28.25 KG/M2 | TEMPERATURE: 97 F | DIASTOLIC BLOOD PRESSURE: 67 MMHG | HEART RATE: 92 BPM | OXYGEN SATURATION: 97 % | HEIGHT: 67 IN

## 2025-05-22 DIAGNOSIS — Z01.818 PREOP TESTING: Primary | ICD-10-CM

## 2025-05-22 DIAGNOSIS — E08.621 DIABETIC ULCER OF TOE OF RIGHT FOOT ASSOCIATED WITH DIABETES MELLITUS DUE TO UNDERLYING CONDITION, WITH BONE INVOLVEMENT WITHOUT EVIDENCE OF NECROSIS: ICD-10-CM

## 2025-05-22 DIAGNOSIS — L97.516 DIABETIC ULCER OF TOE OF RIGHT FOOT ASSOCIATED WITH DIABETES MELLITUS DUE TO UNDERLYING CONDITION, WITH BONE INVOLVEMENT WITHOUT EVIDENCE OF NECROSIS: ICD-10-CM

## 2025-05-22 DIAGNOSIS — L03.115 CELLULITIS OF RIGHT FOOT: ICD-10-CM

## 2025-05-22 PROBLEM — L03.116 CELLULITIS OF LEFT FOOT: Status: RESOLVED | Noted: 2021-05-09 | Resolved: 2025-05-22

## 2025-05-22 LAB — POCT GLUCOSE: 130 MG/DL (ref 70–110)

## 2025-05-22 PROCEDURE — 71000033 HC RECOVERY, INTIAL HOUR: Performed by: PODIATRIST

## 2025-05-22 PROCEDURE — 37000008 HC ANESTHESIA 1ST 15 MINUTES: Performed by: PODIATRIST

## 2025-05-22 PROCEDURE — 63600175 PHARM REV CODE 636 W HCPCS: Performed by: PODIATRIST

## 2025-05-22 PROCEDURE — 36000706: Performed by: PODIATRIST

## 2025-05-22 PROCEDURE — 36000707: Performed by: PODIATRIST

## 2025-05-22 PROCEDURE — 37000009 HC ANESTHESIA EA ADD 15 MINS: Performed by: PODIATRIST

## 2025-05-22 PROCEDURE — 71000015 HC POSTOP RECOV 1ST HR: Performed by: PODIATRIST

## 2025-05-22 PROCEDURE — 88311 DECALCIFY TISSUE: CPT | Mod: TC,59 | Performed by: PODIATRIST

## 2025-05-22 PROCEDURE — 63600175 PHARM REV CODE 636 W HCPCS

## 2025-05-22 PROCEDURE — 63600175 PHARM REV CODE 636 W HCPCS: Performed by: STUDENT IN AN ORGANIZED HEALTH CARE EDUCATION/TRAINING PROGRAM

## 2025-05-22 PROCEDURE — 28820 AMPUTATION OF TOE: CPT | Mod: T5,,, | Performed by: PODIATRIST

## 2025-05-22 RX ORDER — OXYCODONE AND ACETAMINOPHEN 5; 325 MG/1; MG/1
1 TABLET ORAL
Status: DISCONTINUED | OUTPATIENT
Start: 2025-05-22 | End: 2025-05-22 | Stop reason: HOSPADM

## 2025-05-22 RX ORDER — MIDAZOLAM HYDROCHLORIDE 1 MG/ML
INJECTION INTRAMUSCULAR; INTRAVENOUS
Status: DISCONTINUED | OUTPATIENT
Start: 2025-05-22 | End: 2025-05-22

## 2025-05-22 RX ORDER — BUPIVACAINE HYDROCHLORIDE 2.5 MG/ML
INJECTION, SOLUTION EPIDURAL; INFILTRATION; INTRACAUDAL; PERINEURAL
Status: DISCONTINUED | OUTPATIENT
Start: 2025-05-22 | End: 2025-05-22 | Stop reason: HOSPADM

## 2025-05-22 RX ORDER — HYDROMORPHONE HYDROCHLORIDE 2 MG/ML
0.2 INJECTION, SOLUTION INTRAMUSCULAR; INTRAVENOUS; SUBCUTANEOUS EVERY 5 MIN PRN
Status: DISCONTINUED | OUTPATIENT
Start: 2025-05-22 | End: 2025-05-22 | Stop reason: HOSPADM

## 2025-05-22 RX ORDER — CLINDAMYCIN PHOSPHATE 900 MG/50ML
INJECTION, SOLUTION INTRAVENOUS
Status: DISCONTINUED | OUTPATIENT
Start: 2025-05-22 | End: 2025-05-22

## 2025-05-22 RX ORDER — PHENYLEPHRINE HYDROCHLORIDE 10 MG/ML
INJECTION INTRAVENOUS
Status: DISCONTINUED | OUTPATIENT
Start: 2025-05-22 | End: 2025-05-22

## 2025-05-22 RX ORDER — GLUCAGON 1 MG
1 KIT INJECTION
Status: DISCONTINUED | OUTPATIENT
Start: 2025-05-22 | End: 2025-05-22 | Stop reason: HOSPADM

## 2025-05-22 RX ORDER — DEXAMETHASONE SODIUM PHOSPHATE 4 MG/ML
INJECTION, SOLUTION INTRA-ARTICULAR; INTRALESIONAL; INTRAMUSCULAR; INTRAVENOUS; SOFT TISSUE
Status: DISCONTINUED | OUTPATIENT
Start: 2025-05-22 | End: 2025-05-22

## 2025-05-22 RX ORDER — PROPOFOL 10 MG/ML
VIAL (ML) INTRAVENOUS
Status: DISCONTINUED | OUTPATIENT
Start: 2025-05-22 | End: 2025-05-22

## 2025-05-22 RX ORDER — AMOXICILLIN AND CLAVULANATE POTASSIUM 875; 125 MG/1; MG/1
1 TABLET, FILM COATED ORAL 2 TIMES DAILY
Qty: 14 TABLET | Refills: 0 | Status: SHIPPED | OUTPATIENT
Start: 2025-05-22 | End: 2025-05-29

## 2025-05-22 RX ORDER — ONDANSETRON HYDROCHLORIDE 2 MG/ML
4 INJECTION, SOLUTION INTRAVENOUS DAILY PRN
Status: DISCONTINUED | OUTPATIENT
Start: 2025-05-22 | End: 2025-05-22 | Stop reason: HOSPADM

## 2025-05-22 RX ORDER — CLINDAMYCIN PHOSPHATE 900 MG/50ML
900 INJECTION, SOLUTION INTRAVENOUS
Status: DISCONTINUED | OUTPATIENT
Start: 2025-05-22 | End: 2025-05-22 | Stop reason: HOSPADM

## 2025-05-22 RX ORDER — ONDANSETRON HYDROCHLORIDE 2 MG/ML
INJECTION, SOLUTION INTRAVENOUS
Status: DISCONTINUED | OUTPATIENT
Start: 2025-05-22 | End: 2025-05-22

## 2025-05-22 RX ORDER — LIDOCAINE HYDROCHLORIDE 20 MG/ML
INJECTION INTRAVENOUS
Status: DISCONTINUED | OUTPATIENT
Start: 2025-05-22 | End: 2025-05-22

## 2025-05-22 RX ORDER — SODIUM CHLORIDE, SODIUM LACTATE, POTASSIUM CHLORIDE, CALCIUM CHLORIDE 600; 310; 30; 20 MG/100ML; MG/100ML; MG/100ML; MG/100ML
INJECTION, SOLUTION INTRAVENOUS CONTINUOUS PRN
Status: DISCONTINUED | OUTPATIENT
Start: 2025-05-22 | End: 2025-05-22

## 2025-05-22 RX ADMIN — PHENYLEPHRINE HYDROCHLORIDE 100 MCG: 10 INJECTION INTRAVENOUS at 07:05

## 2025-05-22 RX ADMIN — LIDOCAINE HYDROCHLORIDE 50 MG: 20 INJECTION INTRAVENOUS at 07:05

## 2025-05-22 RX ADMIN — DEXAMETHASONE SODIUM PHOSPHATE 8 MG: 4 INJECTION, SOLUTION INTRA-ARTICULAR; INTRALESIONAL; INTRAMUSCULAR; INTRAVENOUS; SOFT TISSUE at 07:05

## 2025-05-22 RX ADMIN — SODIUM CHLORIDE, SODIUM LACTATE, POTASSIUM CHLORIDE, AND CALCIUM CHLORIDE: .6; .31; .03; .02 INJECTION, SOLUTION INTRAVENOUS at 06:05

## 2025-05-22 RX ADMIN — PROPOFOL 150 MG: 10 INJECTION, EMULSION INTRAVENOUS at 07:05

## 2025-05-22 RX ADMIN — PHENYLEPHRINE HYDROCHLORIDE 200 MCG: 10 INJECTION INTRAVENOUS at 07:05

## 2025-05-22 RX ADMIN — ONDANSETRON 4 MG: 2 INJECTION INTRAMUSCULAR; INTRAVENOUS at 07:05

## 2025-05-22 RX ADMIN — CLINDAMYCIN PHOSPHATE 900 MG: 900 INJECTION, SOLUTION INTRAVENOUS at 07:05

## 2025-05-22 RX ADMIN — ONDANSETRON 4 MG: 2 INJECTION INTRAMUSCULAR; INTRAVENOUS at 08:05

## 2025-05-22 RX ADMIN — MIDAZOLAM HYDROCHLORIDE 2 MG: 1 INJECTION, SOLUTION INTRAMUSCULAR; INTRAVENOUS at 06:05

## 2025-05-22 NOTE — BRIEF OP NOTE
[facility]  Brief Operative Note    Surgery Date: 5/22/2025     Surgeons and Role:     * Shelby Stephen DPM - Primary    Assisting Surgeon: None    Pre-op Diagnosis:  Diabetic ulcer of toe of right foot associated with diabetes mellitus due to underlying condition, with bone involvement without evidence of necrosis [E08.621, L97.516]    Post-op Diagnosis:  Post-Op Diagnosis Codes:     * Diabetic ulcer of toe of right foot associated with diabetes mellitus due to underlying condition, with bone involvement without evidence of necrosis [E08.621, L97.516]    Procedure(s) (LRB):  AMPUTATION, TOE (Right)    Anesthesia: General    Operative Findings:  Ulceration to the distal aspect of the right with the exposed distal phalanx .  No evidence of abscess.  No complications or compromise to the first metatarsal bone    Estimated Blood Loss: * No values recorded between 5/22/2025  6:59 AM and 5/22/2025  7:51 AM *         Specimens:   Specimen (24h ago, onward)       Start     Ordered    05/22/25 0719  Specimen to Pathology Podiatry  RELEASE UPON ORDERING        References:    Click here for ordering Quick Tip   Question:  Release to patient  Answer:  Immediate    05/22/25 0719                    ID Type Source Tests Collected by Time Destination   1 : Right great toe Tissue Toe, Right Foot SPECIMEN TO PATHOLOGY Shelby Stephen DPM 5/22/2025 0719            Discharge Note    OUTCOME: Patient tolerated treatment/procedure well without complication and is now ready for discharge.    DISPOSITION: Home or Self Care    FINAL DIAGNOSIS: Post-Op Diagnosis Codes:     * Diabetic ulcer of toe of right foot associated with diabetes mellitus due to underlying condition, with bone involvement without evidence of necrosis [E08.621, L97.516]    FOLLOWUP: In clinic    DISCHARGE INSTRUCTIONS:    Discharge Procedure Orders   CBC Auto Differential   Standing Status: Future Number of Occurrences: 1 Standing Exp. Date:  07/19/26     Order Specific Question Answer Comments   Send normal result to authorizing provider's In Basket if patient is active on MyChart: Yes      Comprehensive Metabolic Panel   Standing Status: Future Number of Occurrences: 1 Standing Exp. Date: 07/19/26     Keep surgical extremity elevated     Ice to affected area     Notify your health care provider if you experience any of the following:  temperature >100.4     Notify your health care provider if you experience any of the following:  persistent nausea and vomiting or diarrhea     Notify your health care provider if you experience any of the following:  severe uncontrolled pain     Notify your health care provider if you experience any of the following:  redness, tenderness, or signs of infection (pain, swelling, redness, odor or green/yellow discharge around incision site)     Notify your health care provider if you experience any of the following:  difficulty breathing or increased cough     Notify your health care provider if you experience any of the following:  severe persistent headache     Notify your health care provider if you experience any of the following:  worsening rash     Notify your health care provider if you experience any of the following:  persistent dizziness, light-headedness, or visual disturbances     Leave dressing on - Keep it clean, dry, and intact until clinic visit     EKG 12-lead   Standing Status: Future Number of Occurrences: 1 Standing Exp. Date: 05/20/26     Weight bearing restrictions (specify):   Order Comments: Ambulate in post op shoe

## 2025-05-22 NOTE — DISCHARGE SUMMARY
[facility]  Discharge Note  Short Stay    Procedure(s) (LRB):  AMPUTATION, TOE (Right)      OUTCOME: Patient tolerated treatment/procedure well without complication and is now ready for discharge.    DISPOSITION: Home or Self Care    FINAL DIAGNOSIS:  Post-Op Diagnosis Codes:     * Diabetic ulcer of toe of right foot associated with diabetes mellitus due to underlying condition, with bone involvement without evidence of necrosis [E08.621, L97.516]    FOLLOWUP: In clinic    DISCHARGE INSTRUCTIONS:    Discharge Procedure Orders   CBC Auto Differential   Standing Status: Future Number of Occurrences: 1 Standing Exp. Date: 07/19/26     Order Specific Question Answer Comments   Send normal result to authorizing provider's In Basket if patient is active on MyChart: Yes      Comprehensive Metabolic Panel   Standing Status: Future Number of Occurrences: 1 Standing Exp. Date: 07/19/26     Keep surgical extremity elevated     Ice to affected area     Notify your health care provider if you experience any of the following:  temperature >100.4     Notify your health care provider if you experience any of the following:  persistent nausea and vomiting or diarrhea     Notify your health care provider if you experience any of the following:  severe uncontrolled pain     Notify your health care provider if you experience any of the following:  redness, tenderness, or signs of infection (pain, swelling, redness, odor or green/yellow discharge around incision site)     Notify your health care provider if you experience any of the following:  difficulty breathing or increased cough     Notify your health care provider if you experience any of the following:  severe persistent headache     Notify your health care provider if you experience any of the following:  worsening rash     Notify your health care provider if you experience any of the following:  persistent dizziness, light-headedness, or visual disturbances      Leave dressing on - Keep it clean, dry, and intact until clinic visit     EKG 12-lead   Standing Status: Future Number of Occurrences: 1 Standing Exp. Date: 05/20/26     Weight bearing restrictions (specify):   Order Comments: Ambulate in post op shoe        TIME SPENT ON DISCHARGE: 10 minutes

## 2025-05-22 NOTE — OP NOTE
[facility]  Podiatric Medicine & Surgery  Operative Report    SUMMARY     Date of Procedure: 5/22/2025    Surgeon(s) and Role: Surgeons and Role:     * Shelby Stephen DPM - Primary    Pre-Operative Diagnosis: Pre-Op Diagnosis Codes:      * Diabetic ulcer of toe of right foot associated with diabetes mellitus due to underlying condition, with bone involvement without evidence of necrosis [E08.621, L97.516]    Post-Operative Diagnosis: Post-Op Diagnosis Codes:     * Diabetic ulcer of toe of right foot associated with diabetes mellitus due to underlying condition, with bone involvement without evidence of necrosis [E08.621, L97.516]    Planned Procedure: Procedure(s):  AMPUTATION, TOE    Technical Procedures Used:   1. Right great toe amputation.    Anesthesia: General    Hemostasis: none    Estimated Blood Loss (EBL): less than 50 mL    Drains: none    Specimens:   ID Type Source Tests Collected by Time Destination   1 : Right great toe Tissue Toe, Right Foot SPECIMEN TO PATHOLOGY Shelby Stephen DPM 5/22/2025 0719        Implants: * No implants in log *    Complications: * No complications entered in OR log *      Description of the Findings of the Procedure: The patient was seen in the Holding Room. The risks, benefits, complications, treatment options, and expected outcomes were discussed with the patient. The risks and potential complications of their problem and purposed treatment include but are not limited to infection, nerve injury, vascular injury, nonunion/malunion/delayed union of the surgical site, persistent pain, potential skin necrosis, deep vein thrombosis, possible pulmonary embolus, complications of the anesthetics and failure of the implant.  The patient concurred with the proposed plan, giving informed consent. The patient is aware that the procedure may be a part of a staged collection of procedures for definitive cure and/or alleviation of symptoms. The site of  surgery properly noted/marked. Preoperative intravenous antibiotics are hanging at bedside, and are currently being administered via the heparin lock. The patient was taken to Operating Suite.    Once in the operative suite, the patient is transferred onto the operative table in the supine position. A TIME-OUT is taken to identify the proper patient, procedure to be performed, and laterality.  The patient is properly positioned on the operating room table for ease of dissection and for any ancillary imaging.   Nursing and ancillary OR staff prepared the patient for the procedure. The patient is adequately sedated by the Attending Anesthesiologist and/or covering CRNA.  Next, the operative limb was rendered sterile using betadine/chlorhexidine paint and scrub. Another TIME-OUT is taken as per protocol to identify the proper patient, correct extremity, and procedure to be performed. Once this coincided with all members of the team, the extremity was scrubbed, prepped, and draped in a sterile aseptic fashion.    Attention was directed to the right great toe where there was noted to be an open ulceration to the distal aspect of the hallux with the distal phalanx exposed just inferior to the nail bed.  [place] incision was made just distal to the 1st metatarsophalangeal joint full-thickness with a 15 blade.  Dissection was then carried down to the level of the underlying osseous structures with care to preserve closure flaps.  The great toe was identified of the 1st metatarsophalangeal joint and 15 blade was utilized to disarticulate the great toe and full.  This was taken and removed past of the back table for specimen.  The areas flushed with copious amounts of normal saline.  All tendons were cut proximally.  Area was once again flushed to ensure there was no evidence of complications.  First metatarsal head was inspected to show no compromise or cartilaginous deficits.  There was no signs of abscess.  The deep  tissue was closed with a 2-0 Vicryl suture.  The subcutaneous tissue closed with a 4-0 Vicryl suture and the skin closed with a 2-0 nylon.  There was adequate perfusion to the closure site.  Foot was preoperative injected with 15 cc of 1:1 mixture of 0.25% Marcaine plain and 1% lidocaine plain.  Xeroform, fluffs, soft dressing was applied.    The anesthesia is weaned. There were no complications to this procedure. Any final necessary imaging to be performed in the PACU if it was not performed here in the OR suite.  The patient is transferred to the [facility] bed/stretcher, [facility]. The patient is transferred to the PACU.    Condition: stable    Disposition: PACU - hemodynamically stable.    Attestation: I performed the procedure.

## 2025-05-22 NOTE — ANESTHESIA PROCEDURE NOTES
Intubation    Date/Time: 5/22/2025 7:05 AM    Performed by: Abdirahman Connor CRNA  Authorized by: Yonas Galloway MD    Intubation:     Induction:  Intravenous    Intubated:  Postinduction    Mask Ventilation:  Easy mask    Attempts:  1    Attempted By:  CRNA    Difficult Airway Encountered?: No      Complications:  None    Airway Device:  Supraglottic airway/LMA    Airway Device Size:  4.0    Style/Cuff Inflation:  Cuffed (inflated to minimal occlusive pressure)    Placement Verified By:  Capnometry    Complicating Factors:  None    Findings Post-Intubation:  BS equal bilateral

## 2025-05-23 LAB
BACTERIA SPEC ANAEROBE CULT: ABNORMAL
ESTROGEN SERPL-MCNC: NORMAL PG/ML
INSULIN SERPL-ACNC: NORMAL U[IU]/ML
LAB AP CLINICAL INFORMATION: NORMAL
LAB AP GROSS DESCRIPTION: NORMAL
LAB AP PERFORMING LOCATION(S): NORMAL
LAB AP REPORT FOOTNOTES: NORMAL
T3RU NFR SERPL: NORMAL %

## 2025-05-24 LAB
BACTERIA SPEC AEROBE CULT: ABNORMAL
BACTERIA SPEC AEROBE CULT: ABNORMAL

## 2025-05-26 ENCOUNTER — OFFICE VISIT (OUTPATIENT)
Dept: PODIATRY | Facility: CLINIC | Age: 65
End: 2025-05-26
Payer: COMMERCIAL

## 2025-05-26 VITALS — WEIGHT: 179.88 LBS | HEIGHT: 67 IN | BODY MASS INDEX: 28.23 KG/M2

## 2025-05-26 DIAGNOSIS — Z89.422 H/O AMPUTATION OF LESSER TOE, LEFT: ICD-10-CM

## 2025-05-26 DIAGNOSIS — E08.621 DIABETIC ULCER OF TOE OF RIGHT FOOT ASSOCIATED WITH DIABETES MELLITUS DUE TO UNDERLYING CONDITION, WITH BONE INVOLVEMENT WITHOUT EVIDENCE OF NECROSIS: ICD-10-CM

## 2025-05-26 DIAGNOSIS — Z89.411 STATUS POST AMPUTATION OF RIGHT GREAT TOE: Primary | ICD-10-CM

## 2025-05-26 DIAGNOSIS — Z89.412 HISTORY OF AMPUTATION OF LEFT GREAT TOE: ICD-10-CM

## 2025-05-26 DIAGNOSIS — L97.516 DIABETIC ULCER OF TOE OF RIGHT FOOT ASSOCIATED WITH DIABETES MELLITUS DUE TO UNDERLYING CONDITION, WITH BONE INVOLVEMENT WITHOUT EVIDENCE OF NECROSIS: ICD-10-CM

## 2025-05-26 PROCEDURE — 99999 PR PBB SHADOW E&M-EST. PATIENT-LVL III: CPT | Mod: PBBFAC,,, | Performed by: PODIATRIST

## 2025-05-26 RX ORDER — METRONIDAZOLE 500 MG/1
500 TABLET ORAL 3 TIMES DAILY
Qty: 21 TABLET | Refills: 0 | Status: SHIPPED | OUTPATIENT
Start: 2025-05-26 | End: 2025-06-02

## 2025-05-26 RX ORDER — SULFAMETHOXAZOLE AND TRIMETHOPRIM 800; 160 MG/1; MG/1
1 TABLET ORAL 2 TIMES DAILY
Qty: 20 TABLET | Refills: 0 | Status: SHIPPED | OUTPATIENT
Start: 2025-05-26 | End: 2025-06-05

## 2025-05-26 NOTE — ANESTHESIA POSTPROCEDURE EVALUATION
Anesthesia Post Evaluation    Patient: Suellen Campos    Procedure(s) Performed: Procedure(s) (LRB):  AMPUTATION, TOE (Right)    Final Anesthesia Type: general      Patient location during evaluation: PACU  Patient participation: Yes- Able to Participate  Level of consciousness: awake and alert and oriented  Post-procedure vital signs: reviewed and stable  Pain management: adequate  Airway patency: patent  PIOTR mitigation strategies: Multimodal analgesia  PONV status at discharge: No PONV  Anesthetic complications: no      Cardiovascular status: blood pressure returned to baseline and hemodynamically stable  Respiratory status: unassisted  Hydration status: euvolemic  Follow-up not needed.              Vitals Value Taken Time   /67 05/22/25 08:35   Temp 36.3 °C (97.4 °F) 05/22/25 08:00   Pulse 94 05/22/25 08:36   Resp 15 05/22/25 08:36   SpO2 93 % 05/22/25 08:36   Vitals shown include unfiled device data.      Event Time   Out of Recovery 08:40:00         Pain/Ambrocio Score: No data recorded

## 2025-05-26 NOTE — PROGRESS NOTES
Subjective:       Patient ID: Suellen Campos is a 65 y.o. female.    Chief Complaint: Post-op Evaluation (5.22.25 Right great toe amp, pt  rates pain 0/10, pt is diabetic, pt last seen pcp 5/21/2025)    HPI: Suellen Campos presents clinic status post right great toe amputation due to acute infection and open wound with advancing cellulitis of the toe.  States having 0/10 pain currently postoperatively.  Tolerating antibiotics well based on preoperative cultures.  Presents to clinic today with  present.    Review of patient's allergies indicates:  No Known Allergies    Past Medical History:   Diagnosis Date    Chronic pain     COVID-19     Depression     Diabetes mellitus     Diabetes mellitus, type 2     Hyperlipidemia     Hypertension     Neuropathy     Osteoarthritis     Spinal stenosis        Family History   Problem Relation Name Age of Onset    Diabetes Mellitus Mother      Hypertension Mother      Pulmonary fibrosis Father      Hypertension Sister      Diabetes Sister      Pulmonary fibrosis Brother         Social History[1]    Past Surgical History:   Procedure Laterality Date    HYSTERECTOMY      INCISION AND DRAINAGE FOOT Left 5/9/2021    Procedure: INCISION AND DRAINAGE, FOOT;  Surgeon: Shelby Stephen DPM;  Location: [facility] OR;  Service: Podiatry;  Laterality: Left;  GREAT TOE OF LEFT FOOT    SPINAL CORD STIMULATOR IMPLANT      TOE AMPUTATION Left 5/9/2021    Procedure: AMPUTATION, TOE;  Surgeon: Shelby Stephen DPM;  Location: [facility] OR;  Service: Podiatry;  Laterality: Left;  LEFT GREAT     TOE AMPUTATION Left 6/8/2023    Procedure: AMPUTATION, TOE;  Surgeon: Shelby Stephen DPM;  Location: [facility] OR;  Service: Podiatry;  Laterality: Left;    TOE AMPUTATION Right 5/22/2025    Procedure: AMPUTATION, TOE;  Surgeon: Shelby Stephen DPM;  Location: [facility] OR;  Service: Podiatry;  Laterality: Right;  great toe    TONSILLECTOMY      TOTAL ABDOMINAL HYSTERECTOMY W/ BILATERAL SALPINGOOPHORECTOMY    "      Review of Systems       Objective:   Ht 5' 7" (1.702 m)   Wt 81.6 kg (179 lb 14.3 oz)   BMI 28.18 kg/m²     X-Ray Foot Complete Right  Narrative: EXAMINATION:  XR FOOT COMPLETE 3 VIEW RIGHT    CLINICAL HISTORY:  . Type 2 diabetes mellitus with foot ulcer    TECHNIQUE:  AP, lateral, and oblique views of the foot were performed.    COMPARISON:  07/14/2024    FINDINGS:  There is bandaging material surrounding the toes with what appears to represent disruption of the cortex of the distal phalanx of the 2nd digit and possibly the 3rd digit as well.  Bandaging material makes it difficult to evaluate bony detail.  Osteomyelitis is suspected.  Hammertoe deformities of the 2nd and 3rd digits also noted and possibly the 4th digit as well.  A plantar calcaneal enthesophyte is present.  Mild-to-moderate degenerative changes noted in the region of the midfoot.  Impression: As above    Electronically signed by: Jacob Man DO  Date:    08/08/2024  Time:    14:39       Physical Exam   LOWER EXTREMITY PHYSICAL EXAMINATION    Vascular:  The right dorsalis pedis pulse 2/4 and the right posterior tibial pulse 2/4.  The left dorsalis pedis pulse 2/4 and posterior tibial pulse on the left is 2/4.  Capillary refill is intact.  Pedal hair growth intact    Neurological: Sensation to light touch is absent at baseline. Proprioception is intact. Sensation to pin prick is decreased  No numbness or tingling identified.    Musculoskeletal:  Status post right great toe amputation    Dermatological:  Steri-Strips/sutures are intact. There is no signs of increased edema or erythema noted. The skin is well approximated and coapting.  No signs of postoperative infection.  Skin lines are present to the level affected lower extremity as result of decreased edema    Imaging:       Results for orders placed during the hospital encounter of 05/29/23    X-Ray Foot Complete Left    Narrative  EXAMINATION:  XR FOOT COMPLETE 3 VIEW LEFT    CLINICAL " HISTORY:  .  Pain in left foot    TECHNIQUE:  AP, lateral and oblique views of the left foot were performed.    COMPARISON:  07/14/2021    FINDINGS:  Amputation changes noted at the 1st MTP joint.  Chronic nonunited fracture deformity at the 2nd metatarsal.  Similar chronic fracture deformity with erosion changes of the 3rd proximal phalanx.  Similar midfoot arthritic findings present including subcortical cystic change.  Early Charcot joint not excluded.  Pes planus.  No focal soft tissue abnormality.    Impression  As above      Electronically signed by: Tristen Vincent MD  Date:    05/29/2023  Time:    10:42       Results for orders placed during the hospital encounter of 08/08/24    X-Ray Foot Complete Right    Narrative  EXAMINATION:  XR FOOT COMPLETE 3 VIEW RIGHT    CLINICAL HISTORY:  . Type 2 diabetes mellitus with foot ulcer    TECHNIQUE:  AP, lateral, and oblique views of the foot were performed.    COMPARISON:  07/14/2024    FINDINGS:  There is bandaging material surrounding the toes with what appears to represent disruption of the cortex of the distal phalanx of the 2nd digit and possibly the 3rd digit as well.  Bandaging material makes it difficult to evaluate bony detail.  Osteomyelitis is suspected.  Hammertoe deformities of the 2nd and 3rd digits also noted and possibly the 4th digit as well.  A plantar calcaneal enthesophyte is present.  Mild-to-moderate degenerative changes noted in the region of the midfoot.    Impression  As above      Electronically signed by: Jacob Man DO  Date:    08/08/2024  Time:    14:39          Assessment:     1. Status post amputation of right great toe    2. Diabetic ulcer of toe of right foot associated with diabetes mellitus due to underlying condition, with bone involvement without evidence of necrosis    3. H/O amputation of lesser toe, left    4. History of amputation of left great toe        Plan:     Status post amputation of right great toe    Diabetic  ulcer of toe of right foot associated with diabetes mellitus due to underlying condition, with bone involvement without evidence of necrosis    H/O amputation of lesser toe, left    History of amputation of left great toe    Other orders  -     metroNIDAZOLE (FLAGYL) 500 MG tablet; Take 1 tablet (500 mg total) by mouth 3 (three) times daily. for 7 days  Dispense: 21 tablet; Refill: 0        Thorough discussion is had with the patient today, concerning the diagnosis, its etiology, and the treatment algorithm at present.    Preoperative cultures taken and assess.  Wound cultures showing Serratia, Enterococcus, and Bacteroides species.  Initially prescribed oral Augmentin which was transition to Bactrim based on wound cultures.  Given the positive Bacteroides, we will also had additional Flagyl to cover the anaerobic species.    Plan to keep dressings in sutures in for 1 week.-she will be going out of town and will complete daily to every 2 day dressing changes.  Continue to ambulate in postoperative shoe and decrease amount of walking if able.  Encourage elevation to decrease swelling and reduce risk of dehiscence    Will plan follow-up to remove sutures in clinic    Continue to reinforce with the patient the importance of calling immediately if there is any noted complications or sudden changes to the patient's condition.  We discuss red flag risk factors of things to continue to monitor for.  Patient relates understanding as discussed    Future Appointments   Date Time Provider Department Center   6/10/2025 12:30 PM Shelby Stephen DPM [facility] [place]           [1]   Social History  Socioeconomic History    Marital status:    Tobacco Use    Smoking status: Never    Smokeless tobacco: Never   Substance and Sexual Activity    Alcohol use: Never    Drug use: Never    Sexual activity: Yes     Partners: Male     Social Drivers of Health     Financial Resource Strain: Low Risk  (10/29/2021)    Overall  Financial Resource Strain (CARDIA)     Difficulty of Paying Living Expenses: Not very hard   Food Insecurity: No Food Insecurity (10/29/2021)    Hunger Vital Sign     Worried About Running Out of Food in the Last Year: Never true     Ran Out of Food in the Last Year: Never true   Transportation Needs: No Transportation Needs (10/29/2021)    PRAPARE - Transportation     Lack of Transportation (Medical): No     Lack of Transportation (Non-Medical): No   Physical Activity: Inactive (10/29/2021)    Exercise Vital Sign     Days of Exercise per Week: 0 days     Minutes of Exercise per Session: 0 min   Stress: No Stress Concern Present (10/29/2021)    [place] [employer] - Occupational Stress Questionnaire     Feeling of Stress : Not at all   Housing Stability: Unknown (10/29/2021)    Housing Stability Vital Sign     Unable to Pay for Housing in the Last Year: No     Unstable Housing in the Last Year: No

## 2025-05-29 ENCOUNTER — PATIENT MESSAGE (OUTPATIENT)
Dept: PODIATRY | Facility: CLINIC | Age: 65
End: 2025-05-29
Payer: COMMERCIAL

## 2025-06-10 ENCOUNTER — OFFICE VISIT (OUTPATIENT)
Dept: PODIATRY | Facility: CLINIC | Age: 65
End: 2025-06-10
Payer: MEDICARE

## 2025-06-10 DIAGNOSIS — Z89.422 H/O AMPUTATION OF LESSER TOE, LEFT: ICD-10-CM

## 2025-06-10 DIAGNOSIS — Z89.412 HISTORY OF AMPUTATION OF LEFT GREAT TOE: ICD-10-CM

## 2025-06-10 DIAGNOSIS — E11.42 TYPE 2 DIABETES MELLITUS WITH PERIPHERAL NEUROPATHY: ICD-10-CM

## 2025-06-10 DIAGNOSIS — Z89.411 STATUS POST AMPUTATION OF RIGHT GREAT TOE: Primary | ICD-10-CM

## 2025-06-10 PROCEDURE — 99999 PR PBB SHADOW E&M-EST. PATIENT-LVL II: CPT | Mod: PBBFAC,,, | Performed by: PODIATRIST

## 2025-06-10 NOTE — PROGRESS NOTES
Subjective:       Patient ID: Suellen Campos is a 65 y.o. female.    Chief Complaint: Post-op Evaluation    HPI: Suellen Campos presents clinic status post right great toe amputation due to acute infection and open wound with advancing cellulitis of the toe.  States having 0/10 pain currently postoperatively.  Tolerated antibiotics well.  Recently returned from trip to [facility].  Presents to clinic today with  present.    Review of patient's allergies indicates:  No Known Allergies    Past Medical History:   Diagnosis Date    Chronic pain     COVID-19     Depression     Diabetes mellitus     Diabetes mellitus, type 2     Hyperlipidemia     Hypertension     Neuropathy     Osteoarthritis     Spinal stenosis        Family History   Problem Relation Name Age of Onset    Diabetes Mellitus Mother      Hypertension Mother      Pulmonary fibrosis Father      Hypertension Sister      Diabetes Sister      Pulmonary fibrosis Brother         Social History[1]    Past Surgical History:   Procedure Laterality Date    HYSTERECTOMY      INCISION AND DRAINAGE FOOT Left 5/9/2021    Procedure: INCISION AND DRAINAGE, FOOT;  Surgeon: Shelby Stephen DPM;  Location: [facility] OR;  Service: Podiatry;  Laterality: Left;  GREAT TOE OF LEFT FOOT    SPINAL CORD STIMULATOR IMPLANT      TOE AMPUTATION Left 5/9/2021    Procedure: AMPUTATION, TOE;  Surgeon: Shelby Stephen DPM;  Location: [facility] OR;  Service: Podiatry;  Laterality: Left;  LEFT GREAT     TOE AMPUTATION Left 6/8/2023    Procedure: AMPUTATION, TOE;  Surgeon: Sehlby Stephen DPM;  Location: [facility] OR;  Service: Podiatry;  Laterality: Left;    TOE AMPUTATION Right 5/22/2025    Procedure: AMPUTATION, TOE;  Surgeon: Shelby Stephen DPM;  Location: [facility] OR;  Service: Podiatry;  Laterality: Right;  great toe    TONSILLECTOMY      TOTAL ABDOMINAL HYSTERECTOMY W/ BILATERAL SALPINGOOPHORECTOMY         Review of Systems       Objective:   There were no vitals taken for this  visit.    X-Ray Foot Complete Right  Narrative: EXAMINATION:  XR FOOT COMPLETE 3 VIEW RIGHT    CLINICAL HISTORY:  . Type 2 diabetes mellitus with foot ulcer    TECHNIQUE:  AP, lateral, and oblique views of the foot were performed.    COMPARISON:  07/14/2024    FINDINGS:  There is bandaging material surrounding the toes with what appears to represent disruption of the cortex of the distal phalanx of the 2nd digit and possibly the 3rd digit as well.  Bandaging material makes it difficult to evaluate bony detail.  Osteomyelitis is suspected.  Hammertoe deformities of the 2nd and 3rd digits also noted and possibly the 4th digit as well.  A plantar calcaneal enthesophyte is present.  Mild-to-moderate degenerative changes noted in the region of the midfoot.  Impression: As above    Electronically signed by: Jacob Man DO  Date:    08/08/2024  Time:    14:39       Physical Exam   LOWER EXTREMITY PHYSICAL EXAMINATION    Vascular:  The right dorsalis pedis pulse 2/4 and the right posterior tibial pulse 2/4.  The left dorsalis pedis pulse 2/4 and posterior tibial pulse on the left is 2/4.  Capillary refill is intact.  Pedal hair growth intact    Neurological: Sensation to light touch is absent at baseline. Proprioception is intact. Sensation to pin prick is decreased  No numbness or tingling identified.    Musculoskeletal:  Status post right great toe amputation    Dermatological:  Steri-Strips/sutures are intact. There is no signs of increased edema or erythema noted. The skin is well approximated and coapting.  No signs of postoperative infection.  Skin lines are present to the level affected lower extremity as result of decreased edema    Imaging:       Results for orders placed during the hospital encounter of 05/29/23    X-Ray Foot Complete Left    Narrative  EXAMINATION:  XR FOOT COMPLETE 3 VIEW LEFT    CLINICAL HISTORY:  .  Pain in left foot    TECHNIQUE:  AP, lateral and oblique views of the left foot were  performed.    COMPARISON:  07/14/2021    FINDINGS:  Amputation changes noted at the 1st MTP joint.  Chronic nonunited fracture deformity at the 2nd metatarsal.  Similar chronic fracture deformity with erosion changes of the 3rd proximal phalanx.  Similar midfoot arthritic findings present including subcortical cystic change.  Early Charcot joint not excluded.  Pes planus.  No focal soft tissue abnormality.    Impression  As above      Electronically signed by: Tristen Vincent MD  Date:    05/29/2023  Time:    10:42       Results for orders placed during the hospital encounter of 08/08/24    X-Ray Foot Complete Right    Narrative  EXAMINATION:  XR FOOT COMPLETE 3 VIEW RIGHT    CLINICAL HISTORY:  . Type 2 diabetes mellitus with foot ulcer    TECHNIQUE:  AP, lateral, and oblique views of the foot were performed.    COMPARISON:  07/14/2024    FINDINGS:  There is bandaging material surrounding the toes with what appears to represent disruption of the cortex of the distal phalanx of the 2nd digit and possibly the 3rd digit as well.  Bandaging material makes it difficult to evaluate bony detail.  Osteomyelitis is suspected.  Hammertoe deformities of the 2nd and 3rd digits also noted and possibly the 4th digit as well.  A plantar calcaneal enthesophyte is present.  Mild-to-moderate degenerative changes noted in the region of the midfoot.    Impression  As above      Electronically signed by: Jacob Man DO  Date:    08/08/2024  Time:    14:39      Assessment:     1. Status post amputation of right great toe    2. H/O amputation of lesser toe, left    3. History of amputation of left great toe    4. Type 2 diabetes mellitus with peripheral neuropathy      Plan:     Status post amputation of right great toe    H/O amputation of lesser toe, left    History of amputation of left great toe    Type 2 diabetes mellitus with peripheral neuropathy      Thorough discussion is had with the patient today, concerning the diagnosis,  its etiology, and the treatment algorithm at present.    Sutures were removed today without complication.  Skin is completely healed.  Okay to allow the foot to get wet.  No scrubbing the incision for proximally 1-2 weeks.     Thorough discussion is had with the patient concerning the diagnosis, its etiology, and the treatment algorithm at present. Shoe inspection. Foot Education. Patient reminded of the importance of good nutrition and blood sugar control to help prevent podiatric complications of diabetes. Patient instructed on proper foot hygeine. We discussed wearing proper and supportive shoe gear, daily foot inspections, never walking barefooted or sock footed, never putting sharp instruments to feet which can cause major complications associated with infection, ulcers, lacerations.      Continue to reinforce with the patient the importance of calling immediately if there is any noted complications or sudden changes to the patient's condition.  We discuss red flag risk factors of things to continue to monitor for.  Patient relates understanding as discussed    No future appointments.            [1]   Social History  Socioeconomic History    Marital status:    Tobacco Use    Smoking status: Never    Smokeless tobacco: Never   Substance and Sexual Activity    Alcohol use: Never    Drug use: Never    Sexual activity: Yes     Partners: Male     Social Drivers of Health     Financial Resource Strain: Low Risk  (10/29/2021)    Overall Financial Resource Strain (CARDIA)     Difficulty of Paying Living Expenses: Not very hard   Food Insecurity: No Food Insecurity (10/29/2021)    Hunger Vital Sign     Worried About Running Out of Food in the Last Year: Never true     Ran Out of Food in the Last Year: Never true   Transportation Needs: No Transportation Needs (10/29/2021)    PRAPARE - Transportation     Lack of Transportation (Medical): No     Lack of Transportation (Non-Medical): No   Physical Activity: Inactive  (10/29/2021)    Exercise Vital Sign     Days of Exercise per Week: 0 days     Minutes of Exercise per Session: 0 min   Stress: No Stress Concern Present (10/29/2021)    [place] [employer] - Occupational Stress Questionnaire     Feeling of Stress : Not at all   Housing Stability: Unknown (10/29/2021)    Housing Stability Vital Sign     Unable to Pay for Housing in the Last Year: No     Unstable Housing in the Last Year: No

## (undated) DEVICE — SUT VICRYL CTD 2-0 GI 27 SH

## (undated) DEVICE — STOCKINET TUBULAR 1 PLY 6X60IN

## (undated) DEVICE — DRAPE SURG W/TWL 17 5/8X23

## (undated) DEVICE — PACK BASIC SETUP SC BR

## (undated) DEVICE — SEE MEDLINE ITEM 146298

## (undated) DEVICE — INTERPULSE SET

## (undated) DEVICE — SUT ETHILON 3-0 PS2 18 BLK

## (undated) DEVICE — GLOVE 8 PROTEXIS PI ORTHO

## (undated) DEVICE — GOWN POLY REINF BRTH SLV XL

## (undated) DEVICE — NDL BLNT STD 1.5IN 18G

## (undated) DEVICE — TOURNIQUET SB QC DP 18X4IN

## (undated) DEVICE — BLADE SURG #15 CARBON STEEL

## (undated) DEVICE — SEE MEDLINE ITEM 152622

## (undated) DEVICE — COVER LIGHT HANDLE 80/CA

## (undated) DEVICE — MANIFOLD 4 PORT

## (undated) DEVICE — CONTAINER SPECIMEN OR STER 4OZ

## (undated) DEVICE — ELECTRODE REM PLYHSV RETURN 9

## (undated) DEVICE — DECANTER VIAL ASEPTIC TRANSFER

## (undated) DEVICE — GLOVE SURG BIOGEL LATEX SZ 7.5

## (undated) DEVICE — PAD ABD 8X10 STERILE

## (undated) DEVICE — SEE MEDLINE ITEM 146372

## (undated) DEVICE — SPONGE COTTON TRAY 4X4IN

## (undated) DEVICE — SYR 3CC LUER LOC

## (undated) DEVICE — SUT VICRYL 4-0 27 SH

## (undated) DEVICE — SUT ETHILON 2-0 BLK PS-2

## (undated) DEVICE — COVER OVERHEAD SURG LT BLUE

## (undated) DEVICE — SEE MEDLINE ITEM 146308

## (undated) DEVICE — SEE MEDLINE ITEM 157117

## (undated) DEVICE — APPLICATOR CHLORAPREP ORN 26ML

## (undated) DEVICE — DRESSING N ADH OIL EMUL 3X3

## (undated) DEVICE — CONTAINER SPECIMEN STRL 4OZ

## (undated) DEVICE — BANDAGE MATRIX HK LOOP 4IN 5YD

## (undated) DEVICE — SHOE POST-OP VEL CLOS W/MD

## (undated) DEVICE — SPONGE DERMACEA GAUZE 4X4

## (undated) DEVICE — SEE MEDLINE ITEM 154981

## (undated) DEVICE — SUT VICRYL 3-0 8-18 PS-1

## (undated) DEVICE — HEMOSTAT SURGICEL 2X3IN

## (undated) DEVICE — BLADE SURG CARBON STEEL #10

## (undated) DEVICE — BANDAGE ESMARK ELASTIC ST 4X9

## (undated) DEVICE — GLOVE SENSICARE PI GRN 8.5

## (undated) DEVICE — DRESSING XEROFORM FOIL PK 1X8

## (undated) DEVICE — TOWEL OR DISP STRL BLUE 4/PK

## (undated) DEVICE — NDL HYPO SAFETY 25GX1.5IN

## (undated) DEVICE — SEE MEDLINE ITEM 152522

## (undated) DEVICE — SYR 10CC LUER LOCK

## (undated) DEVICE — BANDAGE ROLL COTTN 4.5INX4.1YD

## (undated) DEVICE — NDL HYPODERMIC BLUNT 18G 1.5IN

## (undated) DEVICE — PAD CAST SPECIALIST STRL 4

## (undated) DEVICE — SUT 4-0 ETHILON 18 PS-2

## (undated) DEVICE — SEE MEDLINE ITEM 157027

## (undated) DEVICE — SUPPORT ULNA NERVE PROTECTOR

## (undated) DEVICE — NDL ECLIPSE SAF REG 25GX1.5IN

## (undated) DEVICE — BLADE SCALP OPHTL RND TIP

## (undated) DEVICE — SOL 9P NACL IRR PIC IL

## (undated) DEVICE — GAUZE SPONGE 4X4 12PLY

## (undated) DEVICE — DRAPE T EXTRM SURG 121X128X90

## (undated) DEVICE — GLOVE SIGNATURE ESSNTL LTX 8

## (undated) DEVICE — POSITIONER HEAD DONUT 9IN FOAM

## (undated) DEVICE — DRAPE THREE-QUARTER 53X77IN

## (undated) DEVICE — SHEET DRAPE FAN-FOLDED 3/4

## (undated) DEVICE — SUT ETHILON 4-0 PS2 18 BLK

## (undated) DEVICE — SOL NACL IRR 1000ML BTL

## (undated) DEVICE — SUT MONOCRYL 4.0 PS2 CP496G

## (undated) DEVICE — HEADREST ROUND DISP FOAM 9IN

## (undated) DEVICE — DRAPE INVISISHIELD TWL 18X24IN

## (undated) DEVICE — SUT VICRYL PLUS 3-0 PS2 18

## (undated) DEVICE — NDL SAFETY 25G X 1.5 ECLIPSE

## (undated) DEVICE — SWAB CULTURETTE II DUAL

## (undated) DEVICE — DRESSING XEROFORM 1X8IN